# Patient Record
Sex: MALE | Race: BLACK OR AFRICAN AMERICAN | Employment: FULL TIME | URBAN - METROPOLITAN AREA
[De-identification: names, ages, dates, MRNs, and addresses within clinical notes are randomized per-mention and may not be internally consistent; named-entity substitution may affect disease eponyms.]

---

## 2017-01-23 DIAGNOSIS — I10 ESSENTIAL HYPERTENSION WITH GOAL BLOOD PRESSURE LESS THAN 130/80: ICD-10-CM

## 2017-01-24 RX ORDER — HYDROCHLOROTHIAZIDE 12.5 MG/1
TABLET ORAL
Qty: 30 TAB | Refills: 0 | Status: SHIPPED | OUTPATIENT
Start: 2017-01-24 | End: 2017-02-27 | Stop reason: SDUPTHER

## 2017-01-24 RX ORDER — AMLODIPINE BESYLATE 10 MG/1
TABLET ORAL
Qty: 30 TAB | Refills: 0 | Status: SHIPPED | OUTPATIENT
Start: 2017-01-24 | End: 2017-02-27 | Stop reason: SDUPTHER

## 2017-01-24 RX ORDER — LINAGLIPTIN AND METFORMIN HYDROCHLORIDE 2.5; 1 MG/1; MG/1
TABLET, FILM COATED ORAL
Qty: 60 TAB | Refills: 0 | Status: SHIPPED | OUTPATIENT
Start: 2017-01-24 | End: 2017-02-27 | Stop reason: SDUPTHER

## 2017-01-24 RX ORDER — ROSUVASTATIN CALCIUM 10 MG/1
TABLET, FILM COATED ORAL
Qty: 30 TAB | Refills: 0 | Status: SHIPPED | OUTPATIENT
Start: 2017-01-24 | End: 2017-02-27 | Stop reason: SDUPTHER

## 2017-03-06 ENCOUNTER — OFFICE VISIT (OUTPATIENT)
Dept: INTERNAL MEDICINE CLINIC | Age: 52
End: 2017-03-06

## 2017-03-06 VITALS
HEIGHT: 69 IN | OXYGEN SATURATION: 97 % | BODY MASS INDEX: 33.92 KG/M2 | SYSTOLIC BLOOD PRESSURE: 134 MMHG | TEMPERATURE: 98.5 F | RESPIRATION RATE: 16 BRPM | HEART RATE: 92 BPM | DIASTOLIC BLOOD PRESSURE: 97 MMHG | WEIGHT: 229 LBS

## 2017-03-06 DIAGNOSIS — J11.1 INFLUENZA-LIKE ILLNESS: Primary | ICD-10-CM

## 2017-03-06 DIAGNOSIS — E78.2 MIXED HYPERLIPIDEMIA: ICD-10-CM

## 2017-03-06 DIAGNOSIS — R05.9 COUGH: ICD-10-CM

## 2017-03-06 DIAGNOSIS — E11.9 TYPE 2 DIABETES MELLITUS WITHOUT COMPLICATION, WITHOUT LONG-TERM CURRENT USE OF INSULIN (HCC): ICD-10-CM

## 2017-03-06 DIAGNOSIS — I10 ESSENTIAL HYPERTENSION: ICD-10-CM

## 2017-03-06 LAB
FLUAV+FLUBV AG NOSE QL IA.RAPID: NEGATIVE POS/NEG
FLUAV+FLUBV AG NOSE QL IA.RAPID: NEGATIVE POS/NEG
VALID INTERNAL CONTROL?: YES

## 2017-03-06 NOTE — PROGRESS NOTES
Room 14    Chief Complaint   Patient presents with    Cough    Generalized Body Aches     started with fever on Wed. of last week. Has been taking cloraciden for cold symptoms. Patient did not take bp medications on Thurs. and Friday, has since  prescription on Sat. and resumed regime. 1. Have you been to the ER, urgent care clinic since your last visit? Hospitalized since your last visit? No    2. Have you seen or consulted any other health care providers outside of the [address] [place] [address] since your last visit? Include any pap smears or colon screening. No     Health Maintenance Due   Topic Date Due    DTaP/Tdap/Td series (1 - Tdap) 06/30/1987    EYE EXAM RETINAL OR DILATED Q1  06/25/2015    FOBT Q 1 YEAR AGE 50-75  06/30/2016    INFLUENZA AGE 9 TO ADULT  08/01/2016    HEMOGLOBIN A1C Q6M  02/05/2017   Patient did not receive a flu shot for 2016. Patient has upcoming appt. with Ms. Kimberly Reyna on March 17th. Patient does not have a living will, he is looking into completing one. Information provided with AVS today.

## 2017-03-06 NOTE — LETTER
NOTIFICATION RETURN TO WORK / SCHOOL 
 
3/6/2017 10:10 AM 
 
Mr. Esteban De La Rosa [address] [facility] [id number] To Whom It May Concern: 
 
Esteban De La Rosa is currently under the care of [facility]. He will return to work/school on: 3/9/17 If there are questions or concerns please have the patient contact our office. Sincerely, Janet Wang MD

## 2017-03-06 NOTE — MR AVS SNAPSHOT
Visit Information Date & Time Provider Department Dept. Phone Encounter #  
 3/6/2017  9:45 AM Raul Hanna MD [facility] [phone] [id number] Follow-up Instructions Return in about 11 days (around 3/17/2017), or if symptoms worsen or fail to improve, for diabetes, blood pressure as scheduled. Your Appointments 3/17/2017  8:00 AM  
ROUTINE CARE with Milka Jaimes NP [facility] ([address] [facility]) Appt Note: f/u  
 [address] [facility] [address] [facility] [address]  
[facility] [address] [facility] [address] Upcoming Health Maintenance Date Due DTaP/Tdap/Td series (1 - Tdap) 6/30/1987 EYE EXAM RETINAL OR DILATED Q1 6/25/2015 FOBT Q 1 YEAR AGE 50-75 6/30/2016 INFLUENZA AGE 9 TO ADULT 8/1/2016 HEMOGLOBIN A1C Q6M 2/5/2017 FOOT EXAM Q1 8/5/2017 MICROALBUMIN Q1 8/5/2017 LIPID PANEL Q1 8/5/2017 Allergies as of 3/6/2017  Review Complete On: 3/6/2017 By: Raul Hanna MD  
 No Known Allergies Current Immunizations  Never Reviewed Name Date Pneumococcal Polysaccharide (PPSV-23) 8/5/2016 Not reviewed this visit You Were Diagnosed With   
  
 Codes Comments Influenza-like illness    -  Primary ICD-10-CM: R69 
ICD-9-CM: 799.89 Cough     ICD-10-CM: R05 ICD-9-CM: 577. 2 Type 2 diabetes mellitus without complication, without long-term current use of insulin (HCC)     ICD-10-CM: E11.9 ICD-9-CM: 250.00 Essential hypertension     ICD-10-CM: I10 
ICD-9-CM: 401.9 Mixed hyperlipidemia     ICD-10-CM: E78.2 ICD-9-CM: 272.2 Vitals BP Pulse Temp Resp Height(growth percentile) Weight(growth percentile) (!) 134/97 (BP 1 Location: Left arm, BP Patient Position: Sitting) 92 98.5 °F (36.9 °C) (Oral) 16 5' 9\" (1.753 m) 229 lb (103.9 kg) SpO2 BMI Smoking Status 97% 33.82 kg/m2 Never Smoker Vitals History BMI and BSA Data Body Mass Index Body Surface Area  
 33.82 kg/m 2 2.25 m 2 Preferred Pharmacy Pharmacy Name Phone [id number] [facility] [phone] Your Updated Medication List  
  
   
This list is accurate as of: 3/6/17 10:13 AM.  Always use your most recent med list.  
  
  
  
  
 albuterol 90 mcg/actuation inhaler Commonly known as:  PROVENTIL HFA, VENTOLIN HFA, PROAIR HFA Take 2 Puffs by inhalation every six (6) hours as needed for Wheezing. amLODIPine 10 mg tablet Commonly known as:  Patterson Cater TAKE 1 TAB BY MOUTH DAILY. APPLE CIDER VINEGAR PO Take  by mouth. CINNAMON 500 mg Cap Generic drug:  cinnamon bark Take 1,000 mg by mouth two (2) times a day. coenzyme Q-10 200 mg capsule Commonly known as:  CO Q-10 Take 1 Cap by mouth daily. CRESTOR 10 mg tablet Generic drug:  rosuvastatin TAKE 1 TABLET BY MOUTH EVERY DAY  
  
 glucose blood VI test strips strip Commonly known as:  Ascensia CONTOUR  
PATIENT IS TO CHECK BLOOD SUGAR TWICE DAILY. guaiFENesin-dextromethorphan -30 mg per tablet Commonly known as:  Roberto & Roberto DM Take 1 Tab by mouth every twelve (12) hours as needed for Cough or Congestion. hydroCHLOROthiazide 12.5 mg tablet Commonly known as:  HYDRODIURIL  
TAKE 1 TABLET BY MOUTH EVERY DAY  
  
 linagliptin-metFORMIN 2.5-1,000 mg per tablet Commonly known as:  Keke Lips Take 1 Tab by mouth two (2) times daily (with meals). APPOINTMENT REQUIRED Prescriptions Sent to Pharmacy Refills  
 guaiFENesin-dextromethorphan SR (MUCINEX DM) 600-30 mg per tablet 1 Sig: Take 1 Tab by mouth every twelve (12) hours as needed for Cough or Congestion.   
 Class: Normal  
 Pharmacy: [facility]/pharmacy #[id number]- [id number], [address] [place] [address] [facility] [facility] #: [phone] Route: Oral  
  
We Performed the Following AMB POC MARYLOU INFLUENZA A/B TEST [10882 CPT(R)] Follow-up Instructions Return in about 11 days (around 3/17/2017), or if symptoms worsen or fail to improve, for diabetes, blood pressure as scheduled. Patient Instructions Cely Richards 3384 What is a living will? A living will is a legal form you use to write down the kind of care you want at the end of your life. It is used by the health professionals who will treat you if you aren't able to decide for yourself. If you put your wishes in writing, your loved ones and others will know what kind of care you want. They won't need to guess. This can ease your mind and be helpful to others. A living will is not the same as an estate or property will. An estate will explains what you want to happen with your money and property after you die. Is a living will a legal document? A living will is a legal document. Each state has its own laws about living rondon. If you move to another state, make sure that your living will is legal in the state where you now live. Or you might use a universal form that has been approved by many states. This kind of form can sometimes be completed and stored online. Your electronic copy will then be available wherever you have a connection to the Internet. In most cases, doctors will respect your wishes even if you have a form from a different state. · You don't need an  to complete a living will. But legal advice can be helpful if your state's laws are unclear, your health history is complicated, or your family can't agree on what should be in your living will. · You can change your living will at any time. Some people find that their wishes about end-of-life care change as their health changes.  
· In addition to making a living will, think about completing a medical power of  form. This form lets you name the person you want to make end-of-life treatment decisions for you (your \"health care agent\") if you're not able to. Many hospitals and nursing homes will give you the forms you need to complete a living will and a medical power of . · Your living will is used only if you can't make or communicate decisions for yourself anymore. If you become able to make decisions again, you can accept or refuse any treatment, no matter what you wrote in your living will. · Your state may offer an online registry. This is a place where you can store your living will online so the doctors and nurses who need to treat you can find it right away. What should you think about when creating a living will? Talk about your end-of-life wishes with your family members and your doctor. Let them know what you want. That way the people making decisions for you won't be surprised by your choices. Think about these questions as you make your living will: · Do you know enough about life support methods that might be used? If not, talk to your doctor so you know what might be done if you can't breathe on your own, your heart stops, or you're unable to swallow. · What things would you still want to be able to do after you receive life-support methods? Would you want to be able to walk? To speak? To eat on your own? To live without the help of machines? · If you have a choice, where do you want to be cared for? In your home? At a hospital or nursing home? · Do you want certain Episcopal practices performed if you become very ill? · If you have a choice at the end of your life, where would you prefer to die? At home? In a hospital or nursing home? Somewhere else? · Would you prefer to be buried or cremated? · Do you want your organs to be donated after you die? What should you do with your living will?  
· Make sure that your family members and your health care agent have copies of your living will. · Give your doctor a copy of your living will to keep in your medical record. If you have more than one doctor, make sure that each one has a copy. · You may want to put a copy of your living will where it can be easily found. Where can you learn more? Go to http://rosi-shell.info/. Enter H710 in the search box to learn more about \"Learning About Living Nata. \" Current as of: February 24, 2016 Content Version: 11.1 © 6610-4541 [employer]. Care instructions adapted under license by [employer] (which disclaims liability or warranty for this information). If you have questions about a medical condition or this instruction, always ask your healthcare professional. [address] any warranty or liability for your use of this information. Introducing [facility] & HEALTH SERVICES! [facility] introduces [employer] patient portal. Now you can access parts of your medical record, email your doctor's office, and request medication refills online. 1. In your internet browser, go to [email or web address] 2. Click on the First Time User? Click Here link in the Sign In box. You will see the New Member Sign Up page. 3. Enter your [employer] Access Code exactly as it appears below. You will not need to use this code after youve completed the sign-up process. If you do not sign up before the expiration date, you must request a new code. · [employer] Access Code: [email or web address] Expires: 3/20/2017  8:47 PM 
 
4. Enter the last four digits of your Social Security Number (xxxx) and Date of Birth (mm/dd/yyyy) as indicated and click Submit. You will be taken to the next sign-up page. 5. Create a Smart Panelt ID. This will be your [employer] login ID and cannot be changed, so think of one that is secure and easy to remember. 6. Create a Smart Panelt password. You can change your password at any time. 7. Enter your Password Reset Question and Answer. This can be used at a later time if you forget your password. 8. Enter your e-mail address. You will receive e-mail notification when new information is available in [address] 9. Click Sign Up. You can now view and download portions of your medical record. 10. Click the Download Summary menu link to download a portable copy of your medical information. If you have questions, please visit the Frequently Asked Questions section of the Droid[employer] website. Remember, [employer] is NOT to be used for urgent needs. For medical emergencies, dial 911. Now available from your iPhone and Android! Please provide this summary of care documentation to your next provider. Your primary care clinician is listed as Herminia Gallardo. If you have any questions after today's visit, please call [phone].

## 2017-03-06 NOTE — PATIENT INSTRUCTIONS
Learning About Living Nata  What is a living will? A living will is a legal form you use to write down the kind of care you want at the end of your life. It is used by the health professionals who will treat you if you aren't able to decide for yourself. If you put your wishes in writing, your loved ones and others will know what kind of care you want. They won't need to guess. This can ease your mind and be helpful to others. A living will is not the same as an estate or property will. An estate will explains what you want to happen with your money and property after you die. Is a living will a legal document? A living will is a legal document. Each state has its own laws about living rondon. If you move to another state, make sure that your living will is legal in the state where you now live. Or you might use a universal form that has been approved by many states. This kind of form can sometimes be completed and stored online. Your electronic copy will then be available wherever you have a connection to the Internet. In most cases, doctors will respect your wishes even if you have a form from a different state. · You don't need an  to complete a living will. But legal advice can be helpful if your state's laws are unclear, your health history is complicated, or your family can't agree on what should be in your living will. · You can change your living will at any time. Some people find that their wishes about end-of-life care change as their health changes. · In addition to making a living will, think about completing a medical power of  form. This form lets you name the person you want to make end-of-life treatment decisions for you (your \"health care agent\") if you're not able to. Many hospitals and nursing homes will give you the forms you need to complete a living will and a medical power of .   · Your living will is used only if you can't make or communicate decisions for yourself anymore. If you become able to make decisions again, you can accept or refuse any treatment, no matter what you wrote in your living will. · Your state may offer an online registry. This is a place where you can store your living will online so the doctors and nurses who need to treat you can find it right away. What should you think about when creating a living will? Talk about your end-of-life wishes with your family members and your doctor. Let them know what you want. That way the people making decisions for you won't be surprised by your choices. Think about these questions as you make your living will:  · Do you know enough about life support methods that might be used? If not, talk to your doctor so you know what might be done if you can't breathe on your own, your heart stops, or you're unable to swallow. · What things would you still want to be able to do after you receive life-support methods? Would you want to be able to walk? To speak? To eat on your own? To live without the help of machines? · If you have a choice, where do you want to be cared for? In your home? At a hospital or nursing home? · Do you want certain Adventist practices performed if you become very ill? · If you have a choice at the end of your life, where would you prefer to die? At home? In a hospital or nursing home? Somewhere else? · Would you prefer to be buried or cremated? · Do you want your organs to be donated after you die? What should you do with your living will? · Make sure that your family members and your health care agent have copies of your living will. · Give your doctor a copy of your living will to keep in your medical record. If you have more than one doctor, make sure that each one has a copy. · You may want to put a copy of your living will where it can be easily found. Where can you learn more? Go to http://rosi-shell.info/.   Enter M926 in the search box to learn more about \"Learning About Living Perroy. \"  Current as of: February 24, 2016  Content Version: 11.1  © 2790-2082 [employer]. Care instructions adapted under license by [employer] (which disclaims liability or warranty for this information). If you have questions about a medical condition or this instruction, always ask your healthcare professional. [address] any warranty or liability for your use of this information.

## 2017-03-06 NOTE — PROGRESS NOTES
HISTORY OF PRESENT ILLNESS  Kandy Richter is a 49 y.o. male. HPI  Presents for acute care    Several days of fever, chills, cough, HAs, body aches  Started 5-6 days ago    Now increased thickened secretions and sputum production    Sister staying with him and she has had similar sx    OTC meds have helped some     Fair PO intake, drinking fluids    Past medical, Social, and Family history reviewed  Medications reviewed and updated. ROS  Complete ROS reviewed and negative or stable except as noted in HPI. Physical Exam   Constitutional: He is oriented to person, place, and time. He appears well-nourished. No distress. HENT:   Head: Normocephalic and atraumatic. Eyes: EOM are normal. Pupils are equal, round, and reactive to light. No scleral icterus. Neck: Normal range of motion. Neck supple. Cardiovascular: Normal rate, regular rhythm and normal heart sounds. Exam reveals no gallop and no friction rub. No murmur heard. Pulmonary/Chest: Effort normal and breath sounds normal. No respiratory distress. He has no wheezes. He has no rales. Abdominal: Soft. He exhibits no distension. There is no tenderness. Musculoskeletal: Normal range of motion. He exhibits no edema. Neurological: He is alert and oriented to person, place, and time. He exhibits normal muscle tone. Skin: Skin is warm. No rash noted. Psychiatric: He has a normal mood and affect. Nursing note and vitals reviewed. Prior labs reviewed. ASSESSMENT and PLAN    ICD-10-CM ICD-9-CM    1. Influenza-like illness R69 799.89 guaiFENesin-dextromethorphan SR (MUCINEX DM) 600-30 mg per tablet   2. Cough R05 786.2 AMB POC MARYLOU INFLUENZA A/B TEST      guaiFENesin-dextromethorphan SR (MUCINEX DM) 600-30 mg per tablet      CANCELED: AMB POC RAPID INFLUENZA TEST   3. Type 2 diabetes mellitus without complication, without long-term current use of insulin (HCC) E11.9 250.00    4. Essential hypertension I10 401.9    5.  Mixed hyperlipidemia E78.2 272.2      Follow-up Disposition:  Return in about 11 days (around 3/17/2017), or if symptoms worsen or fail to improve, for diabetes, blood pressure as scheduled.    results and schedule of future studies reviewed with patient  reviewed diet and weight   reviewed medications and side effects in detail   Has routine f/u on 3/17/17  Encouraged mucinex  Stay hydrated

## 2017-03-17 ENCOUNTER — OFFICE VISIT (OUTPATIENT)
Dept: INTERNAL MEDICINE CLINIC | Age: 52
End: 2017-03-17

## 2017-03-17 VITALS
HEART RATE: 90 BPM | DIASTOLIC BLOOD PRESSURE: 85 MMHG | BODY MASS INDEX: 34.04 KG/M2 | HEIGHT: 69 IN | WEIGHT: 229.8 LBS | TEMPERATURE: 98.1 F | RESPIRATION RATE: 18 BRPM | SYSTOLIC BLOOD PRESSURE: 132 MMHG | OXYGEN SATURATION: 95 %

## 2017-03-17 DIAGNOSIS — E78.2 MIXED HYPERLIPIDEMIA: ICD-10-CM

## 2017-03-17 DIAGNOSIS — N52.9 ERECTILE DYSFUNCTION, UNSPECIFIED ERECTILE DYSFUNCTION TYPE: ICD-10-CM

## 2017-03-17 DIAGNOSIS — E11.69 TYPE 2 DIABETES MELLITUS WITH OTHER SPECIFIED COMPLICATION (HCC): Primary | ICD-10-CM

## 2017-03-17 DIAGNOSIS — I10 ESSENTIAL HYPERTENSION: ICD-10-CM

## 2017-03-17 DIAGNOSIS — Z12.5 PROSTATE CANCER SCREENING: ICD-10-CM

## 2017-03-17 LAB
BILIRUB UR QL STRIP: NEGATIVE
GLUCOSE UR-MCNC: NEGATIVE MG/DL
HBA1C MFR BLD HPLC: 8.6 % (ref 4.8–5.6)
KETONES P FAST UR STRIP-MCNC: NEGATIVE MG/DL
PH UR STRIP: 5 [PH] (ref 4.6–8)
PROT UR QL STRIP: NEGATIVE MG/DL
SP GR UR STRIP: 1.02 (ref 1–1.03)
UA UROBILINOGEN AMB POC: NORMAL (ref 0.2–1)
URINALYSIS CLARITY POC: CLEAR
URINALYSIS COLOR POC: YELLOW
URINE BLOOD POC: NEGATIVE
URINE LEUKOCYTES POC: NEGATIVE
URINE NITRITES POC: NEGATIVE

## 2017-03-17 RX ORDER — SILDENAFIL 100 MG/1
100 TABLET, FILM COATED ORAL AS NEEDED
Qty: 6 TAB | Refills: 0 | Status: SHIPPED | OUTPATIENT
Start: 2017-03-17 | End: 2018-03-07

## 2017-03-17 RX ORDER — PIOGLITAZONEHYDROCHLORIDE 15 MG/1
15 TABLET ORAL DAILY
Qty: 30 TAB | Refills: 3 | Status: SHIPPED | OUTPATIENT
Start: 2017-03-17 | End: 2017-08-08 | Stop reason: SDUPTHER

## 2017-03-17 NOTE — PROGRESS NOTES
HISTORY OF PRESENT ILLNESS  Francesca Sainz is a 49 y.o. male. HPI  Was seen by Dr. Jarrett Schafer for flu like symptoms March 1; negative rapid flu This is second consecutive year with similar symptoms. Plans to get influenza vaccine next year    Fasting blood surgars 130's-140's. Not eating breakfast,     Exercising at gym 5 days weekly, walking 1 mile a day, no loger lifting weight    Chronic poor sleep. ~ 4 hours daily    Did not take CoQ10 recommed but no longer has leg cramps      Takes 1 tablesppon vinegar in AM    Eye exam up to date. [employer] [facility]    Past Medical History:   Diagnosis Date    Arthritis     Borderline diabetic     Diabetes ([facility] [address]     Family history of premature CAD     Hypertension        Current Outpatient Prescriptions on File Prior to Visit   Medication Sig Dispense Refill    APPLE CIDER VINEGAR PO Take  by mouth.  guaiFENesin-dextromethorphan SR (MUCINEX DM) 600-30 mg per tablet Take 1 Tab by mouth every twelve (12) hours as needed for Cough or Congestion. 30 Tab 1    amLODIPine (NORVASC) 10 mg tablet TAKE 1 TAB BY MOUTH DAILY. 30 Tab 0    hydroCHLOROthiazide (HYDRODIURIL) 12.5 mg tablet TAKE 1 TABLET BY MOUTH EVERY DAY 30 Tab 0    CRESTOR 10 mg tablet TAKE 1 TABLET BY MOUTH EVERY DAY 30 Tab 0    linagliptin-metFORMIN (JENTADUETO) 2.5-1,000 mg per tablet Take 1 Tab by mouth two (2) times daily (with meals). APPOINTMENT REQUIRED 60 Tab 0    coenzyme Q-10 (CO Q-10) 200 mg capsule Take 1 Cap by mouth daily. 30 Cap 11    glucose blood VI test strips (ASCENSIA CONTOUR) strip PATIENT IS TO CHECK BLOOD SUGAR TWICE DAILY. 100 Strip 11    cinnamon bark (CINNAMON) 500 mg cap Take 1,000 mg by mouth two (2) times a day.  albuterol (PROVENTIL HFA, VENTOLIN HFA, PROAIR HFA) 90 mcg/actuation inhaler Take 2 Puffs by inhalation every six (6) hours as needed for Wheezing. 1 Inhaler 0     No current facility-administered medications on file prior to visit.       Review of Systems Constitutional: Negative for malaise/fatigue and weight loss. HENT: Negative. Eyes: Negative for blurred vision. Respiratory: Negative. Cardiovascular: Negative. Gastrointestinal: Negative. Genitourinary: Negative. Musculoskeletal: Negative. Neurological: Negative. Psychiatric/Behavioral: The patient has insomnia. Physical Exam   Constitutional: He is oriented to person, place, and time. He appears well-developed and well-nourished. No distress. HENT:   Right Ear: External ear normal.   Left Ear: External ear normal.   Nose: Nose normal.   Mouth/Throat: Oropharynx is clear and moist. No oropharyngeal exudate. Eyes: Conjunctivae are normal. Right eye exhibits no discharge. Left eye exhibits no discharge. Neck: No thyromegaly present. Cardiovascular: Normal rate, regular rhythm and normal heart sounds. Pulmonary/Chest: Effort normal and breath sounds normal.   Musculoskeletal: He exhibits no edema, tenderness or deformity. Lymphadenopathy:     He has no cervical adenopathy. Neurological: He is alert and oriented to person, place, and time. No cranial nerve deficit. Skin: Skin is warm and dry. He is not diaphoretic. Psychiatric: He has a normal mood and affect. His behavior is normal. Judgment and thought content normal.   Diabetic foot exam:     Left:    Pulse DP: 2+ (normal)   Pulse PT: trace   Deformities: None  Right:    Pulse DP: 2+ (normal)   Pulse PT: trace   Deformities: None    ASSESSMENT and PLAN    ICD-10-CM ICD-9-CM    1. Type 2 diabetes mellitus with other specified complication ([facility]) W03.74 250.80 AMB POC HEMOGLOBIN A1C      AMB POC URINALYSIS DIP STICK AUTO W/ MICRO      METABOLIC PANEL, COMPREHENSIVE      pioglitazone (ACTOS) 15 mg tablet      CBC WITH AUTOMATED DIFF   2. Essential hypertension W35 601.7 METABOLIC PANEL, COMPREHENSIVE   3. Mixed hyperlipidemia E78.2 272.2 LIPID PANEL      METABOLIC PANEL, COMPREHENSIVE   4.  Erectile dysfunction, unspecified erectile dysfunction type N52.9 607.84 sildenafil citrate (VIAGRA) 100 mg tablet   5. Prostate cancer screening Z12.5 V76.44 PSA W/ REFLX FREE PSA     Follow-up Disposition:  Return in about 3 months (around 6/17/2017) for diabetes, htn, hyperlipidemia.  lab results and schedule of future lab studies reviewed with patient  reviewed diet, exercise and weight control  reviewed medications and side effects in detail  specific diabetic recommendations: low cholesterol diet, weight control and daily exercise discussed, home glucose monitoring emphasized, all medications, side effects and compliance discussed carefully, glycohemoglobin and other lab monitoring discussed and long term diabetic complications discussed  use of aspirin to prevent MI and TIA's discussed    Hemoglobin A1 8.6% was 8.1%. Add Actos 15 mg daily. Continue current medications.  Reinforce lifestyle management

## 2017-03-17 NOTE — PROGRESS NOTES
RM#7  Chief Complaint   Patient presents with    Follow-up     Diabetes, flue last week     Results for orders placed or performed in visit on 03/17/17   AMB POC HEMOGLOBIN A1C   Result Value Ref Range    Hemoglobin A1c (POC) 8.6 (A) 4.8 - 5.6 %   AMB POC URINALYSIS DIP STICK AUTO W/ MICRO   Result Value Ref Range    Color (UA POC) Yellow     Clarity (UA POC) Clear     Glucose (UA POC) Negative Negative    Bilirubin (UA POC) Negative Negative    Ketones (UA POC) Negative Negative    Specific gravity (UA POC) 1.020 1.001 - 1.035    Blood (UA POC) Negative Negative    pH (UA POC) 5.0 4.6 - 8.0    Protein (UA POC) Negative Negative mg/dL    Urobilinogen (UA POC) 0.2 mg/dL 0.2 - 1    Nitrites (UA POC) Negative Negative    Leukocyte esterase (UA POC) Negative Negative       1. Have you been to the ER, urgent care clinic since your last visit? Hospitalized since your last visit? No    2. Have you seen or consulted any other health care providers outside of the [address] [place] [address] since your last visit? Include any pap smears or colon screening.  No

## 2017-03-17 NOTE — MR AVS SNAPSHOT
Visit Information Date & Time Provider Department Dept. Phone Encounter #  
 3/17/2017  8:00 AM Shelley Barrera [phone] Pediatrics and Internal Medicine [phone] [id number] Follow-up Instructions Return in about 3 months (around 6/17/2017) for diabetes, htn, hyperlipidemia. Upcoming Health Maintenance Date Due DTaP/Tdap/Td series (1 - Tdap) 6/30/1987 EYE EXAM RETINAL OR DILATED Q1 6/25/2015 FOBT Q 1 YEAR AGE 50-75 6/30/2016 INFLUENZA AGE 9 TO ADULT 8/1/2016 MICROALBUMIN Q1 8/5/2017 LIPID PANEL Q1 8/5/2017 HEMOGLOBIN A1C Q6M 9/17/2017 FOOT EXAM Q1 3/17/2018 Allergies as of 3/17/2017  Review Complete On: 3/17/2017 By: Betito Mota No Known Allergies Current Immunizations  Never Reviewed Name Date Pneumococcal Polysaccharide (PPSV-23) 8/5/2016 Not reviewed this visit You Were Diagnosed With   
  
 Codes Comments Type 2 diabetes mellitus with other specified complication (HCC)    -  Primary ICD-10-CM: E11.69 ICD-9-CM: 250.80 Erectile dysfunction, unspecified erectile dysfunction type     ICD-10-CM: N52.9 ICD-9-CM: 607.84 Essential hypertension     ICD-10-CM: I10 
ICD-9-CM: 401.9 Prostate cancer screening     ICD-10-CM: Z12.5 ICD-9-CM: V76.44 Mixed hyperlipidemia     ICD-10-CM: E78.2 ICD-9-CM: 272.2 Vitals BP Pulse Temp Resp Height(growth percentile) Weight(growth percentile) 132/85 (BP 1 Location: Left arm, BP Patient Position: Sitting) 90 98.1 °F (36.7 °C) (Oral) 18 5' 9.02\" (1.753 m) 229 lb 12.8 oz (104.2 kg) SpO2 BMI Smoking Status 95% 33.92 kg/m2 Never Smoker BMI and BSA Data Body Mass Index Body Surface Area  
 33.92 kg/m 2 2.25 m 2 Preferred Pharmacy Pharmacy Name Phone [facility]/PHARMACY #[id number] [facility] [phone] Your Updated Medication List  
  
   
 This list is accurate as of: 3/17/17  8:45 AM.  Always use your most recent med list.  
  
  
  
  
 albuterol 90 mcg/actuation inhaler Commonly known as:  PROVENTIL HFA, VENTOLIN HFA, PROAIR HFA Take 2 Puffs by inhalation every six (6) hours as needed for Wheezing. amLODIPine 10 mg tablet Commonly known as:  Suzon Salle TAKE 1 TAB BY MOUTH DAILY. APPLE CIDER VINEGAR PO Take  by mouth. CINNAMON 500 mg Cap Generic drug:  cinnamon bark Take 1,000 mg by mouth two (2) times a day. coenzyme Q-10 200 mg capsule Commonly known as:  CO Q-10 Take 1 Cap by mouth daily. CRESTOR 10 mg tablet Generic drug:  rosuvastatin TAKE 1 TABLET BY MOUTH EVERY DAY  
  
 glucose blood VI test strips strip Commonly known as:  Ascensia CONTOUR  
PATIENT IS TO CHECK BLOOD SUGAR TWICE DAILY. guaiFENesin-dextromethorphan -30 mg per tablet Commonly known as:  Roberto & Roberto DM Take 1 Tab by mouth every twelve (12) hours as needed for Cough or Congestion. hydroCHLOROthiazide 12.5 mg tablet Commonly known as:  HYDRODIURIL  
TAKE 1 TABLET BY MOUTH EVERY DAY  
  
 linagliptin-metFORMIN 2.5-1,000 mg per tablet Commonly known as:  Zannie Libby Take 1 Tab by mouth two (2) times daily (with meals). APPOINTMENT REQUIRED  
  
 pioglitazone 15 mg tablet Commonly known as:  ACTOS Take 1 Tab by mouth daily. sildenafil citrate 100 mg tablet Commonly known as:  VIAGRA Take 1 Tab by mouth as needed. Prescriptions Sent to Pharmacy Refills  
 pioglitazone (ACTOS) 15 mg tablet 3 Sig: Take 1 Tab by mouth daily. Class: Normal  
 Pharmacy: [place] [address] [address] [place] [address] Ph #: [phone] Route: Oral  
 sildenafil citrate (VIAGRA) 100 mg tablet 0 Sig: Take 1 Tab by mouth as needed.   
 Class: Normal  
 Pharmacy: [facility]/pharmacy #[id number], [address] [place] [address] [facility] #: [phone] Route: Oral  
  
We Performed the Following AMB POC HEMOGLOBIN A1C [27047 CPT(R)] AMB POC URINALYSIS DIP STICK AUTO W/ MICRO [07244 CPT(R)] CBC WITH AUTOMATED DIFF [12579 CPT(R)] LIPID PANEL [20242 CPT(R)] METABOLIC PANEL, COMPREHENSIVE [30319 CPT(R)] PSA W/ REFLX FREE PSA [74149 CPT(R)] Follow-up Instructions Return in about 3 months (around 6/17/2017) for diabetes, htn, hyperlipidemia. Introducing [facility] & HEALTH SERVICES! Rola Brown introduces [employer]unity patient portal. Now you can access parts of your medical record, email your doctor's office, and request medication refills online. 1. In your internet browser, go to [email or web address] 2. Click on the First Time User? Click Here link in the Sign In box. You will see the New Member Sign Up page. 3. Enter your Attunity Access Code exactly as it appears below. You will not need to use this code after youve completed the sign-up process. If you do not sign up before the expiration date, you must request a new code. · Attunity Access Code: [email or web address] Expires: 3/20/2017  9:47 PM 
 
4. Enter the last four digits of your Social Security Number (xxxx) and Date of Birth (mm/dd/yyyy) as indicated and click Submit. You will be taken to the next sign-up page. 5. Create a Attunity ID. This will be your Attunity login ID and cannot be changed, so think of one that is secure and easy to remember. 6. Create a Attunity password. You can change your password at any time. 7. Enter your Password Reset Question and Answer. This can be used at a later time if you forget your password. 8. Enter your e-mail address. You will receive e-mail notification when new information is available in [address] 9. Click Sign Up. You can now view and download portions of your medical record.  
10. Click the Download Summary menu link to download a portable copy of your medical information. If you have questions, please visit the Frequently Asked Questions section of the LPATH website. Remember, LPATH is NOT to be used for urgent needs. For medical emergencies, dial 911. Now available from your iPhone and Android! Please provide this summary of care documentation to your next provider. Your primary care clinician is listed as Dax Klein. If you have any questions after today's visit, please call [phone].

## 2017-04-02 DIAGNOSIS — I10 ESSENTIAL HYPERTENSION WITH GOAL BLOOD PRESSURE LESS THAN 130/80: ICD-10-CM

## 2017-04-03 RX ORDER — HYDROCHLOROTHIAZIDE 12.5 MG/1
TABLET ORAL
Qty: 30 TAB | Refills: 0 | Status: SHIPPED | OUTPATIENT
Start: 2017-04-03 | End: 2017-04-30 | Stop reason: SDUPTHER

## 2017-04-03 RX ORDER — LINAGLIPTIN AND METFORMIN HYDROCHLORIDE 2.5; 1 MG/1; MG/1
TABLET, FILM COATED ORAL
Qty: 60 TAB | Refills: 0 | Status: SHIPPED | OUTPATIENT
Start: 2017-04-03 | End: 2017-04-30 | Stop reason: SDUPTHER

## 2017-04-03 RX ORDER — ROSUVASTATIN CALCIUM 10 MG/1
TABLET, FILM COATED ORAL
Qty: 30 TAB | Refills: 0 | Status: SHIPPED | OUTPATIENT
Start: 2017-04-03 | End: 2017-04-30 | Stop reason: SDUPTHER

## 2017-04-03 RX ORDER — AMLODIPINE BESYLATE 10 MG/1
TABLET ORAL
Qty: 30 TAB | Refills: 0 | Status: SHIPPED | OUTPATIENT
Start: 2017-04-03 | End: 2017-04-30 | Stop reason: SDUPTHER

## 2017-04-06 ENCOUNTER — TELEPHONE (OUTPATIENT)
Dept: INTERNAL MEDICINE CLINIC | Age: 52
End: 2017-04-06

## 2017-04-18 LAB
ALBUMIN SERPL-MCNC: 4.3 G/DL (ref 3.5–5.5)
ALBUMIN/GLOB SERPL: 1.5 {RATIO} (ref 1.2–2.2)
ALP SERPL-CCNC: 56 IU/L (ref 39–117)
ALT SERPL-CCNC: 17 IU/L (ref 0–44)
AST SERPL-CCNC: 18 IU/L (ref 0–40)
BASOPHILS # BLD AUTO: 0 X10E3/UL (ref 0–0.2)
BASOPHILS NFR BLD AUTO: 0 %
BILIRUB SERPL-MCNC: 0.5 MG/DL (ref 0–1.2)
BUN SERPL-MCNC: 21 MG/DL (ref 6–24)
BUN/CREAT SERPL: 14 (ref 9–20)
CALCIUM SERPL-MCNC: 9.5 MG/DL (ref 8.7–10.2)
CHLORIDE SERPL-SCNC: 100 MMOL/L (ref 96–106)
CHOLEST SERPL-MCNC: 149 MG/DL (ref 100–199)
CO2 SERPL-SCNC: 25 MMOL/L (ref 18–29)
CREAT SERPL-MCNC: 1.53 MG/DL (ref 0.76–1.27)
EOSINOPHIL # BLD AUTO: 0.3 X10E3/UL (ref 0–0.4)
EOSINOPHIL NFR BLD AUTO: 3 %
ERYTHROCYTE [DISTWIDTH] IN BLOOD BY AUTOMATED COUNT: 15.6 % (ref 12.3–15.4)
GLOBULIN SER CALC-MCNC: 2.9 G/DL (ref 1.5–4.5)
GLUCOSE SERPL-MCNC: 162 MG/DL (ref 65–99)
HCT VFR BLD AUTO: 38.4 % (ref 37.5–51)
HDLC SERPL-MCNC: 36 MG/DL
HGB BLD-MCNC: 12.7 G/DL (ref 12.6–17.7)
IMM GRANULOCYTES # BLD: 0 X10E3/UL (ref 0–0.1)
IMM GRANULOCYTES NFR BLD: 0 %
LDLC SERPL CALC-MCNC: 82 MG/DL (ref 0–99)
LYMPHOCYTES # BLD AUTO: 2.8 X10E3/UL (ref 0.7–3.1)
LYMPHOCYTES NFR BLD AUTO: 32 %
MCH RBC QN AUTO: 27.5 PG (ref 26.6–33)
MCHC RBC AUTO-ENTMCNC: 33.1 G/DL (ref 31.5–35.7)
MCV RBC AUTO: 83 FL (ref 79–97)
MONOCYTES # BLD AUTO: 0.4 X10E3/UL (ref 0.1–0.9)
MONOCYTES NFR BLD AUTO: 5 %
NEUTROPHILS # BLD AUTO: 5.1 X10E3/UL (ref 1.4–7)
NEUTROPHILS NFR BLD AUTO: 60 %
PLATELET # BLD AUTO: 281 X10E3/UL (ref 150–379)
POTASSIUM SERPL-SCNC: 4.5 MMOL/L (ref 3.5–5.2)
PROT SERPL-MCNC: 7.2 G/DL (ref 6–8.5)
PSA SERPL-MCNC: 1.5 NG/ML (ref 0–4)
RBC # BLD AUTO: 4.62 X10E6/UL (ref 4.14–5.8)
REFLEX CRITERIA: NORMAL
SODIUM SERPL-SCNC: 143 MMOL/L (ref 134–144)
TRIGL SERPL-MCNC: 153 MG/DL (ref 0–149)
VLDLC SERPL CALC-MCNC: 31 MG/DL (ref 5–40)
WBC # BLD AUTO: 8.6 X10E3/UL (ref 3.4–10.8)

## 2017-04-30 DIAGNOSIS — I10 ESSENTIAL HYPERTENSION WITH GOAL BLOOD PRESSURE LESS THAN 130/80: ICD-10-CM

## 2017-05-01 RX ORDER — AMLODIPINE BESYLATE 10 MG/1
TABLET ORAL
Qty: 30 TAB | Refills: 0 | Status: SHIPPED | OUTPATIENT
Start: 2017-05-01 | End: 2017-06-05 | Stop reason: SDUPTHER

## 2017-05-01 RX ORDER — HYDROCHLOROTHIAZIDE 12.5 MG/1
TABLET ORAL
Qty: 30 TAB | Refills: 0 | Status: SHIPPED | OUTPATIENT
Start: 2017-05-01 | End: 2017-06-05 | Stop reason: SDUPTHER

## 2017-05-01 RX ORDER — LINAGLIPTIN AND METFORMIN HYDROCHLORIDE 2.5; 1 MG/1; MG/1
TABLET, FILM COATED ORAL
Qty: 60 TAB | Refills: 0 | Status: SHIPPED | OUTPATIENT
Start: 2017-05-01 | End: 2017-06-05 | Stop reason: SDUPTHER

## 2017-05-01 RX ORDER — ROSUVASTATIN CALCIUM 10 MG/1
TABLET, FILM COATED ORAL
Qty: 30 TAB | Refills: 0 | Status: SHIPPED | OUTPATIENT
Start: 2017-05-01 | End: 2017-06-05 | Stop reason: SDUPTHER

## 2017-06-05 DIAGNOSIS — I10 ESSENTIAL HYPERTENSION WITH GOAL BLOOD PRESSURE LESS THAN 130/80: ICD-10-CM

## 2017-06-06 RX ORDER — AMLODIPINE BESYLATE 10 MG/1
TABLET ORAL
Qty: 30 TAB | Refills: 0 | Status: SHIPPED | OUTPATIENT
Start: 2017-06-06 | End: 2017-07-04 | Stop reason: SDUPTHER

## 2017-06-06 RX ORDER — ROSUVASTATIN CALCIUM 10 MG/1
TABLET, FILM COATED ORAL
Qty: 30 TAB | Refills: 0 | Status: SHIPPED | OUTPATIENT
Start: 2017-06-06 | End: 2017-07-04 | Stop reason: SDUPTHER

## 2017-06-06 RX ORDER — HYDROCHLOROTHIAZIDE 12.5 MG/1
TABLET ORAL
Qty: 30 TAB | Refills: 0 | Status: SHIPPED | OUTPATIENT
Start: 2017-06-06 | End: 2017-07-04 | Stop reason: SDUPTHER

## 2017-06-06 RX ORDER — LINAGLIPTIN AND METFORMIN HYDROCHLORIDE 2.5; 1 MG/1; MG/1
TABLET, FILM COATED ORAL
Qty: 60 TAB | Refills: 0 | Status: SHIPPED | OUTPATIENT
Start: 2017-06-06 | End: 2017-07-04 | Stop reason: SDUPTHER

## 2017-06-23 ENCOUNTER — OFFICE VISIT (OUTPATIENT)
Dept: INTERNAL MEDICINE CLINIC | Age: 52
End: 2017-06-23

## 2017-06-23 VITALS
HEIGHT: 69 IN | DIASTOLIC BLOOD PRESSURE: 84 MMHG | HEART RATE: 85 BPM | WEIGHT: 232 LBS | BODY MASS INDEX: 34.36 KG/M2 | TEMPERATURE: 98.5 F | SYSTOLIC BLOOD PRESSURE: 134 MMHG | RESPIRATION RATE: 18 BRPM | OXYGEN SATURATION: 97 %

## 2017-06-23 DIAGNOSIS — I10 ESSENTIAL HYPERTENSION: ICD-10-CM

## 2017-06-23 DIAGNOSIS — M25.561 CHRONIC PAIN OF RIGHT KNEE: ICD-10-CM

## 2017-06-23 DIAGNOSIS — E66.9 OBESITY (BMI 30.0-34.9): ICD-10-CM

## 2017-06-23 DIAGNOSIS — G89.29 CHRONIC PAIN OF RIGHT KNEE: ICD-10-CM

## 2017-06-23 DIAGNOSIS — R05.9 COUGH: ICD-10-CM

## 2017-06-23 DIAGNOSIS — E78.2 MIXED HYPERLIPIDEMIA: ICD-10-CM

## 2017-06-23 LAB — HBA1C MFR BLD HPLC: 7.6 % (ref 4.8–5.6)

## 2017-06-23 RX ORDER — MONTELUKAST SODIUM 10 MG/1
10 TABLET ORAL DAILY
Qty: 30 TAB | Refills: 3 | Status: SHIPPED | OUTPATIENT
Start: 2017-06-23 | End: 2017-10-22 | Stop reason: SDUPTHER

## 2017-06-23 NOTE — PROGRESS NOTES
RM#8  Chief Complaint   Patient presents with    Follow-up     Diabetes f/u     Results for orders placed or performed in visit on 06/23/17   AMB POC HEMOGLOBIN A1C   Result Value Ref Range    Hemoglobin A1c (POC) 7.6 (A) 4.8 - 5.6 %       1. Have you been to the ER, urgent care clinic since your last visit? Hospitalized since your last visit? No    2. Have you seen or consulted any other health care providers outside of the [address] [place] [address] since your last visit? Include any pap smears or colon screening.  No  Health Maintenance Due   Topic Date Due    DTaP/Tdap/Td series (1 - Tdap) 06/30/1987    EYE EXAM RETINAL OR DILATED Q1  06/25/2015    FOBT Q 1 YEAR AGE 50-75  06/30/2016

## 2017-06-23 NOTE — PROGRESS NOTES
HISTORY OF PRESENT ILLNESS  Carlos Esposito is a 49 y.o. male presents for routine visit  HPI     Fasting blood sugar usually above goal but rarely > 200. Eye exam up to date  Doing a lot better with diet, now walks 1 mile daily    Recently started traveling for work. Poor food choices then    Recurrent cough and post nasal drainage mostly at nights. Steroid inhaler and antihistamine ineffective    Past Medical History:   Diagnosis Date    Arthritis     Borderline diabetic     Diabetes ([facility]     Family history of premature CAD     Hypertension        Current Outpatient Prescriptions on File Prior to Visit   Medication Sig Dispense Refill    CRESTOR 10 mg tablet TAKE 1 TABLET BY MOUTH EVERY DAY 30 Tab 0    JENTADUETO 2.5-1,000 mg per tablet TAKE 1 TAB BY MOUTH TWO (2) TIMES DAILY (WITH MEALS). APPOINTMENT REQUIRED 60 Tab 0    hydroCHLOROthiazide (HYDRODIURIL) 12.5 mg tablet TAKE 1 TABLET BY MOUTH EVERY DAY 30 Tab 0    amLODIPine (NORVASC) 10 mg tablet TAKE 1 TAB BY MOUTH DAILY. 30 Tab 0    pioglitazone (ACTOS) 15 mg tablet Take 1 Tab by mouth daily. 30 Tab 3    APPLE CIDER VINEGAR PO Take  by mouth.  albuterol (PROVENTIL HFA, VENTOLIN HFA, PROAIR HFA) 90 mcg/actuation inhaler Take 2 Puffs by inhalation every six (6) hours as needed for Wheezing. 1 Inhaler 0    glucose blood VI test strips (ASCENSIA CONTOUR) strip PATIENT IS TO CHECK BLOOD SUGAR TWICE DAILY. 100 Strip 11    cinnamon bark (CINNAMON) 500 mg cap Take 1,000 mg by mouth two (2) times a day.  sildenafil citrate (VIAGRA) 100 mg tablet Take 1 Tab by mouth as needed. 6 Tab 0    guaiFENesin-dextromethorphan SR (MUCINEX DM) 600-30 mg per tablet Take 1 Tab by mouth every twelve (12) hours as needed for Cough or Congestion. 30 Tab 1    coenzyme Q-10 (CO Q-10) 200 mg capsule Take 1 Cap by mouth daily. 30 Cap 11     No current facility-administered medications on file prior to visit.           Review of Systems   Constitutional: Negative for malaise/fatigue and weight loss. Eyes: Negative for blurred vision. Respiratory: Negative. Gastrointestinal: Negative. Genitourinary: Negative. Musculoskeletal: Negative. Psychiatric/Behavioral: Negative. Physical Exam   Constitutional: He is oriented to person, place, and time. He appears well-developed and well-nourished. No distress. Cardiovascular: Normal rate, regular rhythm and normal heart sounds. Pulmonary/Chest: Effort normal and breath sounds normal.   Musculoskeletal: He exhibits no edema, tenderness or deformity. Neurological: He is alert and oriented to person, place, and time. Skin: Skin is warm and dry. He is not diaphoretic. Psychiatric: He has a normal mood and affect. His behavior is normal. Judgment and thought content normal.   Diabetic foot exam:     Left:    Pulse DP: 2+ (normal)   Pulse PT: absent   Deformities: None  Right:    Pulse DP: 2+ (normal)   Pulse PT: absent   Deformities: None    ASSESSMENT and PLAN    ICD-10-CM ICD-9-CM    1. Uncontrolled type 2 diabetes mellitus without complication, without long-term current use of insulin ([facility]) E11.65 250.02 AMB POC HEMOGLOBIN A1C   2. Essential hypertension I10 401.9    3. Chronic pain of right knee M25.561 719.46     G89.29 338.29    4. Cough R05 786.2 montelukast (SINGULAIR) 10 mg tablet   5. Obesity (BMI 30.0-34. 9) E66.9 278.00    6. Mixed hyperlipidemia E78.2 272.2      Follow-up Disposition:  Return in about 2 months (around 8/23/2017) for physical, fasting labs.   lab results and schedule of future lab studies reviewed with patient  reviewed diet, exercise and weight control  cardiovascular risk and specific lipid/LDL goals reviewed  reviewed medications and side effects in detail  specific diabetic recommendations: low cholesterol diet, weight control and daily exercise discussed, home glucose monitoring emphasized, all medications, side effects and compliance discussed carefully, foot care discussed and Podiatry visits discussed, annual eye examinations at Ophthalmology discussed, glycohemoglobin and other lab monitoring discussed and long term diabetic complications discussed  use of aspirin to prevent MI and TIA's discussed    Diabetes, better controlled. Hemoglobin A1c 7.6% was 8.6%. Continue current medications.  Reinforced lifestyle management

## 2017-06-23 NOTE — MR AVS SNAPSHOT
Visit Information Date & Time Provider Department Dept. Phone Encounter #  
 6/23/2017  8:15 AM Liban Farmer and Internal Medicine [phone] [id number] Follow-up Instructions Return in about 2 months (around 8/23/2017) for physical, fasting labs. Upcoming Health Maintenance Date Due DTaP/Tdap/Td series (1 - Tdap) 6/30/1987 EYE EXAM RETINAL OR DILATED Q1 6/25/2015 FOBT Q 1 YEAR AGE 50-75 6/30/2016 INFLUENZA AGE 9 TO ADULT 8/1/2017 MICROALBUMIN Q1 8/5/2017 HEMOGLOBIN A1C Q6M 12/23/2017 FOOT EXAM Q1 3/17/2018 LIPID PANEL Q1 4/17/2018 Allergies as of 6/23/2017  Review Complete On: 6/23/2017 By: Bassem Munoz NP No Known Allergies Current Immunizations  Never Reviewed Name Date Pneumococcal Polysaccharide (PPSV-23) 8/5/2016 Not reviewed this visit You Were Diagnosed With   
  
 Codes Comments Uncontrolled type 2 diabetes mellitus without complication, without long-term current use of insulin ([facility] [address]    -  Primary ICD-10-CM: E11.65 ICD-9-CM: 250.02 Essential hypertension     ICD-10-CM: I10 
ICD-9-CM: 401.9 Chronic pain of right knee     ICD-10-CM: M25.561, G89.29 ICD-9-CM: 719.46, 338.29 Cough     ICD-10-CM: R05 ICD-9-CM: 786.2 Obesity (BMI 30.0-34.9)     ICD-10-CM: V21.0 ICD-9-CM: 278.00 Mixed hyperlipidemia     ICD-10-CM: E78.2 ICD-9-CM: 272.2 Vitals BP Pulse Temp Resp Height(growth percentile) Weight(growth percentile) 134/84 85 98.5 °F (36.9 °C) (Oral) 18 5' 9.02\" (1.753 m) 232 lb (105.2 kg) SpO2 BMI Smoking Status 97% 34.24 kg/m2 Never Smoker Vitals History BMI and BSA Data Body Mass Index Body Surface Area  
 34.24 kg/m 2 2.26 m 2 Preferred Pharmacy Pharmacy Name Phone [id number] [phone] Your Updated Medication List  
  
   
 This list is accurate as of: 6/23/17  8:52 AM.  Always use your most recent med list.  
  
  
  
  
 albuterol 90 mcg/actuation inhaler Commonly known as:  PROVENTIL HFA, VENTOLIN HFA, PROAIR HFA Take 2 Puffs by inhalation every six (6) hours as needed for Wheezing. amLODIPine 10 mg tablet Commonly known as:  Josefa Rafter TAKE 1 TAB BY MOUTH DAILY. APPLE CIDER VINEGAR PO Take  by mouth. CINNAMON 500 mg Cap Generic drug:  cinnamon bark Take 1,000 mg by mouth two (2) times a day. coenzyme Q-10 200 mg capsule Commonly known as:  CO Q-10 Take 1 Cap by mouth daily. CRESTOR 10 mg tablet Generic drug:  rosuvastatin TAKE 1 TABLET BY MOUTH EVERY DAY  
  
 glucose blood VI test strips strip Commonly known as:  Ascensia CONTOUR  
PATIENT IS TO CHECK BLOOD SUGAR TWICE DAILY. guaiFENesin-dextromethorphan -30 mg per tablet Commonly known as:  Roberto & Roberto DM Take 1 Tab by mouth every twelve (12) hours as needed for Cough or Congestion. hydroCHLOROthiazide 12.5 mg tablet Commonly known as:  HYDRODIURIL  
TAKE 1 TABLET BY MOUTH EVERY DAY  
  
 JENTADUETO 2.5-1,000 mg per tablet Generic drug:  linagliptin-metFORMIN  
TAKE 1 TAB BY MOUTH TWO (2) TIMES DAILY (WITH MEALS). APPOINTMENT REQUIRED  
  
 montelukast 10 mg tablet Commonly known as:  SINGULAIR Take 1 Tab by mouth daily. pioglitazone 15 mg tablet Commonly known as:  ACTOS Take 1 Tab by mouth daily. sildenafil citrate 100 mg tablet Commonly known as:  VIAGRA Take 1 Tab by mouth as needed. Prescriptions Sent to Pharmacy Refills  
 montelukast (SINGULAIR) 10 mg tablet 3 Sig: Take 1 Tab by mouth daily. Class: Normal  
 Pharmacy: [place] [address], [address] [place] [address] #: [phone] Route: Oral  
  
We Performed the Following AMB POC HEMOGLOBIN A1C [44788 CPT(R)] Follow-up Instructions Return in about 2 months (around 8/23/2017) for physical, fasting labs. Introducing [facility] & HEALTH SERVICES! Tanis Epley introduces [employer] patient portal. Now you can access parts of your medical record, email your doctor's office, and request medication refills online. 1. In your internet browser, go to [email or web address] 2. Click on the First Time User? Click Here link in the Sign In box. You will see the New Member Sign Up page. 3. Enter your [employer] Access Code exactly as it appears below. You will not need to use this code after youve completed the sign-up process. If you do not sign up before the expiration date, you must request a new code. · [employer] Access Code: [email or web address] Expires: 9/21/2017  8:51 AM 
 
4. Enter the last four digits of your Social Security Number (xxxx) and Date of Birth (mm/dd/yyyy) as indicated and click Submit. You will be taken to the next sign-up page. 5. Create a [employer] ID. This will be your [employer] login ID and cannot be changed, so think of one that is secure and easy to remember. 6. Create a [employer] password. You can change your password at any time. 7. Enter your Password Reset Question and Answer. This can be used at a later time if you forget your password. 8. Enter your e-mail address. You will receive e-mail notification when new information is available in [address] 9. Click Sign Up. You can now view and download portions of your medical record. 10. Click the Download Summary menu link to download a portable copy of your medical information. If you have questions, please visit the Frequently Asked Questions section of the [employer] website. Remember, [employer] is NOT to be used for urgent needs. For medical emergencies, dial 911. Now available from your iPhone and Android! Please provide this summary of care documentation to your next provider. Your primary care clinician is listed as Valery Grajeda. If you have any questions after today's visit, please call [phone].

## 2017-07-04 DIAGNOSIS — I10 ESSENTIAL HYPERTENSION WITH GOAL BLOOD PRESSURE LESS THAN 130/80: ICD-10-CM

## 2017-07-05 RX ORDER — ROSUVASTATIN CALCIUM 10 MG/1
TABLET, FILM COATED ORAL
Qty: 30 TAB | Refills: 0 | Status: SHIPPED | OUTPATIENT
Start: 2017-07-05 | End: 2017-08-08 | Stop reason: SDUPTHER

## 2017-07-05 RX ORDER — LINAGLIPTIN AND METFORMIN HYDROCHLORIDE 2.5; 1 MG/1; MG/1
TABLET, FILM COATED ORAL
Qty: 60 TAB | Refills: 0 | Status: SHIPPED | OUTPATIENT
Start: 2017-07-05 | End: 2017-08-08 | Stop reason: SDUPTHER

## 2017-07-05 RX ORDER — AMLODIPINE BESYLATE 10 MG/1
TABLET ORAL
Qty: 30 TAB | Refills: 0 | Status: SHIPPED | OUTPATIENT
Start: 2017-07-05 | End: 2017-08-08 | Stop reason: SDUPTHER

## 2017-07-05 RX ORDER — HYDROCHLOROTHIAZIDE 12.5 MG/1
TABLET ORAL
Qty: 30 TAB | Refills: 0 | Status: SHIPPED | OUTPATIENT
Start: 2017-07-05 | End: 2017-08-06 | Stop reason: SDUPTHER

## 2017-08-07 RX ORDER — HYDROCHLOROTHIAZIDE 12.5 MG/1
TABLET ORAL
Qty: 30 TAB | Refills: 0 | Status: SHIPPED | OUTPATIENT
Start: 2017-08-07 | End: 2017-09-05 | Stop reason: SDUPTHER

## 2017-08-08 DIAGNOSIS — E11.69 TYPE 2 DIABETES MELLITUS WITH OTHER SPECIFIED COMPLICATION (HCC): ICD-10-CM

## 2017-08-08 DIAGNOSIS — I10 ESSENTIAL HYPERTENSION WITH GOAL BLOOD PRESSURE LESS THAN 130/80: ICD-10-CM

## 2017-08-09 RX ORDER — PIOGLITAZONEHYDROCHLORIDE 15 MG/1
TABLET ORAL
Qty: 30 TAB | Refills: 3 | Status: SHIPPED | OUTPATIENT
Start: 2017-08-09 | End: 2017-12-15 | Stop reason: SDUPTHER

## 2017-08-09 RX ORDER — AMLODIPINE BESYLATE 10 MG/1
TABLET ORAL
Qty: 30 TAB | Refills: 0 | Status: SHIPPED | OUTPATIENT
Start: 2017-08-09 | End: 2017-09-05 | Stop reason: SDUPTHER

## 2017-08-09 RX ORDER — ROSUVASTATIN CALCIUM 10 MG/1
TABLET, FILM COATED ORAL
Qty: 30 TAB | Refills: 0 | Status: SHIPPED | OUTPATIENT
Start: 2017-08-09 | End: 2017-09-05 | Stop reason: SDUPTHER

## 2017-08-09 RX ORDER — LINAGLIPTIN AND METFORMIN HYDROCHLORIDE 2.5; 1 MG/1; MG/1
TABLET, FILM COATED ORAL
Qty: 60 TAB | Refills: 0 | Status: SHIPPED | OUTPATIENT
Start: 2017-08-09 | End: 2017-09-05 | Stop reason: SDUPTHER

## 2017-09-05 DIAGNOSIS — I10 ESSENTIAL HYPERTENSION WITH GOAL BLOOD PRESSURE LESS THAN 130/80: ICD-10-CM

## 2017-09-05 RX ORDER — LINAGLIPTIN AND METFORMIN HYDROCHLORIDE 2.5; 1 MG/1; MG/1
TABLET, FILM COATED ORAL
Qty: 60 TAB | Refills: 0 | Status: SHIPPED | OUTPATIENT
Start: 2017-09-05 | End: 2017-10-10 | Stop reason: SDUPTHER

## 2017-09-05 RX ORDER — ROSUVASTATIN CALCIUM 10 MG/1
TABLET, FILM COATED ORAL
Qty: 30 TAB | Refills: 0 | Status: SHIPPED | OUTPATIENT
Start: 2017-09-05 | End: 2017-10-10 | Stop reason: SDUPTHER

## 2017-09-05 RX ORDER — AMLODIPINE BESYLATE 10 MG/1
TABLET ORAL
Qty: 30 TAB | Refills: 0 | Status: SHIPPED | OUTPATIENT
Start: 2017-09-05 | End: 2017-10-10 | Stop reason: SDUPTHER

## 2017-09-05 RX ORDER — HYDROCHLOROTHIAZIDE 12.5 MG/1
TABLET ORAL
Qty: 30 TAB | Refills: 0 | Status: SHIPPED | OUTPATIENT
Start: 2017-09-05 | End: 2017-10-10 | Stop reason: SDUPTHER

## 2017-09-29 ENCOUNTER — HOSPITAL ENCOUNTER (EMERGENCY)
Age: 52
Discharge: HOME | End: 2017-09-29
Attending: EMERGENCY MEDICINE
Payer: COMMERCIAL

## 2017-09-29 ENCOUNTER — APPOINTMENT (OUTPATIENT)
Dept: GENERAL RADIOLOGY | Age: 52
End: 2017-09-29
Attending: EMERGENCY MEDICINE
Payer: COMMERCIAL

## 2017-09-29 VITALS
SYSTOLIC BLOOD PRESSURE: 156 MMHG | DIASTOLIC BLOOD PRESSURE: 94 MMHG | HEART RATE: 102 BPM | TEMPERATURE: 98.1 F | HEIGHT: 69 IN | BODY MASS INDEX: 34.51 KG/M2 | WEIGHT: 233.03 LBS | OXYGEN SATURATION: 96 % | RESPIRATION RATE: 18 BRPM

## 2017-09-29 DIAGNOSIS — E11.9 TYPE 2 DIABETES MELLITUS WITHOUT COMPLICATION, WITHOUT LONG-TERM CURRENT USE OF INSULIN (HCC): ICD-10-CM

## 2017-09-29 DIAGNOSIS — M79.675 PAIN OF TOE OF LEFT FOOT: Primary | ICD-10-CM

## 2017-09-29 PROCEDURE — 77030036687 HC SHOE PSTOP S2SG -A

## 2017-09-29 PROCEDURE — 74011250637 HC RX REV CODE- 250/637: Performed by: PHYSICIAN ASSISTANT

## 2017-09-29 PROCEDURE — 73660 X-RAY EXAM OF TOE(S): CPT

## 2017-09-29 PROCEDURE — 99283 EMERGENCY DEPT VISIT LOW MDM: CPT

## 2017-09-29 RX ORDER — HYDROCODONE BITARTRATE AND ACETAMINOPHEN 5; 325 MG/1; MG/1
1 TABLET ORAL
Qty: 10 TAB | Refills: 0 | Status: SHIPPED | OUTPATIENT
Start: 2017-09-29 | End: 2017-11-21

## 2017-09-29 RX ORDER — IBUPROFEN 400 MG/1
400 TABLET ORAL
Status: COMPLETED | OUTPATIENT
Start: 2017-09-29 | End: 2017-09-29

## 2017-09-29 RX ADMIN — IBUPROFEN 400 MG: 400 TABLET, FILM COATED ORAL at 19:57

## 2017-09-29 NOTE — ED PROVIDER NOTES
HPI Comments: Sky Duncan is a 47 y.o. Male, with a PMHx significant for HTN, DM, and arthritis, who presents ambulatory to the ED c/o a sudden onset L great toe pain after he hit it last night. Pt has been on his feet since the incident, noting he was at [employer]. He denies any analgesic use secondary to his DM. Pt denies any prior issues with his L foot. He denies a h/o gout. Pt specifically denies drainage from his nail, fever, cough, or runny nose. Social hx: - Tobacco use, + EtOH use, - Illicit drug use    PCP: Ivette Taylor NP    There are no other complaints, changes or physical findings at this time. The history is provided by the patient. No  was used. Past Medical History:   Diagnosis Date    Arthritis     Borderline diabetic     Diabetes ([address]     Family history of premature CAD     Hypertension        Past Surgical History:   Procedure Laterality Date    HX ORTHOPAEDIC      right knee         Family History:   Problem Relation Age of Onset    Cancer Mother      leukemia    Diabetes Father        Social History     Social History    Marital status: LEGALLY      Spouse name: N/A    Number of children: N/A    Years of education: N/A     Occupational History    Not on file. Social History Main Topics    Smoking status: Never Smoker    Smokeless tobacco: Never Used    Alcohol use 0.0 oz/week     0 Standard drinks or equivalent per week      Comment: social    Drug use: No    Sexual activity: Not Currently     Other Topics Concern    Not on file     Social History Narrative         ALLERGIES: Review of patient's allergies indicates no known allergies. Review of Systems   Constitutional: Negative for chills and fever. HENT: Negative for congestion, rhinorrhea and sore throat. Respiratory: Negative for cough and shortness of breath. Cardiovascular: Negative for chest pain and palpitations.    Gastrointestinal: Negative for abdominal pain, diarrhea, nausea and vomiting. Genitourinary: Negative for dysuria and hematuria. Musculoskeletal: Positive for myalgias (+L great toe). Negative for neck pain and neck stiffness.        -drainage from toe   Skin: Negative for rash and wound. Neurological: Negative for dizziness and headaches. Psychiatric/Behavioral: Negative for agitation and confusion. Patient Vitals for the past 12 hrs:   Temp Pulse Resp BP SpO2   09/29/17 1826 98.1 °F (36.7 °C) (!) 102 18 (!) 156/94 96 %            Physical Exam   Constitutional: He is oriented to person, place, and time. He appears well-developed and well-nourished. No distress. HENT:   Head: Normocephalic and atraumatic. Nose: Nose normal.   Mouth/Throat: Oropharynx is clear and moist. No oropharyngeal exudate. Eyes: Conjunctivae and EOM are normal. Right eye exhibits no discharge. Left eye exhibits no discharge. No scleral icterus. Neck: Normal range of motion. Neck supple. No JVD present. No tracheal deviation present. No thyromegaly present. Cardiovascular: Normal rate, regular rhythm and normal heart sounds. 2+ distal pulses   Pulmonary/Chest: Effort normal and breath sounds normal. No respiratory distress. He has no wheezes. Abdominal: Soft. There is no tenderness. Musculoskeletal: Normal range of motion. He exhibits no edema. TTP L great toe, decreased ROM L great toe secondary to tenderness, mild soft tissue TTP   Lymphadenopathy:     He has no cervical adenopathy. Neurological: He is alert and oriented to person, place, and time. He exhibits normal muscle tone. Coordination normal.   NVI   Skin: Skin is warm and dry. He is not diaphoretic. Psychiatric: He has a normal mood and affect. His behavior is normal. Judgment normal.   Nursing note and vitals reviewed.        MDM  Number of Diagnoses or Management Options  Diagnosis management comments:     DDx: Fracture, contusion, strain       Amount and/or Complexity of Data Reviewed  Tests in the radiology section of CPT®: ordered and reviewed  Review and summarize past medical records: yes  Independent visualization of images, tracings, or specimens: yes    Patient Progress  Patient progress: stable    ED Course       Procedures    IMAGING RESULTS:  XR GREAT TOE LT MIN 2 V   Final Result   EXAM: XR GREAT TOE LT MIN 2 V     INDICATION:   pain. Left great toe injury last night.     COMPARISON: None.     FINDINGS:  Three views of the left great toe demonstrate no fracture or other  acute abnormality.     IMPRESSION  IMPRESSION:  No acute abnormality. IMPRESSION:  1. Pain of toe of left foot    2. Type 2 diabetes mellitus without complication, without long-term current use of insulin ([facility] [address]        PLAN:  1. Discharge home    Follow-up Information     Follow up With Details Comments Contact Info    Brandon Olivarez MD  As needed [address] [facility]  [address] [facility],[address]  [facility].  [phone]      [facility] EMERGENCY DEPT  If symptoms worsen [address] [place] [address]  [phone]        Return to ED if worse     DISCHARGE NOTE  7:50 PM  The patient has been re-evaluated and is ready for discharge. Reviewed available results with patient. Counseled patient on diagnosis and care plan. Patient has expressed understanding, and all questions have been answered. Patient agrees with plan and agrees to follow up as recommended, or return to the ED if their symptoms worsen. Discharge instructions have been provided and explained to the patient, along with reasons to return to the ED. ATTESTATION:  This note is prepared by Celsa Velazquez, acting as Scribe for [facility]. ISMAEL Greco: The scribe's documentation has been prepared under my direction and personally reviewed by me in its entirety. I confirm that the note above accurately reflects all work, treatment, procedures, and medical decision making performed by me.

## 2017-09-29 NOTE — DISCHARGE INSTRUCTIONS
Bruises: Care Instructions  Your Care Instructions    Bruises occur when small blood vessels under the skin tear or rupture, most often from a twist, bump, or fall. Blood leaks into tissues under the skin and causes a black-and-blue spot that often turns colors, including purplish black, reddish blue, or yellowish green, as the bruise heals. Bruises hurt, but most are not serious and will go away on their own within 2 to 4 weeks. Sometimes, gravity causes them to spread down the body. A leg bruise usually will take longer to heal than a bruise on the face or arms. Follow-up care is a key part of your treatment and safety. Be sure to make and go to all appointments, and call your doctor if you are having problems. Its also a good idea to know your test results and keep a list of the medicines you take. How can you care for yourself at home? · Take pain medicines exactly as directed. ¨ If the doctor gave you a prescription medicine for pain, take it as prescribed. ¨ If you are not taking a prescription pain medicine, ask your doctor if you can take an over-the-counter medicine. · Put ice or a cold pack on the area for 10 to 20 minutes at a time. Put a thin cloth between the ice and your skin. · If you can, prop up the bruised area on pillows as much as possible for the next few days. Try to keep the bruise above the level of your heart. When should you call for help? Call your doctor now or seek immediate medical care if:  · You have signs of infection, such as:  ¨ Increased pain, swelling, warmth, or redness. ¨ Red streaks leading from the bruise. ¨ Pus draining from the bruise. ¨ A fever. · You have a bruise on your leg and signs of a blood clot, such as:  ¨ Increasing redness and swelling along with warmth, tenderness, and pain in the bruised area. ¨ Pain in your calf, back of the knee, thigh, or groin. ¨ Redness and swelling in your leg or groin. · Your pain gets worse.   Watch closely for changes in your health, and be sure to contact your doctor if:  · You do not get better as expected. Where can you learn more? Go to http://rosi-shell.info/. Enter ([phone] 337-458 in the search box to learn more about \"Bruises: Care Instructions. \"  Current as of: March 20, 2017  Content Version: 11.3  © 1062-0322 [employer]. Care instructions adapted under license by [employer] (which disclaims liability or warranty for this information). If you have questions about a medical condition or this instruction, always ask your healthcare professional. [place] [address] any warranty or liability for your use of this information.

## 2017-10-10 DIAGNOSIS — I10 ESSENTIAL HYPERTENSION WITH GOAL BLOOD PRESSURE LESS THAN 130/80: ICD-10-CM

## 2017-10-11 RX ORDER — AMLODIPINE BESYLATE 10 MG/1
TABLET ORAL
Qty: 30 TAB | Refills: 0 | Status: SHIPPED | OUTPATIENT
Start: 2017-10-11 | End: 2017-11-11 | Stop reason: SDUPTHER

## 2017-10-11 RX ORDER — LINAGLIPTIN AND METFORMIN HYDROCHLORIDE 2.5; 1 MG/1; MG/1
TABLET, FILM COATED ORAL
Qty: 60 TAB | Refills: 0 | Status: SHIPPED | OUTPATIENT
Start: 2017-10-11 | End: 2017-11-11 | Stop reason: SDUPTHER

## 2017-10-11 RX ORDER — HYDROCHLOROTHIAZIDE 12.5 MG/1
TABLET ORAL
Qty: 30 TAB | Refills: 0 | Status: SHIPPED | OUTPATIENT
Start: 2017-10-11 | End: 2017-11-11 | Stop reason: SDUPTHER

## 2017-10-11 RX ORDER — ROSUVASTATIN CALCIUM 10 MG/1
TABLET, FILM COATED ORAL
Qty: 30 TAB | Refills: 0 | Status: SHIPPED | OUTPATIENT
Start: 2017-10-11 | End: 2017-11-11 | Stop reason: SDUPTHER

## 2017-10-22 DIAGNOSIS — R05.9 COUGH: ICD-10-CM

## 2017-10-23 RX ORDER — MONTELUKAST SODIUM 10 MG/1
TABLET ORAL
Qty: 30 TAB | Refills: 3 | Status: SHIPPED | OUTPATIENT
Start: 2017-10-23 | End: 2017-11-21

## 2017-10-24 ENCOUNTER — TELEPHONE (OUTPATIENT)
Dept: INTERNAL MEDICINE CLINIC | Age: 52
End: 2017-10-24

## 2017-10-24 NOTE — TELEPHONE ENCOUNTER
Patient is going to see an endocrinologist of chris mora at [facility]; Dr. Riki Cancino on 11/21/2017 at 1:00. He will need a requisition to be evaluated and treated by provider. Phone: [phone].  Patient can be reached at [phone]

## 2017-11-11 DIAGNOSIS — I10 ESSENTIAL HYPERTENSION WITH GOAL BLOOD PRESSURE LESS THAN 130/80: ICD-10-CM

## 2017-11-13 RX ORDER — HYDROCHLOROTHIAZIDE 12.5 MG/1
TABLET ORAL
Qty: 30 TAB | Refills: 0 | Status: SHIPPED | OUTPATIENT
Start: 2017-11-13 | End: 2017-12-15 | Stop reason: SDUPTHER

## 2017-11-13 RX ORDER — LINAGLIPTIN AND METFORMIN HYDROCHLORIDE 2.5; 1 MG/1; MG/1
TABLET, FILM COATED ORAL
Qty: 60 TAB | Refills: 0 | Status: SHIPPED | OUTPATIENT
Start: 2017-11-13 | End: 2017-12-15 | Stop reason: SDUPTHER

## 2017-11-13 RX ORDER — ROSUVASTATIN CALCIUM 10 MG/1
TABLET, FILM COATED ORAL
Qty: 30 TAB | Refills: 0 | Status: SHIPPED | OUTPATIENT
Start: 2017-11-13 | End: 2017-12-15 | Stop reason: SDUPTHER

## 2017-11-13 RX ORDER — AMLODIPINE BESYLATE 10 MG/1
TABLET ORAL
Qty: 30 TAB | Refills: 0 | Status: SHIPPED | OUTPATIENT
Start: 2017-11-13 | End: 2017-12-15 | Stop reason: SDUPTHER

## 2017-11-21 ENCOUNTER — OFFICE VISIT (OUTPATIENT)
Dept: ENDOCRINOLOGY | Age: 52
End: 2017-11-21

## 2017-11-21 VITALS
HEIGHT: 69 IN | HEART RATE: 94 BPM | BODY MASS INDEX: 34.1 KG/M2 | DIASTOLIC BLOOD PRESSURE: 80 MMHG | SYSTOLIC BLOOD PRESSURE: 140 MMHG | WEIGHT: 230.2 LBS

## 2017-11-21 DIAGNOSIS — E78.2 MIXED HYPERLIPIDEMIA: ICD-10-CM

## 2017-11-21 DIAGNOSIS — E11.9 TYPE 2 DIABETES MELLITUS WITHOUT COMPLICATION, WITHOUT LONG-TERM CURRENT USE OF INSULIN (HCC): Primary | ICD-10-CM

## 2017-11-21 DIAGNOSIS — I10 ESSENTIAL HYPERTENSION: ICD-10-CM

## 2017-11-21 NOTE — PROGRESS NOTES
Chief Complaint   Patient presents with    Diabetes     pcp and pharmacy verified. Eye exam 4 mos ago. can't remember name of eye doctor   Records reviewed. History of Present Illness: Tonya Ann is a 47 y.o. male who I was asked to see in consult, by Dr. Xiomara Gatica, for evaluation of diabetes. Was diagnosed with diabetes . Current regimen is Linagliptin/Metformin 2.5/1000mg BID and Pioglitazone 15mg daily. He notes his PCP recently started the Pioglitazone a couple of months ago. Checks blood sugars \"when I don't feel well\". When he does check his BG it typically in the 130-150's range. Most recent Hgb A1c was 7.6% in 2017. Pt notes he is a full time college student. He notes he struggles with depression and anxiety and has been dealing with a lot of stress. He notes that he has 3 family members that lost limbs from his DM in the last 6 months and his father  from complications from DM and he wants to get his DM under control. A typical day is as follows:  Pt wakes around 4AM and he has be at work by [employer]. He has coffee (creamer) in the AM but is not eating breakfast.  - lunch: He has lunch 1PM, 3-4 days per week. He did not have lunch yesterday. - dinner: He will have dinner after he finishes his homework, which is typically after 8PM. Last night he had fish and potatoes and water. - snacks: She will have cheese and crackers or nuts during the day, at work. - He goes to bed around 10PM-MN, he will occasionally have an HS snack of PB crackers or a chicken wrap. He walks about a mile per day at work and he used to go to [employer], but has not been going for some time. He does hunt on the weekend and that includes a lot of walking. No history of vascular disease. No history of neuropathy, or nephropathy. Last eye exam was 2017, no retinopathy, will request these records.     He has hx of OA and has cortisone injections in both knees in May 2017, but has not had any other steroids since that time. Past Medical History:   Diagnosis Date    Arthritis     Borderline diabetic     Diabetes ([address]     Family history of premature CAD     Hypertension      Past Surgical History:   Procedure Laterality Date    HX ORTHOPAEDIC      right knee     Current Outpatient Prescriptions   Medication Sig    JENTADUETO 2.5-1,000 mg per tablet TAKE 1 TAB BY MOUTH TWO (2) TIMES DAILY (WITH MEALS). APPOINTMENT REQUIRED    hydroCHLOROthiazide (HYDRODIURIL) 12.5 mg tablet TAKE 1 TABLET BY MOUTH EVERY DAY    amLODIPine (NORVASC) 10 mg tablet TAKE 1 TAB BY MOUTH DAILY.  CRESTOR 10 mg tablet TAKE 1 TABLET BY MOUTH EVERY DAY    pioglitazone (ACTOS) 15 mg tablet TAKE 1 TAB BY MOUTH DAILY.  sildenafil citrate (VIAGRA) 100 mg tablet Take 1 Tab by mouth as needed.  APPLE CIDER VINEGAR PO Take  by mouth.  coenzyme Q-10 (CO Q-10) 200 mg capsule Take 1 Cap by mouth daily.  glucose blood VI test strips (ASCENSIA CONTOUR) strip PATIENT IS TO CHECK BLOOD SUGAR TWICE DAILY.  cinnamon bark (CINNAMON) 500 mg cap Take 1,000 mg by mouth two (2) times a day. No current facility-administered medications for this visit. No Known Allergies  Family History   Problem Relation Age of Onset   [facility] Cancer Mother      leukemia    Alzheimer Mother     Hypertension Mother     Diabetes Father     Heart Disease Father     Hypertension Father    [facility] Elevated Lipids Father     Glaucoma Father     Diabetes Sister     Heart Disease Sister     Cancer Sister      Breast    Hypertension Brother     Cancer Maternal Aunt      Breast    Glaucoma Paternal Aunt     Diabetes Brother      Social History     Social History    Marital status: LEGALLY      Spouse name: N/A    Number of children: N/A    Years of education: N/A     Occupational History    Not on file.      Social History Main Topics    Smoking status: Never Smoker    Smokeless tobacco: Never Used    Alcohol use 0.0 oz/week 0 Standard drinks or equivalent per week      Comment: social    Drug use: No    Sexual activity: Not Currently     Other Topics Concern    Not on file     Social History Narrative     Review of Systems:  - Constitutional Symptoms: no fevers, chills, weight loss  - Eyes: no blurry vision or double vision  - Cardiovascular: no chest pain or palpitations  - Respiratory: no cough or shortness of breath  - Gastrointestinal: no dysphagia or abdominal pain  - Musculoskeletal: no joint pains or weakness  - Integumentary: no rashes  - Neurological: no numbness, tingling, or headaches  - Psychiatric: + depression   - Endocrine: no heat or cold intolerance, no polyuria or polydipsia    Physical Examination:  Blood pressure 140/80, pulse 94, height 5' 9\" (1.753 m), weight 230 lb 3.2 oz (104.4 kg).   - General: pleasant, no distress, good eye contact  - HEENT: no exopthalmos, no periorbital edema, no scleral/conjunctival injection, EOMI, no lid lag or stare  - Neck: supple, no thyromegaly, masses, lymph nodes, or carotid bruits, no supraclavicular or dorsocervical fat pads  - Cardiovascular: regular, normal rate, normal S1 and S2, no murmurs/rubs/gallops, 2+ dorsalis pedis pulses bilaterally  - Respiratory: clear to auscultation bilaterally  - Gastrointestinal: soft, nontender, nondistended, no masses, no hepatosplenomegaly  - Musculoskeletal: no proximal muscle weakness in upper or lower extremities  - Integumentary: + acanthosis nigricans, no abdominal striae, no rashes, no edema, no foot ulcers  - Neurological: intact sensation to monofilament 4/4 locations, intact vibratory sensation at great toes bilaterally,   - Psychiatric: normal mood and affect    Data Reviewed:   Component      Latest Ref Rng & Units 6/23/2017 4/17/2017 4/17/2017           8:46 AM  7:52 AM  7:52 AM   Glucose      65 - 99 mg/dL  162 (H)    BUN      6 - 24 mg/dL  21    Creatinine      0.76 - 1.27 mg/dL  1.53 (H)    BUN/Creatinine ratio      9 - 20  14 Sodium      134 - 144 mmol/L  143    Potassium      3.5 - 5.2 mmol/L  4.5    Chloride      96 - 106 mmol/L  100    CO2      18 - 29 mmol/L  25    Calcium      8.7 - 10.2 mg/dL  9.5    Protein, total      6.0 - 8.5 g/dL  7.2    Albumin      3.5 - 5.5 g/dL  4.3    GLOBULIN, TOTAL      1.5 - 4.5 g/dL  2.9    A-G Ratio      1.2 - 2.2  1.5    Bilirubin, total      0.0 - 1.2 mg/dL  0.5    Alk. phosphatase      39 - 117 IU/L  56    AST      0 - 40 IU/L  18    ALT (SGPT)      0 - 44 IU/L  17    Cholesterol, total      100 - 199 mg/dL   149   Triglyceride      0 - 149 mg/dL   153 (H)   HDL Cholesterol      >39 mg/dL   36 (L)   VLDL, calculated      5 - 40 mg/dL   31   LDL, calculated      0 - 99 mg/dL   82   Hemoglobin A1c (POC)      4.8 - 5.6 % 7.6 (A)         Assessment/Plan:   1. Type 2 diabetes mellitus without complication, without long-term current use of insulin ([address]    2. Essential hypertension    3. Mixed hyperlipidemia    1)Pt given copy of \"Daily Diabetes Meal Planning Guide\" and educated about the \"[place] dinner plate\", reading food labels and which foods have the highest carbohydrates. Pt instructed to limit her carbohydrates to 45-60 grams with meals and 15 grams or less with snacks (but to limit snacks). We discussed DM treatment options, including SGLT-2, GLP-1, COCHRAN, TZD and Insulin. For now will not make any changes in his medications at this time, but will work on lifestyle changes and have pt check his BGs twice per day and have him mail his BG logs to me in 6 weeks so we can see how his BGs run during the day. Will check an A1C and urine MA today. 2) His manual BP was 140/80 on his current regimen, no changes needed at this time. 3) Pt's lipid panel was at goal in April 2017, pt is on Rosuvastatin 10mg daily, which he is tolerating well. This is a high intensity statin regimen. Pt voices understanding and agreement with the plan.     RTC 3 months    Patient Instructions        Learning About Diabetes Food Guidelines  Your Care Instructions    Meal planning is important to manage diabetes. It helps keep your blood sugar at a target level (which you set with your doctor). You don't have to eat special foods. You can eat what your family eats, including sweets once in a while. But you do have to pay attention to how often you eat and how much you eat of certain foods. You may want to work with a dietitian or a certified diabetes educator (CDE) to help you plan meals and snacks. A dietitian or CDE can also help you lose weight if that is one of your goals. What should you know about eating carbs? Managing the amount of carbohydrate (carbs) you eat is an important part of healthy meals when you have diabetes. Carbohydrate is found in many foods. · Learn which foods have carbs. And learn the amounts of carbs in different foods. ¨ Bread, cereal, pasta, and rice have about 15 grams of carbs in a serving. A serving is 1 slice of bread (1 ounce), ½ cup of cooked cereal, or 1/3 cup of cooked pasta or rice. ¨ Fruits have 15 grams of carbs in a serving. A serving is 1 small fresh fruit, such as an apple or orange; ½ of a banana; ½ cup of cooked or canned fruit; ½ cup of fruit juice; 1 cup of melon or raspberries; or 2 tablespoons of dried fruit. ¨ Milk and no-sugar-added yogurt have 15 grams of carbs in a serving. A serving is 1 cup of milk or 2/3 cup of no-sugar-added yogurt. ¨ Starchy vegetables have 15 grams of carbs in a serving. A serving is ½ cup of mashed potatoes or sweet potato; 1 cup winter squash; ½ of a small baked potato; ½ cup of cooked beans; or ½ cup cooked corn or green peas. · Learn how much carbs to eat each day and at each meal. A dietitian or CDE can teach you how to keep track of the amount of carbs you eat. This is called carbohydrate counting. · If you are not sure how to count carbohydrate grams, use the Plate Method to plan meals.  It is a good, quick way to make sure that you have a balanced meal. It also helps you spread carbs throughout the day. ¨ Divide your plate by types of foods. Put non-starchy vegetables on half the plate, meat or other protein food on one-quarter of the plate, and a grain or starchy vegetable in the final quarter of the plate. To this you can add a small piece of fruit and 1 cup of milk or yogurt, depending on how many carbs you are supposed to eat at a meal.  · Try to eat about the same amount of carbs at each meal. Do not \"save up\" your daily allowance of carbs to eat at one meal.  · Proteins have very little or no carbs per serving. Examples of proteins are beef, chicken, turkey, fish, eggs, tofu, cheese, cottage cheese, and peanut butter. A serving size of meat is 3 ounces, which is about the size of a deck of cards. Examples of meat substitute serving sizes (equal to 1 ounce of meat) are 1/4 cup of cottage cheese, 1 egg, 1 tablespoon of peanut butter, and ½ cup of tofu. How can you eat out and still eat healthy? · Learn to estimate the serving sizes of foods that have carbohydrate. If you measure food at home, it will be easier to estimate the amount in a serving of restaurant food. · If the meal you order has too much carbohydrate (such as potatoes, corn, or baked beans), ask to have a low-carbohydrate food instead. Ask for a salad or green vegetables. · If you use insulin, check your blood sugar before and after eating out to help you plan how much to eat in the future. · If you eat more carbohydrate at a meal than you had planned, take a walk or do other exercise. This will help lower your blood sugar. What else should you know? · Limit saturated fat, such as the fat from meat and dairy products. This is a healthy choice because people who have diabetes are at higher risk of heart disease. So choose lean cuts of meat and nonfat or low-fat dairy products. Use olive or canola oil instead of butter or shortening when cooking. · Don't skip meals.  Your blood sugar may drop too low if you skip meals and take insulin or certain medicines for diabetes. · Check with your doctor before you drink alcohol. Alcohol can cause your blood sugar to drop too low. Alcohol can also cause a bad reaction if you take certain diabetes medicines. Follow-up care is a key part of your treatment and safety. Be sure to make and go to all appointments, and call your doctor if you are having problems. It's also a good idea to know your test results and keep a list of the medicines you take. Where can you learn more? Go to http://rosi-shell.info/. Enter I143 in the search box to learn more about \"Learning About Diabetes Food Guidelines. \"  Current as of: March 13, 2017  Content Version: 11.4  © 6750-2966 [employer]. Care instructions adapted under license by [employer] (which disclaims liability or warranty for this information). If you have questions about a medical condition or this instruction, always ask your healthcare professional. [place] [address] any warranty or liability for your use of this information. Empagliflozin (By mouth)   Empagliflozin (hv-do-nez-FLOE-zin)  Treats type 2 diabetes. Also lowers risk of death in patients with type 2 diabetes and heart or blood vessel problems. Brand Name(s): Jardiance   There may be other brand names for this medicine. When This Medicine Should Not Be Used: This medicine is not right for everyone. Do not use it if you had an allergic reaction to empagliflozin. How to Use This Medicine:   Tablet  · Take your medicine as directed. Your dose may need to be changed several times to find what works best for you. This medicine is usually taken in the morning. · Read and follow the patient instructions that come with this medicine. Talk to your doctor or pharmacist if you have any questions. · Missed dose: Take a dose as soon as you remember.  If it is almost time for your next dose, wait until then and take a regular dose. Do not take extra medicine to make up for a missed dose. · Store the medicine in a closed container at room temperature, away from heat, moisture, and direct light. Drugs and Foods to Avoid:   Ask your doctor or pharmacist before using any other medicine, including over-the-counter medicines, vitamins, and herbal products. · Some medicines can affect how empagliflozin works. Tell your doctor if you are using any of the following:  ¨ Diuretic (water pill)  ¨ Insulin or another diabetes medicine  Warnings While Using This Medicine:   · Tell your doctor if you are pregnant or breastfeeding, or if you have kidney disease, liver problems, congestive heart failure, high cholesterol, or a history of pancreas problems or genital yeast or urinary tract infections. Tell your doctor if you are on a low-salt diet or if you drink alcohol. · This medicine may cause the following problems:  ¨ Low blood pressure  ¨ Ketoacidosis (high ketones and acid in the blood)  ¨ Low blood sugar  ¨ Kidney problems  ¨ Increased risk of genital yeast or urinary tract infections  · Tell any doctor or dentist who treats you that you are using this medicine. This medicine may affect certain medical test results. This medicine may affect the results of urine glucose tests. · Your doctor will do lab tests at regular visits to check on the effects of this medicine. Keep all appointments. · Keep all medicine out of the reach of children. Never share your medicine with anyone.   Possible Side Effects While Using This Medicine:   Call your doctor right away if you notice any of these side effects:  · Allergic reaction: Itching or hives, swelling in your face or hands, swelling or tingling in your mouth or throat, chest tightness, trouble breathing  · Change in how much or how often you urinate, bloody or cloudy urine, painful or difficult urination, lower back or side pain  · Increased hunger, confusion, shaking, trembling, sweating  · Lightheadedness, dizziness, fainting  · Trouble breathing, tiredness, stomach pain, nausea, vomiting  If you notice these less serious side effects, talk with your doctor:   · Redness, itching, pain, or swelling of the penis, bad-smelling discharge from the penis  · White or yellow vaginal discharge, vaginal itching or odor  If you notice other side effects that you think are caused by this medicine, tell your doctor. Call your doctor for medical advice about side effects. You may report side effects to FDA at 1-413-FDA-1933  © 2017 [address] Information is for End User's use only and may not be sold, redistributed or otherwise used for commercial purposes. The above information is an  only. It is not intended as medical advice for individual conditions or treatments. Talk to your doctor, nurse or pharmacist before following any medical regimen to see if it is safe and effective for you. Liraglutide (By injection)   Liraglutide (thx-s-TELY-tide)  Treats type 2 diabetes and helps with weight loss in certain patients. Also reduces the risk of heart attacks and strokes in patients with type 2 diabetes and heart or blood vessel disease. Brand Name(s): Saxenda, Victoza   There may be other brand names for this medicine. When This Medicine Should Not Be Used: This medicine is not right for everyone. Do not use it if you had an allergic reaction to liraglutide, or if you have multiple endocrine neoplasia syndrome type 2 (MEN 2) or if you or anyone in your family had medullary thyroid cancer. Tell your doctor if you are pregnant or have become pregnant while you are using this medicine. How to Use This Medicine:   Injectable  · Your doctor will prescribe your exact dose and tell you how often it should be given. This medicine is given as a shot under the skin of your stomach, thighs, or upper arms.   · If you use insulin in addition to this medicine, do not mix them into the same syringe. You may give the shots in the same area (including your stomach), but do not give the shots right next to each other. · You may be taught how to give your medicine at home. Make sure you understand all instructions before giving yourself an injection. Do not use more medicine or use it more often than your doctor tells you to. · Check the liquid in the pen. It should be clear and colorless. Do not use it if it is cloudy, discolored, or has particles in it. · You will be shown the body areas where this shot can be given. Use a different body area each time you give yourself a shot. Keep track of where you give each shot to make sure you rotate body areas. · Use a new needle and syringe each time you inject your medicine. · Never share medicine pens with others under any circumstances. Sharing needles or pens can result in transmission of infection. · Drink extra fluids so you will urinate more often and help prevent kidney problems. · This medicine should come with a Medication Guide. Ask your pharmacist for a copy if you do not have one. · Missed dose: If you miss a dose of this medicine, use it as soon as you remember. Then take your next dose at your usual time. Never take extra medicine to make up for a missed dose. If you miss a dose for 3 days or more, call your doctor to talk about how to restart your treatment. · Store your new, unused medicine pen in its original carton in the refrigerator. Protect it from light. Do not freeze this medicine or use it if it has been frozen. You may store the opened medicine pen in the refrigerator or at room temperature for 30 days. Throw away your used pen after 30 days, even if it still has medicine in it. Always remove the needle from the pen before you store it. · Throw away used needles in a hard, closed container that the needles cannot poke through. Keep this container away from children and pets.   Drugs and Foods to Avoid:      Ask your doctor or pharmacist before using any other medicine, including over-the-counter medicines, vitamins, and herbal products. Warnings While Using This Medicine:   · Tell your doctor if you are breastfeeding, or if you have kidney disease, liver disease, digestion problems (including gastroparesis), gallbladder disease, or a history of pancreas problems, depression, or angioedema (swelling of the arms, face, hands, mouth, or throat). · Do not use Saxenda® if you are also using Victoza®. They contain the same medicine. · This medicine may cause the following problems:   ¨ Increased risk for thyroid tumors  ¨ Pancreatitis  ¨ Low blood sugar  ¨ Kidney problems  ¨ Gallbladder problems, including gallstones  ¨ Thoughts of hurting yourself Kamlesh Hinds)  · Your doctor will do lab tests at regular visits to check on the effects of this medicine. Keep all appointments. · Keep all medicine out of the reach of children. Never share your medicine with anyone.   Possible Side Effects While Using This Medicine:   Call your doctor right away if you notice any of these side effects:  · Allergic reaction: Itching or hives, swelling in your face or hands, swelling or tingling in your mouth or throat, chest tightness, trouble breathing  · Change in how much or how often you urinate, painful or burning urination  · Feeling sad or depressed, thoughts of suicide, unusual changes in mood or behavior  · Shaking, trembling, sweating, fast or pounding heartbeat, fainting, hunger, confusion  · Sudden and severe stomach pain, nausea, vomiting, fever, lightheadedness  · Trouble breathing or swallowing, a lump in your neck, hoarseness when speaking  · Yellow skin or eyes  If you notice these less serious side effects, talk with your doctor:   · Decreased appetite  · Diarrhea, constipation, stomach upset  · Dizziness  · Headache  · Redness, itching, swelling, or any changes in your skin where the shot was given  If you notice other side effects that you think are caused by this medicine, tell your doctor. Call your doctor for medical advice about side effects. You may report side effects to FDA at 2-823-APA-9295  © 2017 [address] [employer] Information is for End User's use only and may not be sold, redistributed or otherwise used for commercial purposes. The above information is an  only. It is not intended as medical advice for individual conditions or treatments. Talk to your doctor, nurse or pharmacist before following any medical regimen to see if it is safe and effective for you. Follow-up Disposition:  Return in about 3 months (around 2/21/2018).     Copy sent to:  Dr. Lavera Sacks

## 2017-11-21 NOTE — MR AVS SNAPSHOT
Visit Information Date & Time Provider Department Dept. Phone Encounter #  
 11/21/2017  1:30 PM Gita Waite, [address] [facility] [phone] [id number] Follow-up Instructions Return in about 3 months (around 2/21/2018). Upcoming Health Maintenance Date Due DTaP/Tdap/Td series (1 - Tdap) 6/30/1987 EYE EXAM RETINAL OR DILATED Q1 6/25/2015 FOBT Q 1 YEAR AGE 50-75 6/30/2016 Influenza Age 5 to Adult 8/1/2017 MICROALBUMIN Q1 8/5/2017 HEMOGLOBIN A1C Q6M 12/23/2017 FOOT EXAM Q1 3/17/2018 LIPID PANEL Q1 4/17/2018 Allergies as of 11/21/2017  Review Complete On: 11/21/2017 By: Gita Waite MD  
 No Known Allergies Current Immunizations  Never Reviewed Name Date Pneumococcal Polysaccharide (PPSV-23) 8/5/2016 Not reviewed this visit You Were Diagnosed With   
  
 Codes Comments Type 2 diabetes mellitus without complication, without long-term current use of insulin (HCC)    -  Primary ICD-10-CM: E11.9 ICD-9-CM: 250.00 Essential hypertension     ICD-10-CM: I10 
ICD-9-CM: 401.9 Mixed hyperlipidemia     ICD-10-CM: E78.2 ICD-9-CM: 272.2 Vitals BP Pulse Height(growth percentile) Weight(growth percentile) BMI Smoking Status (!) 140/95 (BP 1 Location: Right arm, BP Patient Position: Sitting) 94 5' 9\" (1.753 m) 230 lb 3.2 oz (104.4 kg) 33.99 kg/m2 Never Smoker Vitals History BMI and BSA Data Body Mass Index Body Surface Area  
 33.99 kg/m 2 2.25 m 2 Preferred Pharmacy Pharmacy Name Phone [phone] [phone] Your Updated Medication List  
  
   
This list is accurate as of: 11/21/17  2:27 PM.  Always use your most recent med list. amLODIPine 10 mg tablet Commonly known as:  Yuli Blade TAKE 1 TAB BY MOUTH DAILY. APPLE CIDER VINEGAR PO Take  by mouth. CINNAMON 500 mg Cap Generic drug:  cinnamon bark Take 1,000 mg by mouth two (2) times a day. coenzyme Q-10 200 mg capsule Commonly known as:  CO Q-10 Take 1 Cap by mouth daily. CRESTOR 10 mg tablet Generic drug:  rosuvastatin TAKE 1 TABLET BY MOUTH EVERY DAY  
  
 glucose blood VI test strips strip Commonly known as:  Ascensia CONTOUR  
PATIENT IS TO CHECK BLOOD SUGAR TWICE DAILY. hydroCHLOROthiazide 12.5 mg tablet Commonly known as:  HYDRODIURIL  
TAKE 1 TABLET BY MOUTH EVERY DAY  
  
 JENTADUETO 2.5-1,000 mg per tablet Generic drug:  linagliptin-metFORMIN  
TAKE 1 TAB BY MOUTH TWO (2) TIMES DAILY (WITH MEALS). APPOINTMENT REQUIRED  
  
 pioglitazone 15 mg tablet Commonly known as:  ACTOS  
TAKE 1 TAB BY MOUTH DAILY. sildenafil citrate 100 mg tablet Commonly known as:  VIAGRA Take 1 Tab by mouth as needed. Follow-up Instructions Return in about 3 months (around 2/21/2018). Patient Instructions Learning About Diabetes Food Guidelines Your Care Instructions Meal planning is important to manage diabetes. It helps keep your blood sugar at a target level (which you set with your doctor). You don't have to eat special foods. You can eat what your family eats, including sweets once in a while. But you do have to pay attention to how often you eat and how much you eat of certain foods. You may want to work with a dietitian or a certified diabetes educator (CDE) to help you plan meals and snacks. A dietitian or CDE can also help you lose weight if that is one of your goals. What should you know about eating carbs? Managing the amount of carbohydrate (carbs) you eat is an important part of healthy meals when you have diabetes. Carbohydrate is found in many foods. · Learn which foods have carbs. And learn the amounts of carbs in different foods.  
¨ Bread, cereal, pasta, and rice have about 15 grams of carbs in a serving. A serving is 1 slice of bread (1 ounce), ½ cup of cooked cereal, or 1/3 cup of cooked pasta or rice. ¨ Fruits have 15 grams of carbs in a serving. A serving is 1 small fresh fruit, such as an apple or orange; ½ of a banana; ½ cup of cooked or canned fruit; ½ cup of fruit juice; 1 cup of melon or raspberries; or 2 tablespoons of dried fruit. ¨ Milk and no-sugar-added yogurt have 15 grams of carbs in a serving. A serving is 1 cup of milk or 2/3 cup of no-sugar-added yogurt. ¨ Starchy vegetables have 15 grams of carbs in a serving. A serving is ½ cup of mashed potatoes or sweet potato; 1 cup winter squash; ½ of a small baked potato; ½ cup of cooked beans; or ½ cup cooked corn or green peas. · Learn how much carbs to eat each day and at each meal. A dietitian or CDE can teach you how to keep track of the amount of carbs you eat. This is called carbohydrate counting. · If you are not sure how to count carbohydrate grams, use the Plate Method to plan meals. It is a good, quick way to make sure that you have a balanced meal. It also helps you spread carbs throughout the day. ¨ Divide your plate by types of foods. Put non-starchy vegetables on half the plate, meat or other protein food on one-quarter of the plate, and a grain or starchy vegetable in the final quarter of the plate. To this you can add a small piece of fruit and 1 cup of milk or yogurt, depending on how many carbs you are supposed to eat at a meal. 
· Try to eat about the same amount of carbs at each meal. Do not \"save up\" your daily allowance of carbs to eat at one meal. 
· Proteins have very little or no carbs per serving. Examples of proteins are beef, chicken, turkey, fish, eggs, tofu, cheese, cottage cheese, and peanut butter. A serving size of meat is 3 ounces, which is about the size of a deck of cards.  Examples of meat substitute serving sizes (equal to 1 ounce of meat) are 1/4 cup of cottage cheese, 1 egg, 1 tablespoon of peanut butter, and ½ cup of tofu. How can you eat out and still eat healthy? · Learn to estimate the serving sizes of foods that have carbohydrate. If you measure food at home, it will be easier to estimate the amount in a serving of restaurant food. · If the meal you order has too much carbohydrate (such as potatoes, corn, or baked beans), ask to have a low-carbohydrate food instead. Ask for a salad or green vegetables. · If you use insulin, check your blood sugar before and after eating out to help you plan how much to eat in the future. · If you eat more carbohydrate at a meal than you had planned, take a walk or do other exercise. This will help lower your blood sugar. What else should you know? · Limit saturated fat, such as the fat from meat and dairy products. This is a healthy choice because people who have diabetes are at higher risk of heart disease. So choose lean cuts of meat and nonfat or low-fat dairy products. Use olive or canola oil instead of butter or shortening when cooking. · Don't skip meals. Your blood sugar may drop too low if you skip meals and take insulin or certain medicines for diabetes. · Check with your doctor before you drink alcohol. Alcohol can cause your blood sugar to drop too low. Alcohol can also cause a bad reaction if you take certain diabetes medicines. Follow-up care is a key part of your treatment and safety. Be sure to make and go to all appointments, and call your doctor if you are having problems. It's also a good idea to know your test results and keep a list of the medicines you take. Where can you learn more? Go to http://rosi-shell.info/. Enter M658 in the search box to learn more about \"Learning About Diabetes Food Guidelines. \" Current as of: March 13, 2017 Content Version: 11.4 © 8168-3233 [employer].  Care instructions adapted under license by [employer] (which disclaims liability or warranty for this information). If you have questions about a medical condition or this instruction, always ask your healthcare professional. [address] any warranty or liability for your use of this information. Empagliflozin (By mouth) Empagliflozin (xt-yx-hsb-FLOE-zin) Treats type 2 diabetes. Also lowers risk of death in patients with type 2 diabetes and heart or blood vessel problems. Brand Name(s): Jardiance There may be other brand names for this medicine. When This Medicine Should Not Be Used: This medicine is not right for everyone. Do not use it if you had an allergic reaction to empagliflozin. How to Use This Medicine:  
Tablet · Take your medicine as directed. Your dose may need to be changed several times to find what works best for you. This medicine is usually taken in the morning. · Read and follow the patient instructions that come with this medicine. Talk to your doctor or pharmacist if you have any questions. · Missed dose: Take a dose as soon as you remember. If it is almost time for your next dose, wait until then and take a regular dose. Do not take extra medicine to make up for a missed dose. · Store the medicine in a closed container at room temperature, away from heat, moisture, and direct light. Drugs and Foods to Avoid: Ask your doctor or pharmacist before using any other medicine, including over-the-counter medicines, vitamins, and herbal products. · Some medicines can affect how empagliflozin works. Tell your doctor if you are using any of the following: ¨ Diuretic (water pill) ¨ Insulin or another diabetes medicine Warnings While Using This Medicine: · Tell your doctor if you are pregnant or breastfeeding, or if you have kidney disease, liver problems, congestive heart failure, high cholesterol, or a history of pancreas problems or genital yeast or urinary tract infections. Tell your doctor if you are on a low-salt diet or if you drink alcohol. · This medicine may cause the following problems: 
¨ Low blood pressure ¨ Ketoacidosis (high ketones and acid in the blood) ¨ Low blood sugar ¨ Kidney problems ¨ Increased risk of genital yeast or urinary tract infections · Tell any doctor or dentist who treats you that you are using this medicine. This medicine may affect certain medical test results. This medicine may affect the results of urine glucose tests. · Your doctor will do lab tests at regular visits to check on the effects of this medicine. Keep all appointments. · Keep all medicine out of the reach of children. Never share your medicine with anyone. Possible Side Effects While Using This Medicine:  
Call your doctor right away if you notice any of these side effects: · Allergic reaction: Itching or hives, swelling in your face or hands, swelling or tingling in your mouth or throat, chest tightness, trouble breathing · Change in how much or how often you urinate, bloody or cloudy urine, painful or difficult urination, lower back or side pain · Increased hunger, confusion, shaking, trembling, sweating · Lightheadedness, dizziness, fainting · Trouble breathing, tiredness, stomach pain, nausea, vomiting If you notice these less serious side effects, talk with your doctor: · Redness, itching, pain, or swelling of the penis, bad-smelling discharge from the penis · White or yellow vaginal discharge, vaginal itching or odor If you notice other side effects that you think are caused by this medicine, tell your doctor. Call your doctor for medical advice about side effects. You may report side effects to FDA at 5-513-MTE-5935 © 2017 [facility]– [place][note 2] Information is for End User's use only and may not be sold, redistributed or otherwise used for commercial purposes. The above information is an  only. It is not intended as medical advice for individual conditions or treatments.  Talk to your doctor, nurse or pharmacist before following any medical regimen to see if it is safe and effective for you. Liraglutide (By injection) Liraglutide (xmu-o-JWVC-tide) Treats type 2 diabetes and helps with weight loss in certain patients. Also reduces the risk of heart attacks and strokes in patients with type 2 diabetes and heart or blood vessel disease. Brand Name(s): Mallory Ahumada There may be other brand names for this medicine. When This Medicine Should Not Be Used: This medicine is not right for everyone. Do not use it if you had an allergic reaction to liraglutide, or if you have multiple endocrine neoplasia syndrome type 2 (MEN 2) or if you or anyone in your family had medullary thyroid cancer. Tell your doctor if you are pregnant or have become pregnant while you are using this medicine. How to Use This Medicine:  
Injectable · Your doctor will prescribe your exact dose and tell you how often it should be given. This medicine is given as a shot under the skin of your stomach, thighs, or upper arms. · If you use insulin in addition to this medicine, do not mix them into the same syringe. You may give the shots in the same area (including your stomach), but do not give the shots right next to each other. · You may be taught how to give your medicine at home. Make sure you understand all instructions before giving yourself an injection. Do not use more medicine or use it more often than your doctor tells you to. · Check the liquid in the pen. It should be clear and colorless. Do not use it if it is cloudy, discolored, or has particles in it. · You will be shown the body areas where this shot can be given. Use a different body area each time you give yourself a shot. Keep track of where you give each shot to make sure you rotate body areas. · Use a new needle and syringe each time you inject your medicine. · Never share medicine pens with others under any circumstances.  Sharing needles or pens can result in transmission of infection. · Drink extra fluids so you will urinate more often and help prevent kidney problems. · This medicine should come with a Medication Guide. Ask your pharmacist for a copy if you do not have one. · Missed dose: If you miss a dose of this medicine, use it as soon as you remember. Then take your next dose at your usual time. Never take extra medicine to make up for a missed dose. If you miss a dose for 3 days or more, call your doctor to talk about how to restart your treatment. · Store your new, unused medicine pen in its original carton in the refrigerator. Protect it from light. Do not freeze this medicine or use it if it has been frozen. You may store the opened medicine pen in the refrigerator or at room temperature for 30 days. Throw away your used pen after 30 days, even if it still has medicine in it. Always remove the needle from the pen before you store it. · Throw away used needles in a hard, closed container that the needles cannot poke through. Keep this container away from children and pets. Drugs and Foods to Avoid: Ask your doctor or pharmacist before using any other medicine, including over-the-counter medicines, vitamins, and herbal products. Warnings While Using This Medicine: · Tell your doctor if you are breastfeeding, or if you have kidney disease, liver disease, digestion problems (including gastroparesis), gallbladder disease, or a history of pancreas problems, depression, or angioedema (swelling of the arms, face, hands, mouth, or throat). · Do not use Saxenda® if you are also using Victoza®. They contain the same medicine. · This medicine may cause the following problems:  
¨ Increased risk for thyroid tumors ¨ Pancreatitis ¨ Low blood sugar ¨ Kidney problems ¨ Gallbladder problems, including gallstones ¨ Thoughts of hurting yourself Patricia Edwards) · Your doctor will do lab tests at regular visits to check on the effects of this medicine. Keep all appointments. · Keep all medicine out of the reach of children. Never share your medicine with anyone. Possible Side Effects While Using This Medicine:  
Call your doctor right away if you notice any of these side effects: · Allergic reaction: Itching or hives, swelling in your face or hands, swelling or tingling in your mouth or throat, chest tightness, trouble breathing · Change in how much or how often you urinate, painful or burning urination · Feeling sad or depressed, thoughts of suicide, unusual changes in mood or behavior · Shaking, trembling, sweating, fast or pounding heartbeat, fainting, hunger, confusion · Sudden and severe stomach pain, nausea, vomiting, fever, lightheadedness · Trouble breathing or swallowing, a lump in your neck, hoarseness when speaking · Yellow skin or eyes If you notice these less serious side effects, talk with your doctor: · Decreased appetite · Diarrhea, constipation, stomach upset · Dizziness · Headache · Redness, itching, swelling, or any changes in your skin where the shot was given If you notice other side effects that you think are caused by this medicine, tell your doctor. Call your doctor for medical advice about side effects. You may report side effects to FDA at 0-615-FDA-5635 © 2017 [address] Information is for End User's use only and may not be sold, redistributed or otherwise used for commercial purposes. The above information is an  only. It is not intended as medical advice for individual conditions or treatments. Talk to your doctor, nurse or pharmacist before following any medical regimen to see if it is safe and effective for you. Introducing [facility]!    
 [facility] introduces [employer] patient portal. Now you can access parts of your medical record, email your doctor's office, and request medication refills online. 1. In your internet browser, go to [email or web address] 2. Click on the First Time User? Click Here link in the Sign In box. You will see the New Member Sign Up page. 3. Enter your [employer] Access Code exactly as it appears below. You will not need to use this code after youve completed the sign-up process. If you do not sign up before the expiration date, you must request a new code. · [employer] Access Code: [email or web address] Expires: 12/28/2017  6:49 PM 
 
4. Enter the last four digits of your Social Security Number (xxxx) and Date of Birth (mm/dd/yyyy) as indicated and click Submit. You will be taken to the next sign-up page. 5. Create a [employer] ID. This will be your [employer] login ID and cannot be changed, so think of one that is secure and easy to remember. 6. Create a [employer] password. You can change your password at any time. 7. Enter your Password Reset Question and Answer. This can be used at a later time if you forget your password. 8. Enter your e-mail address. You will receive e-mail notification when new information is available in [address] 9. Click Sign Up. You can now view and download portions of your medical record. 10. Click the Download Summary menu link to download a portable copy of your medical information. If you have questions, please visit the Frequently Asked Questions section of the [employer] website. Remember, [employer] is NOT to be used for urgent needs. For medical emergencies, dial 911. Now available from your iPhone and Android! Please provide this summary of care documentation to your next provider. Your primary care clinician is listed as Eli Sethi. If you have any questions after today's visit, please call [phone].

## 2017-11-21 NOTE — PATIENT INSTRUCTIONS
Learning About Diabetes Food Guidelines  Your Care Instructions    Meal planning is important to manage diabetes. It helps keep your blood sugar at a target level (which you set with your doctor). You don't have to eat special foods. You can eat what your family eats, including sweets once in a while. But you do have to pay attention to how often you eat and how much you eat of certain foods. You may want to work with a dietitian or a certified diabetes educator (CDE) to help you plan meals and snacks. A dietitian or CDE can also help you lose weight if that is one of your goals. What should you know about eating carbs? Managing the amount of carbohydrate (carbs) you eat is an important part of healthy meals when you have diabetes. Carbohydrate is found in many foods. · Learn which foods have carbs. And learn the amounts of carbs in different foods. ¨ Bread, cereal, pasta, and rice have about 15 grams of carbs in a serving. A serving is 1 slice of bread (1 ounce), ½ cup of cooked cereal, or 1/3 cup of cooked pasta or rice. ¨ Fruits have 15 grams of carbs in a serving. A serving is 1 small fresh fruit, such as an apple or orange; ½ of a banana; ½ cup of cooked or canned fruit; ½ cup of fruit juice; 1 cup of melon or raspberries; or 2 tablespoons of dried fruit. ¨ Milk and no-sugar-added yogurt have 15 grams of carbs in a serving. A serving is 1 cup of milk or 2/3 cup of no-sugar-added yogurt. ¨ Starchy vegetables have 15 grams of carbs in a serving. A serving is ½ cup of mashed potatoes or sweet potato; 1 cup winter squash; ½ of a small baked potato; ½ cup of cooked beans; or ½ cup cooked corn or green peas. · Learn how much carbs to eat each day and at each meal. A dietitian or CDE can teach you how to keep track of the amount of carbs you eat. This is called carbohydrate counting. · If you are not sure how to count carbohydrate grams, use the Plate Method to plan meals.  It is a good, quick way to make sure that you have a balanced meal. It also helps you spread carbs throughout the day. ¨ Divide your plate by types of foods. Put non-starchy vegetables on half the plate, meat or other protein food on one-quarter of the plate, and a grain or starchy vegetable in the final quarter of the plate. To this you can add a small piece of fruit and 1 cup of milk or yogurt, depending on how many carbs you are supposed to eat at a meal.  · Try to eat about the same amount of carbs at each meal. Do not \"save up\" your daily allowance of carbs to eat at one meal.  · Proteins have very little or no carbs per serving. Examples of proteins are beef, chicken, turkey, fish, eggs, tofu, cheese, cottage cheese, and peanut butter. A serving size of meat is 3 ounces, which is about the size of a deck of cards. Examples of meat substitute serving sizes (equal to 1 ounce of meat) are 1/4 cup of cottage cheese, 1 egg, 1 tablespoon of peanut butter, and ½ cup of tofu. How can you eat out and still eat healthy? · Learn to estimate the serving sizes of foods that have carbohydrate. If you measure food at home, it will be easier to estimate the amount in a serving of restaurant food. · If the meal you order has too much carbohydrate (such as potatoes, corn, or baked beans), ask to have a low-carbohydrate food instead. Ask for a salad or green vegetables. · If you use insulin, check your blood sugar before and after eating out to help you plan how much to eat in the future. · If you eat more carbohydrate at a meal than you had planned, take a walk or do other exercise. This will help lower your blood sugar. What else should you know? · Limit saturated fat, such as the fat from meat and dairy products. This is a healthy choice because people who have diabetes are at higher risk of heart disease. So choose lean cuts of meat and nonfat or low-fat dairy products.  Use olive or canola oil instead of butter or shortening when cooking. · Don't skip meals. Your blood sugar may drop too low if you skip meals and take insulin or certain medicines for diabetes. · Check with your doctor before you drink alcohol. Alcohol can cause your blood sugar to drop too low. Alcohol can also cause a bad reaction if you take certain diabetes medicines. Follow-up care is a key part of your treatment and safety. Be sure to make and go to all appointments, and call your doctor if you are having problems. It's also a good idea to know your test results and keep a list of the medicines you take. Where can you learn more? Go to http://rosi-hsell.info/. Enter Z012 in the search box to learn more about \"Learning About Diabetes Food Guidelines. \"  Current as of: March 13, 2017  Content Version: 11.4  © 3471-9054 [employer]. Care instructions adapted under license by [employer] (which disclaims liability or warranty for this information). If you have questions about a medical condition or this instruction, always ask your healthcare professional. [place] [address] any warranty or liability for your use of this information. Empagliflozin (By mouth)   Empagliflozin (jc-dp-blz-FLOE-zin)  Treats type 2 diabetes. Also lowers risk of death in patients with type 2 diabetes and heart or blood vessel problems. Brand Name(s): Jardiance   There may be other brand names for this medicine. When This Medicine Should Not Be Used: This medicine is not right for everyone. Do not use it if you had an allergic reaction to empagliflozin. How to Use This Medicine:   Tablet  · Take your medicine as directed. Your dose may need to be changed several times to find what works best for you. This medicine is usually taken in the morning. · Read and follow the patient instructions that come with this medicine. Talk to your doctor or pharmacist if you have any questions. · Missed dose:  Take a dose as soon as you remember. If it is almost time for your next dose, wait until then and take a regular dose. Do not take extra medicine to make up for a missed dose. · Store the medicine in a closed container at room temperature, away from heat, moisture, and direct light. Drugs and Foods to Avoid:   Ask your doctor or pharmacist before using any other medicine, including over-the-counter medicines, vitamins, and herbal products. · Some medicines can affect how empagliflozin works. Tell your doctor if you are using any of the following:  ¨ Diuretic (water pill)  ¨ Insulin or another diabetes medicine  Warnings While Using This Medicine:   · Tell your doctor if you are pregnant or breastfeeding, or if you have kidney disease, liver problems, congestive heart failure, high cholesterol, or a history of pancreas problems or genital yeast or urinary tract infections. Tell your doctor if you are on a low-salt diet or if you drink alcohol. · This medicine may cause the following problems:  ¨ Low blood pressure  ¨ Ketoacidosis (high ketones and acid in the blood)  ¨ Low blood sugar  ¨ Kidney problems  ¨ Increased risk of genital yeast or urinary tract infections  · Tell any doctor or dentist who treats you that you are using this medicine. This medicine may affect certain medical test results. This medicine may affect the results of urine glucose tests. · Your doctor will do lab tests at regular visits to check on the effects of this medicine. Keep all appointments. · Keep all medicine out of the reach of children. Never share your medicine with anyone.   Possible Side Effects While Using This Medicine:   Call your doctor right away if you notice any of these side effects:  · Allergic reaction: Itching or hives, swelling in your face or hands, swelling or tingling in your mouth or throat, chest tightness, trouble breathing  · Change in how much or how often you urinate, bloody or cloudy urine, painful or difficult urination, lower back or side pain  · Increased hunger, confusion, shaking, trembling, sweating  · Lightheadedness, dizziness, fainting  · Trouble breathing, tiredness, stomach pain, nausea, vomiting  If you notice these less serious side effects, talk with your doctor:   · Redness, itching, pain, or swelling of the penis, bad-smelling discharge from the penis  · White or yellow vaginal discharge, vaginal itching or odor  If you notice other side effects that you think are caused by this medicine, tell your doctor. Call your doctor for medical advice about side effects. You may report side effects to FDA at 9-249-FDA-2030  © 2017 [address] Information is for End User's use only and may not be sold, redistributed or otherwise used for commercial purposes. The above information is an  only. It is not intended as medical advice for individual conditions or treatments. Talk to your doctor, nurse or pharmacist before following any medical regimen to see if it is safe and effective for you. Liraglutide (By injection)   Liraglutide (els-s-IZBA-tide)  Treats type 2 diabetes and helps with weight loss in certain patients. Also reduces the risk of heart attacks and strokes in patients with type 2 diabetes and heart or blood vessel disease. Brand Name(s): Saxenda, Victoza   There may be other brand names for this medicine. When This Medicine Should Not Be Used: This medicine is not right for everyone. Do not use it if you had an allergic reaction to liraglutide, or if you have multiple endocrine neoplasia syndrome type 2 (MEN 2) or if you or anyone in your family had medullary thyroid cancer. Tell your doctor if you are pregnant or have become pregnant while you are using this medicine. How to Use This Medicine:   Injectable  · Your doctor will prescribe your exact dose and tell you how often it should be given. This medicine is given as a shot under the skin of your stomach, thighs, or upper arms.   · If you use insulin in addition to this medicine, do not mix them into the same syringe. You may give the shots in the same area (including your stomach), but do not give the shots right next to each other. · You may be taught how to give your medicine at home. Make sure you understand all instructions before giving yourself an injection. Do not use more medicine or use it more often than your doctor tells you to. · Check the liquid in the pen. It should be clear and colorless. Do not use it if it is cloudy, discolored, or has particles in it. · You will be shown the body areas where this shot can be given. Use a different body area each time you give yourself a shot. Keep track of where you give each shot to make sure you rotate body areas. · Use a new needle and syringe each time you inject your medicine. · Never share medicine pens with others under any circumstances. Sharing needles or pens can result in transmission of infection. · Drink extra fluids so you will urinate more often and help prevent kidney problems. · This medicine should come with a Medication Guide. Ask your pharmacist for a copy if you do not have one. · Missed dose: If you miss a dose of this medicine, use it as soon as you remember. Then take your next dose at your usual time. Never take extra medicine to make up for a missed dose. If you miss a dose for 3 days or more, call your doctor to talk about how to restart your treatment. · Store your new, unused medicine pen in its original carton in the refrigerator. Protect it from light. Do not freeze this medicine or use it if it has been frozen. You may store the opened medicine pen in the refrigerator or at room temperature for 30 days. Throw away your used pen after 30 days, even if it still has medicine in it. Always remove the needle from the pen before you store it. · Throw away used needles in a hard, closed container that the needles cannot poke through.  Keep this container away from children and pets. Drugs and Foods to Avoid:      Ask your doctor or pharmacist before using any other medicine, including over-the-counter medicines, vitamins, and herbal products. Warnings While Using This Medicine:   · Tell your doctor if you are breastfeeding, or if you have kidney disease, liver disease, digestion problems (including gastroparesis), gallbladder disease, or a history of pancreas problems, depression, or angioedema (swelling of the arms, face, hands, mouth, or throat). · Do not use Saxenda® if you are also using Victoza®. They contain the same medicine. · This medicine may cause the following problems:   ¨ Increased risk for thyroid tumors  ¨ Pancreatitis  ¨ Low blood sugar  ¨ Kidney problems  ¨ Gallbladder problems, including gallstones  ¨ Thoughts of hurting yourself [employer])  · Your doctor will do lab tests at regular visits to check on the effects of this medicine. Keep all appointments. · Keep all medicine out of the reach of children. Never share your medicine with anyone.   Possible Side Effects While Using This Medicine:   Call your doctor right away if you notice any of these side effects:  · Allergic reaction: Itching or hives, swelling in your face or hands, swelling or tingling in your mouth or throat, chest tightness, trouble breathing  · Change in how much or how often you urinate, painful or burning urination  · Feeling sad or depressed, thoughts of suicide, unusual changes in mood or behavior  · Shaking, trembling, sweating, fast or pounding heartbeat, fainting, hunger, confusion  · Sudden and severe stomach pain, nausea, vomiting, fever, lightheadedness  · Trouble breathing or swallowing, a lump in your neck, hoarseness when speaking  · Yellow skin or eyes  If you notice these less serious side effects, talk with your doctor:   · Decreased appetite  · Diarrhea, constipation, stomach upset  · Dizziness  · Headache  · Redness, itching, swelling, or any changes in your skin where the shot was given  If you notice other side effects that you think are caused by this medicine, tell your doctor. Call your doctor for medical advice about side effects. You may report side effects to FDA at 6-079-VLP-0371  © 2017 [facility] Information is for End User's use only and may not be sold, redistributed or otherwise used for commercial purposes. The above information is an  only. It is not intended as medical advice for individual conditions or treatments. Talk to your doctor, nurse or pharmacist before following any medical regimen to see if it is safe and effective for you.

## 2017-11-21 NOTE — LETTER
11/21/2017 1:39 PM 
 
Patient:  Tanya Sanon YOB: 1965 Date of Visit: 11/21/2017 Dear Cait Cummings, DANNY 
[address] [facility] [address] \[facility] [place] [facility]\"" Internal Medicine [facility] [address] VIA In Basket 
 : Thank you for referring Mr. Jessika Roberts to me for evaluation/treatment. Below are the relevant portions of my assessment and plan of care. If you have questions, please do not hesitate to call me. I look forward to following Jeremiah Salguero along with you. Sincerely, Tomi Sykes MD

## 2017-11-22 LAB
ALBUMIN/CREAT UR: 44.5 MG/G CREAT (ref 0–30)
CREAT UR-MCNC: 113.6 MG/DL
EST. AVERAGE GLUCOSE BLD GHB EST-MCNC: 177 MG/DL
HBA1C MFR BLD: 7.8 % (ref 4.8–5.6)
MICROALBUMIN UR-MCNC: 50.6 UG/ML

## 2017-11-24 NOTE — PROGRESS NOTES
His A1C was 7.8%, which reflects an average blood sugar of just under 180 for the last 3 months. Make the lifestyle changes we discussed, check your sugars and send me some blood sugar readings in 6 weeks.

## 2017-11-26 RX ORDER — BLOOD SUGAR DIAGNOSTIC
STRIP MISCELLANEOUS
Qty: 100 STRIP | Refills: 1 | Status: SHIPPED | OUTPATIENT
Start: 2017-11-26 | End: 2018-11-30 | Stop reason: SDUPTHER

## 2017-12-15 DIAGNOSIS — E11.69 TYPE 2 DIABETES MELLITUS WITH OTHER SPECIFIED COMPLICATION (HCC): ICD-10-CM

## 2017-12-15 DIAGNOSIS — I10 ESSENTIAL HYPERTENSION WITH GOAL BLOOD PRESSURE LESS THAN 130/80: ICD-10-CM

## 2017-12-16 RX ORDER — AMLODIPINE BESYLATE 10 MG/1
TABLET ORAL
Qty: 30 TAB | Refills: 0 | Status: SHIPPED | OUTPATIENT
Start: 2017-12-16 | End: 2018-01-16 | Stop reason: SDUPTHER

## 2017-12-16 RX ORDER — PIOGLITAZONEHYDROCHLORIDE 15 MG/1
TABLET ORAL
Qty: 30 TAB | Refills: 3 | Status: SHIPPED | OUTPATIENT
Start: 2017-12-16 | End: 2018-04-30 | Stop reason: SDUPTHER

## 2017-12-16 RX ORDER — HYDROCHLOROTHIAZIDE 12.5 MG/1
TABLET ORAL
Qty: 30 TAB | Refills: 0 | Status: SHIPPED | OUTPATIENT
Start: 2017-12-16 | End: 2018-01-16 | Stop reason: SDUPTHER

## 2017-12-16 RX ORDER — LINAGLIPTIN AND METFORMIN HYDROCHLORIDE 2.5; 1 MG/1; MG/1
TABLET, FILM COATED ORAL
Qty: 60 TAB | Refills: 0 | Status: SHIPPED | OUTPATIENT
Start: 2017-12-16 | End: 2018-01-16 | Stop reason: SDUPTHER

## 2017-12-16 RX ORDER — ROSUVASTATIN CALCIUM 10 MG/1
TABLET, FILM COATED ORAL
Qty: 30 TAB | Refills: 0 | Status: SHIPPED | OUTPATIENT
Start: 2017-12-16 | End: 2018-01-16 | Stop reason: SDUPTHER

## 2018-01-16 DIAGNOSIS — I10 ESSENTIAL HYPERTENSION WITH GOAL BLOOD PRESSURE LESS THAN 130/80: ICD-10-CM

## 2018-01-17 RX ORDER — AMLODIPINE BESYLATE 10 MG/1
TABLET ORAL
Qty: 30 TAB | Refills: 0 | Status: SHIPPED | OUTPATIENT
Start: 2018-01-17 | End: 2018-02-23 | Stop reason: SDUPTHER

## 2018-01-17 RX ORDER — ROSUVASTATIN CALCIUM 10 MG/1
TABLET, FILM COATED ORAL
Qty: 30 TAB | Refills: 0 | Status: SHIPPED | OUTPATIENT
Start: 2018-01-17 | End: 2018-02-23 | Stop reason: SDUPTHER

## 2018-01-17 RX ORDER — HYDROCHLOROTHIAZIDE 12.5 MG/1
TABLET ORAL
Qty: 30 TAB | Refills: 0 | Status: SHIPPED | OUTPATIENT
Start: 2018-01-17 | End: 2018-02-23 | Stop reason: SDUPTHER

## 2018-01-17 RX ORDER — LINAGLIPTIN AND METFORMIN HYDROCHLORIDE 2.5; 1 MG/1; MG/1
TABLET, FILM COATED ORAL
Qty: 60 TAB | Refills: 0 | Status: SHIPPED | OUTPATIENT
Start: 2018-01-17 | End: 2018-02-23 | Stop reason: SDUPTHER

## 2018-02-20 DIAGNOSIS — R06.2 WHEEZING: ICD-10-CM

## 2018-02-20 RX ORDER — ALBUTEROL SULFATE 90 UG/1
AEROSOL, METERED RESPIRATORY (INHALATION)
Qty: 8.5 INHALER | Refills: 0 | Status: SHIPPED | OUTPATIENT
Start: 2018-02-20 | End: 2018-02-27

## 2018-02-23 DIAGNOSIS — I10 ESSENTIAL HYPERTENSION WITH GOAL BLOOD PRESSURE LESS THAN 130/80: ICD-10-CM

## 2018-02-26 RX ORDER — LINAGLIPTIN AND METFORMIN HYDROCHLORIDE 2.5; 1 MG/1; MG/1
TABLET, FILM COATED ORAL
Qty: 60 TAB | Refills: 0 | Status: SHIPPED | OUTPATIENT
Start: 2018-02-26 | End: 2018-03-25 | Stop reason: SDUPTHER

## 2018-02-26 RX ORDER — AMLODIPINE BESYLATE 10 MG/1
TABLET ORAL
Qty: 30 TAB | Refills: 0 | Status: SHIPPED | OUTPATIENT
Start: 2018-02-26 | End: 2018-03-25 | Stop reason: SDUPTHER

## 2018-02-26 RX ORDER — ROSUVASTATIN CALCIUM 10 MG/1
TABLET, FILM COATED ORAL
Qty: 30 TAB | Refills: 0 | Status: SHIPPED | OUTPATIENT
Start: 2018-02-26 | End: 2018-03-25 | Stop reason: SDUPTHER

## 2018-02-26 RX ORDER — HYDROCHLOROTHIAZIDE 12.5 MG/1
TABLET ORAL
Qty: 30 TAB | Refills: 0 | Status: SHIPPED | OUTPATIENT
Start: 2018-02-26 | End: 2018-03-28 | Stop reason: SDUPTHER

## 2018-02-26 NOTE — TELEPHONE ENCOUNTER
Pt calling back  b/c he is completely out of all of his needed medications. Has upcoming appt on 3/7/2018. Pt was wanting to know if possible to have any meds filled prior being that he needs them. Was notified appt is needed for particular meds but would see if there was any way possible for us to further assist him.

## 2018-02-27 ENCOUNTER — OFFICE VISIT (OUTPATIENT)
Dept: ENDOCRINOLOGY | Age: 53
End: 2018-02-27

## 2018-02-27 ENCOUNTER — HOSPITAL ENCOUNTER (OUTPATIENT)
Dept: GENERAL RADIOLOGY | Age: 53
Discharge: HOME | End: 2018-02-27
Payer: COMMERCIAL

## 2018-02-27 VITALS
WEIGHT: 246.2 LBS | HEART RATE: 84 BPM | BODY MASS INDEX: 36.46 KG/M2 | DIASTOLIC BLOOD PRESSURE: 87 MMHG | SYSTOLIC BLOOD PRESSURE: 133 MMHG | HEIGHT: 69 IN

## 2018-02-27 DIAGNOSIS — R06.2 WHEEZE: ICD-10-CM

## 2018-02-27 DIAGNOSIS — E78.2 MIXED HYPERLIPIDEMIA: ICD-10-CM

## 2018-02-27 DIAGNOSIS — E11.9 TYPE 2 DIABETES MELLITUS WITHOUT COMPLICATION, WITHOUT LONG-TERM CURRENT USE OF INSULIN (HCC): Primary | ICD-10-CM

## 2018-02-27 DIAGNOSIS — R68.82 DECREASED LIBIDO: ICD-10-CM

## 2018-02-27 DIAGNOSIS — I10 ESSENTIAL HYPERTENSION: ICD-10-CM

## 2018-02-27 PROCEDURE — 71046 X-RAY EXAM CHEST 2 VIEWS: CPT

## 2018-02-27 RX ORDER — MONTELUKAST SODIUM 10 MG/1
TABLET ORAL
Refills: 3 | COMMUNITY
Start: 2018-01-25 | End: 2018-02-27

## 2018-02-27 NOTE — PROGRESS NOTES
Chief Complaint   Patient presents with    Diabetes     pcp and pharmacy verified. recent eye exam. patient will ask them to fax   Records since last visit reviewed. History of Present Illness: Hazel Jensen is a 47 y.o. male here for follow up of diabetes. He was diagnosed with diabetes . At our initial visit in 2017 his regimen consisted of Linagliptin/Metformin 2.5/1000mg BID and Pioglitazone 15mg daily. He noted that his PCP had recently started the Pioglitazone a couple of months ago. His A1C was 7.8%. We discussed at length about DM and low carb dieting, making lifestyle changes, and following BGs twice daily. Pt feels that his BGs are doing well, but he notes that he feels he could be suffering from depression. He notes he can not sleep well, he is getting 4-5 hours per day, he notes he is having a lot of stress. He is now a Senior in college, he notes his stress at work is also increased. He is working 12 hours at work then an additional 4-6 hours for college. His PAunt was burried last week and he notes that she was on HD and  from complications of DM. He notes he has multiple family members who have lost limbs secondary to DM. He notes he has also had issues of decreased interest in sex and his libido has been low. Pt has been making changes in his diet with lower carb, higher protein and lower calorie foods. He has a fit bit and has been working on increasing his steps every day. He walks about a mile per day at work and he used to go to [employer], but has not been going for some time. He does hunt on the weekend and that includes a lot of walking. No history of vascular disease. No history of neuropathy, or nephropathy. Last eye exam was 2017, no retinopathy. He has hx of OA and has cortisone injections in both knees in May 2017, but has not had any other steroids since that time.     Current Outpatient Prescriptions   Medication Sig    hydroCHLOROthiazide (HYDRODIURIL) 12.5 mg tablet TAKE 1 TABLET BY MOUTH EVERY DAY    CRESTOR 10 mg tablet TAKE 1 TABLET BY MOUTH EVERY DAY    amLODIPine (NORVASC) 10 mg tablet TAKE 1 TAB BY MOUTH DAILY.  JENTADUETO 2.5-1,000 mg per tablet TAKE 1 TAB BY MOUTH TWO (2) TIMES DAILY (WITH MEALS). APPOINTMENT REQUIRED    pioglitazone (ACTOS) 15 mg tablet TAKE 1 TAB BY MOUTH DAILY.  CONTOUR NEXT STRIPS strip PATIENT IS TO CHECK BLOOD SUGAR TWICE DAILY    sildenafil citrate (VIAGRA) 100 mg tablet Take 1 Tab by mouth as needed.  APPLE CIDER VINEGAR PO Take  by mouth.  coenzyme Q-10 (CO Q-10) 200 mg capsule Take 1 Cap by mouth daily.  cinnamon bark (CINNAMON) 500 mg cap Take 1,000 mg by mouth two (2) times a day. No current facility-administered medications for this visit. No Known Allergies  Review of Systems:  - Eyes: no blurry vision or double vision  - Cardiovascular: no chest pain  - Respiratory: + cough  - Musculoskeletal: + chronic knee pain  - Neurological: no numbness/tingling in extremities    Physical Examination:  Blood pressure 133/87, pulse 84, height 5' 9\" (1.753 m), weight 246 lb 3.2 oz (111.7 kg). - General: pleasant, no distress, good eye contact   - Neck: no carotid bruits  - Cardiovascular: regular, normal rate, nl s1 and s2, no m/r/g, 2+ DP pulses   - Respiratory: + wheezing throughout, no rhonchi or rales   - Integumentary: no edema, no foot ulcers, sensation to monofilament and vibration intact bilaterally  - Psychiatric: normal mood and affect    Data Reviewed:   - none new for review    Assessment/Plan:   1) DM > Pt reports his BGs have been improving. Will check his A1C today. For now will not make any changes in his regimen at this time. Pt encouraged to limit the sugary beverages and keep working on the lifestyle changes we discussed. Pt to check her BGs twice per day. 2) HTN > BP at goal on his current regimen. No changes needed at this time.     3) HLD > Pt's lipid panel was at goal in April 2017, pt is on Rosuvastatin 10mg daily, which he is tolerating well. This is a high intensity statin regimen. Will order Lipid panel. 4) Wheeze > Will order a CXR. 5) Decreased Libido > Will check an AM fasting testosterone FSH and LH. Pt voices understanding and agreement with the plan. RTC 3 months    Follow-up Disposition:  Return in about 3 months (around 5/27/2018).     Copy sent to:  Dr. Jett Willett

## 2018-02-28 NOTE — PROGRESS NOTES
CXR clear.  This was obtained because he had reported issues of cough and he was having wheezing on exam.

## 2018-03-07 ENCOUNTER — OFFICE VISIT (OUTPATIENT)
Dept: INTERNAL MEDICINE CLINIC | Age: 53
End: 2018-03-07

## 2018-03-07 VITALS
DIASTOLIC BLOOD PRESSURE: 95 MMHG | TEMPERATURE: 98.2 F | WEIGHT: 246.4 LBS | RESPIRATION RATE: 18 BRPM | SYSTOLIC BLOOD PRESSURE: 138 MMHG | BODY MASS INDEX: 36.5 KG/M2 | OXYGEN SATURATION: 96 % | HEART RATE: 94 BPM | HEIGHT: 69 IN

## 2018-03-07 DIAGNOSIS — E66.9 OBESITY (BMI 30-39.9): ICD-10-CM

## 2018-03-07 DIAGNOSIS — I10 ESSENTIAL HYPERTENSION: Primary | ICD-10-CM

## 2018-03-07 DIAGNOSIS — N52.9 ERECTILE DYSFUNCTION, UNSPECIFIED ERECTILE DYSFUNCTION TYPE: ICD-10-CM

## 2018-03-07 DIAGNOSIS — J45.20 MILD INTERMITTENT ASTHMA WITHOUT COMPLICATION: ICD-10-CM

## 2018-03-07 PROBLEM — E11.21 TYPE 2 DIABETES WITH NEPHROPATHY (HCC): Status: ACTIVE | Noted: 2018-03-07

## 2018-03-07 LAB
ALBUMIN SERPL-MCNC: 4.7 G/DL (ref 3.5–5.5)
ALBUMIN/GLOB SERPL: 1.6 {RATIO} (ref 1.2–2.2)
ALP SERPL-CCNC: 62 IU/L (ref 39–117)
ALT SERPL-CCNC: 23 IU/L (ref 0–44)
AST SERPL-CCNC: 20 IU/L (ref 0–40)
BILIRUB SERPL-MCNC: 0.3 MG/DL (ref 0–1.2)
BUN SERPL-MCNC: 16 MG/DL (ref 6–24)
BUN/CREAT SERPL: 11 (ref 9–20)
CALCIUM SERPL-MCNC: 9.9 MG/DL (ref 8.7–10.2)
CHLORIDE SERPL-SCNC: 99 MMOL/L (ref 96–106)
CHOLEST SERPL-MCNC: 214 MG/DL (ref 100–199)
CO2 SERPL-SCNC: 20 MMOL/L (ref 18–29)
CREAT SERPL-MCNC: 1.4 MG/DL (ref 0.76–1.27)
EST. AVERAGE GLUCOSE BLD GHB EST-MCNC: 192 MG/DL
FSH SERPL-ACNC: 2.8 MIU/ML (ref 1.5–12.4)
GFR SERPLBLD CREATININE-BSD FMLA CKD-EPI: 58 ML/MIN/1.73
GFR SERPLBLD CREATININE-BSD FMLA CKD-EPI: 67 ML/MIN/1.73
GLOBULIN SER CALC-MCNC: 2.9 G/DL (ref 1.5–4.5)
GLUCOSE SERPL-MCNC: 159 MG/DL (ref 65–99)
HBA1C MFR BLD: 8.3 % (ref 4.8–5.6)
HDLC SERPL-MCNC: 35 MG/DL
INTERPRETATION, 910389: NORMAL
INTERPRETATION: NORMAL
LDLC SERPL CALC-MCNC: 115 MG/DL (ref 0–99)
LH SERPL-ACNC: 3.5 MIU/ML (ref 1.7–8.6)
Lab: NORMAL
PDF IMAGE, 910387: NORMAL
POTASSIUM SERPL-SCNC: 4.1 MMOL/L (ref 3.5–5.2)
PROT SERPL-MCNC: 7.6 G/DL (ref 6–8.5)
SHBG SERPL-SCNC: 28.4 NMOL/L
SODIUM SERPL-SCNC: 142 MMOL/L (ref 134–144)
TESTOST SERPL-MCNC: 214 NG/DL
TESTOSTERONE.FREE+WB MFR SERPL: 51.8 %
TESTOSTERONE.FREE+WB SERPL-MCNC: 111 NG/DL
TRIGL SERPL-MCNC: 322 MG/DL (ref 0–149)
VLDLC SERPL CALC-MCNC: 64 MG/DL (ref 5–40)

## 2018-03-07 RX ORDER — ALBUTEROL SULFATE 90 UG/1
2 AEROSOL, METERED RESPIRATORY (INHALATION)
Qty: 1 INHALER | Refills: 0 | Status: SHIPPED | OUTPATIENT
Start: 2018-03-07 | End: 2019-04-26 | Stop reason: SDUPTHER

## 2018-03-07 RX ORDER — VARDENAFIL HYDROCHLORIDE 20 MG/1
20 TABLET ORAL AS NEEDED
Qty: 10 TAB | Refills: 0 | Status: SHIPPED | OUTPATIENT
Start: 2018-03-07 | End: 2018-08-27

## 2018-03-07 NOTE — PROGRESS NOTES
HISTORY OF PRESENT ILLNESS  Cyndy Malone is a 47 y.o. male presents for evaluation of the following  HPI  Dry hacking cough,wheezing. Symptoms worse at nights    Stopped Singular for a while    Not using inhaler     School stressful    Diabetes managed by Dr. Clive Lennox. Compliant with medical management. Admits exercise and diet need improvement    Blood pressure readings usually better than today's    Requests trial of Levitra,    Past Medical History:   Diagnosis Date    Arthritis     Borderline diabetic     Diabetes ([facility] [address]     Family history of premature CAD     Hypertension        Current Outpatient Prescriptions on File Prior to Visit   Medication Sig Dispense Refill    hydroCHLOROthiazide (HYDRODIURIL) 12.5 mg tablet TAKE 1 TABLET BY MOUTH EVERY DAY 30 Tab 0    CRESTOR 10 mg tablet TAKE 1 TABLET BY MOUTH EVERY DAY 30 Tab 0    amLODIPine (NORVASC) 10 mg tablet TAKE 1 TAB BY MOUTH DAILY. 30 Tab 0    JENTADUETO 2.5-1,000 mg per tablet TAKE 1 TAB BY MOUTH TWO (2) TIMES DAILY (WITH MEALS). APPOINTMENT REQUIRED 60 Tab 0    pioglitazone (ACTOS) 15 mg tablet TAKE 1 TAB BY MOUTH DAILY. 30 Tab 3    CONTOUR NEXT STRIPS strip PATIENT IS TO CHECK BLOOD SUGAR TWICE DAILY 100 Strip 1    APPLE CIDER VINEGAR PO Take  by mouth.  coenzyme Q-10 (CO Q-10) 200 mg capsule Take 1 Cap by mouth daily. 30 Cap 11    cinnamon bark (CINNAMON) 500 mg cap Take 1,000 mg by mouth two (2) times a day. No current facility-administered medications on file prior to visit. Review of Systems   Constitutional: Negative. HENT: Negative. Eyes: Negative for blurred vision. Respiratory: Positive for cough, sputum production and wheezing. Cardiovascular: Negative for chest pain and palpitations. Gastrointestinal: Negative. Genitourinary: Negative. Musculoskeletal: Negative. Skin: Negative. Neurological: Negative. Psychiatric/Behavioral: Negative for depression.        Physical Exam Constitutional: He appears well-developed and well-nourished. No distress. HENT:   Right Ear: External ear normal.   Left Ear: External ear normal.   Nose: Nose normal.   Mouth/Throat: Oropharynx is clear and moist. No oropharyngeal exudate. Cardiovascular: Normal rate and regular rhythm. Pulmonary/Chest: Effort normal and breath sounds normal. No accessory muscle usage. No tachypnea. No respiratory distress. He has no decreased breath sounds. He has no wheezes. He has no rhonchi. He has no rales. He exhibits no tenderness. Lymphadenopathy:     He has no cervical adenopathy. Skin: He is not diaphoretic. ASSESSMENT and PLAN    ICD-10-CM ICD-9-CM    1. Essential hypertension I10 401.9    2. Mild intermittent asthma without complication L29.60 150.31 albuterol (PROVENTIL HFA, VENTOLIN HFA, PROAIR HFA) 90 mcg/actuation inhaler   3. Erectile dysfunction, unspecified erectile dysfunction type N52.9 607.84 vardenafil (LEVITRA) 20 mg tablet   4. Obesity (BMI 30-39. 9) E66.9 278.00      Follow-up Disposition:  Return in about 3 months (around 6/7/2018) for physical exam, htn, hyperlipidemia. current treatment plan is effective, no change in therapy  lab results and schedule of future lab studies reviewed with patient  reviewed diet, exercise and weight control  cardiovascular risk and specific lipid/LDL goals reviewed  reviewed medications and side effects in detail  use of aspirin to prevent MI and TIA's discussed    1. Sub optimal control. Reinforced lifestyle management. Defer medication adjustment. Encouraged to continue monitoring     2. Encouraged to restart Singular, inhaler prn    Patient voices understanding and acceptance of this advice and will call back if any further questions or concerns. An After Visit Summary was printed and given to the patient.

## 2018-03-07 NOTE — PROGRESS NOTES
RM#9  Chief Complaint   Patient presents with    Medication Refill     1. Have you been to the ER, urgent care clinic since your last visit? Hospitalized since your last visit? No    2. Have you seen or consulted any other health care providers outside of the [address] [place] [address] since your last visit? Include any pap smears or colon screening.  No  Health Maintenance Due   Topic Date Due    DTaP/Tdap/Td series (1 - Tdap) 06/30/1987    EYE EXAM RETINAL OR DILATED Q1  06/25/2015    FOBT Q 1 YEAR AGE 50-75  06/30/2016    Influenza Age 9 to Adult  08/01/2017

## 2018-03-07 NOTE — MR AVS SNAPSHOT
[address] [facility] [address] 
[phone] Patient: Ela Carranza MRN:  :[id number] Visit Information Date & Time Provider Department Dept. Phone Encounter #  
 3/7/2018  4:00 PM Reji [facility] [phone] [id number] Follow-up Instructions Return in about 3 months (around 2018) for physical exam, htn, hyperlipidemia. Your Appointments 2018  4:10 PM  
Follow Up with MD [facility] [address] [place] [address]) Appt Note: 3 month f/u  Diabetes [address] [id number] [address] [place] [address] Upcoming Health Maintenance Date Due DTaP/Tdap/Td series (1 - Tdap) 1987 EYE EXAM RETINAL OR DILATED Q1 2015 FOBT Q 1 YEAR AGE 50-75 2016 Influenza Age 5 to Adult 2017 LIPID PANEL Q1 2018 HEMOGLOBIN A1C Q6M 2018 MICROALBUMIN Q1 2018 FOOT EXAM Q1 2019 Allergies as of 3/7/2018  Review Complete On: 3/7/2018 By: Ida Cornejo NP No Known Allergies Current Immunizations  Never Reviewed Name Date Pneumococcal Polysaccharide (PPSV-23) 2016 Not reviewed this visit You Were Diagnosed With   
  
 Codes Comments Erectile dysfunction, unspecified erectile dysfunction type    -  Primary ICD-10-CM: N52.9 ICD-9-CM: 607.84 Mild intermittent asthma without complication     XNZ-58-XH: J45.20 ICD-9-CM: 493.90 Essential hypertension     ICD-10-CM: I10 
ICD-9-CM: 401.9 Obesity (BMI 30-39. 9)     ICD-10-CM: E66.9 ICD-9-CM: 278.00 Vitals BP Pulse Temp Resp Height(growth percentile) Weight(growth percentile)  (!) 138/95 (BP 1 Location: Right arm, BP Patient Position: Sitting) 94 98.2 °F (36.8 °C) (Oral) 18 5' 9.02\" (1.753 m) 246 lb 6.4 oz (111.8 kg) SpO2 BMI Smoking Status 96% 36.37 kg/m2 Never Smoker BMI and BSA Data Body Mass Index Body Surface Area  
 36.37 kg/m 2 2.33 m 2 Preferred Pharmacy Pharmacy Name Phone [phone] [facility] [phone] Your Updated Medication List  
  
   
This list is accurate as of 3/7/18  4:40 PM.  Always use your most recent med list.  
  
  
  
  
 albuterol 90 mcg/actuation inhaler Commonly known as:  PROVENTIL HFA, VENTOLIN HFA, PROAIR HFA Take 2 Puffs by inhalation every four (4) hours as needed for Wheezing. amLODIPine 10 mg tablet Commonly known as:  Morris Pique TAKE 1 TAB BY MOUTH DAILY. APPLE CIDER VINEGAR PO Take  by mouth. CINNAMON 500 mg Cap Generic drug:  cinnamon bark Take 1,000 mg by mouth two (2) times a day. coenzyme Q-10 200 mg capsule Commonly known as:  CO Q-10 Take 1 Cap by mouth daily. CONTOUR NEXT STRIPS strip Generic drug:  glucose blood VI test strips PATIENT IS TO CHECK BLOOD SUGAR TWICE DAILY CRESTOR 10 mg tablet Generic drug:  rosuvastatin TAKE 1 TABLET BY MOUTH EVERY DAY  
  
 hydroCHLOROthiazide 12.5 mg tablet Commonly known as:  HYDRODIURIL  
TAKE 1 TABLET BY MOUTH EVERY DAY  
  
 JENTADUETO 2.5-1,000 mg per tablet Generic drug:  linagliptin-metFORMIN  
TAKE 1 TAB BY MOUTH TWO (2) TIMES DAILY (WITH MEALS). APPOINTMENT REQUIRED  
  
 pioglitazone 15 mg tablet Commonly known as:  ACTOS  
TAKE 1 TAB BY MOUTH DAILY. vardenafil 20 mg tablet Commonly known as:  LEVITRA Take 20 mg by mouth as needed. Prescriptions Printed Refills  
 vardenafil (LEVITRA) 20 mg tablet 0 Sig: Take 20 mg by mouth as needed. Class: Print Route: Oral  
  
Prescriptions Sent to Pharmacy Refills albuterol (PROVENTIL HFA, VENTOLIN HFA, PROAIR HFA) 90 mcg/actuation inhaler 0 Sig: Take 2 Puffs by inhalation every four (4) hours as needed for Wheezing. Class: Normal  
 [facility] [address] [place] [address] #: [phone] Route: Inhalation Follow-up Instructions Return in about 3 months (around 6/7/2018) for physical exam, htn, hyperlipidemia. Patient Instructions Learning About High Blood Pressure What is high blood pressure? Blood pressure is a measure of how hard the blood pushes against the walls of your arteries. It's normal for blood pressure to go up and down throughout the day, but if it stays up, you have high blood pressure. Another name for high blood pressure is hypertension. Two numbers tell you your blood pressure. The first number is the systolic pressure. It shows how hard the blood pushes when your heart is pumping. The second number is the diastolic pressure. It shows how hard the blood pushes between heartbeats, when your heart is relaxed and filling with blood. A blood pressure of less than 120/80 (say \"120 over 80\") is ideal for an adult. High blood pressure is 140/90 or higher. You have high blood pressure if your top number is 140 or higher or your bottom number is 90 or higher, or both. Many people fall into the category in between, called prehypertension. People with prehypertension need to make lifestyle changes to bring their blood pressure down and help prevent or delay high blood pressure. What happens when you have high blood pressure? · Blood flows through your arteries with too much force. Over time, this damages the walls of your arteries. But you can't feel it. High blood pressure usually doesn't cause symptoms. · Fat and calcium start to build up in your arteries. This buildup is called plaque. Plaque makes your arteries narrower and stiffer.  Blood can't flow through them as easily. · This lack of good blood flow starts to damage some of the organs in your body. This can lead to problems such as coronary artery disease and heart attack, heart failure, stroke, kidney failure, and eye damage. How can you prevent high blood pressure? · Stay at a healthy weight. · Try to limit how much sodium you eat to less than 2,300 milligrams (mg) a day. If you limit your sodium to 1,500 mg a day, you can lower your blood pressure even more. ¨ Buy foods that are labeled \"unsalted,\" \"sodium-free,\" or \"low-sodium. \" Foods labeled \"reduced-sodium\" and \"light sodium\" may still have too much sodium. ¨ Flavor your food with garlic, lemon juice, onion, vinegar, herbs, and spices instead of salt. Do not use soy sauce, steak sauce, onion salt, garlic salt, mustard, or ketchup on your food. ¨ Use less salt (or none) when recipes call for it. You can often use half the salt a recipe calls for without losing flavor. · Be physically active. Get at least 30 minutes of exercise on most days of the week. Walking is a good choice. You also may want to do other activities, such as running, swimming, cycling, or playing tennis or team sports. · Limit alcohol to 2 drinks a day for men and 1 drink a day for women. · Eat plenty of fruits, vegetables, and low-fat dairy products. Eat less saturated and total fats. How is high blood pressure treated? · Your doctor will suggest making lifestyle changes. For example, your doctor may ask you to eat healthy foods, quit smoking, lose extra weight, and be more active. · If lifestyle changes don't help enough or your blood pressure is very high, you will have to take medicine every day. Follow-up care is a key part of your treatment and safety. Be sure to make and go to all appointments, and call your doctor if you are having problems. It's also a good idea to know your test results and keep a list of the medicines you take. Where can you learn more? Go to http://rosi-shell.info/. Enter P501 in the search box to learn more about \"Learning About High Blood Pressure. \" Current as of: September 21, 2016 Content Version: 11.4 © 0716-4907 Carvoyant. Care instructions adapted under license by Globa[employer]rek (which disclaims liability or warranty for this information). If you have questions about a medical condition or this instruction, always ask your healthcare professional. [place] [address] any warranty or liability for your use of this information. Starting a Weight Loss Plan: Care Instructions Your Care Instructions If you are thinking about losing weight, it can be hard to know where to start. Your doctor can help you set up a weight loss plan that best meets your needs. You may want to take a class on nutrition or exercise, or join a weight loss support group. If you have questions about how to make changes to your eating or exercise habits, ask your doctor about seeing a registered dietitian or an exercise specialist. 
It can be a big challenge to lose weight. But you do not have to make huge changes at once. Make small changes, and stick with them. When those changes become habit, add a few more changes. If you do not think you are ready to make changes right now, try to pick a date in the future. Make an appointment to see your doctor to discuss whether the time is right for you to start a plan. Follow-up care is a key part of your treatment and safety. Be sure to make and go to all appointments, and call your doctor if you are having problems. It's also a good idea to know your test results and keep a list of the medicines you take. How can you care for yourself at home? · Set realistic goals. Many people expect to lose much more weight than is likely. A weight loss of 5% to 10% of your body weight may be enough to improve your health. · Get family and friends involved to provide support. Talk to them about why you are trying to lose weight, and ask them to help. They can help by participating in exercise and having meals with you, even if they may be eating something different. · Find what works best for you. If you do not have time or do not like to cook, a program that offers meal replacement bars or shakes may be better for you. Or if you like to prepare meals, finding a plan that includes daily menus and recipes may be best. 
· Ask your doctor about other health professionals who can help you achieve your weight loss goals. ¨ A dietitian can help you make healthy changes in your diet. ¨ An exercise specialist or  can help you develop a safe and effective exercise program. 
¨ A counselor or psychiatrist can help you cope with issues such as depression, anxiety, or family problems that can make it hard to focus on weight loss. · Consider joining a support group for people who are trying to lose weight. Your doctor can suggest groups in your area. Where can you learn more? Go to http://rosi-shell.info/. Enter K240 in the search box to learn more about \"Starting a Weight Loss Plan: Care Instructions. \" Current as of: October 13, 2016 Content Version: 11.4 © 3051-1290 Healthwise, Incorporated. Care instructions adapted under license by ChoicePass (which disclaims liability or warranty for this information). If you have questions about a medical condition or this instruction, always ask your healthcare professional. [place] [address] any warranty or liability for your use of this information. Introducing [facility]! [facility] introduces Ooolala patient portal. Now you can access parts of your medical record, email your doctor's office, and request medication refills online. 1. In your internet browser, go to [email or web address] 2. Click on the First Time User? Click Here link in the Sign In box. You will see the New Member Sign Up page. 3. Enter your MyHeritage Access Code exactly as it appears below. You will not need to use this code after youve completed the sign-up process. If you do not sign up before the expiration date, you must request a new code. · MyHeritage Access Code: [email or web address] Expires: 5/28/2018  4:53 PM 
 
4. Enter the last four digits of your Social Security Number (xxxx) and Date of Birth (mm/dd/yyyy) as indicated and click Submit. You will be taken to the next sign-up page. 5. Create a MyHeritage ID. This will be your MyHeritage login ID and cannot be changed, so think of one that is secure and easy to remember. 6. Create a MyHeritage password. You can change your password at any time. 7. Enter your Password Reset Question and Answer. This can be used at a later time if you forget your password. 8. Enter your e-mail address. You will receive e-mail notification when new information is available in [address] 9. Click Sign Up. You can now view and download portions of your medical record. 10. Click the Download Summary menu link to download a portable copy of your medical information. If you have questions, please visit the Frequently Asked Questions section of the MyHeritage website. Remember, MyHeritage is NOT to be used for urgent needs. For medical emergencies, dial 911. Now available from your iPhone and Android! Please provide this summary of care documentation to your next provider. Your primary care clinician is listed as Omkar Telles. If you have any questions after today's visit, please call [phone].

## 2018-03-07 NOTE — PATIENT INSTRUCTIONS
Learning About High Blood Pressure  What is high blood pressure? Blood pressure is a measure of how hard the blood pushes against the walls of your arteries. It's normal for blood pressure to go up and down throughout the day, but if it stays up, you have high blood pressure. Another name for high blood pressure is hypertension. Two numbers tell you your blood pressure. The first number is the systolic pressure. It shows how hard the blood pushes when your heart is pumping. The second number is the diastolic pressure. It shows how hard the blood pushes between heartbeats, when your heart is relaxed and filling with blood. A blood pressure of less than 120/80 (say \"120 over 80\") is ideal for an adult. High blood pressure is 140/90 or higher. You have high blood pressure if your top number is 140 or higher or your bottom number is 90 or higher, or both. Many people fall into the category in between, called prehypertension. People with prehypertension need to make lifestyle changes to bring their blood pressure down and help prevent or delay high blood pressure. What happens when you have high blood pressure? · Blood flows through your arteries with too much force. Over time, this damages the walls of your arteries. But you can't feel it. High blood pressure usually doesn't cause symptoms. · Fat and calcium start to build up in your arteries. This buildup is called plaque. Plaque makes your arteries narrower and stiffer. Blood can't flow through them as easily. · This lack of good blood flow starts to damage some of the organs in your body. This can lead to problems such as coronary artery disease and heart attack, heart failure, stroke, kidney failure, and eye damage. How can you prevent high blood pressure? · Stay at a healthy weight. · Try to limit how much sodium you eat to less than 2,300 milligrams (mg) a day.  If you limit your sodium to 1,500 mg a day, you can lower your blood pressure even more.  ¨ Buy foods that are labeled \"unsalted,\" \"sodium-free,\" or \"low-sodium. \" Foods labeled \"reduced-sodium\" and \"light sodium\" may still have too much sodium. ¨ Flavor your food with garlic, lemon juice, onion, vinegar, herbs, and spices instead of salt. Do not use soy sauce, steak sauce, onion salt, garlic salt, mustard, or ketchup on your food. ¨ Use less salt (or none) when recipes call for it. You can often use half the salt a recipe calls for without losing flavor. · Be physically active. Get at least 30 minutes of exercise on most days of the week. Walking is a good choice. You also may want to do other activities, such as running, swimming, cycling, or playing tennis or team sports. · Limit alcohol to 2 drinks a day for men and 1 drink a day for women. · Eat plenty of fruits, vegetables, and low-fat dairy products. Eat less saturated and total fats. How is high blood pressure treated? · Your doctor will suggest making lifestyle changes. For example, your doctor may ask you to eat healthy foods, quit smoking, lose extra weight, and be more active. · If lifestyle changes don't help enough or your blood pressure is very high, you will have to take medicine every day. Follow-up care is a key part of your treatment and safety. Be sure to make and go to all appointments, and call your doctor if you are having problems. It's also a good idea to know your test results and keep a list of the medicines you take. Where can you learn more? Go to http://rosi-shell.info/. Enter P501 in the search box to learn more about \"Learning About High Blood Pressure. \"  Current as of: September 21, 2016  Content Version: 11.4  © 7001-9171 brand eins Verlag. Care instructions adapted under license by DoNation (which disclaims liability or warranty for this information).  If you have questions about a medical condition or this instruction, always ask your healthcare professional. Binary Thumb, [facility] disclaims any warranty or liability for your use of this information. Starting a Weight Loss Plan: Care Instructions  Your Care Instructions    If you are thinking about losing weight, it can be hard to know where to start. Your doctor can help you set up a weight loss plan that best meets your needs. You may want to take a class on nutrition or exercise, or join a weight loss support group. If you have questions about how to make changes to your eating or exercise habits, ask your doctor about seeing a registered dietitian or an exercise specialist.  It can be a big challenge to lose weight. But you do not have to make huge changes at once. Make small changes, and stick with them. When those changes become habit, add a few more changes. If you do not think you are ready to make changes right now, try to pick a date in the future. Make an appointment to see your doctor to discuss whether the time is right for you to start a plan. Follow-up care is a key part of your treatment and safety. Be sure to make and go to all appointments, and call your doctor if you are having problems. It's also a good idea to know your test results and keep a list of the medicines you take. How can you care for yourself at home? · Set realistic goals. Many people expect to lose much more weight than is likely. A weight loss of 5% to 10% of your body weight may be enough to improve your health. · Get family and friends involved to provide support. Talk to them about why you are trying to lose weight, and ask them to help. They can help by participating in exercise and having meals with you, even if they may be eating something different. · Find what works best for you. If you do not have time or do not like to cook, a program that offers meal replacement bars or shakes may be better for you.  Or if you like to prepare meals, finding a plan that includes daily menus and recipes may be best.  · Ask your doctor about other health professionals who can help you achieve your weight loss goals. ¨ A dietitian can help you make healthy changes in your diet. ¨ An exercise specialist or  can help you develop a safe and effective exercise program.  ¨ A counselor or psychiatrist can help you cope with issues such as depression, anxiety, or family problems that can make it hard to focus on weight loss. · Consider joining a support group for people who are trying to lose weight. Your doctor can suggest groups in your area. Where can you learn more? Go to http://rosiBooster Packshell.info/. Enter A391 in the search box to learn more about \"Starting a Weight Loss Plan: Care Instructions. \"  Current as of: October 13, 2016  Content Version: 11.4  © 8996-5091 Healthwise, Today Tix. Care instructions adapted under license by 99inn.cc (which disclaims liability or warranty for this information). If you have questions about a medical condition or this instruction, always ask your healthcare professional. [place] [address] any warranty or liability for your use of this information.

## 2018-03-08 ENCOUNTER — TELEPHONE (OUTPATIENT)
Dept: ENDOCRINOLOGY | Age: 53
End: 2018-03-08

## 2018-03-08 NOTE — PROGRESS NOTES
Spoke with pt regarding his labs  1) His TT was low but his BAT was in the low normal range. We discussed working on improving his blood sugars and A1C first, and if his testosterone does not improve, then we can discuss testosterone replacement options. 2) Pt notes he had been out of his Rousvastatin \"for a little while\" when his lipid panel was drawn, but he is now back on the Rosuvastatin 10mg daily. 3) Pt to work on the lifestyle and dietary changes we discussed and to send me some blood sugar readings in 3-4 weeks. Pt voices understanding and agreement with the plan.

## 2018-03-21 DIAGNOSIS — R05.9 COUGH: ICD-10-CM

## 2018-03-21 RX ORDER — MONTELUKAST SODIUM 10 MG/1
TABLET ORAL
Qty: 30 TAB | Refills: 3 | Status: SHIPPED | OUTPATIENT
Start: 2018-03-21 | End: 2018-07-21 | Stop reason: SDUPTHER

## 2018-03-25 DIAGNOSIS — I10 ESSENTIAL HYPERTENSION WITH GOAL BLOOD PRESSURE LESS THAN 130/80: ICD-10-CM

## 2018-03-26 RX ORDER — AMLODIPINE BESYLATE 10 MG/1
TABLET ORAL
Qty: 30 TAB | Refills: 0 | Status: SHIPPED | OUTPATIENT
Start: 2018-03-26 | End: 2018-04-23 | Stop reason: SDUPTHER

## 2018-03-26 RX ORDER — LINAGLIPTIN AND METFORMIN HYDROCHLORIDE 2.5; 1 MG/1; MG/1
TABLET, FILM COATED ORAL
Qty: 60 TAB | Refills: 0 | Status: SHIPPED | OUTPATIENT
Start: 2018-03-26 | End: 2018-04-23 | Stop reason: SDUPTHER

## 2018-03-26 RX ORDER — ROSUVASTATIN CALCIUM 10 MG/1
TABLET, FILM COATED ORAL
Qty: 30 TAB | Refills: 0 | Status: SHIPPED | OUTPATIENT
Start: 2018-03-26 | End: 2018-04-23 | Stop reason: SDUPTHER

## 2018-03-30 RX ORDER — HYDROCHLOROTHIAZIDE 12.5 MG/1
TABLET ORAL
Qty: 30 TAB | Refills: 0 | Status: SHIPPED | OUTPATIENT
Start: 2018-03-30 | End: 2018-04-30 | Stop reason: SDUPTHER

## 2018-04-23 DIAGNOSIS — I10 ESSENTIAL HYPERTENSION WITH GOAL BLOOD PRESSURE LESS THAN 130/80: ICD-10-CM

## 2018-04-23 RX ORDER — LINAGLIPTIN AND METFORMIN HYDROCHLORIDE 2.5; 1 MG/1; MG/1
TABLET, FILM COATED ORAL
Qty: 60 TAB | Refills: 0 | Status: SHIPPED | OUTPATIENT
Start: 2018-04-23 | End: 2018-05-25 | Stop reason: SDUPTHER

## 2018-04-23 RX ORDER — ROSUVASTATIN CALCIUM 10 MG/1
TABLET, FILM COATED ORAL
Qty: 30 TAB | Refills: 0 | Status: SHIPPED | OUTPATIENT
Start: 2018-04-23 | End: 2018-05-25 | Stop reason: SDUPTHER

## 2018-04-23 RX ORDER — AMLODIPINE BESYLATE 10 MG/1
TABLET ORAL
Qty: 30 TAB | Refills: 0 | Status: SHIPPED | OUTPATIENT
Start: 2018-04-23 | End: 2018-05-25 | Stop reason: SDUPTHER

## 2018-04-30 DIAGNOSIS — E11.69 TYPE 2 DIABETES MELLITUS WITH OTHER SPECIFIED COMPLICATION (HCC): ICD-10-CM

## 2018-05-04 RX ORDER — PIOGLITAZONEHYDROCHLORIDE 15 MG/1
TABLET ORAL
Qty: 30 TAB | Refills: 3 | Status: SHIPPED | OUTPATIENT
Start: 2018-05-04 | End: 2018-08-27 | Stop reason: SDUPTHER

## 2018-05-04 RX ORDER — HYDROCHLOROTHIAZIDE 12.5 MG/1
TABLET ORAL
Qty: 30 TAB | Refills: 0 | Status: SHIPPED | OUTPATIENT
Start: 2018-05-04 | End: 2018-05-30 | Stop reason: SDUPTHER

## 2018-05-25 DIAGNOSIS — I10 ESSENTIAL HYPERTENSION WITH GOAL BLOOD PRESSURE LESS THAN 130/80: ICD-10-CM

## 2018-05-25 RX ORDER — AMLODIPINE BESYLATE 10 MG/1
TABLET ORAL
Qty: 30 TAB | Refills: 0 | Status: SHIPPED | OUTPATIENT
Start: 2018-05-25 | End: 2018-06-26 | Stop reason: SDUPTHER

## 2018-05-25 RX ORDER — ROSUVASTATIN CALCIUM 10 MG/1
TABLET, FILM COATED ORAL
Qty: 30 TAB | Refills: 0 | Status: SHIPPED | OUTPATIENT
Start: 2018-05-25 | End: 2018-06-28 | Stop reason: SDUPTHER

## 2018-05-25 RX ORDER — LINAGLIPTIN AND METFORMIN HYDROCHLORIDE 2.5; 1 MG/1; MG/1
TABLET, FILM COATED ORAL
Qty: 60 TAB | Refills: 0 | Status: SHIPPED | OUTPATIENT
Start: 2018-05-25 | End: 2018-06-26 | Stop reason: SDUPTHER

## 2018-05-30 RX ORDER — HYDROCHLOROTHIAZIDE 12.5 MG/1
TABLET ORAL
Qty: 30 TAB | Refills: 0 | Status: SHIPPED | OUTPATIENT
Start: 2018-05-30 | End: 2018-06-26 | Stop reason: SDUPTHER

## 2018-06-01 ENCOUNTER — TELEPHONE (OUTPATIENT)
Dept: INTERNAL MEDICINE CLINIC | Age: 53
End: 2018-06-01

## 2018-06-01 NOTE — TELEPHONE ENCOUNTER
Writer looked at patient's preferred drug list for [employer] and [employer]. Rosuvastatin is covered with $0 co pay.

## 2018-06-01 NOTE — TELEPHONE ENCOUNTER
Rachel/telephone  Received: Today       Carrie Mendosakoby Cp Front Office Pool                     Pt stated his insurance will not cover Crestor and he is requesting a different medication. Pts number is [phone] and [facility] [address] [facility]

## 2018-06-01 NOTE — TELEPHONE ENCOUNTER
Spoke to patient. Writer informed patient that Rosuvastatin is covered at a $0 copay. Writer informed pharmacy to fill the prescription. Patient given an opportunity to ask questions, repeated information, and verbalized understanding.

## 2018-06-06 ENCOUNTER — TELEPHONE (OUTPATIENT)
Dept: ENDOCRINOLOGY | Age: 53
End: 2018-06-06

## 2018-06-06 NOTE — TELEPHONE ENCOUNTER
----- Message from Yony Kumar sent at 6/6/2018  4:33 PM EDT -----  Regarding: Dr. Erik Manuel   Pt called concerning his prescription and would like a call back.  Pt best contact number

## 2018-06-07 ENCOUNTER — OFFICE VISIT (OUTPATIENT)
Dept: INTERNAL MEDICINE CLINIC | Age: 53
End: 2018-06-07

## 2018-06-07 VITALS
DIASTOLIC BLOOD PRESSURE: 90 MMHG | WEIGHT: 239 LBS | TEMPERATURE: 97.9 F | OXYGEN SATURATION: 94 % | HEIGHT: 69 IN | BODY MASS INDEX: 35.4 KG/M2 | SYSTOLIC BLOOD PRESSURE: 134 MMHG | HEART RATE: 90 BPM | RESPIRATION RATE: 18 BRPM

## 2018-06-07 DIAGNOSIS — Z23 NEED FOR SHINGLES VACCINE: ICD-10-CM

## 2018-06-07 DIAGNOSIS — Z71.89 ADVANCE DIRECTIVE DISCUSSED WITH PATIENT: ICD-10-CM

## 2018-06-07 DIAGNOSIS — E78.2 MIXED HYPERLIPIDEMIA: ICD-10-CM

## 2018-06-07 DIAGNOSIS — Z00.00 PHYSICAL EXAM, ANNUAL: ICD-10-CM

## 2018-06-07 DIAGNOSIS — E11.9 TYPE 2 DIABETES MELLITUS WITHOUT COMPLICATION, WITHOUT LONG-TERM CURRENT USE OF INSULIN (HCC): Primary | ICD-10-CM

## 2018-06-07 DIAGNOSIS — Z82.49 FHX: PREMATURE CORONARY HEART DISEASE: ICD-10-CM

## 2018-06-07 DIAGNOSIS — E66.9 OBESITY (BMI 30-39.9): ICD-10-CM

## 2018-06-07 PROBLEM — E66.01 SEVERE OBESITY (BMI 35.0-39.9): Status: ACTIVE | Noted: 2018-06-07

## 2018-06-07 LAB
BILIRUB UR QL STRIP: NEGATIVE
GLUCOSE UR-MCNC: NEGATIVE MG/DL
HBA1C MFR BLD HPLC: 8.5 %
KETONES P FAST UR STRIP-MCNC: NEGATIVE MG/DL
PH UR STRIP: 5.5 [PH] (ref 4.6–8)
PROT UR QL STRIP: NEGATIVE
SP GR UR STRIP: 1.01 (ref 1–1.03)
UA UROBILINOGEN AMB POC: NORMAL (ref 0.2–1)
URINALYSIS CLARITY POC: CLEAR
URINALYSIS COLOR POC: YELLOW
URINE BLOOD POC: NEGATIVE
URINE LEUKOCYTES POC: NEGATIVE
URINE NITRITES POC: NEGATIVE

## 2018-06-07 NOTE — TELEPHONE ENCOUNTER
Spoke with patient. He had to reschedule his appointment until 8/29/18. He has to go OOT for his job. He had his annual physical this am. The doctor mentioned that [address] may be helpful with his diabetes and perhaps some weight loss. Patient is wondering if Victoza would be a good medication to try.

## 2018-06-07 NOTE — PATIENT INSTRUCTIONS
Learning About Meal Planning for Diabetes  Why plan your meals? Meal planning can be a key part of managing diabetes. Planning meals and snacks with the right balance of carbohydrate, protein, and fat can help you keep your blood sugar at the target level you set with your doctor. You don't have to eat special foods. You can eat what your family eats, including sweets once in a while. But you do have to pay attention to how often you eat and how much you eat of certain foods. You may want to work with a dietitian or a certified diabetes educator. He or she can give you tips and meal ideas and can answer your questions about meal planning. This health professional can also help you reach a healthy weight if that is one of your goals. What plan is right for you? Your dietitian or diabetes educator may suggest that you start with the plate format or carbohydrate counting. The plate format  The plate format is a simple way to help you manage how you eat. You plan meals by learning how much space each food should take on a plate. Using the plate format helps you spread carbohydrate throughout the day. It can make it easier to keep your blood sugar level within your target range. It also helps you see if you're eating healthy portion sizes. To use the plate format, you put non-starchy vegetables on half your plate. Add meat or meat substitutes on one-quarter of the plate. Put a grain or starchy vegetable (such as brown rice or a potato) on the final quarter of the plate. You can add a small piece of fruit and some low-fat or fat-free milk or yogurt, depending on your carbohydrate goal for each meal.  Here are some tips for using the plate format:  · Make sure that you are not using an oversized plate. A 9-inch plate is best. Many restaurants use larger plates. · Get used to using the plate format at home. Then you can use it when you eat out. · Write down your questions about using the plate format.  Talk to your doctor, a dietitian, or a diabetes educator about your concerns. Carbohydrate counting  With carbohydrate counting, you plan meals based on the amount of carbohydrate in each food. Carbohydrate raises blood sugar higher and more quickly than any other nutrient. It is found in desserts, breads and cereals, and fruit. It's also found in starchy vegetables such as potatoes and corn, grains such as rice and pasta, and milk and yogurt. Spreading carbohydrate throughout the day helps keep your blood sugar levels within your target range. Your daily amount depends on several things, including your weight, how active you are, which diabetes medicines you take, and what your goals are for your blood sugar levels. A registered dietitian or diabetes educator can help you plan how much carbohydrate to include in each meal and snack. A guideline for your daily amount of carbohydrate is:  · 45 to 60 grams at each meal. That's about the same as 3 to 4 carbohydrate servings. · 15 to 20 grams at each snack. That's about the same as 1 carbohydrate serving. The Nutrition Facts label on packaged foods tells you how much carbohydrate is in a serving of the food. First, look at the serving size on the food label. Is that the amount you eat in a serving? All of the nutrition information on a food label is based on that serving size. So if you eat more or less than that, you'll need to adjust the other numbers. Total carbohydrate is the next thing you need to look for on the label. If you count carbohydrate servings, one serving of carbohydrate is 15 grams. For foods that don't come with labels, such as fresh fruits and vegetables, you'll need a guide that lists carbohydrate in these foods. Ask your doctor, dietitian, or diabetes educator about books or other nutrition guides you can use.   If you take insulin, you need to know how many grams of carbohydrate are in a meal. This lets you know how much rapid-acting insulin to take before you eat. If you use an insulin pump, you get a constant rate of insulin during the day. So the pump must be programmed at meals to give you extra insulin to cover the rise in blood sugar after meals. When you know how much carbohydrate you will eat, you can take the right amount of insulin. Or, if you always use the same amount of insulin, you need to make sure that you eat the same amount of carbohydrate at meals. If you need more help to understand carbohydrate counting and food labels, ask your doctor, dietitian, or diabetes educator. How do you get started with meal planning? Here are some tips to get started:  · Plan your meals a week at a time. Don't forget to include snacks too. · Use cookbooks or online recipes to plan several main meals. Plan some quick meals for busy nights. You also can double some recipes that freeze well. Then you can save half for other busy nights when you don't have time to cook. · Make sure you have the ingredients you need for your recipes. If you're running low on basic items, put these items on your shopping list too. · List foods that you use to make breakfasts, lunches, and snacks. List plenty of fruits and vegetables. · Post this list on the refrigerator. Add to it as you think of more things you need. · Take the list to the store to do your weekly shopping. Follow-up care is a key part of your treatment and safety. Be sure to make and go to all appointments, and call your doctor if you are having problems. It's also a good idea to know your test results and keep a list of the medicines you take. Where can you learn more? Go to http://rosi-shell.info/. Allen Hernandes in the search box to learn more about \"Learning About Meal Planning for Diabetes. \"  Current as of: March 13, 2017  Content Version: 11.4  © 8601-8302 [employer].  Care instructions adapted under license by [employer] (which disclaims liability or warranty for this information). If you have questions about a medical condition or this instruction, always ask your healthcare professional. [place] [address] any warranty or liability for your use of this information. Well Visit, Men 48 to 72: Care Instructions  Your Care Instructions    Physical exams can help you stay healthy. Your doctor has checked your overall health and may have suggested ways to take good care of yourself. He or she also may have recommended tests. At home, you can help prevent illness with healthy eating, regular exercise, and other steps. Follow-up care is a key part of your treatment and safety. Be sure to make and go to all appointments, and call your doctor if you are having problems. It's also a good idea to know your test results and keep a list of the medicines you take. How can you care for yourself at home? · Reach and stay at a healthy weight. This will lower your risk for many problems, such as obesity, diabetes, heart disease, and high blood pressure. · Get at least 30 minutes of exercise on most days of the week. Walking is a good choice. You also may want to do other activities, such as running, swimming, cycling, or playing tennis or team sports. · Do not smoke. Smoking can make health problems worse. If you need help quitting, talk to your doctor about stop-smoking programs and medicines. These can increase your chances of quitting for good. · Protect your skin from too much sun. When you're outdoors from 10 a.m. to 4 p.m., stay in the shade or cover up with clothing and a hat with a wide brim. Wear sunglasses that block UV rays. Even when it's cloudy, put broad-spectrum sunscreen (SPF 30 or higher) on any exposed skin. · See a dentist one or two times a year for checkups and to have your teeth cleaned. · Wear a seat belt in the car. · Limit alcohol to 2 drinks a day. Too much alcohol can cause health problems.   Follow your doctor's advice about when to have certain tests. These tests can spot problems early. · Cholesterol. Your doctor will tell you how often to have this done based on your overall health and other things that can increase your risk for heart attack and stroke. · Blood pressure. Have your blood pressure checked during a routine doctor visit. Your doctor will tell you how often to check your blood pressure based on your age, your blood pressure results, and other factors. · Prostate exam. Talk to your doctor about whether you should have a blood test (called a PSA test) for prostate cancer. Experts disagree on whether men should have this test. Some experts recommend that you discuss the benefits and risks of the test with your doctor. · Diabetes. Ask your doctor whether you should have tests for diabetes. · Vision. Some experts recommend that you have yearly exams for glaucoma and other age-related eye problems starting at age 48. · Hearing. Tell your doctor if you notice any change in your hearing. You can have tests to find out how well you hear. · Colon cancer. You should begin tests for colon cancer at age 48. You may have one of several tests. Your doctor will tell you how often to have tests based on your age and risk. Risks include whether you already had a precancerous polyp removed from your colon or whether your parent, brother, sister, or child has had colon cancer. · Heart attack and stroke risk. At least every 4 to 6 years, you should have your risk for heart attack and stroke assessed. Your doctor uses factors such as your age, blood pressure, cholesterol, and whether you smoke or have diabetes to show what your risk for a heart attack or stroke is over the next 10 years. · Abdominal aortic aneurysm. Ask your doctor whether you should have a test to check for an aneurysm. You may need a test if you ever smoked or if your parent, brother, sister, or child has had an aneurysm.   When should you call for help?  Watch closely for changes in your health, and be sure to contact your doctor if you have any problems or symptoms that concern you. Where can you learn more? Go to http://rosi-shell.info/. Enter D707 in the search box to learn more about \"Well Visit, Men 48 to 72: Care Instructions. \"  Current as of: May 12, 2017  Content Version: 11.4  © 5487-8003 [employer]. Care instructions adapted under license by [employer] (which disclaims liability or warranty for this information). If you have questions about a medical condition or this instruction, always ask your healthcare professional. [place] [address] any warranty or liability for your use of this information. Body Mass Index: Care Instructions  Your Care Instructions    Body mass index (BMI) can help you see if your weight is raising your risk for health problems. It uses a formula to compare how much you weigh with how tall you are. · A BMI lower than 18.5 is considered underweight. · A BMI between 18.5 and 24.9 is considered healthy. · A BMI between 25 and 29.9 is considered overweight. A BMI of 30 or higher is considered obese. If your BMI is in the normal range, it means that you have a lower risk for weight-related health problems. If your BMI is in the overweight or obese range, you may be at increased risk for weight-related health problems, such as high blood pressure, heart disease, stroke, arthritis or joint pain, and diabetes. If your BMI is in the underweight range, you may be at increased risk for health problems such as fatigue, lower protection (immunity) against illness, muscle loss, bone loss, hair loss, and hormone problems. BMI is just one measure of your risk for weight-related health problems. You may be at higher risk for health problems if you are not active, you eat an unhealthy diet, or you drink too much alcohol or use tobacco products.   Follow-up care is a key part of your treatment and safety. Be sure to make and go to all appointments, and call your doctor if you are having problems. It's also a good idea to know your test results and keep a list of the medicines you take. How can you care for yourself at home? · Practice healthy eating habits. This includes eating plenty of fruits, vegetables, whole grains, lean protein, and low-fat dairy. · If your doctor recommends it, get more exercise. Walking is a good choice. Bit by bit, increase the amount you walk every day. Try for at least 30 minutes on most days of the week. · Do not smoke. Smoking can increase your risk for health problems. If you need help quitting, talk to your doctor about stop-smoking programs and medicines. These can increase your chances of quitting for good. · Limit alcohol to 2 drinks a day for men and 1 drink a day for women. Too much alcohol can cause health problems. If you have a BMI higher than 25  · Your doctor may do other tests to check your risk for weight-related health problems. This may include measuring the distance around your waist. A waist measurement of more than 40 inches in men or 35 inches in women can increase the risk of weight-related health problems. · Talk with your doctor about steps you can take to stay healthy or improve your health. You may need to make lifestyle changes to lose weight and stay healthy, such as changing your diet and getting regular exercise. If you have a BMI lower than 18.5  · Your doctor may do other tests to check your risk for health problems. · Talk with your doctor about steps you can take to stay healthy or improve your health. You may need to make lifestyle changes to gain or maintain weight and stay healthy, such as getting more healthy foods in your diet and doing exercises to build muscle. Where can you learn more? Go to http://rosi-shell.info/.   Enter S176 in the search box to learn more about \"Body Mass Index: Care Instructions. \"  Current as of: October 13, 2016  Content Version: 11.4  © 6641-3259 [employer]. Care instructions adapted under license by [employer] (which disclaims liability or warranty for this information). If you have questions about a medical condition or this instruction, always ask your healthcare professional. [place] [address] any warranty or liability for your use of this information. Advance Directives: Care Instructions  Your Care Instructions  An advance directive is a legal way to state your wishes at the end of your life. It tells your family and your doctor what to do if you can no longer say what you want. There are two main types of advance directives. You can change them any time that your wishes change. · A living will tells your family and your doctor your wishes about life support and other treatment. · A durable power of  for health care lets you name a person to make treatment decisions for you when you can't speak for yourself. This person is called a health care agent. If you do not have an advance directive, decisions about your medical care may be made by a doctor or a  who doesn't know you. It may help to think of an advance directive as a gift to the people who care for you. If you have one, they won't have to make tough decisions by themselves. Follow-up care is a key part of your treatment and safety. Be sure to make and go to all appointments, and call your doctor if you are having problems. It's also a good idea to know your test results and keep a list of the medicines you take. How can you care for yourself at home? · Discuss your wishes with your loved ones and your doctor. This way, there are no surprises. · Many states have a unique form. Or you might use a universal form that has been approved by many states. This kind of form can sometimes be completed and stored online.  Your electronic copy will then be available wherever you have a connection to the Internet. In most cases, doctors will respect your wishes even if you have a form from a different state. · You don't need a  to do an advance directive. But you may want to get legal advice. · Think about these questions when you prepare an advance directive:  ¨ Who do you want to make decisions about your medical care if you are not able to? Many people choose a family member or close friend. ¨ Do you know enough about life support methods that might be used? If not, talk to your doctor so you understand. ¨ What are you most afraid of that might happen? You might be afraid of having pain, losing your independence, or being kept alive by machines. ¨ Where would you prefer to die? Choices include your home, a hospital, or a nursing home. ¨ Would you like to have information about hospice care to support you and your family? ¨ Do you want to donate organs when you die? ¨ Do you want certain [employer] practices performed before you die? If so, put your wishes in the advance directive. · Read your advance directive every year, and make changes as needed. When should you call for help? Be sure to contact your doctor if you have any questions. Where can you learn more? Go to http://rosi-shell.info/. Enter R264 in the search box to learn more about \"Advance Directives: Care Instructions. \"  Current as of: September 24, 2016  Content Version: 11.4  © 3510-8057 [employer]. Care instructions adapted under license by [employer] (which disclaims liability or warranty for this information). If you have questions about a medical condition or this instruction, always ask your healthcare professional. [place] [address] any warranty or liability for your use of this information.            Body Mass Index: Care Instructions  Your Care Instructions    Body mass index (BMI) can help you see if your weight is raising your risk for health problems. It uses a formula to compare how much you weigh with how tall you are. · A BMI lower than 18.5 is considered underweight. · A BMI between 18.5 and 24.9 is considered healthy. · A BMI between 25 and 29.9 is considered overweight. A BMI of 30 or higher is considered obese. If your BMI is in the normal range, it means that you have a lower risk for weight-related health problems. If your BMI is in the overweight or obese range, you may be at increased risk for weight-related health problems, such as high blood pressure, heart disease, stroke, arthritis or joint pain, and diabetes. If your BMI is in the underweight range, you may be at increased risk for health problems such as fatigue, lower protection (immunity) against illness, muscle loss, bone loss, hair loss, and hormone problems. BMI is just one measure of your risk for weight-related health problems. You may be at higher risk for health problems if you are not active, you eat an unhealthy diet, or you drink too much alcohol or use tobacco products. Follow-up care is a key part of your treatment and safety. Be sure to make and go to all appointments, and call your doctor if you are having problems. It's also a good idea to know your test results and keep a list of the medicines you take. How can you care for yourself at home? · Practice healthy eating habits. This includes eating plenty of fruits, vegetables, whole grains, lean protein, and low-fat dairy. · If your doctor recommends it, get more exercise. Walking is a good choice. Bit by bit, increase the amount you walk every day. Try for at least 30 minutes on most days of the week. · Do not smoke. Smoking can increase your risk for health problems. If you need help quitting, talk to your doctor about stop-smoking programs and medicines. These can increase your chances of quitting for good.   · Limit alcohol to 2 drinks a day for men and 1 drink a day for women. Too much alcohol can cause health problems. If you have a BMI higher than 25  · Your doctor may do other tests to check your risk for weight-related health problems. This may include measuring the distance around your waist. A waist measurement of more than 40 inches in men or 35 inches in women can increase the risk of weight-related health problems. · Talk with your doctor about steps you can take to stay healthy or improve your health. You may need to make lifestyle changes to lose weight and stay healthy, such as changing your diet and getting regular exercise. If you have a BMI lower than 18.5  · Your doctor may do other tests to check your risk for health problems. · Talk with your doctor about steps you can take to stay healthy or improve your health. You may need to make lifestyle changes to gain or maintain weight and stay healthy, such as getting more healthy foods in your diet and doing exercises to build muscle. Where can you learn more? Go to http://rosi-shell.info/. Enter S176 in the search box to learn more about \"Body Mass Index: Care Instructions. \"  Current as of: October 13, 2016  Content Version: 11.4  © 2547-3986 [employer]. Care instructions adapted under license by [employer] (which disclaims liability or warranty for this information). If you have questions about a medical condition or this instruction, always ask your healthcare professional. [address] any warranty or liability for your use of this information.

## 2018-06-07 NOTE — MR AVS SNAPSHOT
[address] [facility] [address] [place] [address] 
[phone] Patient: Carl Bond MRN:  [id number] Visit Information Date & Time Provider Department Dept. Phone Encounter #  
 2018  8:30 AM Terresa Ahumada, [phone] Pediatrics and Internal [facility] [phone] [id number] Follow-up Instructions Return in about 3 months (around 2018) for htn, weight management. Your Appointments 2018 12:10 PM  
Follow Up with Eulalia Felix MD  
[facility] [address] [facility]) Appt Note: 3 month f/u  Diabetes; R/S  [id number] Dr. Mayra Jay [facility] [employer] [phone] [id number] [address] [place] [address] Upcoming Health Maintenance Date Due DTaP/Tdap/Td series (1 - Tdap) 1987 EYE EXAM RETINAL OR DILATED Q1 2015 FOBT Q 1 YEAR AGE 50-75 2016 Influenza Age 5 to Adult 2018 MICROALBUMIN Q1 2018 HEMOGLOBIN A1C Q6M 2018 FOOT EXAM Q1 2019 LIPID PANEL Q1 3/2/2019 Allergies as of 2018  Review Complete On: 2018 By: Aroldo Witt LPN No Known Allergies Current Immunizations  Never Reviewed Name Date Pneumococcal Polysaccharide (PPSV-23) 2016 Zoster Recombinant  Incomplete Not reviewed this visit You Were Diagnosed With   
  
 Codes Comments Type 2 diabetes mellitus without complication, without long-term current use of insulin (HCC)    -  Primary ICD-10-CM: E11.9 ICD-9-CM: 250.00 Need for shingles vaccine     ICD-10-CM: A35 ICD-9-CM: V04.89 Advance directive discussed with patient     ICD-10-CM: Z71.89 ICD-9-CM: V65.49 Mixed hyperlipidemia     ICD-10-CM: E78.2 ICD-9-CM: 272.2 Physical exam, annual     ICD-10-CM: Z00.00 ICD-9-CM: V70.0 Obesity (BMI 30-39. 9)     ICD-10-CM: E66.9 ICD-9-CM: 278.00 FHx: premature coronary heart disease     ICD-10-CM: Z82.49 
ICD-9-CM: V17.3 Vitals BP Pulse Temp Resp Height(growth percentile) Weight(growth percentile) 134/90 (BP 1 Location: Right arm, BP Patient Position: Sitting) 90 97.9 °F (36.6 °C) (Oral) 18 5' 9.02\" (1.753 m) 239 lb (108.4 kg) SpO2 BMI Smoking Status 94% 35.27 kg/m2 Never Smoker Vitals History BMI and BSA Data Body Mass Index Body Surface Area  
 35.27 kg/m 2 2.3 m 2 Preferred Pharmacy Pharmacy Name Phone [phone] [facility] [facility] [phone] Your Updated Medication List  
  
   
This list is accurate as of 6/7/18  9:10 AM.  Always use your most recent med list.  
  
  
  
  
 albuterol 90 mcg/actuation inhaler Commonly known as:  PROVENTIL HFA, VENTOLIN HFA, PROAIR HFA Take 2 Puffs by inhalation every four (4) hours as needed for Wheezing. amLODIPine 10 mg tablet Commonly known as:  Trevon Donna TAKE 1 TAB BY MOUTH DAILY. APPLE CIDER VINEGAR PO Take  by mouth. CINNAMON 500 mg Cap Generic drug:  cinnamon bark Take 1,000 mg by mouth two (2) times a day. coenzyme Q-10 200 mg capsule Commonly known as:  CO Q-10 Take 1 Cap by mouth daily. CONTOUR NEXT TEST STRIPS strip Generic drug:  glucose blood VI test strips PATIENT IS TO CHECK BLOOD SUGAR TWICE DAILY CRESTOR 10 mg tablet Generic drug:  rosuvastatin TAKE 1 TABLET BY MOUTH EVERY DAY  
  
 hydroCHLOROthiazide 12.5 mg tablet Commonly known as:  HYDRODIURIL  
TAKE 1 TABLET BY MOUTH EVERY DAY  
  
 JENTADUETO 2.5-1,000 mg per tablet Generic drug:  linagliptin-metFORMIN  
TAKE 1 TAB BY MOUTH TWO (2) TIMES DAILY (WITH MEALS). APPOINTMENT REQUIRED  
  
 montelukast 10 mg tablet Commonly known as:  SINGULAIR  
TAKE 1 TABLET BY MOUTH DAILY pioglitazone 15 mg tablet Commonly known as:  ACTOS  
TAKE 1 TAB BY MOUTH DAILY. vardenafil 20 mg tablet Commonly known as:  LEVITRA Take 20 mg by mouth as needed. We Performed the Following AMB POC EKG ROUTINE W/ 12 LEADS, INTER & REP [82763 CPT(R)] AMB POC HEMOGLOBIN A1C [77990 CPT(R)] AMB POC URINALYSIS DIP STICK AUTO W/O MICRO [90018 CPT(R)] CBC WITH AUTOMATED DIFF [40543 CPT(R)] LIPID PANEL [54293 CPT(R)] METABOLIC PANEL, COMPREHENSIVE [29447 CPT(R)] PSA W/ REFLX FREE PSA [15611 CPT(R)] REFERRAL TO CARDIOLOGY [RQQ49 Custom] ZOSTER VACC RECOMBINANT ADJUVANTED A3011508 CPT(R)] Follow-up Instructions Return in about 3 months (around 9/7/2018) for htn, weight management. Referral Information Referral ID Referred By Referred To  
  
 [id number] Miguel A pineda, [address], [place]   
   [address] [facility] [address] [facility], [address] Phone: [phone] Fax: [phone] Visits Status Start Date End Date 1 New Request 6/7/18 6/7/19 If your referral has a status of pending review or denied, additional information will be sent to support the outcome of this decision. Patient Instructions Learning About Meal Planning for Diabetes Why plan your meals? Meal planning can be a key part of managing diabetes. Planning meals and snacks with the right balance of carbohydrate, protein, and fat can help you keep your blood sugar at the target level you set with your doctor. You don't have to eat special foods. You can eat what your family eats, including sweets once in a while. But you do have to pay attention to how often you eat and how much you eat of certain foods. You may want to work with a dietitian or a certified diabetes educator. He or she can give you tips and meal ideas and can answer your questions about meal planning.  This health professional can also help you reach a healthy weight if that is one of your goals. What plan is right for you? Your dietitian or diabetes educator may suggest that you start with the plate format or carbohydrate counting. The plate format The plate format is a simple way to help you manage how you eat. You plan meals by learning how much space each food should take on a plate. Using the plate format helps you spread carbohydrate throughout the day. It can make it easier to keep your blood sugar level within your target range. It also helps you see if you're eating healthy portion sizes. To use the plate format, you put non-starchy vegetables on half your plate. Add meat or meat substitutes on one-quarter of the plate. Put a grain or starchy vegetable (such as brown rice or a potato) on the final quarter of the plate. You can add a small piece of fruit and some low-fat or fat-free milk or yogurt, depending on your carbohydrate goal for each meal. 
Here are some tips for using the plate format: · Make sure that you are not using an oversized plate. A 9-inch plate is best. Many restaurants use larger plates. · Get used to using the plate format at home. Then you can use it when you eat out. · Write down your questions about using the plate format. Talk to your doctor, a dietitian, or a diabetes educator about your concerns. Carbohydrate counting With carbohydrate counting, you plan meals based on the amount of carbohydrate in each food. Carbohydrate raises blood sugar higher and more quickly than any other nutrient. It is found in desserts, breads and cereals, and fruit. It's also found in starchy vegetables such as potatoes and corn, grains such as rice and pasta, and milk and yogurt. Spreading carbohydrate throughout the day helps keep your blood sugar levels within your target range.  
Your daily amount depends on several things, including your weight, how active you are, which diabetes medicines you take, and what your goals are for your blood sugar levels. A registered dietitian or diabetes educator can help you plan how much carbohydrate to include in each meal and snack. A guideline for your daily amount of carbohydrate is: · 45 to 60 grams at each meal. That's about the same as 3 to 4 carbohydrate servings. · 15 to 20 grams at each snack. That's about the same as 1 carbohydrate serving. The Nutrition Facts label on packaged foods tells you how much carbohydrate is in a serving of the food. First, look at the serving size on the food label. Is that the amount you eat in a serving? All of the nutrition information on a food label is based on that serving size. So if you eat more or less than that, you'll need to adjust the other numbers. Total carbohydrate is the next thing you need to look for on the label. If you count carbohydrate servings, one serving of carbohydrate is 15 grams. For foods that don't come with labels, such as fresh fruits and vegetables, you'll need a guide that lists carbohydrate in these foods. Ask your doctor, dietitian, or diabetes educator about books or other nutrition guides you can use. If you take insulin, you need to know how many grams of carbohydrate are in a meal. This lets you know how much rapid-acting insulin to take before you eat. If you use an insulin pump, you get a constant rate of insulin during the day. So the pump must be programmed at meals to give you extra insulin to cover the rise in blood sugar after meals. When you know how much carbohydrate you will eat, you can take the right amount of insulin. Or, if you always use the same amount of insulin, you need to make sure that you eat the same amount of carbohydrate at meals. If you need more help to understand carbohydrate counting and food labels, ask your doctor, dietitian, or diabetes educator. How do you get started with meal planning? Here are some tips to get started: · Plan your meals a week at a time. Don't forget to include snacks too. · Use cookbooks or online recipes to plan several main meals. Plan some quick meals for busy nights. You also can double some recipes that freeze well. Then you can save half for other busy nights when you don't have time to cook. · Make sure you have the ingredients you need for your recipes. If you're running low on basic items, put these items on your shopping list too. · List foods that you use to make breakfasts, lunches, and snacks. List plenty of fruits and vegetables. · Post this list on the refrigerator. Add to it as you think of more things you need. · Take the list to the store to do your weekly shopping. Follow-up care is a key part of your treatment and safety. Be sure to make and go to all appointments, and call your doctor if you are having problems. It's also a good idea to know your test results and keep a list of the medicines you take. Where can you learn more? Go to http://rosi-shell.info/. [address] in the search box to learn more about \"Learning About Meal Planning for Diabetes. \" Current as of: March 13, 2017 Content Version: 11.4 © 5046-2785 Healthwise, [employer]. Care instructions adapted under license by [employer] (which disclaims liability or warranty for this information). If you have questions about a medical condition or this instruction, always ask your healthcare professional. [place] [address] any warranty or liability for your use of this information. Well Visit, Men 48 to 72: Care Instructions Your Care Instructions Physical exams can help you stay healthy. Your doctor has checked your overall health and may have suggested ways to take good care of yourself. He or she also may have recommended tests. At home, you can help prevent illness with healthy eating, regular exercise, and other steps. Follow-up care is a key part of your treatment and safety. Be sure to make and go to all appointments, and call your doctor if you are having problems. It's also a good idea to know your test results and keep a list of the medicines you take. How can you care for yourself at home? · Reach and stay at a healthy weight. This will lower your risk for many problems, such as obesity, diabetes, heart disease, and high blood pressure. · Get at least 30 minutes of exercise on most days of the week. Walking is a good choice. You also may want to do other activities, such as running, swimming, cycling, or playing tennis or team sports. · Do not smoke. Smoking can make health problems worse. If you need help quitting, talk to your doctor about stop-smoking programs and medicines. These can increase your chances of quitting for good. · Protect your skin from too much sun. When you're outdoors from 10 a.m. to 4 p.m., stay in the shade or cover up with clothing and a hat with a wide brim. Wear sunglasses that block UV rays. Even when it's cloudy, put broad-spectrum sunscreen (SPF 30 or higher) on any exposed skin. · See a dentist one or two times a year for checkups and to have your teeth cleaned. · Wear a seat belt in the car. · Limit alcohol to 2 drinks a day. Too much alcohol can cause health problems. Follow your doctor's advice about when to have certain tests. These tests can spot problems early. · Cholesterol. Your doctor will tell you how often to have this done based on your overall health and other things that can increase your risk for heart attack and stroke. · Blood pressure. Have your blood pressure checked during a routine doctor visit. Your doctor will tell you how often to check your blood pressure based on your age, your blood pressure results, and other factors.  
· Prostate exam. Talk to your doctor about whether you should have a blood test (called a PSA test) for prostate cancer. Experts disagree on whether men should have this test. Some experts recommend that you discuss the benefits and risks of the test with your doctor. · Diabetes. Ask your doctor whether you should have tests for diabetes. · Vision. Some experts recommend that you have yearly exams for glaucoma and other age-related eye problems starting at age 48. · Hearing. Tell your doctor if you notice any change in your hearing. You can have tests to find out how well you hear. · Colon cancer. You should begin tests for colon cancer at age 48. You may have one of several tests. Your doctor will tell you how often to have tests based on your age and risk. Risks include whether you already had a precancerous polyp removed from your colon or whether your parent, brother, sister, or child has had colon cancer. · Heart attack and stroke risk. At least every 4 to 6 years, you should have your risk for heart attack and stroke assessed. Your doctor uses factors such as your age, blood pressure, cholesterol, and whether you smoke or have diabetes to show what your risk for a heart attack or stroke is over the next 10 years. · Abdominal aortic aneurysm. Ask your doctor whether you should have a test to check for an aneurysm. You may need a test if you ever smoked or if your parent, brother, sister, or child has had an aneurysm. When should you call for help? Watch closely for changes in your health, and be sure to contact your doctor if you have any problems or symptoms that concern you. Where can you learn more? Go to http://rosi-shell.info/. Enter F913 in the search box to learn more about \"Well Visit, Men 48 to 72: Care Instructions. \" Current as of: May 12, 2017 Content Version: 11.4 © 8564-9270 Healthwise, Incorporated.  Care instructions adapted under license by [employer] (which disclaims liability or warranty for this information). If you have questions about a medical condition or this instruction, always ask your healthcare professional. [address] any warranty or liability for your use of this information. Body Mass Index: Care Instructions Your Care Instructions Body mass index (BMI) can help you see if your weight is raising your risk for health problems. It uses a formula to compare how much you weigh with how tall you are. · A BMI lower than 18.5 is considered underweight. · A BMI between 18.5 and 24.9 is considered healthy. · A BMI between 25 and 29.9 is considered overweight. A BMI of 30 or higher is considered obese. If your BMI is in the normal range, it means that you have a lower risk for weight-related health problems. If your BMI is in the overweight or obese range, you may be at increased risk for weight-related health problems, such as high blood pressure, heart disease, stroke, arthritis or joint pain, and diabetes. If your BMI is in the underweight range, you may be at increased risk for health problems such as fatigue, lower protection (immunity) against illness, muscle loss, bone loss, hair loss, and hormone problems. BMI is just one measure of your risk for weight-related health problems. You may be at higher risk for health problems if you are not active, you eat an unhealthy diet, or you drink too much alcohol or use tobacco products. Follow-up care is a key part of your treatment and safety. Be sure to make and go to all appointments, and call your doctor if you are having problems. It's also a good idea to know your test results and keep a list of the medicines you take. How can you care for yourself at home? · Practice healthy eating habits. This includes eating plenty of fruits, vegetables, whole grains, lean protein, and low-fat dairy. · If your doctor recommends it, get more exercise.  Walking is a good choice. Bit by bit, increase the amount you walk every day. Try for at least 30 minutes on most days of the week. · Do not smoke. Smoking can increase your risk for health problems. If you need help quitting, talk to your doctor about stop-smoking programs and medicines. These can increase your chances of quitting for good. · Limit alcohol to 2 drinks a day for men and 1 drink a day for women. Too much alcohol can cause health problems. If you have a BMI higher than 25 · Your doctor may do other tests to check your risk for weight-related health problems. This may include measuring the distance around your waist. A waist measurement of more than 40 inches in men or 35 inches in women can increase the risk of weight-related health problems. · Talk with your doctor about steps you can take to stay healthy or improve your health. You may need to make lifestyle changes to lose weight and stay healthy, such as changing your diet and getting regular exercise. If you have a BMI lower than 18.5 · Your doctor may do other tests to check your risk for health problems. · Talk with your doctor about steps you can take to stay healthy or improve your health. You may need to make lifestyle changes to gain or maintain weight and stay healthy, such as getting more healthy foods in your diet and doing exercises to build muscle. Where can you learn more? Go to http://rosi-shell.info/. Enter S176 in the search box to learn more about \"Body Mass Index: Care Instructions. \" Current as of: October 13, 2016 Content Version: 11.4 © 9268-3932 Healthwise, [employer]. Care instructions adapted under license by [employer] (which disclaims liability or warranty for this information). If you have questions about a medical condition or this instruction, always ask your healthcare professional. [place] [address] any warranty or liability for your use of this information. Advance Directives: Care Instructions Your Care Instructions An advance directive is a legal way to state your wishes at the end of your life. It tells your family and your doctor what to do if you can no longer say what you want. There are two main types of advance directives. You can change them any time that your wishes change. · A living will tells your family and your doctor your wishes about life support and other treatment. · A durable power of  for health care lets you name a person to make treatment decisions for you when you can't speak for yourself. This person is called a health care agent. If you do not have an advance directive, decisions about your medical care may be made by a doctor or a  who doesn't know you. It may help to think of an advance directive as a gift to the people who care for you. If you have one, they won't have to make tough decisions by themselves. Follow-up care is a key part of your treatment and safety. Be sure to make and go to all appointments, and call your doctor if you are having problems. It's also a good idea to know your test results and keep a list of the medicines you take. How can you care for yourself at home? · Discuss your wishes with your loved ones and your doctor. This way, there are no surprises. · Many states have a unique form. Or you might use a universal form that has been approved by many states. This kind of form can sometimes be completed and stored online. Your electronic copy will then be available wherever you have a connection to the Internet. In most cases, doctors will respect your wishes even if you have a form from a different state. · You don't need a  to do an advance directive. But you may want to get legal advice. · Think about these questions when you prepare an advance directive: ¨ Who do you want to make decisions about your medical care if you are not able to? Many people choose a family member or close friend. ¨ Do you know enough about life support methods that might be used? If not, talk to your doctor so you understand. ¨ What are you most afraid of that might happen? You might be afraid of having pain, losing your independence, or being kept alive by machines. ¨ Where would you prefer to die? Choices include your home, a hospital, or a nursing home. ¨ Would you like to have information about hospice care to support you and your family? ¨ Do you want to donate organs when you die? ¨ Do you want certain Holiness practices performed before you die? If so, put your wishes in the advance directive. · Read your advance directive every year, and make changes as needed. When should you call for help? Be sure to contact your doctor if you have any questions. Where can you learn more? Go to http://rosi-shell.info/. Enter R264 in the search box to learn more about \"Advance Directives: Care Instructions. \" Current as of: September 24, 2016 Content Version: 11.4 © 9435-3008 [employer]. Care instructions adapted under license by [employer] (which disclaims liability or warranty for this information). If you have questions about a medical condition or this instruction, always ask your healthcare professional. [address] any warranty or liability for your use of this information. Introducing [facility]! Umu Burkett introduces Dropcam patient portal. Now you can access parts of your medical record, email your doctor's office, and request medication refills online. 1. In your internet browser, go to https://[employer]. [employer] 2. Click on the First Time User? Click Here link in the Sign In box. You will see the New Member Sign Up page. 3. Enter your Dropcam Access Code exactly as it appears below.  You will not need to use this code after youve completed the sign-up process. If you do not sign up before the expiration date, you must request a new code. · Metrekare Access Code: [email or web address] Expires: 9/5/2018  9:10 AM 
 
4. Enter the last four digits of your Social Security Number (xxxx) and Date of Birth (mm/dd/yyyy) as indicated and click Submit. You will be taken to the next sign-up page. 5. Create a Metrekare ID. This will be your Metrekare login ID and cannot be changed, so think of one that is secure and easy to remember. 6. Create a Metrekare password. You can change your password at any time. 7. Enter your Password Reset Question and Answer. This can be used at a later time if you forget your password. 8. Enter your e-mail address. You will receive e-mail notification when new information is available in [address]. 9. Click Sign Up. You can now view and download portions of your medical record. 10. Click the Download Summary menu link to download a portable copy of your medical information. If you have questions, please visit the Frequently Asked Questions section of the Metrek[employer] website. Remember, Metrekare is NOT to be used for urgent needs. For medical emergencies, dial 911. Now available from your iPhone and Android! Please provide this summary of care documentation to your next provider. Your primary care clinician is listed as Randy Singh. If you have any questions after today's visit, please call [phone].

## 2018-06-07 NOTE — PROGRESS NOTES
Exam Room 7  Radha Zuniga is a 47 y.o. male  Chief Complaint   Patient presents with    Complete Physical     1. Have you been to the ER, urgent care clinic since your last visit? Hospitalized since your last visit? No    2. Have you seen or consulted any other health care providers outside of the [address] [place] [address] since your last visit? Include any pap smears or colon screening.  No     Health Maintenance Due   Topic Date Due    DTaP/Tdap/Td series (1 - Tdap) 06/30/1987    EYE EXAM RETINAL OR DILATED Q1  06/25/2015    FOBT Q 1 YEAR AGE 50-75  06/30/2016     Patient had eye exam June 1, 2018 at [address] [place] [address]

## 2018-06-07 NOTE — TELEPHONE ENCOUNTER
Patient has been advised regarding Victoza. He wonders if he should stop his Jentadueto 2.5/1000 mg BID and Actos 15 mg in AMs. Also wonders what type of side effects he should expect and how long they will last.   OK to pickup a sample?

## 2018-06-07 NOTE — PROGRESS NOTES
HISTORY OF PRESENT ILLNESS  Shyann Mckeon is a 47 y.o. male with history of diabetes, hypertension, obesity, hyperlipidemia presents for physical exam  HPI  He reports plan to discuss starting Victoza with Dr. Harish Gaming    Exercising, compliant with medications and eating better, but blood sugars still above goal    Crestor now nonformulary, needs alternate [facility] [place]    Eye exam up to date, June 1, 2018; no retinopathy    colonoscopy 2015, Dr. Josue Streeter, needs 5 year follow up    Busy with school, work and parenting    Gets ~ 4 hours sleep, gets tired by 2PM    Review of Systems   Constitutional: Negative for malaise/fatigue. HENT: Negative. Eyes: Negative for blurred vision and double vision. Glasses   Respiratory: Negative. Gastrointestinal: Negative. Genitourinary: Negative. Musculoskeletal: Negative. Skin: Negative. Neurological: Negative for dizziness and headaches. Psychiatric/Behavioral: The patient is nervous/anxious and has insomnia. Physical Exam   Constitutional: He is oriented to person, place, and time. He appears well-developed and well-nourished. No distress. HENT:   Right Ear: External ear normal.   Left Ear: External ear normal.   Nose: Nose normal.   Mouth/Throat: Oropharynx is clear and moist. No oropharyngeal exudate. Neck: No JVD present. Carotid bruit is not present. No thyromegaly present. Cardiovascular: Normal rate, regular rhythm and normal heart sounds. Pulmonary/Chest: Effort normal and breath sounds normal. No respiratory distress. Abdominal: Soft. Bowel sounds are normal. He exhibits no distension and no mass. There is no tenderness. There is no rebound and no guarding. Musculoskeletal: He exhibits no edema, tenderness or deformity. Lymphadenopathy:     He has no cervical adenopathy. Neurological: He is alert and oriented to person, place, and time. No cranial nerve deficit. Skin: Skin is warm and dry. No rash noted.  He is not diaphoretic. No erythema. Psychiatric: He has a normal mood and affect. His behavior is normal. Judgment and thought content normal.       ASSESSMENT and PLAN    ICD-10-CM ICD-9-CM    1. Type 2 diabetes mellitus without complication, without long-term current use of insulin (HCC) E11.9 250.00 AMB POC HEMOGLOBIN A1C      AMB POC URINALYSIS DIP STICK AUTO W/O MICRO      METABOLIC PANEL, COMPREHENSIVE      LIPID PANEL      REFERRAL TO CARDIOLOGY   2. Need for shingles vaccine Z23 V04.89 ZOSTER VACC RECOMBINANT ADJUVANTED   3. Advance directive discussed with patient Z71.89 V65.49    4. Mixed hyperlipidemia C03.0 329.9 METABOLIC PANEL, COMPREHENSIVE      REFERRAL TO CARDIOLOGY   5. Physical exam, annual Z00.00 V70.0 AMB POC EKG ROUTINE W/ 12 LEADS, INTER & REP      CBC WITH AUTOMATED DIFF      PSA W/ REFLX FREE PSA   6. Obesity (BMI 30-39. 9) E66.9 278.00 REFERRAL TO CARDIOLOGY   7. FHx: premature coronary heart disease Z82.49 V17.3 REFERRAL TO CARDIOLOGY     Follow-up Disposition:  Return in about 3 months (around 9/7/2018) for htn, weight management, shingles vac.  lab results and schedule of future lab studies reviewed with patient  reviewed diet, exercise and weight control  cardiovascular risk and specific lipid/LDL goals reviewed  reviewed medications and side effects in detail  specific diabetic recommendations: low cholesterol diet, weight control and daily exercise discussed, home glucose monitoring emphasized, all medications, side effects and compliance discussed carefully, annual eye examinations at Ophthalmology discussed, glycohemoglobin and other lab monitoring discussed and long term diabetic complications discussed  use of aspirin to prevent MI and TIA's discussed    Discussed risk factors for CVD, indications for cardiology evaluation     Discussed the patient's BMI with him.   The BMI follow up plan is as follows:     dietary management education, guidance, and counseling  encourage exercise  monitor weight  prescribed dietary intake    An After Visit Summary was printed and given to the patient.

## 2018-06-07 NOTE — TELEPHONE ENCOUNTER
He does not need to stop the [facility] or the Actos. The most common side effect is nausea, which should pass once his body gets used to the medication.     Yes, that will be just fine

## 2018-06-07 NOTE — TELEPHONE ENCOUNTER
Victoza is a good medication for Diabetes and has been shown to help with both blood sugars as well as weight loss. I can send in a prescription if he would be interested. It is an injectable medication he takes every morning. He would start at a dose of 0.6mg daily for a week, then increase to 1.2mg daily.

## 2018-06-08 LAB
ALBUMIN SERPL-MCNC: 4.4 G/DL (ref 3.5–5.5)
ALBUMIN/GLOB SERPL: 1.5 {RATIO} (ref 1.2–2.2)
ALP SERPL-CCNC: 63 IU/L (ref 39–117)
ALT SERPL-CCNC: 24 IU/L (ref 0–44)
AST SERPL-CCNC: 20 IU/L (ref 0–40)
BASOPHILS # BLD AUTO: 0 X10E3/UL (ref 0–0.2)
BASOPHILS NFR BLD AUTO: 0 %
BILIRUB SERPL-MCNC: 0.4 MG/DL (ref 0–1.2)
BUN SERPL-MCNC: 17 MG/DL (ref 6–24)
BUN/CREAT SERPL: 10 (ref 9–20)
CALCIUM SERPL-MCNC: 9.9 MG/DL (ref 8.7–10.2)
CHLORIDE SERPL-SCNC: 102 MMOL/L (ref 96–106)
CHOLEST SERPL-MCNC: 170 MG/DL (ref 100–199)
CO2 SERPL-SCNC: 26 MMOL/L (ref 18–29)
CREAT SERPL-MCNC: 1.65 MG/DL (ref 0.76–1.27)
EOSINOPHIL # BLD AUTO: 0.3 X10E3/UL (ref 0–0.4)
EOSINOPHIL NFR BLD AUTO: 3 %
ERYTHROCYTE [DISTWIDTH] IN BLOOD BY AUTOMATED COUNT: 15 % (ref 12.3–15.4)
GLOBULIN SER CALC-MCNC: 3 G/DL (ref 1.5–4.5)
GLUCOSE SERPL-MCNC: 163 MG/DL (ref 65–99)
HCT VFR BLD AUTO: 39.7 % (ref 37.5–51)
HDLC SERPL-MCNC: 34 MG/DL
HGB BLD-MCNC: 12.9 G/DL (ref 13–17.7)
IMM GRANULOCYTES # BLD: 0 X10E3/UL (ref 0–0.1)
IMM GRANULOCYTES NFR BLD: 0 %
LDLC SERPL CALC-MCNC: 90 MG/DL (ref 0–99)
LYMPHOCYTES # BLD AUTO: 3.2 X10E3/UL (ref 0.7–3.1)
LYMPHOCYTES NFR BLD AUTO: 34 %
MCH RBC QN AUTO: 27 PG (ref 26.6–33)
MCHC RBC AUTO-ENTMCNC: 32.5 G/DL (ref 31.5–35.7)
MCV RBC AUTO: 83 FL (ref 79–97)
MONOCYTES # BLD AUTO: 0.4 X10E3/UL (ref 0.1–0.9)
MONOCYTES NFR BLD AUTO: 4 %
NEUTROPHILS # BLD AUTO: 5.6 X10E3/UL (ref 1.4–7)
NEUTROPHILS NFR BLD AUTO: 59 %
PLATELET # BLD AUTO: 281 X10E3/UL (ref 150–379)
POTASSIUM SERPL-SCNC: 4.5 MMOL/L (ref 3.5–5.2)
PROT SERPL-MCNC: 7.4 G/DL (ref 6–8.5)
PSA SERPL-MCNC: 1.3 NG/ML (ref 0–4)
RBC # BLD AUTO: 4.78 X10E6/UL (ref 4.14–5.8)
REFLEX CRITERIA: NORMAL
SODIUM SERPL-SCNC: 143 MMOL/L (ref 134–144)
TRIGL SERPL-MCNC: 229 MG/DL (ref 0–149)
VLDLC SERPL CALC-MCNC: 46 MG/DL (ref 5–40)
WBC # BLD AUTO: 9.5 X10E3/UL (ref 3.4–10.8)

## 2018-06-19 NOTE — TELEPHONE ENCOUNTER
----- Message from Tammi Zabala sent at 6/19/2018  7:40 AM EDT -----  Regarding: Dr. Carolyn Castro  Pt requested a Rx for Victoza 1.2mg, and uses the [facility] on file. Pt stated the medication is working well. Pt best contact [phone].

## 2018-06-21 ENCOUNTER — DOCUMENTATION ONLY (OUTPATIENT)
Dept: ENDOCRINOLOGY | Age: 53
End: 2018-06-21

## 2018-06-21 NOTE — PROGRESS NOTES
[id number];RLVHGQ:[id number]; Review Type:Prior Auth; Coverage Start Date:06/21/2018; Coverage End Date:06/21/2019

## 2018-06-26 DIAGNOSIS — I10 ESSENTIAL HYPERTENSION WITH GOAL BLOOD PRESSURE LESS THAN 130/80: ICD-10-CM

## 2018-06-26 RX ORDER — BLOOD SUGAR DIAGNOSTIC
STRIP MISCELLANEOUS
Qty: 100 PEN NEEDLE | Refills: 3 | Status: SHIPPED | OUTPATIENT
Start: 2018-06-26 | End: 2019-04-14 | Stop reason: SDUPTHER

## 2018-06-26 NOTE — TELEPHONE ENCOUNTER
Requested Prescriptions     Pending Prescriptions Disp Refills    Insulin Needles, Disposable, (NOVOFINE 32) 32 gauge x 1/4\" ndle       Sig: by Does Not Apply route.

## 2018-06-28 RX ORDER — ROSUVASTATIN CALCIUM 10 MG/1
TABLET, COATED ORAL
Qty: 30 TAB | Refills: 0 | Status: SHIPPED | OUTPATIENT
Start: 2018-06-28 | End: 2018-07-24 | Stop reason: SDUPTHER

## 2018-07-06 ENCOUNTER — OFFICE VISIT (OUTPATIENT)
Dept: CARDIOLOGY CLINIC | Age: 53
End: 2018-07-06

## 2018-07-06 VITALS
HEART RATE: 90 BPM | DIASTOLIC BLOOD PRESSURE: 84 MMHG | RESPIRATION RATE: 18 BRPM | WEIGHT: 228 LBS | BODY MASS INDEX: 33.77 KG/M2 | SYSTOLIC BLOOD PRESSURE: 140 MMHG | HEIGHT: 69 IN | OXYGEN SATURATION: 97 %

## 2018-07-06 DIAGNOSIS — E78.2 MIXED HYPERLIPIDEMIA: ICD-10-CM

## 2018-07-06 DIAGNOSIS — I10 BENIGN ESSENTIAL HTN: Primary | ICD-10-CM

## 2018-07-06 DIAGNOSIS — E11.21 TYPE 2 DIABETES WITH NEPHROPATHY (HCC): ICD-10-CM

## 2018-07-06 DIAGNOSIS — Z82.49 FAMILY HISTORY OF EARLY CAD: ICD-10-CM

## 2018-07-06 NOTE — MR AVS SNAPSHOT
[address] [facility] [address] [facility] 
[phone] Patient: Sky Duncan MRN:  [id number] Visit Information Date & Time Provider Department Dept. Phone Encounter #  
 2018  9:40 AM Narendra Fermin MD [facility] Sulaiman Monk [phone] [id number] Your Appointments 2018 12:10 PM  
Follow Up with Maritza Berrios MD  
[address] [place] [address] Diabetes and Endocrinology [address] [place] [address]) Appt Note: 3 month f/u  Diabetes; R/S SW [id number] Dr. Benitez Cardington [employer] [address] [phone] [address] [place] [address]  
  
    
 2018  8:00 AM  
ROUTINE CARE with Ivette Taylor NP [facility] ([address] [facility]) Appt Note: 3 mo f/u weight management [id number] [facility] [address] [facility] [address]  
[facility] [address] [facility] [address] Upcoming Health Maintenance Date Due DTaP/Tdap/Td series (1 - Tdap) 1987 EYE EXAM RETINAL OR DILATED Q1 2015 FOBT Q 1 YEAR AGE 50-75 2016 Influenza Age 5 to Adult 2018 MICROALBUMIN Q1 2018 HEMOGLOBIN A1C Q6M 2018 FOOT EXAM Q1 2019 LIPID PANEL Q1 2019 Allergies as of 2018  Review Complete On: 2018 By: Priti Colindres RN No Known Allergies Current Immunizations  Never Reviewed Name Date Pneumococcal Polysaccharide (PPSV-23) 2016 Zoster Recombinant 2018 Not reviewed this visit Vitals BP Pulse Resp Height(growth percentile) Weight(growth percentile) SpO2  
 140/84 (BP 1 Location: Left arm, BP Patient Position: Sitting) 90 18 5' 9\" (1.753 m) 228 lb (103.4 kg) 97% BMI Smoking Status 33.67 kg/m2 Never Smoker Vitals History BMI and BSA Data Body Mass Index Body Surface Area  
 33.67 kg/m 2 2.24 m 2 Preferred Pharmacy Pharmacy Name Phone [id number] [facility] [phone] Your Updated Medication List  
  
   
This list is accurate as of 7/6/18 10:37 AM.  Always use your most recent med list.  
  
  
  
  
 albuterol 90 mcg/actuation inhaler Commonly known as:  PROVENTIL HFA, VENTOLIN HFA, PROAIR HFA Take 2 Puffs by inhalation every four (4) hours as needed for Wheezing. amLODIPine 10 mg tablet Commonly known as:  Wandra Sierra TAKE 1 TAB BY MOUTH DAILY. APPLE CIDER VINEGAR PO Take  by mouth. CINNAMON 500 mg Cap Generic drug:  cinnamon bark Take 1,000 mg by mouth two (2) times a day. coenzyme Q-10 200 mg capsule Commonly known as:  CO Q-10 Take 1 Cap by mouth daily. CONTOUR NEXT TEST STRIPS strip Generic drug:  glucose blood VI test strips PATIENT IS TO CHECK BLOOD SUGAR TWICE DAILY  
  
 hydroCHLOROthiazide 12.5 mg tablet Commonly known as:  HYDRODIURIL  
TAKE 1 TABLET BY MOUTH EVERY DAY Insulin Needles (Disposable) 32 gauge x 1/4\" Ndle Commonly known as:  NOVOFINE 32 One shot daily JENTADUETO 2.5-1,000 mg per tablet Generic drug:  linagliptin-metFORMIN  
TAKE 1 TAB BY MOUTH TWO (2) TIMES DAILY (WITH MEALS). APPOINTMENT REQUIRED Liraglutide 0.6 mg/0.1 mL (18 mg/3 mL) Pnij Commonly known as:  Cliffton Keel Inject 1.2 mg once daily  
  
 montelukast 10 mg tablet Commonly known as:  SINGULAIR  
TAKE 1 TABLET BY MOUTH DAILY pioglitazone 15 mg tablet Commonly known as:  ACTOS  
TAKE 1 TAB BY MOUTH DAILY. rosuvastatin 10 mg tablet Commonly known as:  CRESTOR  
TAKE 1 TABLET BY MOUTH EVERY DAY  
  
 vardenafil 20 mg tablet Commonly known as:  LEVITRA Take 20 mg by mouth as needed. Introducing hospitals & HEALTH SERVICES! Romayne Duster introduces CatchFree patient portal. Now you can access parts of your medical record, email your doctor's office, and request medication refills online. 1. In your internet browser, go to [email or web address] 2. Click on the First Time User? Click Here link in the Sign In box. You will see the New Member Sign Up page. 3. Enter your CatchFree Access Code exactly as it appears below. You will not need to use this code after youve completed the sign-up process. If you do not sign up before the expiration date, you must request a new code. · CatchFree Access Code: [email or web address] Expires: 9/5/2018  9:10 AM 
 
4. Enter the last four digits of your Social Security Number (xxxx) and Date of Birth (mm/dd/yyyy) as indicated and click Submit. You will be taken to the next sign-up page. 5. Create a CatchFree ID. This will be your CatchFree login ID and cannot be changed, so think of one that is secure and easy to remember. 6. Create a CatchFree password. You can change your password at any time. 7. Enter your Password Reset Question and Answer. This can be used at a later time if you forget your password. 8. Enter your e-mail address. You will receive e-mail notification when new information is available in [address] 9. Click Sign Up. You can now view and download portions of your medical record. 10. Click the Download Summary menu link to download a portable copy of your medical information. If you have questions, please visit the Frequently Asked Questions section of the CatchFree website. Remember, CatchFree is NOT to be used for urgent needs. For medical emergencies, dial 911. Now available from your iPhone and Android! Please provide this summary of care documentation to your next provider. Your primary care clinician is listed as Paige Bond. If you have any questions after today's visit, please call [phone].

## 2018-07-10 RX ORDER — LINAGLIPTIN AND METFORMIN HYDROCHLORIDE 2.5; 1 MG/1; MG/1
TABLET, FILM COATED ORAL
Qty: 60 TAB | Refills: 2 | Status: SHIPPED | OUTPATIENT
Start: 2018-07-10 | End: 2018-10-03 | Stop reason: SDUPTHER

## 2018-07-10 RX ORDER — AMLODIPINE BESYLATE 10 MG/1
TABLET ORAL
Qty: 30 TAB | Refills: 2 | Status: SHIPPED | OUTPATIENT
Start: 2018-07-10 | End: 2018-10-03 | Stop reason: SDUPTHER

## 2018-07-10 RX ORDER — HYDROCHLOROTHIAZIDE 12.5 MG/1
TABLET ORAL
Qty: 30 TAB | Refills: 2 | Status: SHIPPED | OUTPATIENT
Start: 2018-07-10 | End: 2018-10-04 | Stop reason: SDUPTHER

## 2018-07-10 NOTE — TELEPHONE ENCOUNTER
Verbal orders read back and verified by Howard Mcdermott for accuracy. Transmission failed. Prescription called in.

## 2018-07-21 DIAGNOSIS — R05.9 COUGH: ICD-10-CM

## 2018-07-23 RX ORDER — MONTELUKAST SODIUM 10 MG/1
TABLET ORAL
Qty: 30 TAB | Refills: 3 | Status: SHIPPED | OUTPATIENT
Start: 2018-07-23 | End: 2018-11-29 | Stop reason: SDUPTHER

## 2018-07-24 RX ORDER — ROSUVASTATIN CALCIUM 10 MG/1
TABLET, COATED ORAL
Qty: 30 TAB | Refills: 0 | Status: SHIPPED | OUTPATIENT
Start: 2018-07-24 | End: 2018-08-28 | Stop reason: SDUPTHER

## 2018-08-03 NOTE — TELEPHONE ENCOUNTER
[facility] requested 90 day rx. Per 6/6/18 telephone encounter, Danna Pittman said:     Kirk Serrato is a good medication for Diabetes and has been shown to help with both blood sugars as well as weight loss.     I can send in a prescription if he would be interested.     It is an injectable medication he takes every morning. He would start at a dose of 0.6mg daily for a week, then increase to 1.2mg daily.

## 2018-08-27 ENCOUNTER — OFFICE VISIT (OUTPATIENT)
Dept: ENDOCRINOLOGY | Age: 53
End: 2018-08-27

## 2018-08-27 VITALS
SYSTOLIC BLOOD PRESSURE: 128 MMHG | HEART RATE: 91 BPM | BODY MASS INDEX: 32.32 KG/M2 | DIASTOLIC BLOOD PRESSURE: 89 MMHG | HEIGHT: 69 IN | WEIGHT: 218.2 LBS

## 2018-08-27 DIAGNOSIS — I10 ESSENTIAL HYPERTENSION: ICD-10-CM

## 2018-08-27 DIAGNOSIS — N52.8 OTHER MALE ERECTILE DYSFUNCTION: ICD-10-CM

## 2018-08-27 DIAGNOSIS — E11.9 TYPE 2 DIABETES MELLITUS WITHOUT COMPLICATION, WITHOUT LONG-TERM CURRENT USE OF INSULIN (HCC): Primary | ICD-10-CM

## 2018-08-27 DIAGNOSIS — E78.2 MIXED HYPERLIPIDEMIA: ICD-10-CM

## 2018-08-27 DIAGNOSIS — E11.69 TYPE 2 DIABETES MELLITUS WITH OTHER SPECIFIED COMPLICATION (HCC): ICD-10-CM

## 2018-08-27 RX ORDER — PIOGLITAZONEHYDROCHLORIDE 15 MG/1
TABLET ORAL
Qty: 30 TAB | Refills: 3 | Status: SHIPPED | OUTPATIENT
Start: 2018-08-27 | End: 2018-12-14 | Stop reason: SDUPTHER

## 2018-08-27 RX ORDER — HYDROCHLOROTHIAZIDE 12.5 MG/1
12.5 CAPSULE ORAL DAILY
COMMUNITY
Start: 2018-08-06 | End: 2018-09-07 | Stop reason: SDUPTHER

## 2018-08-27 RX ORDER — TADALAFIL 20 MG/1
20 TABLET ORAL AS NEEDED
Qty: 10 TAB | Refills: 3 | Status: SHIPPED | OUTPATIENT
Start: 2018-08-27 | End: 2018-11-30

## 2018-08-27 NOTE — PROGRESS NOTES
Chief Complaint   Patient presents with    Diabetes     pcp and pharmacy verified   Records since last visit reviewed. History of Present Illness: Rishi Pfeiffer is a 47 y.o. male here for follow up of diabetes. He was diagnosed with diabetes 2015. At our last visit in February 2018 his A1C was 8.3% on Linagliptin/Metformin 2.5/1000mg BID and Pioglitazone 15mg daily. We discussed at length about DM and low carb dieting, making lifestyle changes, and following BGs twice daily. Since February 2018 he has lost 30 pounds and is today down to 218 pounds. He notes he is feeling better and \"I have not had any issues or problems\". Pt notes that since he has started the Victoza, he can not eat as much, he notes \"I am battling with thinking I can eat what I want, but just not as much. \"  He notes he has been very busy and has not been able to be physically active. He just bought a house 3 weeks ago and will be closing next month. He notes that as he is feeling better, he is able to play with his grandchild better. Pt checks his BGs periodically. He notes when he does test, his BGs are never over 150. He has been having issues of HAs, but his optometrist told him it was because he was on the computer for 8 hours per day. He is taking the Victoza 1.2mg every morning, Linagliptin/Metformin 2.5/1000mg BID and Pioglitazone 15mg daily. Pt notes he is not very hungry so he will have a cup of coffee in the morning. He will nibble on grapes or oatmeal in the afternoon. He will have dinner around 7PM, last night he had 1/2 of a cheese burger and water. Pt has been making changes in his diet with lower carb, higher protein and lower calorie foods. He has a fit bit and has been working on increasing his steps every day. He has been getting about 3500 sets per day. No history of vascular disease. No history of neuropathy, or nephropathy. Last eye exam was June 2018, no retinopathy.  Will request these records. He has not had any cortisone injections. He has been having some pain in his left hand, from the frequent typing. With his weight loss he notes his knee pain is less. Current Outpatient Prescriptions   Medication Sig    hydroCHLOROthiazide (MICROZIDE) 12.5 mg capsule 12.5 mg daily.  tadalafil (CIALIS) 20 mg tablet Take 1 Tab by mouth as needed. Take one tablet a half an hour before activity. Run as cash pay.  Liraglutide (VICTOZA) 0.6 mg/0.1 mL (18 mg/3 mL) pnij Inject 1.2 mg once daily    rosuvastatin (CRESTOR) 10 mg tablet TAKE 1 TABLET BY MOUTH EVERY DAY    montelukast (SINGULAIR) 10 mg tablet TAKE 1 TABLET BY MOUTH DAILY    amLODIPine (NORVASC) 10 mg tablet TAKE 1 TAB BY MOUTH DAILY.  hydroCHLOROthiazide (HYDRODIURIL) 12.5 mg tablet TAKE 1 TABLET BY MOUTH EVERY DAY    JENTADUETO 2.5-1,000 mg per tablet TAKE 1 TAB BY MOUTH TWO (2) TIMES DAILY (WITH MEALS). APPOINTMENT REQUIRED    Insulin Needles, Disposable, (NOVOFINE 32) 32 gauge x 1/4\" ndle One shot daily    pioglitazone (ACTOS) 15 mg tablet TAKE 1 TAB BY MOUTH DAILY.  albuterol (PROVENTIL HFA, VENTOLIN HFA, PROAIR HFA) 90 mcg/actuation inhaler Take 2 Puffs by inhalation every four (4) hours as needed for Wheezing.  CONTOUR NEXT STRIPS strip PATIENT IS TO CHECK BLOOD SUGAR TWICE DAILY    coenzyme Q-10 (CO Q-10) 200 mg capsule Take 1 Cap by mouth daily.  cinnamon bark (CINNAMON) 500 mg cap Take 500 mg by mouth two (2) times a day. No current facility-administered medications for this visit. No Known Allergies  Review of Systems:  - Eyes: no blurry vision or double vision  - Cardiovascular: no chest pain  - Respiratory: no SOB or cough  - Musculoskeletal: chronic knee pain is improved  - Neurological: no numbness/tingling in extremities    Physical Examination:  Blood pressure 128/89, pulse 91, height 5' 9\" (1.753 m), weight 218 lb 3.2 oz (99 kg).    General: pleasant, no distress, good eye contact   Neck: no carotid bruits  Cardiovascular: regular, normal rate, nl s1 and s2, no m/r/g, 2+ DP pulses   Respiratory: clear bilaterally  Integumentary: no edema, no foot ulcers  Psychiatric: normal mood and affect    Diabetic foot exam:     Left Foot:   Visual Exam: normal    Pulse DP: 2+ (normal)   Filament test: normal sensation    Vibratory sensation: normal      Right Foot:   Visual Exam: normal    Pulse DP: 2+ (normal)   Filament test: normal sensation    Vibratory sensation: normal        Data Reviewed:   Component      Latest Ref Rng & Units 6/7/2018 6/7/2018 6/7/2018 6/7/2018           9:09 AM  9:09 AM  9:09 AM  9:09 AM   WBC      3.4 - 10.8 x10E3/uL  9.5     RBC      4.14 - 5.80 x10E6/uL  4.78     HGB      13.0 - 17.7 g/dL  12.9 (L)     HCT      37.5 - 51.0 %  39.7     MCV      79 - 97 fL  83     MCH      26.6 - 33.0 pg  27.0     MCHC      31.5 - 35.7 g/dL  32.5     RDW      12.3 - 15.4 %  15.0     PLATELET      219 - 307 x10E3/uL  281     NEUTROPHILS      Not Estab. %  59     Lymphocytes      Not Estab. %  34     MONOCYTES      Not Estab. %  4     EOSINOPHILS      Not Estab. %  3     BASOPHILS      Not Estab. %  0     ABS. NEUTROPHILS      1.4 - 7.0 x10E3/uL  5.6     Abs Lymphocytes      0.7 - 3.1 x10E3/uL  3.2 (H)     ABS. MONOCYTES      0.1 - 0.9 x10E3/uL  0.4     ABS. EOSINOPHILS      0.0 - 0.4 x10E3/uL  0.3     ABS. BASOPHILS      0.0 - 0.2 x10E3/uL  0.0     IMMATURE GRANULOCYTES      Not Estab. %  0     ABS. IMM.  GRANS.      0.0 - 0.1 x10E3/uL  0.0     Glucose      65 - 99 mg/dL    163 (H)   BUN      6 - 24 mg/dL    17   Creatinine      0.76 - 1.27 mg/dL    1.65 (H)   BUN/Creatinine ratio      9 - 20    10   Sodium      134 - 144 mmol/L    143   Potassium      3.5 - 5.2 mmol/L    4.5   Chloride      96 - 106 mmol/L    102   CO2      18 - 29 mmol/L    26   Calcium      8.7 - 10.2 mg/dL    9.9   Protein, total      6.0 - 8.5 g/dL    7.4   Albumin      3.5 - 5.5 g/dL    4.4   GLOBULIN, TOTAL      1.5 - 4.5 g/dL 3.0   A-G Ratio      1.2 - 2.2    1.5   Bilirubin, total      0.0 - 1.2 mg/dL    0.4   Alk. phosphatase      39 - 117 IU/L    63   AST      0 - 40 IU/L    20   ALT (SGPT)      0 - 44 IU/L    24   Cholesterol, total      100 - 199 mg/dL   170    Triglyceride      0 - 149 mg/dL   229 (H)    HDL Cholesterol      >39 mg/dL   34 (L)    VLDL, calculated      5 - 40 mg/dL   46 (H)    LDL, calculated      0 - 99 mg/dL   90    Prostate Specific Ag      0.0 - 4.0 ng/mL 1.3        Component      Latest Ref Rng & Units 6/7/2018           8:28 AM   Hemoglobin A1c (POC)      % 8.5       Assessment/Plan:   1) DM > Pt has had excellent weight loss and when he does check his BGs he has not see BGs above 150. Will check his A1C today. Pt encouraged to keep up the good work and continue the Victoza 1.2mg daily, Linagliptin/Metformin 2.5/1000mg BID and Pioglitazone 15mg daily. Pt to check her BGs twice per day. 2) HTN > BP at goal on his current regimen. No changes needed at this time. 3) HLD > Pt's lipid panel was at goal in June 2018, pt is on Rosuvastatin 10mg daily, which he is tolerating well. This is a high intensity statin regimen. 4) ED > Will give pt a prescription for Cialas 20mg PRN. Pt voices understanding and agreement with the plan. RTC 3 months    Follow-up Disposition:  Return in about 3 months (around 11/27/2018).     Copy sent to:  Dr. Yosi Motley

## 2018-08-28 LAB
EST. AVERAGE GLUCOSE BLD GHB EST-MCNC: 160 MG/DL
HBA1C MFR BLD: 7.2 % (ref 4.8–5.6)

## 2018-08-28 RX ORDER — ROSUVASTATIN CALCIUM 10 MG/1
TABLET, COATED ORAL
Qty: 30 TAB | Refills: 0 | Status: SHIPPED | OUTPATIENT
Start: 2018-08-28 | End: 2018-09-27 | Stop reason: SDUPTHER

## 2018-09-07 ENCOUNTER — OFFICE VISIT (OUTPATIENT)
Dept: INTERNAL MEDICINE CLINIC | Age: 53
End: 2018-09-07

## 2018-09-07 VITALS
HEIGHT: 69 IN | BODY MASS INDEX: 32.32 KG/M2 | HEART RATE: 90 BPM | WEIGHT: 218.2 LBS | RESPIRATION RATE: 18 BRPM | OXYGEN SATURATION: 96 % | TEMPERATURE: 98.1 F | DIASTOLIC BLOOD PRESSURE: 90 MMHG | SYSTOLIC BLOOD PRESSURE: 143 MMHG

## 2018-09-07 DIAGNOSIS — E78.2 MIXED HYPERLIPIDEMIA: ICD-10-CM

## 2018-09-07 DIAGNOSIS — I10 ESSENTIAL HYPERTENSION WITH GOAL BLOOD PRESSURE LESS THAN 130/80: Primary | ICD-10-CM

## 2018-09-07 DIAGNOSIS — E66.9 OBESITY (BMI 30.0-34.9): ICD-10-CM

## 2018-09-07 DIAGNOSIS — Z23 ENCOUNTER FOR IMMUNIZATION: ICD-10-CM

## 2018-09-07 NOTE — PROGRESS NOTES
HISTORY OF PRESENT ILLNESS  Morgan Jansen is a 47 y.o. male presents for routine visit  HPI  Diabetes managed by Dr Ruiz Melendrez; provider pleased with weight weight loss. victoza causes nausea if taken without meal    Compliant with medical management    Here for shingrix #2 today    Physically active    Trying to follow a healthier diet    Just bought house    Past Medical History:   Diagnosis Date    Arthritis     Borderline diabetic     Diabetes ([address]     Family history of premature CAD     Hypertension        Current Outpatient Prescriptions on File Prior to Visit   Medication Sig Dispense Refill    rosuvastatin (CRESTOR) 10 mg tablet TAKE 1 TABLET BY MOUTH EVERY DAY 30 Tab 0    pioglitazone (ACTOS) 15 mg tablet TAKE 1 TAB BY MOUTH DAILY. 30 Tab 3    Liraglutide (VICTOZA) 0.6 mg/0.1 mL (18 mg/3 mL) pnij Inject 1.2 mg once daily 6 Pen 0    montelukast (SINGULAIR) 10 mg tablet TAKE 1 TABLET BY MOUTH DAILY 30 Tab 3    amLODIPine (NORVASC) 10 mg tablet TAKE 1 TAB BY MOUTH DAILY. 30 Tab 2    hydroCHLOROthiazide (HYDRODIURIL) 12.5 mg tablet TAKE 1 TABLET BY MOUTH EVERY DAY 30 Tab 2    JENTADUETO 2.5-1,000 mg per tablet TAKE 1 TAB BY MOUTH TWO (2) TIMES DAILY (WITH MEALS). APPOINTMENT REQUIRED 60 Tab 2    Insulin Needles, Disposable, (NOVOFINE 32) 32 gauge x 1/4\" ndle One shot daily 100 Pen Needle 3    albuterol (PROVENTIL HFA, VENTOLIN HFA, PROAIR HFA) 90 mcg/actuation inhaler Take 2 Puffs by inhalation every four (4) hours as needed for Wheezing. 1 Inhaler 0    CONTOUR NEXT STRIPS strip PATIENT IS TO CHECK BLOOD SUGAR TWICE DAILY 100 Strip 1    coenzyme Q-10 (CO Q-10) 200 mg capsule Take 1 Cap by mouth daily. 30 Cap 11    cinnamon bark (CINNAMON) 500 mg cap Take 500 mg by mouth two (2) times a day.  tadalafil (CIALIS) 20 mg tablet Take 1 Tab by mouth as needed. Take one tablet a half an hour before activity. Run as cash pay.  10 Tab 3     No current facility-administered medications on file prior to visit. Review of Systems   Constitutional: Positive for weight loss (intentional). Eyes: Negative. Cardiovascular: Negative. Gastrointestinal: Negative. Genitourinary: Negative. Neurological: Negative. Visit Vitals    /90 (BP 1 Location: Left arm, BP Patient Position: Sitting)    Pulse 90    Temp 98.1 °F (36.7 °C) (Oral)    Resp 18    Ht 5' 9\" (1.753 m)    Wt 218 lb 3.2 oz (99 kg)    SpO2 96%    BMI 32.22 kg/m2     Physical Exam   Constitutional: He appears well-developed and well-nourished. No distress. Cardiovascular: Normal rate, regular rhythm and normal heart sounds. Pulmonary/Chest: Effort normal and breath sounds normal.   Musculoskeletal: He exhibits no edema. Skin: He is not diaphoretic. ASSESSMENT and PLAN    ICD-10-CM ICD-9-CM    1. Essential hypertension with goal blood pressure less than 130/80 I10 401.9    2. Mixed hyperlipidemia E78.2 272.2    3. Encounter for immunization Z23 V03.89 CANCELED: ZOSTER VACC RECOMBINANT ADJUVANTED      CANCELED: MI IMMUNIZ ADMIN,1 SINGLE/COMB VAC/TOXOID   4. Obesity (BMI 30.0-34. 9) E66.9 278.00      Follow-up Disposition:  Return in about 3 months (around 12/7/2018) for fasting labs, htn, hyperlipidemia, weight management. lab results and schedule of future lab studies reviewed with patient  reviewed diet, exercise and weight control  cardiovascular risk and specific lipid/LDL goals reviewed  reviewed medications and side effects in detail  use of aspirin to prevent MI and TIA's discussed    Hypertension, sub optimal control. Reinforced lifestyle management    shingrix out of stock will do on follow up visit    Patient voices understanding and acceptance of this advice and will call back if any further questions or concerns. An After Visit Summary was printed and given to the patient.

## 2018-09-07 NOTE — PROGRESS NOTES
Exam Room 7    John Caputo is a 47 y.o. male    Chief Complaint   Patient presents with    Weight Management     Fasting    1. Have you been to the ER, urgent care clinic since your last visit? Hospitalized since your last visit? No    2. Have you seen or consulted any other health care providers outside of the [address] [place] [address] since your last visit? Include any pap smears or colon screening.  No    Last A1c done August 27th

## 2018-09-07 NOTE — PATIENT INSTRUCTIONS
High Blood Pressure: Care Instructions  Your Care Instructions    If your blood pressure is usually above 130/80, you have high blood pressure, or hypertension. That means the top number is 130 or higher or the bottom number is 80 or higher, or both. Despite what a lot of people think, high blood pressure usually doesn't cause headaches or make you feel dizzy or lightheaded. It usually has no symptoms. But it does increase your risk for heart attack, stroke, and kidney or eye damage. The higher your blood pressure, the more your risk increases. Your doctor will give you a goal for your blood pressure. Your goal will be based on your health and your age. Lifestyle changes, such as eating healthy and being active, are always important to help lower blood pressure. You might also take medicine to reach your blood pressure goal.  Follow-up care is a key part of your treatment and safety. Be sure to make and go to all appointments, and call your doctor if you are having problems. It's also a good idea to know your test results and keep a list of the medicines you take. How can you care for yourself at home? Medical treatment  · If you stop taking your medicine, your blood pressure will go back up. You may take one or more types of medicine to lower your blood pressure. Be safe with medicines. Take your medicine exactly as prescribed. Call your doctor if you think you are having a problem with your medicine. · Talk to your doctor before you start taking aspirin every day. Aspirin can help certain people lower their risk of a heart attack or stroke. But taking aspirin isn't right for everyone, because it can cause serious bleeding. · See your doctor regularly. You may need to see the doctor more often at first or until your blood pressure comes down. · If you are taking blood pressure medicine, talk to your doctor before you take decongestants or anti-inflammatory medicine, such as ibuprofen.  Some of these medicines can raise blood pressure. · Learn how to check your blood pressure at home. Lifestyle changes  · Stay at a healthy weight. This is especially important if you put on weight around the waist. Losing even 10 pounds can help you lower your blood pressure. · If your doctor recommends it, get more exercise. Walking is a good choice. Bit by bit, increase the amount you walk every day. Try for at least 30 minutes on most days of the week. You also may want to swim, bike, or do other activities. · Avoid or limit alcohol. Talk to your doctor about whether you can drink any alcohol. · Try to limit how much sodium you eat to less than 2,300 milligrams (mg) a day. Your doctor may ask you to try to eat less than 1,500 mg a day. · Eat plenty of fruits (such as bananas and oranges), vegetables, legumes, whole grains, and low-fat dairy products. · Lower the amount of saturated fat in your diet. Saturated fat is found in animal products such as milk, cheese, and meat. Limiting these foods may help you lose weight and also lower your risk for heart disease. · Do not smoke. Smoking increases your risk for heart attack and stroke. If you need help quitting, talk to your doctor about stop-smoking programs and medicines. These can increase your chances of quitting for good. When should you call for help? Call 911 anytime you think you may need emergency care. This may mean having symptoms that suggest that your blood pressure is causing a serious heart or blood vessel problem. Your blood pressure may be over 180/110.   For example, call 911 if:    · You have symptoms of a heart attack. These may include:  ¨ Chest pain or pressure, or a strange feeling in the chest.  ¨ Sweating. ¨ Shortness of breath. ¨ Nausea or vomiting. ¨ Pain, pressure, or a strange feeling in the back, neck, jaw, or upper belly or in one or both shoulders or arms. ¨ Lightheadedness or sudden weakness.   ¨ A fast or irregular heartbeat.     · You have symptoms of a stroke. These may include:  ¨ Sudden numbness, tingling, weakness, or loss of movement in your face, arm, or leg, especially on only one side of your body. ¨ Sudden vision changes. ¨ Sudden trouble speaking. ¨ Sudden confusion or trouble understanding simple statements. ¨ Sudden problems with walking or balance. ¨ A sudden, severe headache that is different from past headaches.     · You have severe back or belly pain.    Do not wait until your blood pressure comes down on its own. Get help right away.   Call your doctor now or seek immediate care if:    · Your blood pressure is much higher than normal (such as 180/110 or higher), but you don't have symptoms.     · You think high blood pressure is causing symptoms, such as:  ¨ Severe headache. ¨ Blurry vision.    Watch closely for changes in your health, and be sure to contact your doctor if:    · Your blood pressure measures 140/90 or higher at least 2 times. That means the top number is 140 or higher or the bottom number is 90 or higher, or both.     · You think you may be having side effects from your blood pressure medicine.     · Your blood pressure is usually normal, but it goes above normal at least 2 times. Where can you learn more? Go to http://rosi-shell.info/. Enter A266 in the search box to learn more about \"High Blood Pressure: Care Instructions. \"  Current as of: December 6, 2017  Content Version: 11.7  © 2256-4977 [employer]. Care instructions adapted under license by [employer] (which disclaims liability or warranty for this information). If you have questions about a medical condition or this instruction, always ask your healthcare professional. [place] [address] any warranty or liability for your use of this information. Learning About High Blood Pressure  What is high blood pressure?     Blood pressure is a measure of how hard the blood pushes against the walls of your arteries. It's normal for blood pressure to go up and down throughout the day, but if it stays up, you have high blood pressure. Another name for high blood pressure is hypertension. Two numbers tell you your blood pressure. The first number is the systolic pressure. It shows how hard the blood pushes when your heart is pumping. The second number is the diastolic pressure. It shows how hard the blood pushes between heartbeats, when your heart is relaxed and filling with blood. A blood pressure of less than 120/80 (say \"120 over 80\") is ideal for an adult. High blood pressure is 130/80 or higher. You have high blood pressure if your top number is 130 or higher or your bottom number is 80 or higher, or both. What happens when you have high blood pressure? · Blood flows through your arteries with too much force. Over time, this damages the walls of your arteries. But you can't feel it. High blood pressure usually doesn't cause symptoms. · Fat and calcium start to build up in your arteries. This buildup is called plaque. Plaque makes your arteries narrower and stiffer. Blood can't flow through them as easily. · This lack of good blood flow starts to damage some of the organs in your body. This can lead to problems such as coronary artery disease and heart attack, heart failure, stroke, kidney failure, and eye damage. How can you prevent high blood pressure? · Stay at a healthy weight. · Try to limit how much sodium you eat to less than 2,300 milligrams (mg) a day. If you limit your sodium to 1,500 mg a day, you can lower your blood pressure even more. ¨ Buy foods that are labeled \"unsalted,\" \"sodium-free,\" or \"low-sodium. \" Foods labeled \"reduced-sodium\" and \"light sodium\" may still have too much sodium. ¨ Flavor your food with garlic, lemon juice, onion, vinegar, herbs, and spices instead of salt.  Do not use soy sauce, steak sauce, onion salt, garlic salt, mustard, or ketchup on your food.  ¨ Use less salt (or none) when recipes call for it. You can often use half the salt a recipe calls for without losing flavor. · Be physically active. Get at least 30 minutes of exercise on most days of the week. Walking is a good choice. You also may want to do other activities, such as running, swimming, cycling, or playing tennis or team sports. · Limit alcohol to 2 drinks a day for men and 1 drink a day for women. · Eat plenty of fruits, vegetables, and low-fat dairy products. Eat less saturated and total fats. How is high blood pressure treated? · Your doctor will suggest making lifestyle changes. For example, your doctor may ask you to eat healthy foods, quit smoking, lose extra weight, and be more active. · If lifestyle changes don't help enough or your blood pressure is very high, you will have to take medicine every day. Follow-up care is a key part of your treatment and safety. Be sure to make and go to all appointments, and call your doctor if you are having problems. It's also a good idea to know your test results and keep a list of the medicines you take. Where can you learn more? Go to http://rosi-shell.info/. Enter P501 in the search box to learn more about \"Learning About High Blood Pressure. \"  Current as of: May 10, 2017  Content Version: 11.7  © 4750-9464 [employer]. Care instructions adapted under license by [employer] (which disclaims liability or warranty for this information). If you have questions about a medical condition or this instruction, always ask your healthcare professional. [place] [address] any warranty or liability for your use of this information. Starting a Weight Loss Plan: Care Instructions  Your Care Instructions    If you are thinking about losing weight, it can be hard to know where to start. Your doctor can help you set up a weight loss plan that best meets your needs.  You may want to take a class on nutrition or exercise, or join a weight loss support group. If you have questions about how to make changes to your eating or exercise habits, ask your doctor about seeing a registered dietitian or an exercise specialist.  It can be a big challenge to lose weight. But you do not have to make huge changes at once. Make small changes, and stick with them. When those changes become habit, add a few more changes. If you do not think you are ready to make changes right now, try to pick a date in the future. Make an appointment to see your doctor to discuss whether the time is right for you to start a plan. Follow-up care is a key part of your treatment and safety. Be sure to make and go to all appointments, and call your doctor if you are having problems. It's also a good idea to know your test results and keep a list of the medicines you take. How can you care for yourself at home? · Set realistic goals. Many people expect to lose much more weight than is likely. A weight loss of 5% to 10% of your body weight may be enough to improve your health. · Get family and friends involved to provide support. Talk to them about why you are trying to lose weight, and ask them to help. They can help by participating in exercise and having meals with you, even if they may be eating something different. · Find what works best for you. If you do not have time or do not like to cook, a program that offers meal replacement bars or shakes may be better for you. Or if you like to prepare meals, finding a plan that includes daily menus and recipes may be best.  · Ask your doctor about other health professionals who can help you achieve your weight loss goals. ¨ A dietitian can help you make healthy changes in your diet.   ¨ An exercise specialist or  can help you develop a safe and effective exercise program.  ¨ A counselor or psychiatrist can help you cope with issues such as depression, anxiety, or family problems that can make it hard to focus on weight loss. · Consider joining a support group for people who are trying to lose weight. Your doctor can suggest groups in your area. Where can you learn more? Go to http://rosi-shell.info/. Enter I495 in the search box to learn more about \"Starting a Weight Loss Plan: Care Instructions. \"  Current as of: October 9, 2017  Content Version: 11.7  © 0580-4396 [employer]. Care instructions adapted under license by [employer] (which disclaims liability or warranty for this information). If you have questions about a medical condition or this instruction, always ask your healthcare professional. [address] any warranty or liability for your use of this information.

## 2018-09-07 NOTE — MR AVS SNAPSHOT
[address] [facility] [place] [address] 
[phone] Patient: Daksha Martinez MRN:  [id number] Visit Information Date & Time Provider Department Dept. Phone Encounter #  
 2018  8:00 AM Noe Anibal Blevinsdexter [facility] [phone] [facility] [phone] [id number] Follow-up Instructions Return in about 3 months (around 2018) for fasting labs, htn, hyperlipidemia, weight management. Your Appointments 2018 12:10 PM  
Follow Up with MD Abdulaziz [facility]) Appt Note: 3 month f/u  
 [address] [facility] [address] [id number] [facility] Upcoming Health Maintenance Date Due DTaP/Tdap/Td series (1 - Tdap) 1987 FOBT Q 1 YEAR AGE 50-75 2016 Influenza Age 5 to Adult 2018 MICROALBUMIN Q1 2018 HEMOGLOBIN A1C Q6M 2019 EYE EXAM RETINAL OR DILATED Q1 2019 LIPID PANEL Q1 2019 FOOT EXAM Q1 2019 Allergies as of 2018  Review Complete On: 2018 By: José Antonio Gonzalez LPN No Known Allergies Current Immunizations  Never Reviewed Name Date Pneumococcal Polysaccharide (PPSV-23) 2016 Zoster Recombinant  Incomplete, 2018 Not reviewed this visit You Were Diagnosed With   
  
 Codes Comments Essential hypertension with goal blood pressure less than 130/80    -  Primary ICD-10-CM: I10 
ICD-9-CM: 401.9 Mixed hyperlipidemia     ICD-10-CM: E78.2 ICD-9-CM: 272.2 Encounter for immunization     ICD-10-CM: Z26 ICD-9-CM: V03.89 Obesity (BMI 30.0-34.9)     ICD-10-CM: Q61.9 ICD-9-CM: 278.00 Vitals BP Pulse Temp Resp Height(growth percentile) Weight(growth percentile) 143/90 (BP 1 Location: Left arm, BP Patient Position: Sitting) 90 98.1 °F (36.7 °C) (Oral) 18 5' 9\" (1.753 m) 218 lb 3.2 oz (99 kg) SpO2 BMI Smoking Status 96% 32.22 kg/m2 Never Smoker Vitals History BMI and BSA Data Body Mass Index Body Surface Area  
 32.22 kg/m 2 2.2 m 2 Preferred Pharmacy Pharmacy Name Phone [phone] [facility] [phone] Your Updated Medication List  
  
   
This list is accurate as of 9/7/18  8:27 AM.  Always use your most recent med list.  
  
  
  
  
 albuterol 90 mcg/actuation inhaler Commonly known as:  PROVENTIL HFA, VENTOLIN HFA, PROAIR HFA Take 2 Puffs by inhalation every four (4) hours as needed for Wheezing. amLODIPine 10 mg tablet Commonly known as:  Michelle Citron TAKE 1 TAB BY MOUTH DAILY. CINNAMON 500 mg Cap Generic drug:  cinnamon bark Take 500 mg by mouth two (2) times a day. coenzyme Q-10 200 mg capsule Commonly known as:  CO Q-10 Take 1 Cap by mouth daily. CONTOUR NEXT TEST STRIPS strip Generic drug:  glucose blood VI test strips PATIENT IS TO CHECK BLOOD SUGAR TWICE DAILY  
  
 hydroCHLOROthiazide 12.5 mg tablet Commonly known as:  HYDRODIURIL  
TAKE 1 TABLET BY MOUTH EVERY DAY Insulin Needles (Disposable) 32 gauge x 1/4\" Ndle Commonly known as:  NOVOFINE 32 One shot daily JENTADUETO 2.5-1,000 mg per tablet Generic drug:  linagliptin-metFORMIN  
TAKE 1 TAB BY MOUTH TWO (2) TIMES DAILY (WITH MEALS). APPOINTMENT REQUIRED Liraglutide 0.6 mg/0.1 mL (18 mg/3 mL) Pnij Commonly known as:  Rodolfo Dopp Inject 1.2 mg once daily  
  
 montelukast 10 mg tablet Commonly known as:  SINGULAIR  
TAKE 1 TABLET BY MOUTH DAILY pioglitazone 15 mg tablet Commonly known as:  ACTOS  
TAKE 1 TAB BY MOUTH DAILY. rosuvastatin 10 mg tablet Commonly known as:  CRESTOR  
 TAKE 1 TABLET BY MOUTH EVERY DAY  
  
 tadalafil 20 mg tablet Commonly known as:  CIALIS Take 1 Tab by mouth as needed. Take one tablet a half an hour before activity. Run as cash pay. We Performed the Following CA IMMUNIZ ADMIN,1 SINGLE/COMB VAC/TOXOID [id number] CPT(R)] ZOSTER VACC RECOMBINANT ADJUVANTED [id number] CPT(R)] Follow-up Instructions Return in about 3 months (around 12/7/2018) for fasting labs, htn, hyperlipidemia, weight management. Patient Instructions High Blood Pressure: Care Instructions Your Care Instructions If your blood pressure is usually above 130/80, you have high blood pressure, or hypertension. That means the top number is 130 or higher or the bottom number is 80 or higher, or both. Despite what a lot of people think, high blood pressure usually doesn't cause headaches or make you feel dizzy or lightheaded. It usually has no symptoms. But it does increase your risk for heart attack, stroke, and kidney or eye damage. The higher your blood pressure, the more your risk increases. Your doctor will give you a goal for your blood pressure. Your goal will be based on your health and your age. Lifestyle changes, such as eating healthy and being active, are always important to help lower blood pressure. You might also take medicine to reach your blood pressure goal. 
Follow-up care is a key part of your treatment and safety. Be sure to make and go to all appointments, and call your doctor if you are having problems. It's also a good idea to know your test results and keep a list of the medicines you take. How can you care for yourself at home? Medical treatment · If you stop taking your medicine, your blood pressure will go back up. You may take one or more types of medicine to lower your blood pressure. Be safe with medicines. Take your medicine exactly as prescribed. Call your doctor if you think you are having a problem with your medicine. · Talk to your doctor before you start taking aspirin every day. Aspirin can help certain people lower their risk of a heart attack or stroke. But taking aspirin isn't right for everyone, because it can cause serious bleeding. · See your doctor regularly. You may need to see the doctor more often at first or until your blood pressure comes down. · If you are taking blood pressure medicine, talk to your doctor before you take decongestants or anti-inflammatory medicine, such as ibuprofen. Some of these medicines can raise blood pressure. · Learn how to check your blood pressure at home. Lifestyle changes · Stay at a healthy weight. This is especially important if you put on weight around the waist. Losing even 10 pounds can help you lower your blood pressure. · If your doctor recommends it, get more exercise. Walking is a good choice. Bit by bit, increase the amount you walk every day. Try for at least 30 minutes on most days of the week. You also may want to swim, bike, or do other activities. · Avoid or limit alcohol. Talk to your doctor about whether you can drink any alcohol. · Try to limit how much sodium you eat to less than 2,300 milligrams (mg) a day. Your doctor may ask you to try to eat less than 1,500 mg a day. · Eat plenty of fruits (such as bananas and oranges), vegetables, legumes, whole grains, and low-fat dairy products. · Lower the amount of saturated fat in your diet. Saturated fat is found in animal products such as milk, cheese, and meat. Limiting these foods may help you lose weight and also lower your risk for heart disease. · Do not smoke. Smoking increases your risk for heart attack and stroke. If you need help quitting, talk to your doctor about stop-smoking programs and medicines. These can increase your chances of quitting for good. When should you call for help? Call 911 anytime you think you may need emergency care.  This may mean having symptoms that suggest that your blood pressure is causing a serious heart or blood vessel problem. Your blood pressure may be over 180/110. 
 For example, call 911 if: 
  · You have symptoms of a heart attack. These may include: ¨ Chest pain or pressure, or a strange feeling in the chest. 
¨ Sweating. ¨ Shortness of breath. ¨ Nausea or vomiting. ¨ Pain, pressure, or a strange feeling in the back, neck, jaw, or upper belly or in one or both shoulders or arms. ¨ Lightheadedness or sudden weakness. ¨ A fast or irregular heartbeat.  
  · You have symptoms of a stroke. These may include: 
¨ Sudden numbness, tingling, weakness, or loss of movement in your face, arm, or leg, especially on only one side of your body. ¨ Sudden vision changes. ¨ Sudden trouble speaking. ¨ Sudden confusion or trouble understanding simple statements. ¨ Sudden problems with walking or balance. ¨ A sudden, severe headache that is different from past headaches.  
  · You have severe back or belly pain.  
 Do not wait until your blood pressure comes down on its own. Get help right away. 
 Call your doctor now or seek immediate care if: 
  · Your blood pressure is much higher than normal (such as 180/110 or higher), but you don't have symptoms.  
  · You think high blood pressure is causing symptoms, such as: ¨ Severe headache. ¨ Blurry vision.  
 Watch closely for changes in your health, and be sure to contact your doctor if: 
  · Your blood pressure measures 140/90 or higher at least 2 times. That means the top number is 140 or higher or the bottom number is 90 or higher, or both.  
  · You think you may be having side effects from your blood pressure medicine.  
  · Your blood pressure is usually normal, but it goes above normal at least 2 times. Where can you learn more? Go to http://rosi-shell.info/. Enter B209 in the search box to learn more about \"High Blood Pressure: Care Instructions. \" 
 Current as of: December 6, 2017 Content Version: 11.7 © 8417-6441 [employer]. Care instructions adapted under license by [employer] (which disclaims liability or warranty for this information). If you have questions about a medical condition or this instruction, always ask your healthcare professional. [address] any warranty or liability for your use of this information. Learning About High Blood Pressure What is high blood pressure? Blood pressure is a measure of how hard the blood pushes against the walls of your arteries. It's normal for blood pressure to go up and down throughout the day, but if it stays up, you have high blood pressure. Another name for high blood pressure is hypertension. Two numbers tell you your blood pressure. The first number is the systolic pressure. It shows how hard the blood pushes when your heart is pumping. The second number is the diastolic pressure. It shows how hard the blood pushes between heartbeats, when your heart is relaxed and filling with blood. A blood pressure of less than 120/80 (say \"120 over 80\") is ideal for an adult. High blood pressure is 130/80 or higher. You have high blood pressure if your top number is 130 or higher or your bottom number is 80 or higher, or both. What happens when you have high blood pressure? · Blood flows through your arteries with too much force. Over time, this damages the walls of your arteries. But you can't feel it. High blood pressure usually doesn't cause symptoms. · Fat and calcium start to build up in your arteries. This buildup is called plaque. Plaque makes your arteries narrower and stiffer. Blood can't flow through them as easily. · This lack of good blood flow starts to damage some of the organs in your body. This can lead to problems such as coronary artery disease and heart attack, heart failure, stroke, kidney failure, and eye damage. How can you prevent high blood pressure? · Stay at a healthy weight. · Try to limit how much sodium you eat to less than 2,300 milligrams (mg) a day. If you limit your sodium to 1,500 mg a day, you can lower your blood pressure even more. ¨ Buy foods that are labeled \"unsalted,\" \"sodium-free,\" or \"low-sodium. \" Foods labeled \"reduced-sodium\" and \"light sodium\" may still have too much sodium. ¨ Flavor your food with garlic, lemon juice, onion, vinegar, herbs, and spices instead of salt. Do not use soy sauce, steak sauce, onion salt, garlic salt, mustard, or ketchup on your food. ¨ Use less salt (or none) when recipes call for it. You can often use half the salt a recipe calls for without losing flavor. · Be physically active. Get at least 30 minutes of exercise on most days of the week. Walking is a good choice. You also may want to do other activities, such as running, swimming, cycling, or playing tennis or team sports. · Limit alcohol to 2 drinks a day for men and 1 drink a day for women. · Eat plenty of fruits, vegetables, and low-fat dairy products. Eat less saturated and total fats. How is high blood pressure treated? · Your doctor will suggest making lifestyle changes. For example, your doctor may ask you to eat healthy foods, quit smoking, lose extra weight, and be more active. · If lifestyle changes don't help enough or your blood pressure is very high, you will have to take medicine every day. Follow-up care is a key part of your treatment and safety. Be sure to make and go to all appointments, and call your doctor if you are having problems. It's also a good idea to know your test results and keep a list of the medicines you take. Where can you learn more? Go to http://rosi-shell.info/. Enter P501 in the search box to learn more about \"Learning About High Blood Pressure. \" Current as of: May 10, 2017 Content Version: 11.7 © 8657-3265 [employer]. Care instructions adapted under license by [employer] (which disclaims liability or warranty for this information). If you have questions about a medical condition or this instruction, always ask your healthcare professional. [address] any warranty or liability for your use of this information. Starting a Weight Loss Plan: Care Instructions Your Care Instructions If you are thinking about losing weight, it can be hard to know where to start. Your doctor can help you set up a weight loss plan that best meets your needs. You may want to take a class on nutrition or exercise, or join a weight loss support group. If you have questions about how to make changes to your eating or exercise habits, ask your doctor about seeing a registered dietitian or an exercise specialist. 
It can be a big challenge to lose weight. But you do not have to make huge changes at once. Make small changes, and stick with them. When those changes become habit, add a few more changes. If you do not think you are ready to make changes right now, try to pick a date in the future. Make an appointment to see your doctor to discuss whether the time is right for you to start a plan. Follow-up care is a key part of your treatment and safety. Be sure to make and go to all appointments, and call your doctor if you are having problems. It's also a good idea to know your test results and keep a list of the medicines you take. How can you care for yourself at home? · Set realistic goals. Many people expect to lose much more weight than is likely. A weight loss of 5% to 10% of your body weight may be enough to improve your health. · Get family and friends involved to provide support. Talk to them about why you are trying to lose weight, and ask them to help.  They can help by participating in exercise and having meals with you, even if they may be eating something different. · Find what works best for you. If you do not have time or do not like to cook, a program that offers meal replacement bars or shakes may be better for you. Or if you like to prepare meals, finding a plan that includes daily menus and recipes may be best. 
· Ask your doctor about other health professionals who can help you achieve your weight loss goals. ¨ A dietitian can help you make healthy changes in your diet. ¨ An exercise specialist or  can help you develop a safe and effective exercise program. 
¨ A counselor or psychiatrist can help you cope with issues such as depression, anxiety, or family problems that can make it hard to focus on weight loss. · Consider joining a support group for people who are trying to lose weight. Your doctor can suggest groups in your area. Where can you learn more? Go to http://rosi-shell.info/. Enter O977 in the search box to learn more about \"Starting a Weight Loss Plan: Care Instructions. \" Current as of: October 9, 2017 Content Version: 11.7 © 1025-6881 I Do Now I Don't. Care instructions adapted under license by [employer] (which disclaims liability or warranty for this information). If you have questions about a medical condition or this instruction, always ask your healthcare professional. [address] any warranty or liability for your use of this information. Introducing [facility]! Rola Brown introduces Cache IQ patient portal. Now you can access parts of your medical record, email your doctor's office, and request medication refills online. 1. In your internet browser, go to [email or web address] 2. Click on the First Time User? Click Here link in the Sign In box. You will see the New Member Sign Up page. 3. Enter your Cache IQ Access Code exactly as it appears below.  You will not need to use this code after youve completed the sign-up process. If you do not sign up before the expiration date, you must request a new code. · Lakala Access Code: [email or web address] Expires: 12/6/2018  8:27 AM 
 
4. Enter the last four digits of your Social Security Number (xxxx) and Date of Birth (mm/dd/yyyy) as indicated and click Submit. You will be taken to the next sign-up page. 5. Create a Lakala ID. This will be your Lakala login ID and cannot be changed, so think of one that is secure and easy to remember. 6. Create a Lakala password. You can change your password at any time. 7. Enter your Password Reset Question and Answer. This can be used at a later time if you forget your password. 8. Enter your e-mail address. You will receive e-mail notification when new information is available in [address]. 9. Click Sign Up. You can now view and download portions of your medical record. 10. Click the Download Summary menu link to download a portable copy of your medical information. If you have questions, please visit the Frequently Asked Questions section of the Lakala website. Remember, Lakala is NOT to be used for urgent needs. For medical emergencies, dial 911. Now available from your iPhone and Android! Please provide this summary of care documentation to your next provider. Your primary care clinician is listed as Herminia Gallardo. If you have any questions after today's visit, please call [phone].

## 2018-09-24 ENCOUNTER — TELEPHONE (OUTPATIENT)
Dept: ENDOCRINOLOGY | Age: 53
End: 2018-09-24

## 2018-09-24 NOTE — TELEPHONE ENCOUNTER
I reviewed his blood sugar logs and they are looking pretty good. Continue the Victoza 1.2mg every morning, Linagliptin/Metformin 2.5/1000mg in the morning and with dinner and Pioglitazone 15mg daily.

## 2018-09-27 RX ORDER — ROSUVASTATIN CALCIUM 10 MG/1
TABLET, COATED ORAL
Qty: 30 TAB | Refills: 0 | Status: SHIPPED | OUTPATIENT
Start: 2018-09-27 | End: 2018-11-01 | Stop reason: SDUPTHER

## 2018-10-03 DIAGNOSIS — I10 ESSENTIAL HYPERTENSION WITH GOAL BLOOD PRESSURE LESS THAN 130/80: ICD-10-CM

## 2018-10-04 RX ORDER — HYDROCHLOROTHIAZIDE 12.5 MG/1
CAPSULE ORAL
Qty: 30 CAP | Refills: 2 | Status: SHIPPED | OUTPATIENT
Start: 2018-10-04 | End: 2018-12-14 | Stop reason: SDUPTHER

## 2018-10-04 RX ORDER — AMLODIPINE BESYLATE 10 MG/1
TABLET ORAL
Qty: 30 TAB | Refills: 2 | Status: SHIPPED | OUTPATIENT
Start: 2018-10-04 | End: 2018-12-14 | Stop reason: SDUPTHER

## 2018-10-04 RX ORDER — LINAGLIPTIN AND METFORMIN HYDROCHLORIDE 2.5; 1 MG/1; MG/1
TABLET, FILM COATED ORAL
Qty: 60 TAB | Refills: 2 | Status: SHIPPED | OUTPATIENT
Start: 2018-10-04 | End: 2018-12-14 | Stop reason: SDUPTHER

## 2018-11-01 RX ORDER — ROSUVASTATIN CALCIUM 10 MG/1
TABLET, COATED ORAL
Qty: 30 TAB | Refills: 0 | Status: SHIPPED | OUTPATIENT
Start: 2018-11-01 | End: 2018-11-30 | Stop reason: SDUPTHER

## 2018-11-14 ENCOUNTER — TELEPHONE (OUTPATIENT)
Dept: ENDOCRINOLOGY | Age: 53
End: 2018-11-14

## 2018-11-14 NOTE — TELEPHONE ENCOUNTER
----- Message from [facility] sent at 11/14/2018 10:31 AM EST -----  Regarding: Dr Marva Dewitt refcarlos  The pt is requesting a callback about an unknown medication that he believes is wrong.       Best contact number is [phone]

## 2018-11-16 RX ORDER — SILDENAFIL 25 MG/1
TABLET, FILM COATED ORAL
Qty: 25 TAB | Refills: 4 | Status: SHIPPED | OUTPATIENT
Start: 2018-11-16 | End: 2018-11-30

## 2018-11-29 DIAGNOSIS — R05.9 COUGH: ICD-10-CM

## 2018-11-29 RX ORDER — MONTELUKAST SODIUM 10 MG/1
TABLET ORAL
Qty: 30 TAB | Refills: 3 | Status: SHIPPED | OUTPATIENT
Start: 2018-11-29 | End: 2019-03-25 | Stop reason: SDUPTHER

## 2018-11-30 ENCOUNTER — OFFICE VISIT (OUTPATIENT)
Dept: ENDOCRINOLOGY | Age: 53
End: 2018-11-30

## 2018-11-30 VITALS
WEIGHT: 204 LBS | DIASTOLIC BLOOD PRESSURE: 89 MMHG | HEIGHT: 69 IN | BODY MASS INDEX: 30.21 KG/M2 | SYSTOLIC BLOOD PRESSURE: 138 MMHG

## 2018-11-30 DIAGNOSIS — I10 ESSENTIAL HYPERTENSION: ICD-10-CM

## 2018-11-30 DIAGNOSIS — E11.9 TYPE 2 DIABETES MELLITUS WITHOUT COMPLICATION, WITHOUT LONG-TERM CURRENT USE OF INSULIN (HCC): Primary | ICD-10-CM

## 2018-11-30 DIAGNOSIS — E78.2 MIXED HYPERLIPIDEMIA: ICD-10-CM

## 2018-11-30 RX ORDER — ROSUVASTATIN CALCIUM 10 MG/1
TABLET, COATED ORAL
Qty: 30 TAB | Refills: 0 | Status: SHIPPED | OUTPATIENT
Start: 2018-11-30 | End: 2019-01-04 | Stop reason: SDUPTHER

## 2018-12-01 LAB
ALBUMIN SERPL-MCNC: 4.5 G/DL (ref 3.5–5.5)
ALBUMIN/CREAT UR: 22.2 MG/G CREAT (ref 0–30)
ALBUMIN/GLOB SERPL: 1.6 {RATIO} (ref 1.2–2.2)
ALP SERPL-CCNC: 62 IU/L (ref 39–117)
ALT SERPL-CCNC: 18 IU/L (ref 0–44)
AST SERPL-CCNC: 23 IU/L (ref 0–40)
BILIRUB SERPL-MCNC: 0.2 MG/DL (ref 0–1.2)
BUN SERPL-MCNC: 15 MG/DL (ref 6–24)
BUN/CREAT SERPL: 10 (ref 9–20)
CALCIUM SERPL-MCNC: 9.8 MG/DL (ref 8.7–10.2)
CHLORIDE SERPL-SCNC: 100 MMOL/L (ref 96–106)
CHOLEST SERPL-MCNC: 118 MG/DL (ref 100–199)
CO2 SERPL-SCNC: 29 MMOL/L (ref 20–29)
CREAT SERPL-MCNC: 1.54 MG/DL (ref 0.76–1.27)
CREAT UR-MCNC: 174.2 MG/DL
EST. AVERAGE GLUCOSE BLD GHB EST-MCNC: 151 MG/DL
GLOBULIN SER CALC-MCNC: 2.9 G/DL (ref 1.5–4.5)
GLUCOSE SERPL-MCNC: 99 MG/DL (ref 65–99)
HBA1C MFR BLD: 6.9 % (ref 4.8–5.6)
HDLC SERPL-MCNC: 37 MG/DL
INTERPRETATION, 910389: NORMAL
INTERPRETATION: NORMAL
LDLC SERPL CALC-MCNC: 55 MG/DL (ref 0–99)
Lab: NORMAL
MICROALBUMIN UR-MCNC: 38.6 UG/ML
PDF IMAGE, 910387: NORMAL
POTASSIUM SERPL-SCNC: 4.2 MMOL/L (ref 3.5–5.2)
PROT SERPL-MCNC: 7.4 G/DL (ref 6–8.5)
SODIUM SERPL-SCNC: 145 MMOL/L (ref 134–144)
TRIGL SERPL-MCNC: 128 MG/DL (ref 0–149)
VLDLC SERPL CALC-MCNC: 26 MG/DL (ref 5–40)

## 2018-12-11 ENCOUNTER — HOSPITAL ENCOUNTER (EMERGENCY)
Age: 53
Discharge: HOME | End: 2018-12-11
Attending: EMERGENCY MEDICINE
Payer: COMMERCIAL

## 2018-12-11 ENCOUNTER — APPOINTMENT (OUTPATIENT)
Dept: CT IMAGING | Age: 53
End: 2018-12-11
Payer: COMMERCIAL

## 2018-12-11 ENCOUNTER — APPOINTMENT (OUTPATIENT)
Dept: GENERAL RADIOLOGY | Age: 53
End: 2018-12-11
Payer: COMMERCIAL

## 2018-12-11 VITALS
BODY MASS INDEX: 30.5 KG/M2 | OXYGEN SATURATION: 100 % | HEART RATE: 87 BPM | WEIGHT: 205.91 LBS | DIASTOLIC BLOOD PRESSURE: 98 MMHG | RESPIRATION RATE: 16 BRPM | TEMPERATURE: 98.2 F | SYSTOLIC BLOOD PRESSURE: 154 MMHG | HEIGHT: 69 IN

## 2018-12-11 DIAGNOSIS — S49.92XA INJURY OF LEFT SHOULDER, INITIAL ENCOUNTER: ICD-10-CM

## 2018-12-11 DIAGNOSIS — M48.02 CERVICAL STENOSIS OF SPINAL CANAL: ICD-10-CM

## 2018-12-11 DIAGNOSIS — M61.312: ICD-10-CM
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
## 2018-12-11 DIAGNOSIS — S09.90XA INJURY OF HEAD, INITIAL ENCOUNTER: Primary | ICD-10-CM

## 2018-12-11 DIAGNOSIS — F07.81 POST CONCUSSION SYNDROME: ICD-10-CM

## 2018-12-11 PROCEDURE — 70450 CT HEAD/BRAIN W/O DYE: CPT

## 2018-12-11 PROCEDURE — 72125 CT NECK SPINE W/O DYE: CPT

## 2018-12-11 PROCEDURE — 99283 EMERGENCY DEPT VISIT LOW MDM: CPT

## 2018-12-11 PROCEDURE — 73030 X-RAY EXAM OF SHOULDER: CPT

## 2018-12-11 RX ORDER — IBUPROFEN 600 MG/1
600 TABLET ORAL
Qty: 20 TAB | Refills: 0 | Status: SHIPPED | OUTPATIENT
Start: 2018-12-11 | End: 2018-12-14

## 2018-12-11 RX ORDER — METHOCARBAMOL 750 MG/1
750 TABLET, FILM COATED ORAL 3 TIMES DAILY
Qty: 15 TAB | Refills: 0 | Status: SHIPPED | OUTPATIENT
Start: 2018-12-11 | End: 2018-12-16

## 2018-12-11 RX ORDER — BUTALBITAL, ACETAMINOPHEN AND CAFFEINE 300; 40; 50 MG/1; MG/1; MG/1
CAPSULE ORAL
Qty: 15 CAP | Refills: 0 | Status: SHIPPED | OUTPATIENT
Start: 2018-12-11 | End: 2018-12-14

## 2018-12-11 NOTE — DISCHARGE INSTRUCTIONS
Rest, ice/cool compresses several times daily x 2 days, then alternate ice/moist heat, gentle stretching, massage. Avoid prolonged sitting. Follow up with primary care for recheck. Contact information provided for Orthopedist if symptoms persist.  Return to the Emergency Dept for any continued/worsening pain. Head Injury: After Your Visit to the Emergency Room  Your Care Instructions  You were seen in the emergency room for a head injury. Have another adult watch you closely for the next 24 hours. Ask the person to watch for signs that your head injury is getting worse, and make sure that he or she knows what to do if these signs appear. Even though you have been released from the emergency room, you still need to watch for any problems. The doctor carefully checked you. But sometimes problems can develop later. If you have new symptoms, or if your symptoms do not get better, return to the emergency room or call your doctor right away. A concussion means you hit your head hard enough to injure your brain. If you had a concussion, you may have symptoms that last for a few days to months. You may have changes in how well you think, concentrate, or remember; changes in your sleep patterns; or other symptoms. Although this is common after a concussion, any symptoms that are new or that are getting worse could be signs of a more serious problem. A visit to the emergency room is only one step in your treatment. Even if you feel better, you still need to do what your doctor recommends, such as going to all suggested follow-up appointments and taking medicines exactly as directed. This will help you recover and help prevent future problems. How can you care for yourself at home? · Take it easy for the next few days, or longer if you are not feeling well. Do not try to do too much. · Get plenty of sleep at night. Try to rest during the day.   · Ask your doctor if you can take an over-the-counter pain medicine, such as acetaminophen (Tylenol), ibuprofen (Advil, Motrin), or naproxen (Aleve). Read and follow all instructions on the label. Do not take two or more pain medicines at the same time unless the doctor told you to. Many pain medicines have acetaminophen, which is Tylenol. Too much acetaminophen (Tylenol) can be harmful. · Put ice or a cold pack on the area for 10 to 20 minutes at a time. Put a thin cloth between the ice and your skin. · Do not drink any alcohol for 24 hours or until your doctor tells you it is okay. · Avoid activities that could lead to another head injury. Avoid contact sports until your doctor says that you can do them. An athlete should never go back into a game after a head injury. · Until your doctor says it is okay, do not drive or do other things that require you to be alert. When should you call for help? Call 911 if:  · You have twitching, jerking, or a seizure. · You passed out (lost consciousness). · You have other symptoms that you think are a medical emergency. Return to the emergency room now if:  · You continue to vomit for more than 2 hours, or you have new vomiting. · You have clear or bloody fluid coming from your nose or ears. · The person checking on you has a hard time waking you. · You are confused, cannot think clearly, or do not know where you are. · You have trouble walking or talking. · You have new weakness or numbness in any part of your body. · You have bruises around both eyes (\"raccoon eyes\"). Call your doctor today if:  · Your headaches get worse. Where can you learn more? Go to WalkHub.be  Enter C324 in the search box to learn more about \"Head Injury: After Your Visit to the Emergency Room. \"   © 0025-3599 [employer]. Care instructions adapted under license by [facility] (which disclaims liability or warranty for this information).  This care instruction is for use with your licensed healthcare professional. If you have questions about a medical condition or this instruction, always ask your healthcare professional. [place] [address] any warranty or liability for your use of this information. Content Version: 9.4.21342; Last Revised: July 27, 2010       Postconcussion Syndrome: Care Instructions  Your Care Instructions    Postconcussion syndrome occurs after a blow to the head or body. Common symptoms are changes in the ability to concentrate, think, remember, or solve problems. Symptoms, which may include headaches, personality changes, and dizziness, may be related to stress from the events surrounding the accident that caused the injury. Follow-up care is a key part of your treatment and safety. Be sure to make and go to all appointments, and call your doctor if you are having problems. It's also a good idea to know your test results and keep a list of the medicines you take. How can you care for yourself at home? Pain  · Rest is the best treatment for postconcussion syndrome. · Do not drive if you have taken a prescription pain medicine. · Rest in a quiet, dark room until your headache is gone. Close your eyes and try to relax or go to sleep. Do not watch TV or read. · Put a cold, moist cloth or cold pack on the painful area for 10 to 20 minutes at a time. Put a thin cloth between the cold pack and your skin. · Have someone gently massage your neck and shoulders. · Take your medicines exactly as prescribed. Call your doctor if you think you are having a problem with your medicine. You will get more details on the specific medicines your doctor prescribes. Stress  · Try to reduce stress. Some ways to do this include:  ? Taking slow, deep breaths. ? Soaking in a warm bath. ? Listening to soothing music. ? Taking a yoga class. ? Having a massage or back rub. ? Drinking a warm, nonalcoholic, noncaffeinated beverage. · Get enough sleep. · Eat a healthy, balanced diet.  A balanced diet includes whole grains, dairy, fruits and vegetables, and protein. Eat a variety of foods from each of those groups so you get all the nutrients you need. · Avoid alcohol and illegal drugs. · Try relaxation exercises, such as breathing and muscle relaxation exercises. · Talk to your doctor about counseling. It may help you deal with stress from your accident. When should you call for help? Watch closely for changes in your health, and be sure to contact your doctor if:    · You do not get better as expected.     · Your symptoms, such as headaches, trouble concentrating, or changes in mood, get worse. Where can you learn more? Go to http://rosi-shell.info/. Enter J126 in the search box to learn more about \"Postconcussion Syndrome: Care Instructions. \"  Current as of: September 10, 2017  Content Version: 11.8  © 4272-0229 [employer]. Care instructions adapted under license by [employer] (which disclaims liability or warranty for this information). If you have questions about a medical condition or this instruction, always ask your healthcare professional. [address] any warranty or liability for your use of this information. Shoulder Stretches: Exercises  Your Care Instructions  Here are some examples of exercises for your shoulder. Start each exercise slowly. Ease off the exercise if you start to have pain. Your doctor or physical therapist will tell you when you can start these exercises and which ones will work best for you. How to do the exercises  Shoulder stretch    1.  a doorway and place one arm against the door frame. Your elbow should be a little higher than your shoulder. 2. Relax your shoulders as you lean forward, allowing your chest and shoulder muscles to stretch. You can also turn your body slightly away from your arm to stretch the muscles even more. 3. Hold for 15 to 30 seconds.   4. Repeat 2 to 4 times with each arm. Shoulder and chest stretch    1. Shoulder and chest stretch  2. While sitting, relax your upper body so you slump slightly in your chair. 3. As you breathe in, straighten your back and open your arms out to the sides. 4. Gently pull your shoulder blades back and downward. 5. Hold for 15 to 30 seconds as your breathe normally. 6. Repeat 2 to 4 times. Overhead stretch    1. Reach up over your head with both arms. 2. Hold for 15 to 30 seconds. 3. Repeat 2 to 4 times. Follow-up care is a key part of your treatment and safety. Be sure to make and go to all appointments, and call your doctor if you are having problems. It's also a good idea to know your test results and keep a list of the medicines you take. Where can you learn more? Go to http://rosi-shell.info/. Enter S254 in the search box to learn more about \"Shoulder Stretches: Exercises. \"  Current as of: November 29, 2017  Content Version: 11.8  © 9926-6060 [employer]. Care instructions adapted under license by [employer] (which disclaims liability or warranty for this information). If you have questions about a medical condition or this instruction, always ask your healthcare professional. [address] any warranty or liability for your use of this information.

## 2018-12-11 NOTE — ROUTINE PROCESS
Hua JAMES reviewed discharge instructions with the patient. The patient verbalized understanding.   Alert and stable for discharge

## 2018-12-11 NOTE — LETTER
[address] 
[facility] EMERGENCY DEPT 
[address] [id number] [phone] Work/School Note Date: 12/11/2018 To Whom It May concern: 
 
Alok Kingston was seen and treated today in the emergency room. He may return to work in 1 to 2 days, as symptoms improve. Sincerely, Lidia Bocanegra

## 2018-12-11 NOTE — ED PROVIDER NOTES
EMERGENCY DEPARTMENT HISTORY AND PHYSICAL EXAM      Date: 12/11/2018  Patient Name: Alok Kingston    History of Presenting Illness     Chief Complaint   Patient presents with    Fall     Ambulatory c/o LOC from slip and fall on ice this am Pt also c/o L shoulder pain and jaw pain        History Provided By: Patient    HPI: Alok Kingston, 47 y.o. male presents ambulatory to the ED with c/o head injury and pain to the L shoulder after a GLF on the ice this am.  Pt states \"I don't even really remember all of what happened and I don't remember driving my car to work\". Pt states he began to get alarmed when he was at work and felt disoriented. He denied visual deficits other than some initial dizziness. No N/V. Pt c/o mild neck pain. No numbness/weakness. Pt denied h/o chronic headaches/neck pain. He states the shoulder pain is mild and believes he may have tried to catch himself with the fall. He landed supine and is unsure why he is having pain radiating into his jaw. He is able to open/close his mouth and has no pain with palpation/movement of the jaw/mouth. No dental injury. He denied chest pain or shortness of breath. Chief Complaint: head injury, neck pain, L shoulder pain s/p fall  Duration: 3 Hours  Timing:  Acute  Location: head, neck, L shoulder  Quality: Aching  Severity: Moderate  Modifying Factors: pain worsens with movement of shoulder  Associated Symptoms: mild nausea, dizziness and confusion associated with headache        There are no other complaints, changes, or physical findings at this time. PCP: Mk Young NP    Current Outpatient Medications   Medication Sig Dispense Refill    butalbital-acetaminophen-caff (FIORICET) -40 mg per capsule May take 1 to 2 caps every 6 hours as needed 15 Cap 0    methocarbamol (ROBAXIN) 750 mg tablet Take 1 Tab by mouth three (3) times daily for 5 days.  15 Tab 0    ibuprofen (MOTRIN) 600 mg tablet Take 1 Tab by mouth every six (6) hours as needed for Pain for up to 20 doses. 20 Tab 0    rosuvastatin (CRESTOR) 10 mg tablet TAKE 1 TABLET BY MOUTH EVERY DAY 30 Tab 0    glucose blood VI test strips (CONTOUR NEXT TEST STRIPS) strip PATIENT IS TO CHECK BLOOD SUGAR TWICE DAILY 200 Strip 3    Liraglutide (VICTOZA 3-DANIELA) 0.6 mg/0.1 mL (18 mg/3 mL) pnij INJECT 1.2 MG ONCE DAILY 18 mL 0    montelukast (SINGULAIR) 10 mg tablet TAKE 1 TABLET BY MOUTH DAILY 30 Tab 3    amLODIPine (NORVASC) 10 mg tablet TAKE 1 TABLET BY MOUTH EVERY DAY 30 Tab 2    hydroCHLOROthiazide (MICROZIDE) 12.5 mg capsule TAKE ONE CAPSULE BY MOUTH EVERY DAY 30 Cap 2    JENTADUETO 2.5-1,000 mg per tablet TAKE 1 TABLET BY MOUTH TWICE A DAY WITH MEALS 60 Tab 2    pioglitazone (ACTOS) 15 mg tablet TAKE 1 TAB BY MOUTH DAILY. 30 Tab 3    Insulin Needles, Disposable, (NOVOFINE 32) 32 gauge x 1/4\" ndle One shot daily 100 Pen Needle 3    albuterol (PROVENTIL HFA, VENTOLIN HFA, PROAIR HFA) 90 mcg/actuation inhaler Take 2 Puffs by inhalation every four (4) hours as needed for Wheezing. 1 Inhaler 0    coenzyme Q-10 (CO Q-10) 200 mg capsule Take 1 Cap by mouth daily. 30 Cap 11    cinnamon bark (CINNAMON) 500 mg cap Take 500 mg by mouth two (2) times a day.          Past History     Past Medical History:  Past Medical History:   Diagnosis Date    Arthritis     Borderline diabetic     Diabetes ([address]     Family history of premature CAD     Hypertension        Past Surgical History:  Past Surgical History:   Procedure Laterality Date    HX ORTHOPAEDIC      right knee       Family History:  Family History   Problem Relation Age of Onset   [facility] Cancer Mother         leukemia    Alzheimer Mother     Hypertension Mother     Diabetes Father     Heart Disease Father     Hypertension Father     Elevated Lipids Father     Glaucoma Father     Diabetes Sister     Heart Disease Sister     Cancer Sister         Breast    Hypertension Brother     Cancer Maternal Aunt         Breast    Glaucoma Paternal Aunt     Diabetes Brother        Social History:  Social History     Tobacco Use    Smoking status: Never Smoker    Smokeless tobacco: Never Used   Substance Use Topics    Alcohol use: Yes     Alcohol/week: 0.0 oz     Comment: social    Drug use: No       Allergies:  No Known Allergies      Review of Systems   Review of Systems   Constitutional: Negative for chills and fever. HENT: Negative for congestion, dental problem, nosebleeds, rhinorrhea and sore throat. Eyes: Negative for photophobia, pain and visual disturbance. Respiratory: Negative for cough and shortness of breath. Cardiovascular: Negative for chest pain and palpitations. Gastrointestinal: Positive for nausea. Negative for abdominal pain, diarrhea and vomiting. Genitourinary: Negative for dysuria and hematuria. Musculoskeletal: Negative for neck pain and neck stiffness. Skin: Negative for rash and wound. Allergic/Immunologic: Negative for food allergies and immunocompromised state. Neurological: Positive for dizziness and light-headedness. Negative for weakness, numbness and headaches. Psychiatric/Behavioral: Positive for confusion. Negative for agitation. Physical Exam   Physical Exam   Constitutional: He is oriented to person, place, and time. He appears well-developed and well-nourished. No distress. WDWN AA male, alert, articulate, in NAD   HENT:   Head: Normocephalic. Nose: Nose normal.   Mouth/Throat: Oropharynx is clear and moist. No oropharyngeal exudate. Mild palpable hematoma to posterior occipital scalp, no step off, no mastoid tenderness, dentition intact, no nasal bone tenderness, no epistaxis. Mandible nontender. Palate nontender. Eyes: Conjunctivae and EOM are normal. Pupils are equal, round, and reactive to light. Right eye exhibits no discharge. Left eye exhibits no discharge. No scleral icterus. Neck: Normal range of motion. Neck supple. No JVD present.  No tracheal deviation present. No thyromegaly present. No midline cervical spinal tenderness, moderate paracervical tenderness   Cardiovascular: Normal rate, regular rhythm and normal heart sounds. Pulmonary/Chest: Effort normal and breath sounds normal. No respiratory distress. He has no wheezes. He exhibits no tenderness. Abdominal: Soft. There is no tenderness. Musculoskeletal: He exhibits tenderness. He exhibits no edema. Decreased A/P ROM to L shoulder due to tenderness with palpation/movement, no spinal tenderness, no deformity, no crepitus, no erythema/rash/lesion/heat. 2+ distal pulses, NVI, sensation grossly intact to light touch. Pt observed to ambulate without deficit. Lymphadenopathy:     He has no cervical adenopathy. Neurological: He is alert and oriented to person, place, and time. No cranial nerve deficit. He exhibits normal muscle tone. Coordination normal.   FNF intact, no pronator drift   Skin: Skin is warm and dry. He is not diaphoretic. Psychiatric: He has a normal mood and affect. His behavior is normal. Judgment normal.   Nursing note and vitals reviewed. Diagnostic Study Results     Labs -   No results found for this or any previous visit (from the past 12 hour(s)). Radiologic Studies -   CT SPINE CERV WO CONT   Final Result   IMPRESSION:      1. No fracture. 2. C6-C7 and at least moderate central spinal canal stenosis and severe   bilateral foraminal stenosis. 3. C5-C6 moderate central spinal canal stenosis and moderate right foraminal   stenosis. CT HEAD WO CONT   Final Result   IMPRESSION:       No acute intracranial abnormality on this noncontrast head CT. No change. XR SHOULDER LT AP/LAT MIN 2 V   Final Result   IMPRESSION:       Normal left shoulder views. CT Results  (Last 48 hours)               12/11/18 1123  CT HEAD WO CONT Final result    Impression:  IMPRESSION:        No acute intracranial abnormality on this noncontrast head CT. No change. Narrative:  EXAM: CT HEAD WO CONT       INDICATION: Loss of consciousness after slip and fall injury this morning. COMPARISON: CT head on 2/23/2010. TECHNIQUE: Noncontrast head CT. Coronal and sagittal reformats. CT dose   reduction was achieved through the use of a standardized protocol tailored for   this examination and automatic exposure control for dose modulation. FINDINGS: The ventricles and sulci are age-appropriate without hydrocephalus. There is no mass effect or midline shift. There is no intracranial hemorrhage or   extra-axial fluid collection. Cortical calcification in the right frontal lobe   near the sylvian fissure is unchanged. There is no abnormal area of decreased   density to suggest infarct. The calvarium is intact. The visualized paranasal sinuses and mastoid air cells   are clear. 12/11/18 1123  CT SPINE CERV WO CONT Final result    Impression:  IMPRESSION:       1. No fracture. 2. C6-C7 and at least moderate central spinal canal stenosis and severe   bilateral foraminal stenosis. 3. C5-C6 moderate central spinal canal stenosis and moderate right foraminal   stenosis. Narrative:  EXAM:  CT CERVICAL SPINE WITHOUT CONTRAST       INDICATION: Neck pain after fall with loss of consciousness this morning. COMPARISON: None. CONTRAST:  None. TECHNIQUE: Multislice helical CT of the cervical spine was performed without   intravenous contrast administration. Sagittal and coronal reconstructions were   generated. CT dose reduction was achieved through use of a standardized   protocol tailored for this examination and automatic exposure control for dose   modulation. FINDINGS:       2 mm posterior subluxation of C6 on C7 is degenerated. Reversal of the normal   cervical lordosis is most likely due to chronic degenerative disc disease. There   is no fracture or compression deformity. The odontoid process is intact.  The craniocervical junction is within normal limits. Moderate degenerative disc   disease C5-C7. Facets are within normal limits. Paraspinal soft tissues are   within normal limits. C2-C3: There is no spinal canal or neural foraminal stenosis. C3-C4: Mild central spinal canal stenosis. Melvin Earing C4-C5: Mild central spinal canal stenosis. C5-C6: Moderate central spinal canal stenosis and right foraminal stenosis. C6-C7: At least moderate central spinal canal stenosis. Severe bilateral   foraminal stenosis. C7-T1: There is no spinal canal or neural foraminal stenosis. Medical Decision Making   I am the first provider for this patient. I reviewed the vital signs, available nursing notes, past medical history, past surgical history, family history and social history. Vital Signs-Reviewed the patient's vital signs. Patient Vitals for the past 12 hrs:   Temp Pulse Resp BP SpO2   12/11/18 0954 98.2 °F (36.8 °C) 87 16 (!) 154/98 100 %       Records Reviewed: Nursing Notes, Old Medical Records, Previous Radiology Studies and Previous Laboratory Studies    Provider Notes (Medical Decision Making):   Concussion, skull fx, contusion, strain, rotator cuff injury    ED Course:   Initial assessment performed. The patients presenting problems have been discussed, and they are in agreement with the care plan formulated and outlined with them. I have encouraged them to ask questions as they arise throughout their visit. DISCHARGE NOTE:  The care plan has been outline with the patient and/or family, who verbally conveyed understanding and agreement. Available results have been reviewed. Patient and/or family understand the follow up plan as outlined and discharge instructions. Should their condition deterioration at any time after discharge the patient agrees to return, follow up sooner than outlined or seek medical assistance at the closest Emergency Room as soon as possible. Questions have been answered. Discharge instructions and educational information regarding the patient's diagnosis as well a list of reasons why the patient would want to seek immediate medical attention, should their condition change, were reviewed directly with the patient/family       PLAN:  1. Current Discharge Medication List      START taking these medications    Details   butalbital-acetaminophen-caff (FIORICET) -40 mg per capsule May take 1 to 2 caps every 6 hours as needed  Qty: 15 Cap, Refills: 0      methocarbamol (ROBAXIN) 750 mg tablet Take 1 Tab by mouth three (3) times daily for 5 days. Qty: 15 Tab, Refills: 0      ibuprofen (MOTRIN) 600 mg tablet Take 1 Tab by mouth every six (6) hours as needed for Pain for up to 20 doses. Qty: 20 Tab, Refills: 0           2. Follow-up Information     Follow up With Specialties Details Why Contact Info    Kilo Jimenez NP Family Practice   [id number] [facility]  Patricia Fairchild [address] [place] [address]  [phone]      [facility] EMERGENCY DEPT Emergency Medicine  If symptoms worsen [address] [place] [address] Drive  [address] [facility]  [phone]        Return to ED if worse     Diagnosis     Clinical Impression:   1. Injury of head, initial encounter    2. Post concussion syndrome    3. Cervical stenosis of spinal canal    4. Calcifcn and ossifictn of muscles assoc w burns, l shoulder    5. Injury of left shoulder, initial encounter    6.  Fall, initial encounter

## 2018-12-14 ENCOUNTER — OFFICE VISIT (OUTPATIENT)
Dept: INTERNAL MEDICINE CLINIC | Age: 53
End: 2018-12-14

## 2018-12-14 VITALS
HEART RATE: 94 BPM | RESPIRATION RATE: 16 BRPM | SYSTOLIC BLOOD PRESSURE: 121 MMHG | DIASTOLIC BLOOD PRESSURE: 88 MMHG | TEMPERATURE: 98.5 F | WEIGHT: 201.6 LBS | HEIGHT: 69 IN | OXYGEN SATURATION: 99 % | BODY MASS INDEX: 29.86 KG/M2

## 2018-12-14 DIAGNOSIS — S06.0X1D CONCUSSION WITH LOSS OF CONSCIOUSNESS OF 30 MINUTES OR LESS, SUBSEQUENT ENCOUNTER: ICD-10-CM

## 2018-12-14 DIAGNOSIS — E11.9 TYPE 2 DIABETES MELLITUS WITHOUT COMPLICATION, WITHOUT LONG-TERM CURRENT USE OF INSULIN (HCC): ICD-10-CM

## 2018-12-14 DIAGNOSIS — I10 ESSENTIAL HYPERTENSION WITH GOAL BLOOD PRESSURE LESS THAN 130/80: ICD-10-CM

## 2018-12-14 DIAGNOSIS — E11.69 TYPE 2 DIABETES MELLITUS WITH OTHER SPECIFIED COMPLICATION (HCC): ICD-10-CM

## 2018-12-14 DIAGNOSIS — I10 ESSENTIAL HYPERTENSION WITH GOAL BLOOD PRESSURE LESS THAN 130/80: Primary | ICD-10-CM
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
## 2018-12-14 RX ORDER — SILDENAFIL 25 MG/1
TABLET, FILM COATED ORAL
Refills: 4 | COMMUNITY
Start: 2018-11-16 | End: 2019-04-08 | Stop reason: SDUPTHER

## 2018-12-14 NOTE — PROGRESS NOTES
HISTORY OF PRESENT ILLNESS  Thomas Rueda is a 47 y.o. male. HPI  [facility] Tuesday, d/t icy side walk, hit back of  Head,  LOC for ~ 10 minutes,  bruised left arm and shoulder. Was seen by ED [facility] ED provider, negative head and  cervical spine CT scan    Headaches resolved. Continues to have left shoulder pain     He has avoided pain medication d/t concern of adverse effect on renal function    Compliant with medical management     Endocrinologist Randi Case, diabetes control much better    He is following a healthier diet    Past Medical History:   Diagnosis Date    Arthritis     Borderline diabetic     Diabetes ([facility] [address]     Family history of premature CAD     Hypertension        Current Outpatient Medications on File Prior to Visit   Medication Sig Dispense Refill    [] methocarbamol (ROBAXIN) 750 mg tablet Take 1 Tab by mouth three (3) times daily for 5 days. 15 Tab 0    rosuvastatin (CRESTOR) 10 mg tablet TAKE 1 TABLET BY MOUTH EVERY DAY 30 Tab 0    glucose blood VI test strips (CONTOUR NEXT TEST STRIPS) strip PATIENT IS TO CHECK BLOOD SUGAR TWICE DAILY 200 Strip 3    Liraglutide (VICTOZA 3-DANIELA) 0.6 mg/0.1 mL (18 mg/3 mL) pnij INJECT 1.2 MG ONCE DAILY 18 mL 0    montelukast (SINGULAIR) 10 mg tablet TAKE 1 TABLET BY MOUTH DAILY 30 Tab 3    amLODIPine (NORVASC) 10 mg tablet TAKE 1 TABLET BY MOUTH EVERY DAY 30 Tab 2    hydroCHLOROthiazide (MICROZIDE) 12.5 mg capsule TAKE ONE CAPSULE BY MOUTH EVERY DAY 30 Cap 2    JENTADUETO 2.5-1,000 mg per tablet TAKE 1 TABLET BY MOUTH TWICE A DAY WITH MEALS 60 Tab 2    pioglitazone (ACTOS) 15 mg tablet TAKE 1 TAB BY MOUTH DAILY. 30 Tab 3    Insulin Needles, Disposable, (NOVOFINE 32) 32 gauge x 1/4\" ndle One shot daily 100 Pen Needle 3    albuterol (PROVENTIL HFA, VENTOLIN HFA, PROAIR HFA) 90 mcg/actuation inhaler Take 2 Puffs by inhalation every four (4) hours as needed for Wheezing.  1 Inhaler 0    coenzyme Q-10 (CO Q-10) 200 mg capsule Take 1 Cap by mouth daily. 30 Cap 11    cinnamon bark (CINNAMON) 500 mg cap Take 500 mg by mouth two (2) times a day.  sildenafil citrate (VIAGRA) 25 mg tablet TAKE 1-4 TABLETS 30 MINUTES PRIOR TO ROMANCE. DO NOT REPEAT IN 24 HOURS.  4     No current facility-administered medications on file prior to visit. Review of Systems   Constitutional: Positive for weight loss (intentional). HENT: Negative. Eyes: Negative. Respiratory: Negative. Cardiovascular: Negative. Gastrointestinal: Negative. Genitourinary: Negative. Musculoskeletal: Positive for myalgias. Negative for joint pain. Skin: Negative. Left upper arm bruise   Neurological: Negative for dizziness and headaches. Psychiatric/Behavioral: Negative. Visit Vitals  /88 (BP 1 Location: Right arm, BP Patient Position: Sitting)   Pulse 94   Temp 98.5 °F (36.9 °C) (Oral)   Resp 16   Ht 5' 9\" (1.753 m)   Wt 201 lb 9.6 oz (91.4 kg)   SpO2 99%   BMI 29.77 kg/m²     Physical Exam   Constitutional: He is oriented to person, place, and time. He appears well-developed and well-nourished. No distress. Musculoskeletal:        Arms:  Neurological: He is alert and oriented to person, place, and time. No cranial nerve deficit. Coordination normal.   Skin: Skin is warm and dry. Bruising (left medial upper arm) noted. No abrasion and no rash noted. He is not diaphoretic. No erythema. Psychiatric: He has a normal mood and affect. His behavior is normal. Judgment and thought content normal.       ASSESSMENT and PLAN    ICD-10-CM ICD-9-CM    1. Essential hypertension with goal blood pressure less than 130/80 I10 401.9    2. Type 2 diabetes mellitus without complication, without long-term current use of insulin (HCC) E11.9 250.00    3. Fall due to slipping on ice or snow, subsequent encounter W00. 9XXD V58.89      E885.9    4. Concussion with loss of consciousness of 30 minutes or less, subsequent encounter S06. 0X1D V58.89      850.11      Diagnoses and all orders for this visit:    1. Essential hypertension with goal blood pressure less than 130/80    2. Type 2 diabetes mellitus without complication, without long-term current use of insulin ([facility] [address]    3. Fall due to slipping on ice or snow, subsequent encounter    4. Concussion with loss of consciousness of 30 minutes or less, subsequent encounter      Strongly encouraged to continue current medications and lifestyle changes    Encouraged Acetaminophen prn acute shoulder pain 2/2 fall    Follow-up Disposition:  Return in about 6 months (around 6/14/2019) for diabetes, htn.  current treatment plan is effective, no change in therapy  lab results and schedule of future lab studies reviewed with patient  reviewed diet, exercise and weight control  cardiovascular risk and specific lipid/LDL goals reviewed  reviewed medications and side effects in detail  radiology results and schedule of future radiology studies reviewed with patient    Patient voices understanding and acceptance of this advice and will call back if any further questions or concerns. An After Visit Summary was printed and given to the patient.

## 2018-12-14 NOTE — LETTER
12/14/2018 8:43 AM 
 
[facility] [id number] Immunization History Administered Date(s) Administered  Pneumococcal Polysaccharide (PPSV-23) 08/05/2016  Zoster Recombinant 06/07/2018

## 2018-12-14 NOTE — PROGRESS NOTES
Exam room 9  Beau Mojica is a 47 y.o. male  Visit Vitals  /88 (BP 1 Location: Right arm, BP Patient Position: Sitting)   Pulse 94   Temp 98.5 °F (36.9 °C) (Oral)   Resp 16   Ht 5' 9\" (1.753 m)   Wt 201 lb 9.6 oz (91.4 kg)   SpO2 99%   BMI 29.77 kg/m²     Chief Complaint   Patient presents with    Hypertension    Labs    Weight Management     1. Have you been to the ER, urgent care clinic since your last visit? [facility]ussion due to fall on ice 12/11/18    2. Have you seen or consulted any other health care providers outside of the [facility] since your last visit? Include any pap smears or colon screening.  No  Health Maintenance Due   Topic Date Due    DTaP/Tdap/Td series (1 - Tdap) 06/30/1987    FOBT Q 1 YEAR AGE 50-75  06/30/2016    Influenza Age 5 to Adult  08/01/2018    Shingrix Vaccine Age 50> (2 of 2) 08/02/2018     Learning Assessment 3/11/2016   PRIMARY LEARNER Patient   HIGHEST LEVEL OF EDUCATION - PRIMARY LEARNER  SOME COLLEGE   BARRIERS PRIMARY LEARNER NONE   CO-LEARNER CAREGIVER No   PRIMARY LANGUAGE ENGLISH   LEARNER PREFERENCE PRIMARY READING   ANSWERED BY Patient   RELATIONSHIP SELF

## 2018-12-14 NOTE — PATIENT INSTRUCTIONS
Learning About Diabetes Food Guidelines  Your Care Instructions    Meal planning is important to manage diabetes. It helps keep your blood sugar at a target level (which you set with your doctor). You don't have to eat special foods. You can eat what your family eats, including sweets once in a while. But you do have to pay attention to how often you eat and how much you eat of certain foods. You may want to work with a dietitian or a certified diabetes educator (CDE) to help you plan meals and snacks. A dietitian or CDE can also help you lose weight if that is one of your goals. What should you know about eating carbs? Managing the amount of carbohydrate (carbs) you eat is an important part of healthy meals when you have diabetes. Carbohydrate is found in many foods. · Learn which foods have carbs. And learn the amounts of carbs in different foods. ? Bread, cereal, pasta, and rice have about 15 grams of carbs in a serving. A serving is 1 slice of bread (1 ounce), ½ cup of cooked cereal, or 1/3 cup of cooked pasta or rice. ? Fruits have 15 grams of carbs in a serving. A serving is 1 small fresh fruit, such as an apple or orange; ½ of a banana; ½ cup of cooked or canned fruit; ½ cup of fruit juice; 1 cup of melon or raspberries; or 2 tablespoons of dried fruit. ? Milk and no-sugar-added yogurt have 15 grams of carbs in a serving. A serving is 1 cup of milk or 2/3 cup of no-sugar-added yogurt. ? Starchy vegetables have 15 grams of carbs in a serving. A serving is ½ cup of mashed potatoes or sweet potato; 1 cup winter squash; ½ of a small baked potato; ½ cup of cooked beans; or ½ cup cooked corn or green peas. · Learn how much carbs to eat each day and at each meal. A dietitian or CDE can teach you how to keep track of the amount of carbs you eat. This is called carbohydrate counting. · If you are not sure how to count carbohydrate grams, use the Plate Method to plan meals.  It is a good, quick way to make sure that you have a balanced meal. It also helps you spread carbs throughout the day. ? Divide your plate by types of foods. Put non-starchy vegetables on half the plate, meat or other protein food on one-quarter of the plate, and a grain or starchy vegetable in the final quarter of the plate. To this you can add a small piece of fruit and 1 cup of milk or yogurt, depending on how many carbs you are supposed to eat at a meal.  · Try to eat about the same amount of carbs at each meal. Do not \"save up\" your daily allowance of carbs to eat at one meal.  · Proteins have very little or no carbs per serving. Examples of proteins are beef, chicken, turkey, fish, eggs, tofu, cheese, cottage cheese, and peanut butter. A serving size of meat is 3 ounces, which is about the size of a deck of cards. Examples of meat substitute serving sizes (equal to 1 ounce of meat) are 1/4 cup of cottage cheese, 1 egg, 1 tablespoon of peanut butter, and ½ cup of tofu. How can you eat out and still eat healthy? · Learn to estimate the serving sizes of foods that have carbohydrate. If you measure food at home, it will be easier to estimate the amount in a serving of restaurant food. · If the meal you order has too much carbohydrate (such as potatoes, corn, or baked beans), ask to have a low-carbohydrate food instead. Ask for a salad or green vegetables. · If you use insulin, check your blood sugar before and after eating out to help you plan how much to eat in the future. · If you eat more carbohydrate at a meal than you had planned, take a walk or do other exercise. This will help lower your blood sugar. What else should you know? · Limit saturated fat, such as the fat from meat and dairy products. This is a healthy choice because people who have diabetes are at higher risk of heart disease. So choose lean cuts of meat and nonfat or low-fat dairy products.  Use olive or canola oil instead of butter or shortening when cooking. · Don't skip meals. Your blood sugar may drop too low if you skip meals and take insulin or certain medicines for diabetes. · Check with your doctor before you drink alcohol. Alcohol can cause your blood sugar to drop too low. Alcohol can also cause a bad reaction if you take certain diabetes medicines. Follow-up care is a key part of your treatment and safety. Be sure to make and go to all appointments, and call your doctor if you are having problems. It's also a good idea to know your test results and keep a list of the medicines you take. Where can you learn more? Go to http://rosi-shell.info/. Enter Z150 in the search box to learn more about \"Learning About Diabetes Food Guidelines. \"  Current as of: December 7, 2017  Content Version: 11.8  © 9620-0713 [employer]. Care instructions adapted under license by [employer] (which disclaims liability or warranty for this information). If you have questions about a medical condition or this instruction, always ask your healthcare professional. [address] any warranty or liability for your use of this information. Diabetes Blood Sugar Emergencies: Your Action Plan  How can you prevent a blood sugar emergency? An important part of living with diabetes is keeping your blood sugar in your target range. You'll need to know what to do if it's too high or too low. Managing your blood sugar levels helps you avoid emergencies. This care sheet will teach you about the signs of high and low blood sugar. It will help you make an action plan with your doctor for when these signs occur. Low blood sugar is more likely to happen if you take certain medicines for diabetes. It can also happen if you skip a meal, drink alcohol, or exercise more than usual.  You may get high blood sugar if you eat differently than you normally do.  One example is eating more carbohydrate than usual. Having a cold, the flu, or other sudden illness can also cause high blood sugar levels. Levels can also rise if you miss a dose of medicine. Any change in how you take your medicine may affect your blood sugar level. So it's important to work with your doctor before you make any changes. Check your blood sugar  Work with your doctor to fill in the blank spaces below that apply to you. Track your levels, know your target range, and write down ways you can get your blood sugar back in your target range. A log book can help you track your levels. Take the book to all of your medical appointments. · Check your blood sugar _____ times a day, at these times:________________________________________________. (For example: Before meals, at bedtime, before exercise, during exercise, other.)  · Your blood sugar target range before a meal is ___________________. Your blood sugar target range after a meal is _______________________. · Do this--___________________________________________________--to get your blood sugar back within your safe range if your blood sugar results are _________________________________________. (For example: Less than 70 or above 250 mg/dL.)  Call your doctor when your blood sugar results are ___________________________________. (For example: Less than 70 or above 250 mg/dL.)  What are the symptoms of low and high blood sugar? Common symptoms of low blood sugar are sweating and feeling shaky, weak, hungry, or confused. Symptoms can start quickly. Common symptoms of high blood sugar are feeling very thirsty or very hungry. You may also pass urine more often than usual. You may have blurry vision and may lose weight without trying. But some people may have high or low blood sugar without having any symptoms. That's a good reason to check your blood sugar on a regular schedule. What should you do if you have symptoms? Work with your doctor to fill in the blank spaces below that apply to you.   Low blood sugar  If you have symptoms of low blood sugar, check your blood sugar. If it's below _____ ( for example, below 70), eat or drink a quick-sugar food that has about 15 grams of carbohydrate. Your goal is to get your level back to your safe range. Check your blood sugar again 15 minutes later. If it's still not in your target range, take another 15 grams of carbohydrate and check your blood sugar again in 15 minutes. Repeat this until you reach your target. Then go back to your regular testing schedule. When you have low blood sugar, it's best to stop or reduce any physical activity until your blood sugar is back in your target range and is stable. If you must stay active, eat or drink 30 grams of carbohydrate. Then check your blood sugar again in 15 minutes. If it's not in your target range, take another 30 grams of carbohydrates. Check your blood sugar again in 15 minutes. Keep doing this until you reach your target. You can then go back to your regular testing schedule. If your symptoms or blood sugar levels are getting worse or have not improved after 15 minutes, seek medical care right away. Here are some examples of quick-sugar foods with 15 grams of carbohydrate:  · 3 or 4 glucose tablets  · 1 tube of glucose gel  · Hard candy (such as 3 Jolly Ranchers or 5 to 7 Life Savers)  · ½ cup to ¾ cup (4 to 6 ounces) of fruit juice or regular (not diet) soda  High blood sugar  If you have symptoms of high blood sugar, check your blood sugar. Your goal is to get your level back to your target range. If it's above ______ ( for example, above 250), follow these steps:  · If you missed a dose of your diabetes medicine, take it now. Take only the amount of medicine that you have been prescribed. Do not take more or less medicine. · Give yourself insulin if your doctor has prescribed it for high blood sugar. · Test for ketones, if the doctor told you to do so.  If the results of the ketone test show a moderate-to-large amount of ketones, call the doctor for advice. · Wait 30 minutes after you take the extra insulin or the missed medicine. Check your blood sugar again. If your symptoms or blood sugar levels are getting worse or have not improved after taking these steps, seek medical care right away. Follow-up care is a key part of your treatment and safety. Be sure to make and go to all appointments, and call your doctor if you are having problems. It's also a good idea to know your test results and keep a list of the medicines you take. Where can you learn more? Go to http://rosi-shell.info/. Enter Z280 in the search box to learn more about \"Diabetes Blood Sugar Emergencies: Your Action Plan. \"  Current as of: December 7, 2017  Content Version: 11.8  © 3222-6777 [employer]. Care instructions adapted under license by [employer] (which disclaims liability or warranty for this information). If you have questions about a medical condition or this instruction, always ask your healthcare professional. [address] any warranty or liability for your use of this information.

## 2018-12-27 RX ORDER — PIOGLITAZONEHYDROCHLORIDE 15 MG/1
TABLET ORAL
Qty: 30 TAB | Refills: 3 | Status: SHIPPED | OUTPATIENT
Start: 2018-12-27 | End: 2019-09-06

## 2018-12-27 RX ORDER — LINAGLIPTIN AND METFORMIN HYDROCHLORIDE 2.5; 1 MG/1; MG/1
TABLET, FILM COATED ORAL
Qty: 60 TAB | Refills: 2 | Status: SHIPPED | OUTPATIENT
Start: 2018-12-27 | End: 2019-04-14 | Stop reason: SDUPTHER

## 2018-12-27 RX ORDER — HYDROCHLOROTHIAZIDE 12.5 MG/1
CAPSULE ORAL
Qty: 30 CAP | Refills: 2 | Status: SHIPPED | OUTPATIENT
Start: 2018-12-27 | End: 2019-03-27 | Stop reason: SDUPTHER

## 2018-12-27 RX ORDER — AMLODIPINE BESYLATE 10 MG/1
TABLET ORAL
Qty: 30 TAB | Refills: 2 | Status: SHIPPED | OUTPATIENT
Start: 2018-12-27 | End: 2019-03-27 | Stop reason: SDUPTHER

## 2019-01-11 ENCOUNTER — TELEPHONE (OUTPATIENT)
Dept: ENDOCRINOLOGY | Age: 54
End: 2019-01-11

## 2019-01-11 NOTE — TELEPHONE ENCOUNTER
Patient states has lost 64 las in the last 6 months. He now weighs 190 lbs. He states that he does not have any energy and gets slight headaches. He wonders if due to his diabetes. He wonders if the diabetes medication may be too strong. He has not been checking his blood sugars. Advised to start checking his blood sugars once daily at different times x 2 weeks and either mail the readings here or call with readings. Patient agrees.

## 2019-01-11 NOTE — TELEPHONE ENCOUNTER
Patient called to say he hasn't been feeling well for a long time and it has to do with his diabetes. He would like a call back at:  [phone].

## 2019-01-15 DIAGNOSIS — E11.9 TYPE 2 DIABETES MELLITUS WITHOUT COMPLICATION, WITHOUT LONG-TERM CURRENT USE OF INSULIN (HCC): Primary | ICD-10-CM

## 2019-01-15 NOTE — TELEPHONE ENCOUNTER
Patient stated that he called the pharmacy for his test strips and they told him that they do not have a prescription there and for him to call here. Patient would like for a new prescription to be sent to [facility] [phone].

## 2019-01-15 NOTE — TELEPHONE ENCOUNTER
Rx was sent 11/30/18, but [facility] states they have no record. Advised patient will send again today.

## 2019-01-30 ENCOUNTER — OFFICE VISIT (OUTPATIENT)
Dept: INTERNAL MEDICINE CLINIC | Age: 54
End: 2019-01-30

## 2019-01-30 VITALS
HEART RATE: 78 BPM | DIASTOLIC BLOOD PRESSURE: 84 MMHG | BODY MASS INDEX: 28.94 KG/M2 | TEMPERATURE: 98.5 F | RESPIRATION RATE: 16 BRPM | SYSTOLIC BLOOD PRESSURE: 124 MMHG | WEIGHT: 195.4 LBS | OXYGEN SATURATION: 98 % | HEIGHT: 69 IN

## 2019-01-30 DIAGNOSIS — M54.6 ACUTE RIGHT-SIDED THORACIC BACK PAIN: Primary | ICD-10-CM

## 2019-01-30 DIAGNOSIS — N28.9 RENAL INSUFFICIENCY: ICD-10-CM

## 2019-01-30 DIAGNOSIS — I10 ESSENTIAL HYPERTENSION: ICD-10-CM

## 2019-01-30 RX ORDER — CYCLOBENZAPRINE HCL 10 MG
10 TABLET ORAL
Qty: 30 TAB | Refills: 0 | Status: SHIPPED | OUTPATIENT
Start: 2019-01-30 | End: 2020-02-03

## 2019-01-30 RX ORDER — LIDOCAINE 50 MG/G
PATCH TOPICAL
Qty: 30 EACH | Refills: 0 | Status: SHIPPED | OUTPATIENT
Start: 2019-01-30 | End: 2019-04-23 | Stop reason: SDUPTHER

## 2019-01-30 NOTE — PROGRESS NOTES
Exam room [address] Pitts is a 47 y.o. male   Pt states he had a fall and was seen in the ED, and by PCP but thinks due to the fall his back is hurting. Chief Complaint   Patient presents with    Back Pain     Visit Vitals  /84 (BP 1 Location: Left arm, BP Patient Position: Sitting)   Pulse 78   Temp 98.5 °F (36.9 °C) (Oral)   Resp 16   Ht 5' 9\" (1.753 m)   Wt 195 lb 6.4 oz (88.6 kg)   SpO2 98%   BMI 28.86 kg/m²     1. Have you been to the ER, urgent care clinic since your last visit? Hospitalized since your last visit? No    2. Have you seen or consulted any other health care providers outside of the [address] [place] [address] since your last visit? Include any pap smears or colon screening.  No  Health Maintenance Due   Topic Date Due    DTaP/Tdap/Td series (1 - Tdap) 06/30/1987    FOBT Q 1 YEAR AGE 50-75  06/30/2016    Influenza Age 5 to Adult  08/01/2018    Shingrix Vaccine Age 50> (2 of 2) 08/02/2018     Learning Assessment 3/11/2016   PRIMARY LEARNER Patient   HIGHEST LEVEL OF EDUCATION - PRIMARY LEARNER  SOME COLLEGE   BARRIERS PRIMARY LEARNER NONE   CO-LEARNER CAREGIVER No   PRIMARY LANGUAGE ENGLISH   LEARNER PREFERENCE PRIMARY READING   ANSWERED BY Patient   RELATIONSHIP SELF

## 2019-01-30 NOTE — PROGRESS NOTES
HISTORY OF PRESENT ILLNESS  Isabel Roberts is a 47 y.o. male. HPI  Right thoracic back pain for 1-2 weeks after fall on icy surface 12/11/18    Pain worse during the day, stands at work    Longer drive to work, d/t relocation     Past Medical History:   Diagnosis Date    Arthritis     Borderline diabetic     Diabetes ([address]     Family history of premature CAD     Hypertension        Current Outpatient Medications on File Prior to Visit   Medication Sig Dispense Refill    glucose blood VI test strips (CONTOUR NEXT TEST STRIPS) strip PATIENT IS TO CHECK BLOOD SUGAR TWICE DAILY. DX: E11.9 200 Strip 3    rosuvastatin (CRESTOR) 10 mg tablet TAKE 1 TABLET BY MOUTH EVERY DAY 30 Tab 6    hydroCHLOROthiazide (MICROZIDE) 12.5 mg capsule TAKE ONE CAPSULE BY MOUTH EVERY DAY 30 Cap 2    pioglitazone (ACTOS) 15 mg tablet TAKE 1 TAB BY MOUTH DAILY. 30 Tab 3    amLODIPine (NORVASC) 10 mg tablet TAKE 1 TABLET BY MOUTH EVERY DAY 30 Tab 2    JENTADUETO 2.5-1,000 mg per tablet TAKE 1 TABLET BY MOUTH TWICE A DAY WITH MEALS 60 Tab 2    sildenafil citrate (VIAGRA) 25 mg tablet TAKE 1-4 TABLETS 30 MINUTES PRIOR TO ROMANCE. DO NOT REPEAT IN 24 HOURS.  4    Liraglutide (VICTOZA 3-DANIELA) 0.6 mg/0.1 mL (18 mg/3 mL) pnij INJECT 1.2 MG ONCE DAILY 18 mL 0    montelukast (SINGULAIR) 10 mg tablet TAKE 1 TABLET BY MOUTH DAILY 30 Tab 3    Insulin Needles, Disposable, (NOVOFINE 32) 32 gauge x 1/4\" ndle One shot daily 100 Pen Needle 3    albuterol (PROVENTIL HFA, VENTOLIN HFA, PROAIR HFA) 90 mcg/actuation inhaler Take 2 Puffs by inhalation every four (4) hours as needed for Wheezing. 1 Inhaler 0    coenzyme Q-10 (CO Q-10) 200 mg capsule Take 1 Cap by mouth daily. 30 Cap 11    cinnamon bark (CINNAMON) 500 mg cap Take 500 mg by mouth two (2) times a day. No current facility-administered medications on file prior to visit. Review of Systems   Respiratory: Negative.     Cardiovascular: Negative for palpitations, orthopnea and leg swelling. Gastrointestinal: Negative. Genitourinary: Negative. Musculoskeletal: Positive for back pain, falls and myalgias. Physical Exam   Musculoskeletal:        Arms:      ASSESSMENT and PLAN    ICD-10-CM ICD-9-CM    1. Acute right-sided thoracic back pain M54.6 724.1 XR SPINE THORAC 3 V      cyclobenzaprine (FLEXERIL) 10 mg tablet      lidocaine (LIDODERM) 5 %   2. Renal insufficiency N28.9 593.9    3. Essential hypertension I10 401.9      Follow-up Disposition:  Return if symptoms worsen or fail to improve. reviewed medications and side effects in detail    Patient advised to limit use of NSAID d/t renal insufficiency    Encouraged proper posture, back support     Discussed PT referral if symptoms persist      Patient voices understanding and acceptance of this advice and will call back if any further questions or concerns. An After Visit Summary was printed and given to the patient.

## 2019-01-30 NOTE — PATIENT INSTRUCTIONS
Learning About How to Have a Healthy Back  What causes back pain? Back pain is often caused by overuse, strain, or injury. For example, people often hurt their backs playing sports or working in the yard, being jolted in a car accident, or lifting something too heavy. Aging plays a part too. Your bones and muscles tend to lose strength as you age, which makes injury more likely. The spongy discs between the bones of the spine (vertebrae) may suffer from wear and tear and no longer provide enough cushion between the bones. A disc that bulges or breaks open (herniated disc) can press on nerves, causing back pain. In some people, back pain is the result of arthritis, broken vertebrae caused by bone loss (osteoporosis), illness, or a spine problem. Although most people have back pain at one time or another, there are steps you can take to make it less likely. How can you have a healthy back? Reduce stress on your back through good posture  Slumping or slouching alone may not cause low back pain. But after the back has been strained or injured, bad posture can make pain worse. · Sleep in a position that maintains your back's normal curves and on a mattress that feels comfortable. Sleep on your side with a pillow between your knees, or sleep on your back with a pillow under your knees. These positions can reduce strain on your back. · Stand and sit up straight. \"Good posture\" generally means your ears, shoulders, and hips are in a straight line. · If you must stand for a long time, put one foot on a stool, ledge, or box. Switch feet every now and then. · Sit in a chair that is low enough to let you place both feet flat on the floor with both knees nearly level with your hips. If your chair or desk is too high, use a footrest to raise your knees. Place a small pillow, a rolled-up towel, or a lumbar roll in the curve of your back if you need extra support.   · Try a kneeling chair, which helps tilt your hips forward. This takes pressure off your lower back. · Try sitting on an exercise ball. It can rock from side to side, which helps keep your back loose. · When driving, keep your knees nearly level with your hips. Sit straight, and drive with both hands on the steering wheel. Your arms should be in a slightly bent position. Reduce stress on your back through careful lifting  · Squat down, bending at the hips and knees only. If you need to, put one knee to the floor and extend your other knee in front of you, bent at a right angle (half kneeling). · Press your chest straight forward. This helps keep your upper back straight while keeping a slight arch in your low back. · Hold the load as close to your body as possible, at the level of your belly button (navel). · Use your feet to change direction, taking small steps. · Lead with your hips as you change direction. Keep your shoulders in line with your hips as you move. · Set down your load carefully, squatting with your knees and hips only. Exercise and stretch your back  · Do some exercise on most days of the week, if your doctor says it is okay. You can walk, run, swim, or cycle. · Stretch your back muscles. Here are a few exercises to try:  ? Lie on your back, and gently pull one bent knee to your chest. Put that foot back on the floor, and then pull the other knee to your chest.  ? Do pelvic tilts. Lie on your back with your knees bent. Tighten your stomach muscles. Pull your belly button (navel) in and up toward your ribs. You should feel like your back is pressing to the floor and your hips and pelvis are slightly lifting off the floor. Hold for 6 seconds while breathing smoothly. ? Sit with your back flat against a wall. · Keep your core muscles strong. The muscles of your back, belly (abdomen), and buttocks support your spine. ? Pull in your belly and imagine pulling your navel toward your spine. Hold this for 6 seconds, then relax.  Remember to keep breathing normally as you tense your muscles. ? Do curl-ups. Always do them with your knees bent. Keep your low back on the floor, and curl your shoulders toward your knees using a smooth, slow motion. Keep your arms folded across your chest. If this bothers your neck, try putting your hands behind your neck (not your head), with your elbows spread apart. ? Lie on your back with your knees bent and your feet flat on the floor. Tighten your belly muscles, and then push with your feet and raise your buttocks up a few inches. Hold this position 6 seconds as you continue to breathe normally, then lower yourself slowly to the floor. Repeat 8 to 12 times. ? If you like group exercise, try Pilates or yoga. These classes have poses that strengthen the core muscles. Lead a healthy lifestyle  · Stay at a healthy weight to avoid strain on your back. · Do not smoke. Smoking increases the risk of osteoporosis, which weakens the spine. If you need help quitting, talk to your doctor about stop-smoking programs and medicines. These can increase your chances of quitting for good. Where can you learn more? Go to http://rosi-shell.info/. Enter L315 in the search box to learn more about \"Learning About How to Have a Healthy Back. \"  Current as of: September 20, 2018  Content Version: 11.9  © 9406-0870 "[employer]. Care instructions adapted under license by [employer] (which disclaims liability or warranty for this information). If you have questions about a medical condition or this instruction, always ask your healthcare professional. [place] [address] any warranty or liability for your use of this information.

## 2019-02-06 ENCOUNTER — TELEPHONE (OUTPATIENT)
Dept: ENDOCRINOLOGY | Age: 54
End: 2019-02-06

## 2019-02-11 ENCOUNTER — HOSPITAL ENCOUNTER (OUTPATIENT)
Dept: GENERAL RADIOLOGY | Age: 54
Discharge: HOME | End: 2019-02-11
Payer: COMMERCIAL

## 2019-02-11 DIAGNOSIS — M54.6 ACUTE RIGHT-SIDED THORACIC BACK PAIN: ICD-10-CM

## 2019-02-11 PROCEDURE — 72072 X-RAY EXAM THORAC SPINE 3VWS: CPT

## 2019-02-14 NOTE — PROGRESS NOTES
[place]: Blane Carrel  ([phone]    HPI:   Mr. Hamlet Person is a 48 yo M with DM, HTN, family history of early CAD seen initially for shortness of breath. Since last visit, he has been doing okay. In 2014, he had a stress test that was normal. From a symptom standpoint, he denies any chest pain, exertional shortness of breath. He occasionally has some wheezing at night that he associates with asthma. He lives on the 10th floor and says that he has done better with the stairs. He denies any palpitations. He has been seeing Dr. Paco Duncan of endocrinology and making adjustments to medications. He is trying to eat better as well. He is compensated on exam with clear lungs and no lower extremity edema. His blood pressure was overall normal. Reviewed personally his EKG from 6/7/18 and it was normal sinus rhythm, nonspecific ST-T wave abnormality, heart rate of 75. Assessment/Plan:  1. Family history of early coronary artery disease. He is currently asymptomatic. Focus is on prevention. Stressed the importance of regular follow up with his primary care physician and addressing risk factors. He did note some frustration about having to be on so many medications, but again, stressed the importance of addressing this and being preventative. He can follow up here on an as needed basis. 2. Essential hypertension. Blood pressure controlled. No changes made. 3. Type 2 diabetes. Followed by endocrinology, Dr. Paco Duncan. 4. Mixed hyperlipidemia. He is tolerating statin. He  has a past medical history of Arthritis; Borderline diabetic; Diabetes ([address] Family history of premature CAD; and Hypertension. All other systems negative except as above. PE  Vitals:    07/06/18 0925   BP: 140/84   Pulse: 90   Resp: 18   SpO2: 97%   Weight: 228 lb (103.4 kg)   Height: 5' 9\" (1.753 m)    Body mass index is 33.67 kg/(m^2).    General appearance - alert, well appearing, and in no distress  Mental status - affect appropriate to mood  Eyes - sclera anicteric, moist mucous membranes  Neck - supple, no significant adenopathy, no JVD  Chest - clear to auscultation, no wheezes, rales or rhonchi  Heart - normal rate, regular rhythm, normal S1, S2, no murmurs, rubs, clicks or gallops  Abdomen - soft, nontender, nondistended, no masses or organomegaly  Neurological -no focal deficit  Extremities - peripheral pulses normal, no pedal edema  Skin - normal coloration  no rashes    Recent Labs:  Lab Results   Component Value Date/Time    Cholesterol, total 170 06/07/2018 09:09 AM    HDL Cholesterol 34 (L) 06/07/2018 09:09 AM    LDL, calculated 90 06/07/2018 09:09 AM    Triglyceride 229 (H) 06/07/2018 09:09 AM    CHOL/HDL Ratio 7.5 (H) 08/16/2010 12:07 PM     Lab Results   Component Value Date/Time    Creatinine (POC) 2.5 (H) 03/02/2016 12:03 AM    Creatinine 1.65 (H) 06/07/2018 09:09 AM     Lab Results   Component Value Date/Time    BUN 17 06/07/2018 09:09 AM    BUN (POC) 24 (H) 03/02/2016 12:03 AM     Lab Results   Component Value Date/Time    Potassium 4.5 06/07/2018 09:09 AM     Lab Results   Component Value Date/Time    Hemoglobin A1c 8.3 (H) 03/02/2018 08:27 AM     Lab Results   Component Value Date/Time    Hemoglobin (POC) 10.9 (L) 03/02/2016 12:03 AM    HGB 12.9 (L) 06/07/2018 09:09 AM     Lab Results   Component Value Date/Time    PLATELET 894 60/63/8310 09:09 AM       Reviewed:  Past Medical History:   Diagnosis Date    Arthritis     Borderline diabetic     Diabetes ([facility]     Family history of premature CAD     Hypertension      History   Smoking Status    Never Smoker   Smokeless Tobacco    Never Used     History   Alcohol Use    0.0 oz/week    0 Standard drinks or equivalent per week     Comment: social     No Known Allergies    Current Outpatient Prescriptions   Medication Sig    rosuvastatin (CRESTOR) 10 mg tablet TAKE 1 TABLET BY MOUTH EVERY DAY    Insulin Needles, Disposable, (NOVOFINE 32) 32 gauge x 1/4\" ndle One shot daily    Liraglutide (VICTOZA) 0.6 mg/0.1 mL (18 mg/3 mL) pnij Inject 1.2 mg once daily    hydroCHLOROthiazide (HYDRODIURIL) 12.5 mg tablet TAKE 1 TABLET BY MOUTH EVERY DAY    JENTADUETO 2.5-1,000 mg per tablet TAKE 1 TAB BY MOUTH TWO (2) TIMES DAILY (WITH MEALS). APPOINTMENT REQUIRED    amLODIPine (NORVASC) 10 mg tablet TAKE 1 TAB BY MOUTH DAILY.  pioglitazone (ACTOS) 15 mg tablet TAKE 1 TAB BY MOUTH DAILY.  montelukast (SINGULAIR) 10 mg tablet TAKE 1 TABLET BY MOUTH DAILY    albuterol (PROVENTIL HFA, VENTOLIN HFA, PROAIR HFA) 90 mcg/actuation inhaler Take 2 Puffs by inhalation every four (4) hours as needed for Wheezing.  CONTOUR NEXT STRIPS strip PATIENT IS TO CHECK BLOOD SUGAR TWICE DAILY    cinnamon bark (CINNAMON) 500 mg cap Take 1,000 mg by mouth two (2) times a day.  vardenafil (LEVITRA) 20 mg tablet Take 20 mg by mouth as needed.  APPLE CIDER VINEGAR PO Take  by mouth.  coenzyme Q-10 (CO Q-10) 200 mg capsule Take 1 Cap by mouth daily. No current facility-administered medications for this visit.         MD Juan Francisco [facility] [place]  [address] [facility] [address]  [address] [Post Nasal Drip] : post nasal drip [Ear Pain] : ear pain [Ear Itch] : ear itch [Vertigo] : vertigo [Ear Noises] : ear noises [Nasal Congestion] : nasal congestion [Problem Snoring] : problem snoring [Snoring With Pauses] : snoring with pauses [Hoarseness] : hoarseness [Throat Clearing] : throat clearing [Throat Pain] : throat pain [Throat Dryness] : throat dryness [Throat Itching] : throat itching [Eyes Itch] : itching of the eyes [Heartburn] : heartburn [Negative] : Heme/Lymph [Sneezing] : no sneezing [Seasonal Allergies] : no seasonal allergies [Hearing Loss] : no hearing loss [Recurrent Ear Infections] : no recurrent ear infections [Lightheadedness] : no lightheadedness [Nose Bleeds] : no nose bleeds [Recurrent Sinus Infections] : no recurrent sinus infections [Sinus Pain] : no sinus pain [Sinus Pressure] : no sinus pressure [Sense Of Smell Problem] : no sense of smell problem [Discolored Nasal Discharge] : no discolored nasal discharge [Swelling Neck] : no neck swelling [Swelling Face] : no face swelling [Fever] : no fever [Chills] : no chills [Feeling Poorly] : not feeling poorly [Feeling Tired] : not feeling tired [Recent Weight Gain (___ Lbs)] : no recent weight gain [Recent Weight Loss (___ Lbs)] : no recent weight loss [Eye Pain] : no eye pain [Red  Eyes] : eyes not red [Eyesight Problems] : no eyesight problems [Discharge From Eyes] : no purulent discharge from the eyes [Dry Eyes] : no dryness of the eyes [Heart Rate Is Slow] : the heart rate was not slow [Heart Rate Is Fast] : the heart rate was not fast [Chest Pain] : no chest pain [Palpitations] : no palpitations [Leg Claudication] : no intermittent leg claudication [Lower Ext Edema] : no extremity edema [Shortness Of Breath] : no shortness of breath [Cough] : no cough [SOB on Exertion] : no shortness of breath during exertion [Orthopnea] : no orthopnea [PND] : no PND [Abdominal Pain] : no abdominal pain [Vomiting] : no vomiting [Constipation] : no constipation [Diarrhea] : no diarrhea [Melena] : no melena [Dysuria] : no dysuria [Incontinence] : no incontinence [Hesitancy] : no urinary hesitancy [Nocturia] : no nocturia [Genital Lesion] : no genital lesions [Testicular Pain] : no testicular pain [Arthralgias] : no arthralgias [Joint Pain] : no joint pain [Joint Swelling] : no joint swelling [Joint Stiffness] : no joint stiffness [Limb Pain] : no limb pain [Limb Swelling] : no limb swelling [Skin Lesions] : no skin lesions [Skin Wound] : no skin wound [Itching] : no itching [Change In A Mole] : no change in a mole [Dry Skin] : no dry skin [An Unusual Growth] : no unusual growth on the skin [Confused] : no confusion [Convulsions] : no convulsions [Dizziness] : no dizziness [Fainting] : no fainting [Limb Weakness] : no limb weakness [Difficulty Walking] : no difficulty walking [Suicidal] : not suicidal [Sleep Disturbances] : no sleep disturbances [Anxiety] : no anxiety [Depression] : no depression [Change In Personality] : no personality change [Emotional Problems] : no emotional problems [Proptosis] : no proptosis [Hot Flashes] : no hot flashes [Muscle Weakness] : no muscle weakness [Erectile Dysfunction] : no erectile dysfunction [Deepening Of The Voice] : no deepening of the voice [Feelings Of Weakness] : no feelings of weakness [Easy Bleeding] : no tendency for easy bleeding [Easy Bruising] : no tendency for easy bruising [Swollen Glands] : no swollen glands [Swollen Glands In The Neck] : no swollen glands in the neck [FreeTextEntry3] : Watery eyes [FreeTextEntry7] : heart burn, difficulty swallowing

## 2019-03-06 ENCOUNTER — TELEPHONE (OUTPATIENT)
Dept: ENDOCRINOLOGY | Age: 54
End: 2019-03-06

## 2019-03-06 NOTE — TELEPHONE ENCOUNTER
Patient has been notified. The pharmacy refill it and he was confused. He states that this medication in on auto refill. Advised him to call his pharmacy to advise that he no longer takes.

## 2019-03-06 NOTE — TELEPHONE ENCOUNTER
Per telephone encounter 02/06/19: Reviewed BG logs (see scanned document). His blood sugars are looking very good and he has continued to have weight loss. Pt instructed to stop the Pioglitazone and send me some more blood sugar readings in a month.     Pt voices understanding and agreement with the plan.

## 2019-03-06 NOTE — TELEPHONE ENCOUNTER
----- Message from Kwame Jordan sent at 3/5/2019  5:56 PM EST -----  Regarding: Dr. Garrido Scales: [phone]  Pt would like a call back providing the information of which diabetic medication was stopped within the last three weeks.

## 2019-03-08 ENCOUNTER — TELEPHONE (OUTPATIENT)
Dept: ENDOCRINOLOGY | Age: 54
End: 2019-03-08

## 2019-03-08 NOTE — TELEPHONE ENCOUNTER
FYI: Recommended to call his pharmacy, as Tyesha Rivers has already gone for the day. Patient agreed.

## 2019-03-08 NOTE — TELEPHONE ENCOUNTER
----- Message from Smule Gains sent at 3/8/2019  1:19 PM EST -----  Regarding: /Telephone  Pt called requesting a call back in regards to pt PCP prescribed an anti depression medication and pt is inquiring is it safe to take with diabetes medication. Pt best contact number is [phone] .

## 2019-03-09 NOTE — TELEPHONE ENCOUNTER
Called pt and discussed his current DM medications. Pt will discuss with Dr. Kesha Rodriguez the medication she is considering and will call back with a name of a medication and we can review it against his current regimen.

## 2019-03-25 DIAGNOSIS — R05.9 COUGH: ICD-10-CM

## 2019-03-25 RX ORDER — MONTELUKAST SODIUM 10 MG/1
TABLET ORAL
Qty: 30 TAB | Refills: 3 | Status: SHIPPED | OUTPATIENT
Start: 2019-03-25 | End: 2019-07-17 | Stop reason: SDUPTHER

## 2019-03-25 NOTE — TELEPHONE ENCOUNTER
Medication refill request:    Last Office Visit: 1/30/19  Next Office Visit:    Future Appointments   Date Time Provider Porsha Carcamo   3/27/2019  9:15 AM Tashia Mcknight NP [facility]   5/31/2019  8:50 AM Arabella José MD [facility]   6/14/2019  8:00 AM Tashia Mcknight NP [address] [place] [address] verified. yes    Patient requesting 90 day supply.

## 2019-03-27 ENCOUNTER — OFFICE VISIT (OUTPATIENT)
Dept: INTERNAL MEDICINE CLINIC | Age: 54
End: 2019-03-27

## 2019-03-27 VITALS
HEIGHT: 69 IN | OXYGEN SATURATION: 99 % | DIASTOLIC BLOOD PRESSURE: 86 MMHG | TEMPERATURE: 98 F | WEIGHT: 184 LBS | HEART RATE: 93 BPM | RESPIRATION RATE: 19 BRPM | SYSTOLIC BLOOD PRESSURE: 133 MMHG | BODY MASS INDEX: 27.25 KG/M2

## 2019-03-27 DIAGNOSIS — F32.A DEPRESSION, UNSPECIFIED DEPRESSION TYPE: Primary | ICD-10-CM

## 2019-03-27 DIAGNOSIS — I10 ESSENTIAL HYPERTENSION WITH GOAL BLOOD PRESSURE LESS THAN 130/80: ICD-10-CM

## 2019-03-27 RX ORDER — CITALOPRAM 10 MG/1
10 TABLET ORAL DAILY
Qty: 30 TAB | Refills: 3 | Status: SHIPPED | OUTPATIENT
Start: 2019-03-27 | End: 2019-04-18 | Stop reason: SDUPTHER

## 2019-03-27 NOTE — PROGRESS NOTES
HISTORY OF PRESENT ILLNESS  Marilee Bryan is a 47 y.o. male presents for acute care  HPI  He is tired. Feels he has \"hit a wall\"     Struggling in school    Can't focus    Seeing therapist, diagnosed with depression, recommended  Antidepressant     Though antidepressant would interfere with medication    Believes depressed mood started with initiation of  Victoza. Endocrinologist wants to continue medication. Actos recently discontinued     He has lost a significant amount of weight.with regular exercise, walks 5 miles daily, dietary changes and Victoza  Frustrated about inquires from family and coworkers about possible illness or drug abuse as cause of weight loss        No new stressors. Ex wife with mental illness, 5 children, several are  angry about new relationship  A lot of good things in life. He has a supportive fiance, upcoming marriage in November    Thinking of changing job    relocated to [place], does not mind commute to work    Compliant with medical management    Past Medical History:   Diagnosis Date    Arthritis     Borderline diabetic     Diabetes ([address]     Family history of premature CAD     Hypertension        Past Medical History:   Diagnosis Date    Arthritis     Borderline diabetic     Diabetes ([facility] [address]     Family history of premature CAD     Hypertension          Current Outpatient Medications on File Prior to Visit   Medication Sig Dispense Refill    montelukast (SINGULAIR) 10 mg tablet TAKE 1 TABLET BY MOUTH DAILY 30 Tab 3    liraglutide (VICTOZA 2-DANIELA) 0.6 mg/0.1 mL (18 mg/3 mL) pnij INJECT 1.2 MG ONCE DAILY 18 mL 3    cyclobenzaprine (FLEXERIL) 10 mg tablet Take 1 Tab by mouth three (3) times daily as needed for Muscle Spasm(s). 30 Tab 0    lidocaine (LIDODERM) 5 % Apply patch to the affected area for 12 hours a day and remove for 12 hours a day.  30 Each 0    glucose blood VI test strips (CONTOUR NEXT TEST STRIPS) strip PATIENT IS TO CHECK BLOOD SUGAR TWICE DAILY. DX: E11.9 200 Strip 3    rosuvastatin (CRESTOR) 10 mg tablet TAKE 1 TABLET BY MOUTH EVERY DAY 30 Tab 6    hydroCHLOROthiazide (MICROZIDE) 12.5 mg capsule TAKE ONE CAPSULE BY MOUTH EVERY DAY 30 Cap 2    pioglitazone (ACTOS) 15 mg tablet TAKE 1 TAB BY MOUTH DAILY. 30 Tab 3    amLODIPine (NORVASC) 10 mg tablet TAKE 1 TABLET BY MOUTH EVERY DAY 30 Tab 2    JENTADUETO 2.5-1,000 mg per tablet TAKE 1 TABLET BY MOUTH TWICE A DAY WITH MEALS 60 Tab 2    sildenafil citrate (VIAGRA) 25 mg tablet TAKE 1-4 TABLETS 30 MINUTES PRIOR TO ROMANCE. DO NOT REPEAT IN 24 HOURS.  4    Insulin Needles, Disposable, (NOVOFINE 32) 32 gauge x 1/4\" ndle One shot daily 100 Pen Needle 3    albuterol (PROVENTIL HFA, VENTOLIN HFA, PROAIR HFA) 90 mcg/actuation inhaler Take 2 Puffs by inhalation every four (4) hours as needed for Wheezing. 1 Inhaler 0    coenzyme Q-10 (CO Q-10) 200 mg capsule Take 1 Cap by mouth daily. 30 Cap 11    cinnamon bark (CINNAMON) 500 mg cap Take 500 mg by mouth two (2) times a day. No current facility-administered medications on file prior to visit. Review of Systems   Constitutional: Positive for weight loss (intentional). Negative for malaise/fatigue. Eyes: Negative. Respiratory: Negative. Cardiovascular: Negative. Gastrointestinal: Negative. Genitourinary: Negative. Musculoskeletal: Negative. Neurological: Negative for dizziness, weakness and headaches. Psychiatric/Behavioral: Positive for depression. Negative for memory loss, substance abuse and suicidal ideas. The patient is not nervous/anxious and does not have insomnia. Visit Vitals  /86 (BP 1 Location: Left arm, BP Patient Position: Sitting)   Pulse 93   Temp 98 °F (36.7 °C) (Oral)   Resp 19   Ht 5' 9\" (1.753 m)   Wt 184 lb (83.5 kg)   SpO2 99%   BMI 27.17 kg/m²     Physical Exam   Constitutional: He is oriented to person, place, and time. He appears well-developed and well-nourished. No distress.    Eyes: Conjunctivae are normal.   Neck: Normal range of motion. Neck supple. Cardiovascular: Normal rate, regular rhythm and normal heart sounds. Pulmonary/Chest: Effort normal and breath sounds normal.   Neurological: He is alert and oriented to person, place, and time. No cranial nerve deficit. Skin: He is not diaphoretic. Psychiatric: He has a normal mood and affect. His behavior is normal. Judgment and thought content normal.       ASSESSMENT and PLAN    ICD-10-CM ICD-9-CM    1. Depression, unspecified depression type F32.9 311 citalopram (CELEXA) 10 mg tablet     Follow-up and Dispositions    · Return in about 1 month (around 4/24/2019) for physical exam .       current treatment plan is effective, no change in therapy  lab results and schedule of future lab studies reviewed with patient  reviewed medications and side effects in detail    Discussed treatment options, management plan, medication side effects, indications for call back, adjustment/discontinuation of medication of medication       Patient voices understanding and acceptance of this advice and will call back if any further questions or concerns. An After Visit Summary was printed and given to the patient.

## 2019-03-27 NOTE — PROGRESS NOTES
Exam room [address] Lesley Marte is a 47 y.o. male   Pt states he has appointment with endocrinologist May 31, 2019  Pt has received Flu vaccine at his workplace  Pt is due for second zoster vaccine, Pt states he was told that due to it being over a year from the initial first dose he would have to retake the first dose again (start over)  Pt is checking blood sugars 3x daily states blood sugars are all within range    Chief Complaint   Patient presents with    Medication Evaluation     There were no vitals taken for this visit. 1. Have you been to the ER, urgent care clinic since your last visit? Hospitalized since your last visit? No    2. Have you seen or consulted any other health care providers outside of the [address] [place] [address] since your last visit? Include any pap smears or colon screening.  No  Health Maintenance Due   Topic Date Due    DTaP/Tdap/Td series (1 - Tdap) 06/30/1987    FOBT Q 1 YEAR AGE 50-75  06/30/2016    Influenza Age 5 to Adult  08/01/2018    Shingrix Vaccine Age 50> (2 of 2) 08/02/2018     Learning Assessment 3/11/2016   PRIMARY LEARNER Patient   HIGHEST LEVEL OF EDUCATION - PRIMARY LEARNER  SOME COLLEGE   BARRIERS PRIMARY LEARNER NONE   CO-LEARNER CAREGIVER No   PRIMARY LANGUAGE ENGLISH   LEARNER PREFERENCE PRIMARY READING   ANSWERED BY Patient   RELATIONSHIP SELF

## 2019-03-27 NOTE — PATIENT INSTRUCTIONS

## 2019-04-01 RX ORDER — AMLODIPINE BESYLATE 10 MG/1
TABLET ORAL
Qty: 30 TAB | Refills: 2 | Status: SHIPPED | OUTPATIENT
Start: 2019-04-01 | End: 2019-04-24 | Stop reason: SDUPTHER

## 2019-04-01 RX ORDER — HYDROCHLOROTHIAZIDE 12.5 MG/1
CAPSULE ORAL
Qty: 30 CAP | Refills: 2 | Status: SHIPPED | OUTPATIENT
Start: 2019-04-01 | End: 2019-04-24 | Stop reason: SDUPTHER

## 2019-04-08 RX ORDER — SILDENAFIL 25 MG/1
TABLET, FILM COATED ORAL
Qty: 25 TAB | Refills: 0 | Status: SHIPPED | OUTPATIENT
Start: 2019-04-08 | End: 2019-05-20 | Stop reason: SDUPTHER

## 2019-04-10 ENCOUNTER — TELEPHONE (OUTPATIENT)
Dept: ENDOCRINOLOGY | Age: 54
End: 2019-04-10

## 2019-04-10 NOTE — TELEPHONE ENCOUNTER
Reviewed BG logs. I called pt and encouraged him to keep up the excellent work. He notes that he is still dieting and working out and has lost down to 175 pounds.

## 2019-04-15 RX ORDER — LINAGLIPTIN AND METFORMIN HYDROCHLORIDE 2.5; 1 MG/1; MG/1
TABLET, FILM COATED ORAL
Qty: 60 TAB | Refills: 2 | Status: SHIPPED | OUTPATIENT
Start: 2019-04-15 | End: 2019-07-10 | Stop reason: SDUPTHER

## 2019-04-15 RX ORDER — BLOOD SUGAR DIAGNOSTIC
STRIP MISCELLANEOUS
Qty: 100 PEN NEEDLE | Refills: 6 | Status: SHIPPED | OUTPATIENT
Start: 2019-04-15

## 2019-04-18 DIAGNOSIS — F32.A DEPRESSION, UNSPECIFIED DEPRESSION TYPE: ICD-10-CM

## 2019-04-18 NOTE — TELEPHONE ENCOUNTER
Medication refill request:    Last Office Visit: 3/27/19  Next Office Visit:    Future Appointments   Date Time Provider Porsha Carcamo   4/26/2019  2:00 PM Zeus Marquez NP [address]   5/31/2019  8:50 AM Regina Sosa MD E [address]   6/14/2019  8:00 AM Zeus Marquez NP [address] [place] [address] verified. yes    Patient requesting 90 day supply.

## 2019-04-23 DIAGNOSIS — M54.6 ACUTE RIGHT-SIDED THORACIC BACK PAIN: ICD-10-CM

## 2019-04-23 RX ORDER — CITALOPRAM 10 MG/1
10 TABLET ORAL DAILY
Qty: 30 TAB | Refills: 3 | Status: SHIPPED | OUTPATIENT
Start: 2019-04-23 | End: 2019-08-18 | Stop reason: SDUPTHER

## 2019-04-24 DIAGNOSIS — I10 ESSENTIAL HYPERTENSION WITH GOAL BLOOD PRESSURE LESS THAN 130/80: ICD-10-CM

## 2019-04-24 RX ORDER — HYDROCHLOROTHIAZIDE 12.5 MG/1
CAPSULE ORAL
Qty: 30 CAP | Refills: 0 | Status: SHIPPED | OUTPATIENT
Start: 2019-04-24 | End: 2019-05-22 | Stop reason: SDUPTHER

## 2019-04-24 RX ORDER — AMLODIPINE BESYLATE 10 MG/1
TABLET ORAL
Qty: 30 TAB | Refills: 0 | Status: SHIPPED | OUTPATIENT
Start: 2019-04-24 | End: 2019-05-22 | Stop reason: SDUPTHER

## 2019-04-24 NOTE — TELEPHONE ENCOUNTER
After verification Pt notified that prescription has been filled.  Pt acknowledged receiving notice from [facility], no further questions or concerns

## 2019-04-26 ENCOUNTER — OFFICE VISIT (OUTPATIENT)
Dept: INTERNAL MEDICINE CLINIC | Age: 54
End: 2019-04-26

## 2019-04-26 VITALS
SYSTOLIC BLOOD PRESSURE: 129 MMHG | BODY MASS INDEX: 26.78 KG/M2 | HEART RATE: 88 BPM | WEIGHT: 180.8 LBS | TEMPERATURE: 97.9 F | DIASTOLIC BLOOD PRESSURE: 88 MMHG | HEIGHT: 69 IN | RESPIRATION RATE: 16 BRPM | OXYGEN SATURATION: 98 %

## 2019-04-26 DIAGNOSIS — E78.2 MIXED HYPERLIPIDEMIA: ICD-10-CM

## 2019-04-26 DIAGNOSIS — M54.2 CERVICAL PAIN: ICD-10-CM

## 2019-04-26 DIAGNOSIS — Z71.89 ADVANCED DIRECTIVES, COUNSELING/DISCUSSION: ICD-10-CM

## 2019-04-26 DIAGNOSIS — Z00.00 PHYSICAL EXAM, ANNUAL: Primary | ICD-10-CM

## 2019-04-26 DIAGNOSIS — J45.20 MILD INTERMITTENT ASTHMA WITHOUT COMPLICATION: ICD-10-CM

## 2019-04-26 DIAGNOSIS — E11.69 CONTROLLED TYPE 2 DIABETES MELLITUS WITH OTHER SPECIFIED COMPLICATION, WITHOUT LONG-TERM CURRENT USE OF INSULIN (HCC): ICD-10-CM

## 2019-04-26 DIAGNOSIS — Z23 ENCOUNTER FOR IMMUNIZATION: ICD-10-CM

## 2019-04-26 DIAGNOSIS — M54.50 LUMBAR PAIN: ICD-10-CM

## 2019-04-26 DIAGNOSIS — N28.9 RENAL INSUFFICIENCY: ICD-10-CM

## 2019-04-26 RX ORDER — LIDOCAINE 50 MG/G
PATCH TOPICAL
Qty: 30 PATCH | Refills: 0 | Status: SHIPPED | OUTPATIENT
Start: 2019-04-26 | End: 2020-02-03

## 2019-04-26 RX ORDER — ALBUTEROL SULFATE 90 UG/1
2 AEROSOL, METERED RESPIRATORY (INHALATION)
Qty: 1 INHALER | Refills: 0 | Status: SHIPPED | OUTPATIENT
Start: 2019-04-26 | End: 2019-08-24 | Stop reason: SDUPTHER

## 2019-04-26 NOTE — PROGRESS NOTES
RM 9    Patient is accompanied by Shanda     Chief Complaint   Patient presents with    Annual Exam     annual complete physical      1. Have you been to the ER, urgent care clinic since your last visit? Hospitalized since your last visit? No    2. Have you seen or consulted any other health care providers outside of the [address] [place] [address] since your last visit? Include any pap smears or colon screening. No    Health Maintenance Due   Topic Date Due    DTaP/Tdap/Td series (1 - Tdap) 06/30/1987    FOBT Q 1 YEAR AGE 50-75  06/30/2016    Shingrix Vaccine Age 50> (2 of 2) 08/02/2018       3 most recent PHQ Screens 6/7/2018   Little interest or pleasure in doing things Not at all   Feeling down, depressed, irritable, or hopeless Not at all   Total Score PHQ 2 0     Abuse Screening Questionnaire 4/26/2019   Do you ever feel afraid of your partner? N   Are you in a relationship with someone who physically or mentally threatens you? N   Is it safe for you to go home?  Y       Learning Assessment 3/11/2016   PRIMARY LEARNER Patient   HIGHEST LEVEL OF EDUCATION - PRIMARY LEARNER  SOME COLLEGE   BARRIERS PRIMARY LEARNER NONE   CO-LEARNER CAREGIVER No   PRIMARY LANGUAGE ENGLISH   LEARNER PREFERENCE PRIMARY READING   ANSWERED BY Patient   RELATIONSHIP SELF

## 2019-04-26 NOTE — PATIENT INSTRUCTIONS
Well Visit, Men 48 to 72: Care Instructions  Your Care Instructions    Physical exams can help you stay healthy. Your doctor has checked your overall health and may have suggested ways to take good care of yourself. He or she also may have recommended tests. At home, you can help prevent illness with healthy eating, regular exercise, and other steps. Follow-up care is a key part of your treatment and safety. Be sure to make and go to all appointments, and call your doctor if you are having problems. It's also a good idea to know your test results and keep a list of the medicines you take. How can you care for yourself at home? · Reach and stay at a healthy weight. This will lower your risk for many problems, such as obesity, diabetes, heart disease, and high blood pressure. · Get at least 30 minutes of exercise on most days of the week. Walking is a good choice. You also may want to do other activities, such as running, swimming, cycling, or playing tennis or team sports. · Do not smoke. Smoking can make health problems worse. If you need help quitting, talk to your doctor about stop-smoking programs and medicines. These can increase your chances of quitting for good. · Protect your skin from too much sun. When you're outdoors from 10 a.m. to 4 p.m., stay in the shade or cover up with clothing and a hat with a wide brim. Wear sunglasses that block UV rays. Even when it's cloudy, put broad-spectrum sunscreen (SPF 30 or higher) on any exposed skin. · See a dentist one or two times a year for checkups and to have your teeth cleaned. · Wear a seat belt in the car. · Limit alcohol to 2 drinks a day. Too much alcohol can cause health problems. Follow your doctor's advice about when to have certain tests. These tests can spot problems early. · Cholesterol.  Your doctor will tell you how often to have this done based on your overall health and other things that can increase your risk for heart attack and stroke. · Blood pressure. Have your blood pressure checked during a routine doctor visit. Your doctor will tell you how often to check your blood pressure based on your age, your blood pressure results, and other factors. · Prostate exam. Talk to your doctor about whether you should have a blood test (called a PSA test) for prostate cancer. Experts disagree on whether men should have this test. Some experts recommend that you discuss the benefits and risks of the test with your doctor. · Diabetes. Ask your doctor whether you should have tests for diabetes. · Vision. Some experts recommend that you have yearly exams for glaucoma and other age-related eye problems starting at age 48. · Hearing. Tell your doctor if you notice any change in your hearing. You can have tests to find out how well you hear. · Colon cancer. You should begin tests for colon cancer at age 48. You may have one of several tests. Your doctor will tell you how often to have tests based on your age and risk. Risks include whether you already had a precancerous polyp removed from your colon or whether your parent, brother, sister, or child has had colon cancer. · Heart attack and stroke risk. At least every 4 to 6 years, you should have your risk for heart attack and stroke assessed. Your doctor uses factors such as your age, blood pressure, cholesterol, and whether you smoke or have diabetes to show what your risk for a heart attack or stroke is over the next 10 years. · Abdominal aortic aneurysm. Ask your doctor whether you should have a test to check for an aneurysm. You may need a test if you ever smoked or if your parent, brother, sister, or child has had an aneurysm. When should you call for help? Watch closely for changes in your health, and be sure to contact your doctor if you have any problems or symptoms that concern you. Where can you learn more? Go to http://rosi-shell.info/.   Enter R115 in the search box to learn more about \"Well Visit, Men 48 to 72: Care Instructions. \"  Current as of: March 28, 2018  Content Version: 11.9  © 5792-0566 [employer]. Care instructions adapted under license by [employer] (which disclaims liability or warranty for this information). If you have questions about a medical condition or this instruction, always ask your healthcare professional. [address] any warranty or liability for your use of this information. Learning About High Blood Pressure  What is high blood pressure? Blood pressure is a measure of how hard the blood pushes against the walls of your arteries. It's normal for blood pressure to go up and down throughout the day, but if it stays up, you have high blood pressure. Another name for high blood pressure is hypertension. Two numbers tell you your blood pressure. The first number is the systolic pressure. It shows how hard the blood pushes when your heart is pumping. The second number is the diastolic pressure. It shows how hard the blood pushes between heartbeats, when your heart is relaxed and filling with blood. Your doctor will give you a goal for your blood pressure. Your goal will be based on your health and your age. High blood pressure (hypertension) means that the top number stays high, or the bottom number stays high, or both. High blood pressure increases the risk of stroke, heart attack, and other problems. You and your doctor will talk about your risks of these problems based on your blood pressure. What happens when you have high blood pressure? · Blood flows through your arteries with too much force. Over time, this damages the walls of your arteries. But you can't feel it. High blood pressure usually doesn't cause symptoms. · Fat and calcium start to build up in your arteries. This buildup is called plaque. Plaque makes your arteries narrower and stiffer.  Blood can't flow through them as easily. · This lack of good blood flow starts to damage some of the organs in your body. This can lead to problems such as coronary artery disease and heart attack, heart failure, stroke, kidney failure, and eye damage. How can you prevent high blood pressure? · Stay at a healthy weight. · Try to limit how much sodium you eat to less than 2,300 milligrams (mg) a day. If you limit your sodium to 1,500 mg a day, you can lower your blood pressure even more. ? Buy foods that are labeled \"unsalted,\" \"sodium-free,\" or \"low-sodium. \" Foods labeled \"reduced-sodium\" and \"light sodium\" may still have too much sodium. ? Flavor your food with garlic, lemon juice, onion, vinegar, herbs, and spices instead of salt. Do not use soy sauce, steak sauce, onion salt, garlic salt, mustard, or ketchup on your food. ? Use less salt (or none) when recipes call for it. You can often use half the salt a recipe calls for without losing flavor. · Be physically active. Get at least 30 minutes of exercise on most days of the week. Walking is a good choice. You also may want to do other activities, such as running, swimming, cycling, or playing tennis or team sports. · Limit alcohol to 2 drinks a day for men and 1 drink a day for women. · Eat plenty of fruits, vegetables, and low-fat dairy products. Eat less saturated and total fats. How is high blood pressure treated? · Your doctor will suggest making lifestyle changes. For example, your doctor may ask you to eat healthy foods, quit smoking, lose extra weight, and be more active. · If lifestyle changes don't help enough, your doctor may recommend that you take medicine. · When blood pressure is very high, medicines are needed to lower it. Follow-up care is a key part of your treatment and safety. Be sure to make and go to all appointments, and call your doctor if you are having problems.  It's also a good idea to know your test results and keep a list of the medicines you take.  Where can you learn more? Go to http://rosi-shell.info/. Enter P501 in the search box to learn more about \"Learning About High Blood Pressure. \"  Current as of: July 22, 2018  Content Version: 11.9  © 5799-6079 [employer]. Care instructions adapted under license by [employer] (which disclaims liability or warranty for this information). If you have questions about a medical condition or this instruction, always ask your healthcare professional. [place] [address] any warranty or liability for your use of this information. Neck Pain: Care Instructions  Your Care Instructions    You can have neck pain anywhere from the bottom of your head to the top of your shoulders. It can spread to the upper back or arms. Injuries, painting a ceiling, sleeping with your neck twisted, staying in one position for too long, and many other activities can cause neck pain. Most neck pain gets better with home care. Your doctor may recommend medicine to relieve pain or relax your muscles. He or she may suggest exercise and physical therapy to increase flexibility and relieve stress. You may need to wear a special (cervical) collar to support your neck for a day or two. Follow-up care is a key part of your treatment and safety. Be sure to make and go to all appointments, and call your doctor if you are having problems. It's also a good idea to know your test results and keep a list of the medicines you take. How can you care for yourself at home? · Try using a heating pad on a low or medium setting for 15 to 20 minutes every 2 or 3 hours. Try a warm shower in place of one session with the heating pad. · You can also try an ice pack for 10 to 15 minutes every 2 to 3 hours. Put a thin cloth between the ice and your skin. · Take pain medicines exactly as directed. ? If the doctor gave you a prescription medicine for pain, take it as prescribed.   ? If you are not taking a prescription pain medicine, ask your doctor if you can take an over-the-counter medicine. · If your doctor recommends a cervical collar, wear it exactly as directed. When should you call for help? Call your doctor now or seek immediate medical care if:    · You have new or worsening numbness in your arms, buttocks or legs.     · You have new or worsening weakness in your arms or legs. (This could make it hard to stand up.)     · You lose control of your bladder or bowels.    Watch closely for changes in your health, and be sure to contact your doctor if:    · Your neck pain is getting worse.     · You are not getting better after 1 week.     · You do not get better as expected. Where can you learn more? Go to http://rosi-shell.info/. Enter [id number] in the search box to learn more about \"Neck Pain: Care Instructions. \"  Current as of: September 20, 2018  Content Version: 11.9  © 4135-5656 [employer]. Care instructions adapted under license by [employer] (which disclaims liability or warranty for this information). If you have questions about a medical condition or this instruction, always ask your healthcare professional. [address] any warranty or liability for your use of this information. Learning About Low Back Pain  What is low back pain? Low back pain is pain that can occur anywhere below the ribs and above the legs. It is very common. Almost everyone has it at one time or another. Low back pain can be:  Acute. This is new pain that can last a few days to a few weeks--at the most a few months. Chronic. This pain can last for more than a few months. Sometimes it can last for years. What are some myths about low back pain? Here are some common myths about low back pain--and the facts:  Myth: \"I need to rest my back when I have back pain. \"  Fact: Staying active won't hurt you.  It may help you get better faster. Myth: \"I need prescription pain medicine. \"  Fact: It's best to try to let time and being active heal your back. Opioid pain medicines--such as hydrocodone or oxycodone--usually don't work any better than over-the-counter medicines like ibuprofen or naproxen. And opioids can cause serious problems like addiction or overdose. Myth: \"I need a test like an X-ray or an MRI to diagnose my low back pain. \"  Fact: Getting a test right away won't help you get better faster. And it could lead you down a treatment path you may not need, since most people get better on their own. What causes low back pain? In most cases, there isn't a clear cause. This can be frustrating, because your back hurts and there's no obvious reason. Your back pain can be caused by:  Overuse or muscle strain. This can happen from playing sports, lifting heavy things, or not being physically fit. A herniated disc. This is a problem with the cushion between the bones in your back. Arthritis. With age, you may have changes in your bones that can narrow the space around your nerves. Other causes. In rare cases, the cause is a serious illness like an infection or cancer. But there are usually other symptoms too. What are the symptoms? Your symptoms depend on your body and the cause of your back pain. You may feel:  · Pain that's sharp or dull. It may be in one small area or over a broad area. But even bad pain doesn't mean that it's caused by something serious. · Leg pain, numbness, or tingling. When a nerve gets squeezed--such as from a disc problem or arthritis--you may have symptoms in your leg or foot. You can even have leg symptoms from a back problem without having any pain in your back. How is low back pain diagnosed? A physical exam is the main way to diagnose low back pain. Your doctor may examine your back, check your nerves by testing your reflexes, and make sure that your muscles are strong.  He or she also will ask questions about your back and overall health. Most people don't need any tests right away. Tests often don't show the reason for your pain. If your pain lasts more than 6 weeks or you have symptoms that your doctor is more concerned about, he or she may order tests. These may include an X-ray, a CT scan, or an MRI. In some cases, tests can help your doctor find a cause for your pain, especially for pain in one or both legs. How is low back pain treated? Most acute low back pain gets better on its own within a few weeks, no matter what the cause. Time and doing usual activities are all that most people need to feel better. Using heat or ice and taking over-the-counter pain medicine also can help while your body heals. If you aren't getting better on your own or your pain is very bad, your doctor may recommend:  · Physical therapy. · Spinal manipulation, such as by a chiropractor. · Acupuncture. · Massage. · Injections of steroid medicine in your back (especially for pain that involves your legs). If you have chronic low back pain, treatment will help you understand and manage your pain. Treatment may include:  · Staying active. This may include walking or doing back exercises. · Physical therapy. · Medicines. Some of these medicines are also used for other problems, like depression. · Pain management. Your doctor may have you see a pain specialist.  · Counseling. Having chronic pain can be hard. It may help to talk to someone who can help you cope with your pain. Surgery isn't needed for most people. But it may help some types of low back pain. Follow-up care is a key part of your treatment and safety. Be sure to make and go to all appointments, and call your doctor if you are having problems. It's also a good idea to know your test results and keep a list of the medicines you take. When should you call for help? Call 911 anytime you think you may need emergency care.  For example, call if:  · You can't move a leg at all. Call your doctor now or seek immediate medical care if:  · You have new or worse symptoms in your legs, belly, or buttocks. Symptoms may include:  ? Numbness or tingling. ? Weakness. ? Pain. · You lose bladder or bowel control. Watch closely for changes in your health, and be sure to contact your doctor if:  · Along with the back pain, you have a fever, lose weight, or don't feel well. · You do not get better as expected. Where can you learn more? Go to http://rosi-shell.info/. Enter A007 in the search box to learn more about \"Learning About Low Back Pain. \"  Current as of: September 20, 2018  Content Version: 11.9  © 6785-3977 [employer]. Care instructions adapted under license by [employer] (which disclaims liability or warranty for this information). If you have questions about a medical condition or this instruction, always ask your healthcare professional. [address] any warranty or liability for your use of this information.

## 2019-04-27 LAB
ALBUMIN SERPL-MCNC: 4.4 G/DL (ref 3.5–5.5)
ALBUMIN/GLOB SERPL: 1.5 {RATIO} (ref 1.2–2.2)
ALP SERPL-CCNC: 65 IU/L (ref 39–117)
ALT SERPL-CCNC: 8 IU/L (ref 0–44)
AST SERPL-CCNC: 19 IU/L (ref 0–40)
BILIRUB SERPL-MCNC: 0.4 MG/DL (ref 0–1.2)
BUN SERPL-MCNC: 16 MG/DL (ref 6–24)
BUN/CREAT SERPL: 11 (ref 9–20)
CALCIUM SERPL-MCNC: 9.8 MG/DL (ref 8.7–10.2)
CHLORIDE SERPL-SCNC: 100 MMOL/L (ref 96–106)
CO2 SERPL-SCNC: 27 MMOL/L (ref 20–29)
CREAT SERPL-MCNC: 1.42 MG/DL (ref 0.76–1.27)
EST. AVERAGE GLUCOSE BLD GHB EST-MCNC: 140 MG/DL
GLOBULIN SER CALC-MCNC: 3 G/DL (ref 1.5–4.5)
GLUCOSE SERPL-MCNC: 97 MG/DL (ref 65–99)
HBA1C MFR BLD: 6.5 % (ref 4.8–5.6)
POTASSIUM SERPL-SCNC: 3.9 MMOL/L (ref 3.5–5.2)
PROT SERPL-MCNC: 7.4 G/DL (ref 6–8.5)
SODIUM SERPL-SCNC: 144 MMOL/L (ref 134–144)
TSH SERPL DL<=0.005 MIU/L-ACNC: 1.84 UIU/ML (ref 0.45–4.5)

## 2019-04-28 NOTE — PROGRESS NOTES
HISTORY OF PRESENT ILLNESS  Emir Harris is a 47 y.o. male presents with spencer for physical exam  HPI  She continues to exercise regularly and follow a healthy diet. Diabetes managed by Dr. Fredo Holloway. Hemoglobin a1c and blood sugars at goal    Eye exam current     Colonoscopy current     SSRI started LOV, he has seen significant improvement in focus and mood. Has raised chemistry grade to B from [employer]    Stressors caused by  24year old step son with mental illness who has recently moved in with him    Lumbar and cervical pain. Believes d/t long commute to work or from fall on ice last winter. Pain not debilitating. OTC pain medication effective     Psychosocial Hx:  + depression and anxiety  No tobacco   Social alcohol      Past Medical History:   Diagnosis Date    Arthritis     Borderline diabetic     Diabetes ([facility] [address]     Family history of premature CAD     Hypertension        Current Outpatient Medications on File Prior to Visit   Medication Sig Dispense Refill    amLODIPine (NORVASC) 10 mg tablet TAKE 1 TABLET BY MOUTH EVERY DAY 30 Tab 0    hydroCHLOROthiazide (MICROZIDE) 12.5 mg capsule TAKE ONE CAPSULE BY MOUTH EVERY DAY 30 Cap 0    citalopram (CELEXA) 10 mg tablet Take 1 Tab by mouth daily. 30 Tab 3    JENTADUETO 2.5-1,000 mg per tablet TAKE 1 TABLET BY MOUTH TWICE A DAY WITH MEALS 60 Tab 2    Insulin Needles, Disposable, (NOVOFINE 32) 32 gauge x 1/4\" ndle USE FOR ONE SHOT DAILY 100 Pen Needle 6    montelukast (SINGULAIR) 10 mg tablet TAKE 1 TABLET BY MOUTH DAILY 30 Tab 3    liraglutide (VICTOZA 2-DANIELA) 0.6 mg/0.1 mL (18 mg/3 mL) pnij INJECT 1.2 MG ONCE DAILY 18 mL 3    glucose blood VI test strips (CONTOUR NEXT TEST STRIPS) strip PATIENT IS TO CHECK BLOOD SUGAR TWICE DAILY. DX: E11.9 200 Strip 3    rosuvastatin (CRESTOR) 10 mg tablet TAKE 1 TABLET BY MOUTH EVERY DAY 30 Tab 6    pioglitazone (ACTOS) 15 mg tablet TAKE 1 TAB BY MOUTH DAILY.  30 Tab 3    coenzyme Q-10 (CO Q-10) 200 mg capsule Take 1 Cap by mouth daily. 30 Cap 11    cinnamon bark (CINNAMON) 500 mg cap Take 500 mg by mouth two (2) times a day.  lidocaine (LIDODERM) 5 % APPLY PATCH TO THE AFFECTED AREA FOR 12 HOURS A DAY AND REMOVE FOR 12 HOURS A DAY. 30 Patch 0    sildenafil citrate (VIAGRA) 25 mg tablet TAKE 1-4 TABLETS BY MOUTH 30 MINUTES PRIOR TO ROMANCE. DO NOT REPEAT IN 24 HOURS 25 Tab 0    cyclobenzaprine (FLEXERIL) 10 mg tablet Take 1 Tab by mouth three (3) times daily as needed for Muscle Spasm(s). 30 Tab 0     No current facility-administered medications on file prior to visit. Review of Systems   Constitutional: Positive for weight loss (intentiional ). HENT: Negative for hearing loss. Eyes: Negative for blurred vision. Respiratory: Negative. Cardiovascular: Negative. Gastrointestinal: Negative. Genitourinary: Negative. Musculoskeletal: Positive for back pain, myalgias and neck pain. Skin: Negative. Neurological: Negative. Endo/Heme/Allergies: Negative. Psychiatric/Behavioral: Negative. Visit Vitals  /88 (BP 1 Location: Left arm, BP Patient Position: Sitting)   Pulse 88   Temp 97.9 °F (36.6 °C) (Oral)   Resp 16   Ht 5' 9\" (1.753 m)   Wt 180 lb 12.8 oz (82 kg)   SpO2 98%   BMI 26.70 kg/m²     Physical Exam   Constitutional: He is oriented to person, place, and time. He appears well-developed and well-nourished. No distress. HENT:   Head: Normocephalic and atraumatic. Right Ear: External ear normal.   Left Ear: External ear normal.   Nose: Nose normal.   Mouth/Throat: Oropharynx is clear and moist. No oropharyngeal exudate. Eyes: Pupils are equal, round, and reactive to light. Conjunctivae and EOM are normal. Right eye exhibits no discharge. Left eye exhibits no discharge. No scleral icterus. Neck: No JVD present. No spinous process tenderness and no muscular tenderness present. Carotid bruit is not present. No neck rigidity.  No edema, no erythema and normal range of motion present. No thyromegaly present. Cardiovascular: Normal rate and regular rhythm. Pulmonary/Chest: Effort normal and breath sounds normal.   Abdominal: Soft. Bowel sounds are normal. He exhibits no distension and no mass. There is no tenderness. There is no rebound and no guarding. Musculoskeletal: He exhibits no edema, tenderness or deformity. Lumbar back: He exhibits normal range of motion, no tenderness and no deformity. Back:    Lymphadenopathy:     He has no cervical adenopathy. Neurological: He is alert and oriented to person, place, and time. No cranial nerve deficit. Skin: Skin is warm and dry. He is not diaphoretic. Psychiatric: He has a normal mood and affect. His behavior is normal. Judgment and thought content normal.       ASSESSMENT and PLAN    ICD-10-CM ICD-9-CM    1. Physical exam, annual Z00.00 V70.0    2. Mild intermittent asthma without complication X98.01 903.38 albuterol (PROVENTIL HFA, VENTOLIN HFA, PROAIR HFA) 90 mcg/actuation inhaler   3. Essential hypertension with goal blood pressure less than 130/80 T54 861.3 METABOLIC PANEL, COMPREHENSIVE   4. Renal insufficiency B65.2 339.8 METABOLIC PANEL, COMPREHENSIVE      TSH RFX ON ABNORMAL TO FREE T4   5. Essential hypertension G95 869.0 METABOLIC PANEL, COMPREHENSIVE   6. Mixed hyperlipidemia Y67.5 466.5 METABOLIC PANEL, COMPREHENSIVE   7. Controlled type 2 diabetes mellitus with other specified complication, without long-term current use of insulin (HCC) E11.69 250.80 HEMOGLOBIN A1C WITH EAG      TSH RFX ON ABNORMAL TO FREE T4   8. Lumbar pain M54.5 724.2 REFERRAL TO ORTHOPEDIC SURGERY   9. Cervical pain M54.2 723.1 REFERRAL TO ORTHOPEDIC SURGERY   10. Encounter for immunization Z23 V03.89 TETANUS, DIPHTHERIA TOXOIDS AND ACELLULAR PERTUSSIS VACCINE (TDAP), IN INDIVIDS. >=7, IM      SD IMMUNIZ ADMIN,1 SINGLE/COMB VAC/TOXOID   11.  Advanced directives, counseling/discussion Z71.89 V65.49      Follow-up and Dispositions    · Return in about 3 months (around 7/26/2019) for htn.       lab results and schedule of future lab studies reviewed with patient  reviewed medications and side effects in detail    Discussed indications for pneumococcal vaccine. He elects to defer to next OV    Encouraged shingles vaccine, advised to first  verify coverage with insurer    Patient voices understanding and acceptance of this advice and will call back if any further questions or concerns. An After Visit Summary was printed and given to the patient.

## 2019-04-28 NOTE — ACP (ADVANCE CARE PLANNING)
Advance Care Planning (ACP) Provider Conversation Snapshot    Date of ACP Conversation: 04/28/19  Persons included in Conversation:  patient  Length of ACP Conversation in minutes:  <16 minutes (Non-Billable)    Authorized Decision Maker (if patient is incapable of making informed decisions):    This person is:   n/a            For Patients with Decision Making Capacity:   Values/Goals: Exploration of values, goals, and preferences if recovery is not expected, even with continued medical treatment in the event of:  Imminent death    Conversation Outcomes / Follow-Up Plan:   Recommended completion of Advance Directive form after review of ACP materials and conversation with prospective healthcare agent

## 2019-04-29 RX ORDER — BLOOD SUGAR DIAGNOSTIC
STRIP MISCELLANEOUS
Qty: 100 PEN NEEDLE | Refills: 1 | Status: SHIPPED | OUTPATIENT
Start: 2019-04-29 | End: 2020-09-04 | Stop reason: SDUPTHER

## 2019-05-21 RX ORDER — SILDENAFIL 25 MG/1
TABLET, FILM COATED ORAL
Qty: 25 TAB | Refills: 0 | Status: SHIPPED | OUTPATIENT
Start: 2019-05-21 | End: 2019-07-05 | Stop reason: SDUPTHER

## 2019-05-22 DIAGNOSIS — I10 ESSENTIAL HYPERTENSION WITH GOAL BLOOD PRESSURE LESS THAN 130/80: ICD-10-CM

## 2019-05-22 RX ORDER — AMLODIPINE BESYLATE 10 MG/1
TABLET ORAL
Qty: 30 TAB | Refills: 0 | Status: SHIPPED | OUTPATIENT
Start: 2019-05-22 | End: 2019-06-14 | Stop reason: SDUPTHER

## 2019-05-22 RX ORDER — HYDROCHLOROTHIAZIDE 12.5 MG/1
CAPSULE ORAL
Qty: 30 CAP | Refills: 0 | Status: SHIPPED | OUTPATIENT
Start: 2019-05-22 | End: 2019-06-14 | Stop reason: SDUPTHER

## 2019-05-28 RX ORDER — BLOOD SUGAR DIAGNOSTIC
STRIP MISCELLANEOUS
Qty: 100 PEN NEEDLE | Refills: 3 | Status: SHIPPED | OUTPATIENT
Start: 2019-05-28 | End: 2020-09-04 | Stop reason: SDUPTHER

## 2019-05-31 ENCOUNTER — OFFICE VISIT (OUTPATIENT)
Dept: ENDOCRINOLOGY | Age: 54
End: 2019-05-31

## 2019-05-31 VITALS
HEIGHT: 69 IN | BODY MASS INDEX: 24.76 KG/M2 | DIASTOLIC BLOOD PRESSURE: 88 MMHG | WEIGHT: 167.2 LBS | HEART RATE: 76 BPM | SYSTOLIC BLOOD PRESSURE: 134 MMHG

## 2019-05-31 DIAGNOSIS — E78.2 MIXED HYPERLIPIDEMIA: ICD-10-CM

## 2019-05-31 DIAGNOSIS — N52.8 OTHER MALE ERECTILE DYSFUNCTION: ICD-10-CM

## 2019-05-31 DIAGNOSIS — E11.9 TYPE 2 DIABETES MELLITUS WITHOUT COMPLICATION, WITHOUT LONG-TERM CURRENT USE OF INSULIN (HCC): Primary | ICD-10-CM

## 2019-05-31 DIAGNOSIS — I10 ESSENTIAL HYPERTENSION: ICD-10-CM

## 2019-05-31 NOTE — PROGRESS NOTES
Chief Complaint   Patient presents with    Diabetes     pcp and pharmacy verified   Records since last visit reviewed. History of Present Illness: Dany Penny is a 47 y.o. male here for follow up of diabetes. He was diagnosed with diabetes 2015. His A1C in April 2019 was down to 6.5%. Pt's weight today is 167 pounds, which is down 37 pounds from 204 in November 2018. Pt is still taking  Victoza 1.2mg every morning, Linagliptin/Metformin 2.5/1000mg BID and Pioglitazone 15mg daily. He brought his BG logs with him today. His FBGs have been running in the 110-130's range with pre-lunch in the 100-120's range and pre diner in the 's range. Pt is on Celexa and he notes this has helped with his feelings of depression. He will graduate in August.     Pt is eating 3 meals per day. He has breakfast around 730AM at work. Yesterday he had a sausage, hashbrown, english muffin and coffee (creamer). He has lunch around [place]. Yesterday he had 1/2 a cold cut sub and water. He has dinner around 6-7PM, last night he had a chicken club sandwich, some fries and water. He is going to the gym every day with his girlfriend and he has been going for a walk every morning. He is getting 61598 + steps per day. No history of vascular disease. No history of neuropathy, or nephropathy. Last eye exam was June 2018, no retinopathy. He has not had any cortisone injections. He notes his knee pain has resolved with the weigh loss. Current Outpatient Medications   Medication Sig    Insulin Needles, Disposable, (NOVOFINE 32) 32 gauge x 1/4\" ndle USE FOR ONE SHOT DAILY    amLODIPine (NORVASC) 10 mg tablet TAKE 1 TABLET BY MOUTH EVERY DAY    hydroCHLOROthiazide (MICROZIDE) 12.5 mg capsule TAKE ONE CAPSULE BY MOUTH EVERY DAY    sildenafil citrate (VIAGRA) 25 mg tablet TAKE 1-4 TABLETS BY MOUTH 30 MINUTES PRIOR TO ROMANCE. DO NOT REPEAT IN 24 HOURS    Insulin Needles, Disposable, (NOVOFINE 32) 32 gauge x 1/4\" ndle USE FOR ONE SHOT DAILY    lidocaine (LIDODERM) 5 % APPLY PATCH TO THE AFFECTED AREA FOR 12 HOURS A DAY AND REMOVE FOR 12 HOURS A DAY.  albuterol (PROVENTIL HFA, VENTOLIN HFA, PROAIR HFA) 90 mcg/actuation inhaler Take 2 Puffs by inhalation every four (4) hours as needed for Wheezing.  citalopram (CELEXA) 10 mg tablet Take 1 Tab by mouth daily.  JENTADUETO 2.5-1,000 mg per tablet TAKE 1 TABLET BY MOUTH TWICE A DAY WITH MEALS    Insulin Needles, Disposable, (NOVOFINE 32) 32 gauge x 1/4\" ndle USE FOR ONE SHOT DAILY    montelukast (SINGULAIR) 10 mg tablet TAKE 1 TABLET BY MOUTH DAILY    liraglutide (VICTOZA 2-DANIELA) 0.6 mg/0.1 mL (18 mg/3 mL) pnij INJECT 1.2 MG ONCE DAILY    cyclobenzaprine (FLEXERIL) 10 mg tablet Take 1 Tab by mouth three (3) times daily as needed for Muscle Spasm(s).  glucose blood VI test strips (CONTOUR NEXT TEST STRIPS) strip PATIENT IS TO CHECK BLOOD SUGAR TWICE DAILY. DX: E11.9    rosuvastatin (CRESTOR) 10 mg tablet TAKE 1 TABLET BY MOUTH EVERY DAY    pioglitazone (ACTOS) 15 mg tablet TAKE 1 TAB BY MOUTH DAILY.  coenzyme Q-10 (CO Q-10) 200 mg capsule Take 1 Cap by mouth daily.  cinnamon bark (CINNAMON) 500 mg cap Take 500 mg by mouth two (2) times a day. No current facility-administered medications for this visit. No Known Allergies  Review of Systems:  - Eyes: no blurry vision or double vision  - Cardiovascular: no chest pain  - Respiratory: no SOB or cough  - Musculoskeletal: chronic knee pain is improved  - Neurological: no numbness/tingling in extremities    Physical Examination:  Blood pressure 134/88, pulse 76, height 5' 9\" (1.753 m), weight 167 lb 3.2 oz (75.8 kg).    General: pleasant, no distress, good eye contact   Neck: no carotid bruits  Cardiovascular: regular, normal rate, nl s1 and s2, no m/r/g, 2+ DP pulses   Respiratory: clear bilaterally  Integumentary: no edema, no foot ulcers  Psychiatric: normal mood and affect    Diabetic foot exam:     Left Foot:   Visual Exam: normal    Pulse DP: 2+ (normal)   Filament test: normal sensation    Vibratory sensation: normal      Right Foot:   Visual Exam: normal    Pulse DP: 2+ (normal)   Filament test: normal sensation    Vibratory sensation: normal        Data Reviewed:   Component      Latest Ref Rng & Units 4/26/2019 4/26/2019 4/26/2019           2:39 PM  2:39 PM  2:39 PM   Glucose      65 - 99 mg/dL   97   BUN      6 - 24 mg/dL   16   Creatinine      0.76 - 1.27 mg/dL   1.42 (H)   BUN/Creatinine ratio      9 - 20   11   Sodium      134 - 144 mmol/L   144   Potassium      3.5 - 5.2 mmol/L   3.9   Chloride      96 - 106 mmol/L   100   CO2      20 - 29 mmol/L   27   Calcium      8.7 - 10.2 mg/dL   9.8   Protein, total      6.0 - 8.5 g/dL   7.4   Albumin      3.5 - 5.5 g/dL   4.4   GLOBULIN, TOTAL      1.5 - 4.5 g/dL   3.0   A-G Ratio      1.2 - 2.2   1.5   Bilirubin, total      0.0 - 1.2 mg/dL   0.4   Alk. phosphatase      39 - 117 IU/L   65   AST      0 - 40 IU/L   19   ALT (SGPT)      0 - 44 IU/L   8   Hemoglobin A1c, (calculated)      4.8 - 5.6 %  6.5 (H)    Estimated average glucose      mg/dL  140    TSH      0.450 - 4.500 uIU/mL 1.840         Assessment/Plan:   1) DM > Pt has had excellent weight loss and his BGs have improved as well. Pt encouraged to keep up the good work and continue the Victoza 1.2mg daily, Linagliptin/Metformin 2.5/1000mg BID, but will have pt stop the Pioglitazone 15mg daily. Will see how he does off the Pioglitazone and see him back in 3 months. Pt to check her BGs twice per day. 2) HTN > BP at goal on his current regimen. No changes needed at this time. 3) HLD > Pt's lipid panel was at goal in June 2018, pt is on Rosuvastatin 10mg daily, which he is tolerating well. This is a high intensity statin regimen. 4) ED > Will give pt a prescription for Cialas 20mg PRN. Pt voices understanding and agreement with the plan.     RTC 3 months    Follow-up and Dispositions    · Return in about 3 months (around 8/31/2019).          Copy sent to:  Dr. Reva Escobedo

## 2019-06-14 DIAGNOSIS — I10 ESSENTIAL HYPERTENSION WITH GOAL BLOOD PRESSURE LESS THAN 130/80: ICD-10-CM

## 2019-06-14 RX ORDER — HYDROCHLOROTHIAZIDE 12.5 MG/1
CAPSULE ORAL
Qty: 30 CAP | Refills: 0 | Status: SHIPPED | OUTPATIENT
Start: 2019-06-14 | End: 2019-07-09 | Stop reason: SDUPTHER

## 2019-06-14 RX ORDER — AMLODIPINE BESYLATE 10 MG/1
TABLET ORAL
Qty: 30 TAB | Refills: 0 | Status: SHIPPED | OUTPATIENT
Start: 2019-06-14 | End: 2019-07-09 | Stop reason: SDUPTHER

## 2019-06-21 NOTE — PROGRESS NOTES
Chief Complaint Patient presents with  Diabetes PCP and pharmacy confirmed Records since last visit reviewed. History of Present Illness: Gavino Correa is a 47 y.o. male here for follow up of diabetes. He was diagnosed with diabetes 2015. At our last visit in August 2018 His A1C had improved from 8.3% down to 7.2% on Victoza 1.2mg daily, Pioglitazode 15mg daily and Linagliptin/Metformin 2.5/1000mg BID Pt has lost 15 pounds and notes he is exercising more and is feeling much better. His new girlfriend is \"a fitness person\" and she has helped him change his diet and lifestyle. He checks his BGs about once per week. He is taking the Victoza 1.2mg every morning, Linagliptin/Metformin 2.5/1000mg BID and Pioglitazone 15mg daily. Pt is eating 2 meals per day. He will drink water and coffee in the morning, his girlfriend is trying to get him to eat 3 meals per day. He has replaced his chocolate with pineapple and strawberries. With the Victoza he notes his portions have come down quite a bit. He is going to the gym every day with his girlfriend and he has been going for a walk every morning. He is getting 8000 steps per day. No history of vascular disease. No history of neuropathy, or nephropathy. Last eye exam was June 2018, no retinopathy. He has not had any cortisone injections. He has been having some pain in his left hand, from the frequent typing. With his weight loss he notes his knee pain is less. He is having his knees \"checked\" in the near future. Current Outpatient Medications Medication Sig  
 rosuvastatin (CRESTOR) 10 mg tablet TAKE 1 TABLET BY MOUTH EVERY DAY  glucose blood VI test strips (CONTOUR NEXT TEST STRIPS) strip PATIENT IS TO CHECK BLOOD SUGAR TWICE DAILY  Liraglutide (VICTOZA 3-DANIELA) 0.6 mg/0.1 mL (18 mg/3 mL) pnij INJECT 1.2 MG ONCE DAILY  montelukast (SINGULAIR) 10 mg tablet TAKE 1 TABLET BY MOUTH DAILY  amLODIPine (NORVASC) 10 mg tablet TAKE 1 TABLET BY MOUTH EVERY DAY  hydroCHLOROthiazide (MICROZIDE) 12.5 mg capsule TAKE ONE CAPSULE BY MOUTH EVERY DAY  JENTADUETO 2.5-1,000 mg per tablet TAKE 1 TABLET BY MOUTH TWICE A DAY WITH MEALS  pioglitazone (ACTOS) 15 mg tablet TAKE 1 TAB BY MOUTH DAILY.  Insulin Needles, Disposable, (NOVOFINE 32) 32 gauge x 1/4\" ndle One shot daily  albuterol (PROVENTIL HFA, VENTOLIN HFA, PROAIR HFA) 90 mcg/actuation inhaler Take 2 Puffs by inhalation every four (4) hours as needed for Wheezing.  coenzyme Q-10 (CO Q-10) 200 mg capsule Take 1 Cap by mouth daily.  cinnamon bark (CINNAMON) 500 mg cap Take 500 mg by mouth two (2) times a day. No current facility-administered medications for this visit. No Known Allergies Review of Systems: - Eyes: no blurry vision or double vision - Cardiovascular: no chest pain - Respiratory: no SOB or cough - Musculoskeletal: chronic knee pain is improved - Neurological: no numbness/tingling in extremities Physical Examination: 
Blood pressure 138/89, height 5' 9\" (1.753 m), weight 204 lb (92.5 kg). General: pleasant, no distress, good eye contact Neck: no carotid bruits Cardiovascular: regular, normal rate, nl s1 and s2, no m/r/g, 2+ DP pulses Respiratory: clear bilaterally Integumentary: no edema, no foot ulcers Psychiatric: normal mood and affect Diabetic foot exam:  
 
Left Foot: 
 Visual Exam: normal  
 Pulse DP: 2+ (normal) Filament test: normal sensation Vibratory sensation: normal 
   
Right Foot: 
 Visual Exam: normal  
 Pulse DP: 2+ (normal) Filament test: normal sensation Vibratory sensation: normal 
 
 
 
Data Reviewed:  
- None Assessment/Plan:  
1) DM > Pt has had excellent weight loss and when he does check his BGs he has not see BGs above 150. Will check his A1C today.  Pt encouraged to keep up the good work and continue the [place] OTR daily, Linagliptin/Metformin 2.5/1000mg BID and Pioglitazone 15mg daily. Pt to check her BGs twice per day. 2) HTN > BP at goal on his current regimen. No changes needed at this time. 3) HLD > Pt's lipid panel was at goal in June 2018, pt is on Rosuvastatin 10mg daily, which he is tolerating well. This is a high intensity statin regimen. 4) ED > Will give pt a prescription for Cialas 20mg PRN. Pt voices understanding and agreement with the plan. RTC 6 months Follow-up Disposition: 
Return in about 6 months (around 5/30/2019). Copy sent to: 
Dr. She Miranda 19

## 2019-06-28 ENCOUNTER — OFFICE VISIT (OUTPATIENT)
Dept: INTERNAL MEDICINE CLINIC | Age: 54
End: 2019-06-28

## 2019-06-28 VITALS
HEART RATE: 86 BPM | BODY MASS INDEX: 26.36 KG/M2 | WEIGHT: 178 LBS | HEIGHT: 69 IN | RESPIRATION RATE: 16 BRPM | TEMPERATURE: 98.5 F | OXYGEN SATURATION: 98 % | SYSTOLIC BLOOD PRESSURE: 119 MMHG | DIASTOLIC BLOOD PRESSURE: 71 MMHG

## 2019-06-28 DIAGNOSIS — Z23 ENCOUNTER FOR IMMUNIZATION: ICD-10-CM

## 2019-06-28 RX ORDER — ASPIRIN 81 MG/1
TABLET ORAL DAILY
COMMUNITY
End: 2019-09-06

## 2019-06-28 NOTE — PROGRESS NOTES
Rm#[id number]    Chief Complaint   Patient presents with    Diabetes     6 month f/u    Hypertension     6 month f/u      1. Have you been to the ER, urgent care clinic since your last visit? Hospitalized since your last visit? No    2. Have you seen or consulted any other health care providers outside of the [address] [place] [address] since your last visit? Include any pap smears or colon screening.  No     Health Maintenance Due   Topic Date Due    FOBT Q 1 YEAR AGE 50-75  06/30/2016    Shingrix Vaccine Age 50> (2 of 2) 08/02/2018     3 most recent PHQ Screens 6/28/2019   Little interest or pleasure in doing things Not at all   Feeling down, depressed, irritable, or hopeless Not at all   Total Score PHQ 2 0

## 2019-07-05 RX ORDER — SILDENAFIL 25 MG/1
TABLET, FILM COATED ORAL
Qty: 25 TAB | Refills: 0 | Status: SHIPPED | OUTPATIENT
Start: 2019-07-05 | End: 2019-08-15 | Stop reason: SDUPTHER

## 2019-07-08 NOTE — PROGRESS NOTES
HISTORY OF PRESENT ILLNESS Katherine Stoner is a 49 y.o. male. HPI 
 
ROS Physical Exam 
 
ASSESSMENT and PLAN 
{ASSESSMENT/PLAN:59116}

## 2019-07-09 DIAGNOSIS — I10 ESSENTIAL HYPERTENSION WITH GOAL BLOOD PRESSURE LESS THAN 130/80: ICD-10-CM

## 2019-07-10 RX ORDER — HYDROCHLOROTHIAZIDE 12.5 MG/1
CAPSULE ORAL
Qty: 30 CAP | Refills: 0 | Status: SHIPPED | OUTPATIENT
Start: 2019-07-10 | End: 2019-08-05 | Stop reason: SDUPTHER

## 2019-07-10 RX ORDER — AMLODIPINE BESYLATE 10 MG/1
TABLET ORAL
Qty: 30 TAB | Refills: 0 | Status: SHIPPED | OUTPATIENT
Start: 2019-07-10 | End: 2019-08-05 | Stop reason: SDUPTHER

## 2019-07-10 RX ORDER — LINAGLIPTIN AND METFORMIN HYDROCHLORIDE 2.5; 1 MG/1; MG/1
TABLET, FILM COATED ORAL
Qty: 60 TAB | Refills: 2 | Status: SHIPPED | OUTPATIENT
Start: 2019-07-10 | End: 2019-10-24 | Stop reason: SDUPTHER

## 2019-07-17 DIAGNOSIS — R05.9 COUGH: ICD-10-CM

## 2019-07-17 RX ORDER — MONTELUKAST SODIUM 10 MG/1
TABLET ORAL
Qty: 90 TAB | Refills: 1 | Status: SHIPPED | OUTPATIENT
Start: 2019-07-17 | End: 2019-12-13 | Stop reason: SDUPTHER

## 2019-08-02 RX ORDER — ROSUVASTATIN CALCIUM 10 MG/1
TABLET, COATED ORAL
Qty: 90 TAB | Refills: 2 | Status: SHIPPED | OUTPATIENT
Start: 2019-08-02 | End: 2020-03-20

## 2019-08-15 RX ORDER — BLOOD SUGAR DIAGNOSTIC
STRIP MISCELLANEOUS
Qty: 100 PEN NEEDLE | Refills: 3 | Status: SHIPPED | OUTPATIENT
Start: 2019-08-15 | End: 2020-09-04 | Stop reason: SDUPTHER

## 2019-08-16 RX ORDER — SILDENAFIL 25 MG/1
TABLET, FILM COATED ORAL
Qty: 25 TAB | Refills: 0 | Status: SHIPPED | OUTPATIENT
Start: 2019-08-16 | End: 2019-10-29 | Stop reason: SDUPTHER

## 2019-08-18 DIAGNOSIS — F32.A DEPRESSION, UNSPECIFIED DEPRESSION TYPE: ICD-10-CM

## 2019-08-19 RX ORDER — CITALOPRAM 10 MG/1
TABLET ORAL
Qty: 90 TAB | Refills: 1 | Status: SHIPPED | OUTPATIENT
Start: 2019-08-19 | End: 2019-10-09 | Stop reason: SDUPTHER

## 2019-08-24 DIAGNOSIS — J45.20 MILD INTERMITTENT ASTHMA WITHOUT COMPLICATION: ICD-10-CM

## 2019-08-26 RX ORDER — ALBUTEROL SULFATE 90 UG/1
AEROSOL, METERED RESPIRATORY (INHALATION)
Qty: 8.5 INHALER | Refills: 0 | Status: SHIPPED | OUTPATIENT
Start: 2019-08-26 | End: 2020-03-25 | Stop reason: SDUPTHER

## 2019-08-30 DIAGNOSIS — I10 ESSENTIAL HYPERTENSION: ICD-10-CM

## 2019-08-30 DIAGNOSIS — E11.9 TYPE 2 DIABETES MELLITUS WITHOUT COMPLICATION, WITHOUT LONG-TERM CURRENT USE OF INSULIN (HCC): ICD-10-CM

## 2019-08-30 DIAGNOSIS — N52.8 OTHER MALE ERECTILE DYSFUNCTION: ICD-10-CM

## 2019-08-30 DIAGNOSIS — E78.2 MIXED HYPERLIPIDEMIA: ICD-10-CM

## 2019-08-31 LAB
ALBUMIN SERPL-MCNC: 4.3 G/DL (ref 3.5–5.5)
ALBUMIN/CREAT UR: 4.5 MG/G CREAT (ref 0–30)
ALBUMIN/GLOB SERPL: 1.3 {RATIO} (ref 1.2–2.2)
ALP SERPL-CCNC: 58 IU/L (ref 39–117)
ALT SERPL-CCNC: 9 IU/L (ref 0–44)
AST SERPL-CCNC: 12 IU/L (ref 0–40)
BILIRUB SERPL-MCNC: 0.5 MG/DL (ref 0–1.2)
BUN SERPL-MCNC: 15 MG/DL (ref 6–24)
BUN/CREAT SERPL: 10 (ref 9–20)
CALCIUM SERPL-MCNC: 9.6 MG/DL (ref 8.7–10.2)
CHLORIDE SERPL-SCNC: 101 MMOL/L (ref 96–106)
CHOLEST SERPL-MCNC: 150 MG/DL (ref 100–199)
CO2 SERPL-SCNC: 27 MMOL/L (ref 20–29)
CREAT SERPL-MCNC: 1.53 MG/DL (ref 0.76–1.27)
CREAT UR-MCNC: 73 MG/DL
EST. AVERAGE GLUCOSE BLD GHB EST-MCNC: 131 MG/DL
GLOBULIN SER CALC-MCNC: 3.2 G/DL (ref 1.5–4.5)
GLUCOSE SERPL-MCNC: 105 MG/DL (ref 65–99)
HBA1C MFR BLD: 6.2 % (ref 4.8–5.6)
HDLC SERPL-MCNC: 42 MG/DL
INTERPRETATION, 910389: NORMAL
INTERPRETATION: NORMAL
LDLC SERPL CALC-MCNC: 98 MG/DL (ref 0–99)
Lab: NORMAL
MICROALBUMIN UR-MCNC: 3.3 UG/ML
PDF IMAGE, 910387: NORMAL
POTASSIUM SERPL-SCNC: 4.4 MMOL/L (ref 3.5–5.2)
PROT SERPL-MCNC: 7.5 G/DL (ref 6–8.5)
SODIUM SERPL-SCNC: 144 MMOL/L (ref 134–144)
TRIGL SERPL-MCNC: 51 MG/DL (ref 0–149)
VLDLC SERPL CALC-MCNC: 10 MG/DL (ref 5–40)

## 2019-09-06 ENCOUNTER — OFFICE VISIT (OUTPATIENT)
Dept: ENDOCRINOLOGY | Age: 54
End: 2019-09-06

## 2019-09-06 VITALS
HEIGHT: 69 IN | BODY MASS INDEX: 25.8 KG/M2 | WEIGHT: 174.2 LBS | DIASTOLIC BLOOD PRESSURE: 81 MMHG | SYSTOLIC BLOOD PRESSURE: 120 MMHG | HEART RATE: 88 BPM

## 2019-09-06 DIAGNOSIS — I10 ESSENTIAL HYPERTENSION: ICD-10-CM

## 2019-09-06 DIAGNOSIS — E11.9 TYPE 2 DIABETES MELLITUS WITHOUT COMPLICATION, WITHOUT LONG-TERM CURRENT USE OF INSULIN (HCC): Primary | ICD-10-CM

## 2019-09-06 DIAGNOSIS — E78.2 MIXED HYPERLIPIDEMIA: ICD-10-CM

## 2019-09-06 NOTE — PROGRESS NOTES
Chief Complaint   Patient presents with    Diabetes     pcp and pharmacy verified. Eye exam due    Records since last visit reviewed. History of Present Illness: Reggie Hu is a 49 y.o. male here for follow up of diabetes. He was diagnosed with diabetes 2015. His A1C in April 2019 was down to 6.5% he had good weight loss and his BGs were doing well so we stopped his Pioglitazone. Last week his A1C was down further to 6.2%  Pt is still taking  Victoza 1.2mg every morning and Linagliptin/Metformin 2.5/1000mg BID. Pt notes he has started back at the gym (he had been slipping for a little while, but he has restarted). Pt notes he had some slips with his diet but is working on getting back on track. Pt notes he had some stress in his life (he and his GF have . Pt did gradate from college two weeks ago. Pt has increased his Celexa and he noted he was feeling more depressed. No history of vascular disease. No history of neuropathy, or nephropathy. Last eye exam was June 2018, no retinopathy. He has not had any cortisone injections. He notes his knee pain has resolved with the weigh loss. Current Outpatient Medications   Medication Sig    albuterol (PROVENTIL HFA, VENTOLIN HFA, PROAIR HFA) 90 mcg/actuation inhaler INHALE 2 PUFFS BY MOUTH EVERY 4 HOURS AS NEEDED FOR WHEEZE    citalopram (CELEXA) 10 mg tablet TAKE 1 TABLET BY MOUTH EVERY DAY    sildenafil citrate (VIAGRA) 25 mg tablet TAKE 1-4 TABLETS BY MOUTH 30 MINUTES PRIOR TO ROMANCE. DO NOT REPEAT IN 24 HOURS    Insulin Needles, Disposable, (NOVOFINE 32) 32 gauge x 1/4\" ndle USE FOR ONE SHOT DAILY    amLODIPine (NORVASC) 10 mg tablet TAKE 1 TABLET BY MOUTH EVERY DAY    hydroCHLOROthiazide (MICROZIDE) 12.5 mg capsule TAKE ONE CAPSULE BY MOUTH EVERY DAY    rosuvastatin (CRESTOR) 10 mg tablet TAKE 1 TABLET BY MOUTH EVERY DAY    montelukast (SINGULAIR) 10 mg tablet TAKE 1 TABLET BY MOUTH DAILY    JENTADUETO 2.5-1,000 mg per tablet TAKE 1 TABLET BY MOUTH TWICE A DAY WITH MEALS    Insulin Needles, Disposable, (NOVOFINE 32) 32 gauge x 1/4\" ndle USE FOR ONE SHOT DAILY    Insulin Needles, Disposable, (NOVOFINE 32) 32 gauge x 1/4\" ndle USE FOR ONE SHOT DAILY    lidocaine (LIDODERM) 5 % APPLY PATCH TO THE AFFECTED AREA FOR 12 HOURS A DAY AND REMOVE FOR 12 HOURS A DAY.  Insulin Needles, Disposable, (NOVOFINE 32) 32 gauge x 1/4\" ndle USE FOR ONE SHOT DAILY    liraglutide (VICTOZA 2-DANIELA) 0.6 mg/0.1 mL (18 mg/3 mL) pnij INJECT 1.2 MG ONCE DAILY    cyclobenzaprine (FLEXERIL) 10 mg tablet Take 1 Tab by mouth three (3) times daily as needed for Muscle Spasm(s).  glucose blood VI test strips (CONTOUR NEXT TEST STRIPS) strip PATIENT IS TO CHECK BLOOD SUGAR TWICE DAILY. DX: E11.9    pioglitazone (ACTOS) 15 mg tablet TAKE 1 TAB BY MOUTH DAILY.  coenzyme Q-10 (CO Q-10) 200 mg capsule Take 1 Cap by mouth daily.  cinnamon bark (CINNAMON) 500 mg cap Take 500 mg by mouth two (2) times a day. No current facility-administered medications for this visit. No Known Allergies  Review of Systems:  - Eyes: no blurry vision or double vision  - Cardiovascular: no chest pain  - Respiratory: no SOB or cough  - Musculoskeletal: chronic knee pain is improved  - Neurological: no numbness/tingling in extremities    Physical Examination:  Blood pressure 120/81, pulse 88, height 5' 9\" (1.753 m), weight 174 lb 3.2 oz (79 kg).    General: pleasant, no distress, good eye contact   Neck: no carotid bruits  Cardiovascular: regular, normal rate, nl s1 and s2, no m/r/g, 2+ DP pulses   Respiratory: clear bilaterally  Integumentary: no edema, no foot ulcers  Psychiatric: normal mood and affect    Diabetic foot exam:     Left Foot:   Visual Exam: normal    Pulse DP: 2+ (normal)   Filament test: normal sensation    Vibratory sensation: normal      Right Foot:   Visual Exam: normal    Pulse DP: 2+ (normal)   Filament test: normal sensation    Vibratory sensation: normal        Data Reviewed:   Component      Latest Ref Rng & Units 8/30/2019 8/30/2019 8/30/2019 8/30/2019           7:48 AM  7:48 AM  7:48 AM  7:48 AM   Glucose      65 - 99 mg/dL 105 (H)      BUN      6 - 24 mg/dL 15      Creatinine      0.76 - 1.27 mg/dL 1.53 (H)      GFR est non-AA      >59 mL/min/1.73 51 (L)      GFR est AA      >59 mL/min/1.73 59 (L)      BUN/Creatinine ratio      9 - 20 10      Sodium      134 - 144 mmol/L 144      Potassium      3.5 - 5.2 mmol/L 4.4      Chloride      96 - 106 mmol/L 101      CO2      20 - 29 mmol/L 27      Calcium      8.7 - 10.2 mg/dL 9.6      Protein, total      6.0 - 8.5 g/dL 7.5      Albumin      3.5 - 5.5 g/dL 4.3      GLOBULIN, TOTAL      1.5 - 4.5 g/dL 3.2      A-G Ratio      1.2 - 2.2 1.3      Bilirubin, total      0.0 - 1.2 mg/dL 0.5      Alk. phosphatase      39 - 117 IU/L 58      AST      0 - 40 IU/L 12      ALT (SGPT)      0 - 44 IU/L 9      Cholesterol, total      100 - 199 mg/dL  150     Triglyceride      0 - 149 mg/dL  51     HDL Cholesterol      >39 mg/dL  42     VLDL, calculated      5 - 40 mg/dL  10     LDL, calculated      0 - 99 mg/dL  98     Creatinine, urine      Not Estab. mg/dL   73.0    Microalbumin, urine      Not Estab. ug/mL   3.3    Microalbumin/Creat. Ratio      0.0 - 30.0 mg/g creat   4.5    Hemoglobin A1c, (calculated)      4.8 - 5.6 %    6.2 (H)   Estimated average glucose      mg/dL    131       Assessment/Plan:   1) DM > His A1C last week was 6.2% Pt encouraged to keep up the good work and continue the Victoza 1.2mg daily, Linagliptin/Metformin 2.5/1000mg BID. Pt to check her BGs twice per day. 2) HTN > BP at goal on his current regimen. No changes needed at this time. 3) HLD > Pt's lipid panel was at goal in September 2019, pt is on Rosuvastatin 10mg daily, which he is tolerating well. This is a high intensity statin regimen. Pt voices understanding and agreement with the plan.     RTC 6 months    Follow-up and Dispositions    · Return in about 6 months (around 3/6/2020).          Copy sent to:  Dr. Jose Antonio Sanders

## 2019-10-09 DIAGNOSIS — F32.A DEPRESSION, UNSPECIFIED DEPRESSION TYPE: ICD-10-CM

## 2019-10-09 RX ORDER — CITALOPRAM 10 MG/1
20 TABLET ORAL DAILY
Qty: 90 TAB | Refills: 1 | Status: SHIPPED | OUTPATIENT
Start: 2019-10-09 | End: 2020-01-06 | Stop reason: SDUPTHER

## 2019-10-13 DIAGNOSIS — I10 ESSENTIAL HYPERTENSION WITH GOAL BLOOD PRESSURE LESS THAN 130/80: ICD-10-CM

## 2019-10-14 RX ORDER — AMLODIPINE BESYLATE 10 MG/1
TABLET ORAL
Qty: 90 TAB | Refills: 1 | Status: SHIPPED | OUTPATIENT
Start: 2019-10-14 | End: 2020-06-15

## 2019-10-24 RX ORDER — LINAGLIPTIN AND METFORMIN HYDROCHLORIDE 2.5; 1 MG/1; MG/1
TABLET, FILM COATED ORAL
Qty: 60 TAB | Refills: 2 | Status: SHIPPED | OUTPATIENT
Start: 2019-10-24 | End: 2020-01-18

## 2019-10-25 RX ORDER — HYDROCHLOROTHIAZIDE 12.5 MG/1
CAPSULE ORAL
Qty: 90 CAP | Refills: 1 | Status: SHIPPED | OUTPATIENT
Start: 2019-10-25 | End: 2020-05-05

## 2019-11-26 ENCOUNTER — OFFICE VISIT (OUTPATIENT)
Dept: INTERNAL MEDICINE CLINIC | Age: 54
End: 2019-11-26

## 2019-11-26 VITALS
SYSTOLIC BLOOD PRESSURE: 124 MMHG | HEIGHT: 69 IN | HEART RATE: 81 BPM | BODY MASS INDEX: 27.73 KG/M2 | WEIGHT: 187.2 LBS | OXYGEN SATURATION: 98 % | TEMPERATURE: 99.1 F | RESPIRATION RATE: 16 BRPM | DIASTOLIC BLOOD PRESSURE: 83 MMHG

## 2019-11-26 DIAGNOSIS — N28.9 RENAL INSUFFICIENCY: ICD-10-CM

## 2019-11-26 DIAGNOSIS — F32.A DEPRESSION, UNSPECIFIED DEPRESSION TYPE: ICD-10-CM

## 2019-11-26 DIAGNOSIS — E11.69 CONTROLLED TYPE 2 DIABETES MELLITUS WITH OTHER SPECIFIED COMPLICATION, WITHOUT LONG-TERM CURRENT USE OF INSULIN (HCC): ICD-10-CM

## 2019-11-26 DIAGNOSIS — I10 ESSENTIAL HYPERTENSION WITH GOAL BLOOD PRESSURE LESS THAN 130/80: Primary | ICD-10-CM

## 2019-11-26 DIAGNOSIS — Z23 ENCOUNTER FOR IMMUNIZATION: ICD-10-CM

## 2019-11-26 NOTE — PATIENT INSTRUCTIONS
 
Blood pressure is a measure of how hard the blood pushes against the walls of your arteries. It's normal for blood pressure to go up and down throughout the day, but if it stays up, you have high blood pressure. Another name for high blood pressure is hypertension. Two numbers tell you your blood pressure. The first number is the systolic pressure. It shows how hard the blood pushes when your heart is pumping. The second number is the diastolic pressure. It shows how hard the blood pushes between heartbeats, when your heart is relaxed and filling with blood. Your doctor will give you a goal for your blood pressure. Your goal will be based on your health and your age. High blood pressure (hypertension) means that the top number stays high, or the bottom number stays high, or both. High blood pressure increases the risk of stroke, heart attack, and other problems. You and your doctor will talk about your risks of these problems based on your blood pressure. What happens when you have high blood pressure? · Blood flows through your arteries with too much force. Over time, this damages the walls of your arteries. But you can't feel it. High blood pressure usually doesn't cause symptoms. · Fat and calcium start to build up in your arteries. This buildup is called plaque. Plaque makes your arteries narrower and stiffer. Blood can't flow through them as easily. · This lack of good blood flow starts to damage some of the organs in your body. This can lead to problems such as coronary artery disease and heart attack, heart failure, stroke, kidney failure, and eye damage. How can you prevent high blood pressure? · Stay at a healthy weight. · Try to limit how much sodium you eat to less than 2,300 milligrams (mg) a day. If you limit your sodium to 1,500 mg a day, you can lower your blood pressure even more. ? Buy foods that are labeled \"unsalted,\" \"sodium-free,\" or \"low-sodium. \" Foods labeled \"reduced-sodium\" and \"light sodium\" may still have too much sodium. ? Flavor your food with garlic, lemon juice, onion, vinegar, herbs, and spices instead of salt. Do not use soy sauce, steak sauce, onion salt, garlic salt, mustard, or ketchup on your food. ? Use less salt (or none) when recipes call for it. You can often use half the salt a recipe calls for without losing flavor. · Be physically active. Get at least 30 minutes of exercise on most days of the week. Walking is a good choice. You also may want to do other activities, such as running, swimming, cycling, or playing tennis or team sports. · Limit alcohol to 2 drinks a day for men and 1 drink a day for women. · Eat plenty of fruits, vegetables, and low-fat dairy products. Eat less saturated and total fats. How is high blood pressure treated? · Your doctor will suggest making lifestyle changes to help your heart. For example, your doctor may ask you to eat healthy foods, quit smoking, lose extra weight, and be more active. · If lifestyle changes don't help enough, your doctor may recommend that you take medicine. · When blood pressure is very high, medicines are needed to lower it. Follow-up care is a key part of your treatment and safety. Be sure to make and go to all appointments, and call your doctor if you are having problems. It's also a good idea to know your test results and keep a list of the medicines you take. Where can you learn more? Go to http://rosi-shell.info/. Enter P501 in the search box to learn more about \"Learning About High Blood Pressure. \" Current as of: April 9, 2019 Content Version: 12.2 © 1575-5782 [employer]. Care instructions adapted under license by [employer] (which disclaims liability or warranty for this information). If you have questions about a medical condition or this instruction, always ask your healthcare professional. [address] any warranty or liability for your use of this information. Home Blood Pressure Test: About This Test 
What is it? A home blood pressure test allows you to keep track of your blood pressure at home. Blood pressure is a measure of the force of blood against the walls of your arteries. Blood pressure readings include two numbers, such as 130/80 (say \"130 over 80\"). The first number is the systolic pressure. The second number is the diastolic pressure. Why is this test done? You may do this test at home to: · Find out if you have high blood pressure. · Track your blood pressure if you have high blood pressure. · Track how well medicine is working to reduce high blood pressure. · Check how lifestyle changes, such as weight loss and exercise, are affecting blood pressure. How can you prepare for the test? 
· Do not use caffeine, tobacco, or medicines known to raise blood pressure (such as nasal decongestant sprays) for at least 30 minutes before taking your blood pressure. · Do not exercise for at least 30 minutes before taking your blood pressure. What happens before the test? 
Take your blood pressure while you feel comfortable and relaxed. Sit quietly with both feet on the floor for at least 5 minutes before the test. 
What happens during the test? 
· Sit with your arm slightly bent and resting on a table so that your upper arm is at the same level as your heart. · Roll up your sleeve or take off your shirt to expose your upper arm. · Wrap the blood pressure cuff around your upper arm so that the lower edge of the cuff is about 1 inch above the bend of your elbow. Proceed with the following steps depending on if you are using an automatic or manual pressure monitor. Automatic blood pressure monitors · Press the on/off button on the automatic monitor and wait until the ready-to-measure \"heart\" symbol appears next to zero in the display window. · Press the start button. The cuff will inflate and deflate by itself. · Your blood pressure numbers will appear on the screen. · Write your numbers in your log book, along with the date and time. Manual blood pressure monitors · Place the earpieces of a stethoscope in your ears, and place the bell of the stethoscope over the artery, just below the cuff. · Close the valve on the rubber inflating bulb. · Squeeze the bulb rapidly with your opposite hand to inflate the cuff until the dial or column of mercury reads about 30 mm Hg higher than your usual systolic pressure. If you do not know your usual pressure, inflate the cuff to 210 mm Hg or until the pulse at your wrist disappears. · Open the pressure valve just slightly by twisting or pressing the valve on the bulb. · As you watch the pressure slowly fall, note the level on the dial at which you first start to hear a pulsing or tapping sound through the stethoscope. This is your systolic blood pressure. · Continue letting the air out slowly. The sounds will become muffled and will finally disappear. Note the pressure when the sounds completely disappear. This is your diastolic blood pressure. Let out all the remaining air. · Write your numbers in your log book, along with the date and time. What else should you know about the test? 
It is more accurate to take the average of several readings made throughout the day than to rely on a single reading. It's normal for blood pressure to go up and down throughout the day. Follow-up care is a key part of your treatment and safety. Be sure to make and go to all appointments, and call your doctor if you are having problems. It's also a good idea to keep a list of the medicines you take. Where can you learn more? Go to http://rosi-shell.info/. Enter C427 in the search box to learn more about \"Home Blood Pressure Test: About This Test.\" Current as of: April 9, 2019 Content Version: 12.2 © 3924-3762 [employer].  Care instructions adapted under license by [address] (which disclaims liability or warranty for this information). If you have questions about a medical condition or this instruction, always ask your healthcare professional. [address] any warranty or liability for your use of this information.

## 2019-11-26 NOTE — PROGRESS NOTES
Rm 7    Chief Complaint   Patient presents with    Hypertension     1. Have you been to the ER, urgent care clinic since your last visit? Hospitalized since your last visit? [facility], 2 weeks ago, sinus infection    2. Have you seen or consulted any other health care providers outside of the [facility] since your last visit? Include any pap smears or colon screening.  No    Health Maintenance Due   Topic Date Due    FOBT Q 1 YEAR AGE 50-75  06/30/2016     3 most recent PHQ Screens 11/26/2019   Little interest or pleasure in doing things Not at all   Feeling down, depressed, irritable, or hopeless Not at all   Total Score PHQ 2 0     Learning Assessment 3/11/2016   PRIMARY LEARNER Patient   HIGHEST LEVEL OF EDUCATION - PRIMARY LEARNER  SOME COLLEGE   BARRIERS PRIMARY LEARNER NONE   CO-LEARNER CAREGIVER No   PRIMARY LANGUAGE ENGLISH   LEARNER PREFERENCE PRIMARY READING   ANSWERED BY Patient   RELATIONSHIP SELF

## 2019-11-26 NOTE — PROGRESS NOTES
HISTORY OF PRESENT ILLNESS  Adi Monday is a 49 y.o. male presents for routine visit  HPI  Past Medical History:   Diagnosis Date    Arthritis     Borderline diabetic     Diabetes ([address]     Family history of premature CAD     Hypertension        Current Outpatient Medications   Medication Sig    sildenafil citrate (VIAGRA) 25 mg tablet TAKE 1-4 TABLETS BY MOUTH 30 MINUTES PRIOR TO ROMANCE. DO NOT REPEAT IN 24 HOURS    hydroCHLOROthiazide (MICROZIDE) 12.5 mg capsule TAKE ONE CAPSULE BY MOUTH EVERY DAY    JENTADUETO 2.5-1,000 mg per tablet TAKE 1 TABLET BY MOUTH TWICE A DAY WITH MEALS    amLODIPine (NORVASC) 10 mg tablet TAKE 1 TABLET BY MOUTH EVERY DAY    citalopram (CELEXA) 10 mg tablet Take 2 Tabs by mouth daily.  albuterol (PROVENTIL HFA, VENTOLIN HFA, PROAIR HFA) 90 mcg/actuation inhaler INHALE 2 PUFFS BY MOUTH EVERY 4 HOURS AS NEEDED FOR WHEEZE    Insulin Needles, Disposable, (NOVOFINE 32) 32 gauge x 1/4\" ndle USE FOR ONE SHOT DAILY    rosuvastatin (CRESTOR) 10 mg tablet TAKE 1 TABLET BY MOUTH EVERY DAY    montelukast (SINGULAIR) 10 mg tablet TAKE 1 TABLET BY MOUTH DAILY    Insulin Needles, Disposable, (NOVOFINE 32) 32 gauge x 1/4\" ndle USE FOR ONE SHOT DAILY    Insulin Needles, Disposable, (NOVOFINE 32) 32 gauge x 1/4\" ndle USE FOR ONE SHOT DAILY    lidocaine (LIDODERM) 5 % APPLY PATCH TO THE AFFECTED AREA FOR 12 HOURS A DAY AND REMOVE FOR 12 HOURS A DAY.  Insulin Needles, Disposable, (NOVOFINE 32) 32 gauge x 1/4\" ndle USE FOR ONE SHOT DAILY    liraglutide (VICTOZA 2-DANIELA) 0.6 mg/0.1 mL (18 mg/3 mL) pnij INJECT 1.2 MG ONCE DAILY    cyclobenzaprine (FLEXERIL) 10 mg tablet Take 1 Tab by mouth three (3) times daily as needed for Muscle Spasm(s).  glucose blood VI test strips (CONTOUR NEXT TEST STRIPS) strip PATIENT IS TO CHECK BLOOD SUGAR TWICE DAILY. DX: E11.9    coenzyme Q-10 (CO Q-10) 200 mg capsule Take 1 Cap by mouth daily.     cinnamon bark (CINNAMON) 500 mg cap Take 500 mg by mouth two (2) times a day. No current facility-administered medications for this visit. Diabetes managed by Dr. Stuart Marin, hemoglobin A1c 6.2%, 8/30/19    He is compliant with medical management     Going to gym 4 days weekly    Follows a healthy  diet     He plans to do a career change, feels like has hit a wall on the job    Celexa effective, no longer sees counselor     He and clarenceabdulaziz have      Was at [facility] 2 weeks ago for sinus infection, feels better   Review of Systems   Constitutional: Positive for weight loss (intentional ). Eyes: Negative. Respiratory: Negative. Cardiovascular: Negative. Gastrointestinal: Negative. Genitourinary: Negative. Musculoskeletal: Negative. Neurological: Negative. Psychiatric/Behavioral: Negative for depression. The patient is not nervous/anxious and does not have insomnia. Physical Exam  Constitutional:       Appearance: Normal appearance. HENT:      Head: Normocephalic and atraumatic. Neck:      Musculoskeletal: Normal range of motion and neck supple. Cardiovascular:      Rate and Rhythm: Normal rate and regular rhythm. Pulmonary:      Effort: Pulmonary effort is normal.      Breath sounds: Normal breath sounds. Neurological:      Mental Status: He is alert. Psychiatric:         Mood and Affect: Mood normal.         Behavior: Behavior normal.         ASSESSMENT and PLAN    ICD-10-CM ICD-9-CM    1. Essential hypertension with goal blood pressure less than 130/80 I10 401.9    2. Controlled type 2 diabetes mellitus with other specified complication, without long-term current use of insulin (HCC) E11.69 250.80    3. Encounter for immunization Z23 V03.89 ZOSTER VACC RECOMBINANT ADJUVANTED   4. Depression, unspecified depression type F32.9 311    5.  Renal insufficiency N28.9 593.9          current treatment plan is effective, no change in therapy  lab results and schedule of future lab studies reviewed with patient    Patient voices understanding and acceptance of this advice and will call back if any further questions or concerns. An After Visit Summary was printed and given to the patient.   reviewed medications and side effects in detail

## 2019-12-13 DIAGNOSIS — R05.9 COUGH: ICD-10-CM

## 2019-12-13 RX ORDER — MONTELUKAST SODIUM 10 MG/1
TABLET ORAL
Qty: 90 TAB | Refills: 1 | Status: SHIPPED | OUTPATIENT
Start: 2019-12-13 | End: 2020-06-15

## 2020-01-06 DIAGNOSIS — F32.A DEPRESSION, UNSPECIFIED DEPRESSION TYPE: ICD-10-CM

## 2020-01-09 RX ORDER — CITALOPRAM 10 MG/1
20 TABLET ORAL DAILY
Qty: 90 TAB | Refills: 1 | Status: SHIPPED | OUTPATIENT
Start: 2020-01-09 | End: 2020-08-03

## 2020-01-18 RX ORDER — LINAGLIPTIN AND METFORMIN HYDROCHLORIDE 2.5; 1 MG/1; MG/1
TABLET, FILM COATED ORAL
Qty: 60 TAB | Refills: 2 | Status: SHIPPED | OUTPATIENT
Start: 2020-01-18 | End: 2020-04-13

## 2020-02-03 ENCOUNTER — OFFICE VISIT (OUTPATIENT)
Dept: INTERNAL MEDICINE CLINIC | Age: 55
End: 2020-02-03

## 2020-02-03 VITALS
BODY MASS INDEX: 27.19 KG/M2 | HEART RATE: 85 BPM | WEIGHT: 183.6 LBS | DIASTOLIC BLOOD PRESSURE: 93 MMHG | SYSTOLIC BLOOD PRESSURE: 135 MMHG | RESPIRATION RATE: 16 BRPM | OXYGEN SATURATION: 98 % | HEIGHT: 69 IN | TEMPERATURE: 98 F

## 2020-02-03 DIAGNOSIS — G47.00 INSOMNIA, UNSPECIFIED TYPE: ICD-10-CM

## 2020-02-03 DIAGNOSIS — F32.A DEPRESSION, UNSPECIFIED DEPRESSION TYPE: Primary | ICD-10-CM

## 2020-02-03 DIAGNOSIS — E11.69 CONTROLLED TYPE 2 DIABETES MELLITUS WITH OTHER SPECIFIED COMPLICATION, WITHOUT LONG-TERM CURRENT USE OF INSULIN (HCC): ICD-10-CM

## 2020-02-03 DIAGNOSIS — I10 ESSENTIAL HYPERTENSION WITH GOAL BLOOD PRESSURE LESS THAN 130/80: ICD-10-CM

## 2020-02-03 RX ORDER — TRAZODONE HYDROCHLORIDE 50 MG/1
50 TABLET ORAL
Qty: 30 TAB | Refills: 2 | Status: SHIPPED | OUTPATIENT
Start: 2020-02-03 | End: 2020-03-06 | Stop reason: SDUPTHER

## 2020-02-03 NOTE — PROGRESS NOTES
HISTORY OF PRESENT ILLNESS  Freedom Alaniz is a 49 y.o. male presents for acute care   HPI  Past Medical History:   Diagnosis Date    Arthritis     Borderline diabetic     Diabetes ([address]     Family history of premature CAD     Hypertension        Current Outpatient Medications on File Prior to Visit   Medication Sig Dispense Refill    JENTADUETO 2.5-1,000 mg per tablet TAKE 1 TABLET BY MOUTH TWICE A DAY WITH MEALS 60 Tab 2    citalopram (CELEXA) 10 mg tablet Take 2 Tabs by mouth daily. 90 Tab 1    montelukast (SINGULAIR) 10 mg tablet TAKE 1 TABLET BY MOUTH DAILY 90 Tab 1    sildenafil citrate (VIAGRA) 25 mg tablet TAKE 1-4 TABLETS BY MOUTH 30 MINUTES PRIOR TO ROMANCE. DO NOT REPEAT IN 24 HOURS 25 Tab 3    hydroCHLOROthiazide (MICROZIDE) 12.5 mg capsule TAKE ONE CAPSULE BY MOUTH EVERY DAY 90 Cap 1    amLODIPine (NORVASC) 10 mg tablet TAKE 1 TABLET BY MOUTH EVERY DAY 90 Tab 1    albuterol (PROVENTIL HFA, VENTOLIN HFA, PROAIR HFA) 90 mcg/actuation inhaler INHALE 2 PUFFS BY MOUTH EVERY 4 HOURS AS NEEDED FOR WHEEZE 8.5 Inhaler 0    Insulin Needles, Disposable, (NOVOFINE 32) 32 gauge x 1/4\" ndle USE FOR ONE SHOT DAILY 100 Pen Needle 3    rosuvastatin (CRESTOR) 10 mg tablet TAKE 1 TABLET BY MOUTH EVERY DAY 90 Tab 2    Insulin Needles, Disposable, (NOVOFINE 32) 32 gauge x 1/4\" ndle USE FOR ONE SHOT DAILY 100 Pen Needle 3    Insulin Needles, Disposable, (NOVOFINE 32) 32 gauge x 1/4\" ndle USE FOR ONE SHOT DAILY 100 Pen Needle 1    Insulin Needles, Disposable, (NOVOFINE 32) 32 gauge x 1/4\" ndle USE FOR ONE SHOT DAILY 100 Pen Needle 6    liraglutide (VICTOZA 2-DANIELA) 0.6 mg/0.1 mL (18 mg/3 mL) pnij INJECT 1.2 MG ONCE DAILY 18 mL 3    glucose blood VI test strips (CONTOUR NEXT TEST STRIPS) strip PATIENT IS TO CHECK BLOOD SUGAR TWICE DAILY. DX: E11.9 200 Strip 3    coenzyme Q-10 (CO Q-10) 200 mg capsule Take 1 Cap by mouth daily.  30 Cap 11    cinnamon bark (CINNAMON) 500 mg cap Take 500 mg by mouth two (2) times a day.  lidocaine (LIDODERM) 5 % APPLY PATCH TO THE AFFECTED AREA FOR 12 HOURS A DAY AND REMOVE FOR 12 HOURS A DAY. 30 Patch 0    cyclobenzaprine (FLEXERIL) 10 mg tablet Take 1 Tab by mouth three (3) times daily as needed for Muscle Spasm(s). 30 Tab 0     No current facility-administered medications on file prior to visit. 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 psychotherapist     Past Medical History:   Diagnosis Date    Arthritis     Borderline diabetic     [facility]     Family history of premature CAD     Hypertension          Current Outpatient Medications   Medication Sig    traZODone (DESYREL) 50 mg tablet Take 1 Tab by mouth nightly.  JENTADUETO 2.5-1,000 mg per tablet TAKE 1 TABLET BY MOUTH TWICE A DAY WITH MEALS    citalopram (CELEXA) 10 mg tablet Take 2 Tabs by mouth daily.  montelukast (SINGULAIR) 10 mg tablet TAKE 1 TABLET BY MOUTH DAILY    sildenafil citrate (VIAGRA) 25 mg tablet TAKE 1-4 TABLETS BY MOUTH 30 MINUTES PRIOR TO ROMANCE. DO NOT REPEAT IN 24 HOURS    hydroCHLOROthiazide (MICROZIDE) 12.5 mg capsule TAKE ONE CAPSULE BY MOUTH EVERY DAY    amLODIPine (NORVASC) 10 mg tablet TAKE 1 TABLET BY MOUTH EVERY DAY    albuterol (PROVENTIL HFA, VENTOLIN HFA, PROAIR HFA) 90 mcg/actuation inhaler INHALE 2 PUFFS BY MOUTH EVERY 4 HOURS AS NEEDED FOR WHEEZE    Insulin Needles, Disposable, (NOVOFINE 32) 32 gauge x 1/4\" ndle USE FOR ONE SHOT DAILY    rosuvastatin (CRESTOR) 10 mg tablet TAKE 1 TABLET BY MOUTH EVERY DAY    Insulin Needles, Disposable, (NOVOFINE 32) 32 gauge x 1/4\" ndle USE FOR ONE SHOT DAILY    Insulin Needles, Disposable, (NOVOFINE 32) 32 gauge x 1/4\" ndle USE FOR ONE SHOT DAILY    Insulin Needles, Disposable, (NOVOFINE 32) 32 gauge x 1/4\" ndle USE FOR ONE SHOT DAILY    liraglutide (VICTOZA 2-DANIELA) 0.6 mg/0.1 mL (18 mg/3 mL) pnij INJECT 1.2 MG ONCE DAILY    glucose blood VI test strips (CONTOUR NEXT TEST STRIPS) strip PATIENT IS TO CHECK BLOOD SUGAR TWICE DAILY. DX: E11.9    coenzyme Q-10 (CO Q-10) 200 mg capsule Take 1 Cap by mouth daily.  cinnamon bark (CINNAMON) 500 mg cap Take 500 mg by mouth two (2) times a day. No current facility-administered medications for this visit. No Known Allergies  Review of Systems   Constitutional: Positive for malaise/fatigue. Negative for chills and fever. HENT: Negative. Eyes: Negative. Respiratory: Negative. Cardiovascular: Negative. Skin: Negative. Neurological: Positive for dizziness and headaches. Psychiatric/Behavioral: Positive for depression and memory loss. Negative for hallucinations, substance abuse and suicidal ideas. The patient is nervous/anxious and has insomnia. Visit Vitals  BP (!) 135/93 (BP 1 Location: Left arm, BP Patient Position: Sitting)   Pulse 85   Temp 98 °F (36.7 °C) (Oral)   Resp 16   Ht 5' 9\" (1.753 m)   Wt 183 lb 9.6 oz (83.3 kg)   SpO2 98%   BMI 27.11 kg/m²     Physical Exam  Constitutional:       Appearance: Normal appearance. HENT:      Head: Normocephalic and atraumatic. Cardiovascular:      Rate and Rhythm: Normal rate and regular rhythm. Pulmonary:      Effort: Pulmonary effort is normal.      Breath sounds: Normal breath sounds. Neurological:      Mental Status: He is alert and oriented to person, place, and time.  Mental status is at baseline. Cranial Nerves: No cranial nerve deficit. Psychiatric:         Mood and Affect: Mood normal.         Behavior: Behavior normal.         Thought Content: Thought content normal.         Judgment: Judgment normal.         ASSESSMENT and PLAN    ICD-10-CM ICD-9-CM    1. Depression, unspecified depression type F32.9 311 REFERRAL TO NEUROPSYCHOLOGY   2. Insomnia, unspecified type G47.00 780.52 traZODone (DESYREL) 50 mg tablet   3. Essential hypertension with goal blood pressure less than 130/80 I10 401.9    4. Controlled type 2 diabetes mellitus with other specified complication, without long-term current use of insulin ([facility]) E11.69 250.80      Diagnoses and all orders for this visit:    1. Depression, unspecified depression type  -     REFERRAL TO NEUROPSYCHOLOGY    2. Insomnia, unspecified type  -     traZODone (DESYREL) 50 mg tablet; Take 1 Tab by mouth nightly. 3. Essential hypertension with goal blood pressure less than 130/80    4. Controlled type 2 diabetes mellitus with other specified complication, without long-term current use of insulin ([facility]      Follow-up and Dispositions    · Return in about 4 weeks (around 3/2/2020) for depression, impaired memory . Discussed likely sleep deprivation, depression as cause of impaired memory. Encouraged neuropsychology consult   Defer change in antidepressant   reviewed medications and side effects in detail  Patient voices understanding and acceptance of this advice and will call back if any further questions or concerns. An After Visit Summary was printed and given to the patient.

## 2020-02-03 NOTE — PROGRESS NOTES
Rm 8    Chief Complaint   Patient presents with    Medication Evaluation     pt states hes having headaches, forgetforness, having a hard time focusing, sick on stomach. thinks its due to medication. Pt states he did have a fall last December. Not sure if its related to symptoms. 1. Have you been to the ER, urgent care clinic since your last visit? Hospitalized since your last visit? No    2. Have you seen or consulted any other health care providers outside of the [address] [place] [address] since your last visit? Include any pap smears or colon screening.  No    Health Maintenance Due   Topic Date Due    FOBT Q 1 YEAR AGE 50-75  06/30/2016    HEMOGLOBIN A1C Q6M  02/29/2020     3 most recent PHQ Screens 2/3/2020   Little interest or pleasure in doing things Not at all   Feeling down, depressed, irritable, or hopeless Not at all   Total Score PHQ 2 0       Learning Assessment 3/11/2016   PRIMARY LEARNER Patient   HIGHEST LEVEL OF EDUCATION - PRIMARY LEARNER  SOME COLLEGE   BARRIERS PRIMARY LEARNER NONE   CO-LEARNER CAREGIVER No   PRIMARY LANGUAGE ENGLISH   LEARNER PREFERENCE PRIMARY READING   ANSWERED BY Patient   RELATIONSHIP SELF

## 2020-02-03 NOTE — PATIENT INSTRUCTIONS
Depression and Chronic Disease: Care Instructions Your Care Instructions A chronic disease is one that you have for a long time. Some chronic diseases can be controlled, but they usually cannot be cured. Depression is common in people with chronic diseases, but it often goes unnoticed. Many people have concerns about seeking treatment for a mental health problem. You may think it's a sign of weakness, or you don't want people to know about it. It's important to overcome these reasons for not seeking treatment. Treating depression or anxiety is good for your health. Follow-up care is a key part of your treatment and safety. Be sure to make and go to all appointments, and call your doctor if you are having problems. It's also a good idea to know your test results and keep a list of the medicines you take. How can you care for yourself at home? Watch for symptoms of depression The symptoms of depression are often subtle at first. You may think they are caused by your disease rather than depression. Or you may think it is normal to be depressed when you have a chronic disease. If you are depressed you may: · Feel sad or hopeless. · Feel guilty or worthless. · Not enjoy the things you used to enjoy. · Feel hopeless, as though life is not worth living. · Have trouble thinking or remembering. · Have low energy, and you may not eat or sleep well. · Pull away from others. · Think often about death or killing yourself. (Keep the numbers for these national suicide hotlines: 2-623-007-TALK [1-780.869.1885] and 0-507-EMHDVPN [1-189.985.6651]. ) Get treatment By treating your depression, you can feel more hopeful and have more energy. If you feel better, you may take better care of yourself, so your health may improve. · Talk to your doctor if you have any changes in mood during treatment for your disease. · Ask your doctor for help.  Counseling, antidepressant medicine, or a combination of the two can help most people with depression. Often a combination works best. Counseling can also help you cope with having a chronic disease. When should you call for help? Call 911 anytime you think you may need emergency care. For example, call if: 
  · You feel like hurting yourself or someone else.  
  · Someone you know has depression and is about to attempt or is attempting suicide.  
[facility] your doctor now or seek immediate medical care if: 
  · You hear voices.  
  · Someone you know has depression and: 
? Starts to give away his or her possessions. ? Uses illegal drugs or drinks alcohol heavily. ? Talks or writes about death, including writing suicide notes or talking about guns, knives, or pills. ? Starts to spend a lot of time alone. ? Acts very aggressively or suddenly appears calm.  
 Watch closely for changes in your health, and be sure to contact your doctor if: 
  · You do not get better as expected. Where can you learn more? Go to http://rosi-shell.info/. Enter P787 in the search box to learn more about \"Depression and Chronic Disease: Care Instructions. \" Current as of: May 28, 2019 Content Version: 12.2 © 0164-6796 [employer]. Care instructions adapted under license by [employer] (which disclaims liability or warranty for this information). If you have questions about a medical condition or this instruction, always ask your healthcare professional. [place] [address] any warranty or liability for your use of this information. Recovering From Depression: Care Instructions Your Care Instructions Taking good care of yourself is important as you recover from depression. In time, your symptoms will fade as your treatment takes hold. Do not give up. Instead, focus your energy on getting better. Your mood will improve. It just takes some time.  Focus on things that can help you feel better, such as being with friends and family, eating well, and getting enough rest. But take things slowly. Do not do too much too soon. You will begin to feel better gradually. Follow-up care is a key part of your treatment and safety. Be sure to make and go to all appointments, and call your doctor if you are having problems. It's also a good idea to know your test results and keep a list of the medicines you take. How can you care for yourself at home? Be realistic · If you have a large task to do, break it up into smaller steps you can handle, and just do what you can. · You may want to put off important decisions until your depression has lifted. If you have plans that will have a major impact on your life, such as marriage, divorce, or a job change, try to wait a bit. Talk it over with friends and loved ones who can help you look at the overall picture first. 
· Reaching out to people for help is important. Do not isolate yourself. Let your family and friends help you. Find someone you can trust and confide in, and talk to that person. · Be patient, and be kind to yourself. Remember that depression is not your fault and is not something you can overcome with willpower alone. Treatment is necessary for depression, just like for any other illness. Feeling better takes time, and your mood will improve little by little. Stay active · Stay busy and get outside. Take a walk, or try some other light exercise. · Talk with your doctor about an exercise program. Exercise can help with mild depression. · Go to a movie or concert. Take part in a Judaism activity or other social gathering. Go to a ball game. · Ask a friend to have dinner with you. Take care of yourself · Eat a balanced diet with plenty of fresh fruits and vegetables, whole grains, and lean protein. If you have lost your appetite, eat small snacks rather than large meals. · Avoid drinking alcohol or using illegal drugs. Do not take medicines that have not been prescribed for you. They may interfere with medicines you may be taking for depression, or they may make your depression worse. · Take your medicines exactly as they are prescribed. You may start to feel better within 1 to 3 weeks of taking antidepressant medicine. But it can take as many as 6 to 8 weeks to see more improvement. If you have questions or concerns about your medicines, or if you do not notice any improvement by 3 weeks, talk to your doctor. · If you have any side effects from your medicine, tell your doctor. Antidepressants can make you feel tired, dizzy, or nervous. Some people have dry mouth, constipation, headaches, sexual problems, or diarrhea. Many of these side effects are mild and will go away on their own after you have been taking the medicine for a few weeks. Some may last longer. Talk to your doctor if side effects are bothering you too much. You might be able to try a different medicine. · Get enough sleep. If you have problems sleeping: 
? Go to bed at the same time every night, and get up at the same time every morning. ? Keep your bedroom dark and quiet. ? Do not exercise after 5:00 p.m. ? Avoid drinks with caffeine after 5:00 p.m. · Avoid sleeping pills unless they are prescribed by the doctor treating your depression. Sleeping pills may make you groggy during the day, and they may interact with other medicine you are taking. · If you have any other illnesses, such as diabetes, heart disease, or high blood pressure, make sure to continue with your treatment. Tell your doctor about all of the medicines you take, including those with or without a prescription. · Keep the numbers for these national suicide hotlines: 0-888-310-TALK (6-955.149.7059) and 3-740-ILNGCZH (1-296.320.6034). If you or someone you know talks about suicide or feeling hopeless, get help right away. When should you call for help? Call 911 anytime you think you may need emergency care. For example, call if: 
  · You feel like hurting yourself or someone else.  
  · Someone you know has depression and is about to attempt or is attempting suicide.  
[facility] your doctor now or seek immediate medical care if: 
  · You hear voices.  
  · Someone you know has depression and: 
? Starts to give away his or her possessions. ? Uses illegal drugs or drinks alcohol heavily. ? Talks or writes about death, including writing suicide notes or talking about guns, knives, or pills. ? Starts to spend a lot of time alone. ? Acts very aggressively or suddenly appears calm.  
 Watch closely for changes in your health, and be sure to contact your doctor if: 
  · You do not get better as expected. Where can you learn more? Go to http://rosiFilmBreakshell.info/. Enter B261 in the search box to learn more about \"Recovering From Depression: Care Instructions. \" Current as of: May 28, 2019 Content Version: 12.2 © 5914-6755 [email or web address]. Care instructions adapted under license by [employer] (which disclaims liability or warranty for this information). If you have questions about a medical condition or this instruction, always ask your healthcare professional. [address] any warranty or liability for your use of this information. Learning About Sleeping Well What does sleeping well mean? Sleeping well means getting enough sleep. How much sleep is enough varies among people. The number of hours you sleep is not as important as how you feel when you wake up. If you do not feel refreshed, you probably need more sleep. Another sign of not getting enough sleep is feeling tired during the day. The average total nightly sleep time is 7½ to 8 hours. Healthy adults may need a little more or a little less than this. Why is getting enough sleep important? Getting enough quality sleep is a basic part of good health. When your sleep suffers, your mood and your thoughts can suffer too. You may find yourself feeling more grumpy or stressed. Not getting enough sleep also can lead to serious problems, including injury, accidents, anxiety, and depression. What might cause poor sleeping? Many things can cause sleep problems, including: · Stress. Stress can be caused by fear about a single event, such as giving a speech. Or you may have ongoing stress, such as worry about work or school. · Depression, anxiety, and other mental or emotional conditions. · Changes in your sleep habits or surroundings. This includes changes that happen where you sleep, such as noise, light, or sleeping in a different bed. It also includes changes in your sleep pattern, such as having jet lag or working a late shift. · Health problems, such as pain, breathing problems, and restless legs syndrome. · Lack of regular exercise. How can you help yourself? Here are some tips that may help you sleep more soundly and wake up feeling more refreshed. Your sleeping area · Use your bedroom only for sleeping and sex. A bit of light reading may help you fall asleep. But if it doesn't, do your reading elsewhere in the house. Don't watch TV in bed. · Be sure your bed is big enough to stretch out comfortably, especially if you have a sleep partner. · Keep your bedroom quiet, dark, and cool. Use curtains, blinds, or a sleep mask to block out light. To block out noise, use earplugs, soothing music, or a \"white noise\" machine. Your evening and bedtime routine · Create a relaxing bedtime routine. You might want to take a warm shower or bath, listen to soothing music, or drink a cup of noncaffeinated tea. · Go to bed at the same time every night. And get up at the same time every morning, even if you feel tired. What to avoid · Limit caffeine (coffee, tea, caffeinated sodas) during the day, and don't have any for at least 4 to 6 hours before bedtime. · Don't drink alcohol before bedtime. Alcohol can cause you to wake up more often during the night. · Don't smoke or use tobacco, especially in the evening. Nicotine can keep you awake. · Don't take naps during the day, especially close to bedtime. · Don't lie in bed awake for too long. If you can't fall asleep, or if you wake up in the middle of the night and can't get back to sleep within 15 minutes or so, get out of bed and go to another room until you feel sleepy. · Don't take medicine right before bed that may keep you awake or make you feel hyper or energized. Your doctor can tell you if your medicine may do this and if you can take it earlier in the day. If you can't sleep · Imagine yourself in a peaceful, pleasant scene. Focus on the details and feelings of being in a place that is relaxing. · Get up and do a quiet or boring activity until you feel sleepy. · Don't drink any liquids after 6 p.m. if you wake up often because you have to go to the bathroom. Where can you learn more? Go to http://rosi-shell.info/. Enter M321 in the search box to learn more about \"Learning About Sleeping Well. \" Current as of: May 28, 2019 Content Version: 12.2 © 7834-1468 [employer]. Care instructions adapted under license by [employer] (which disclaims liability or warranty for this information). If you have questions about a medical condition or this instruction, always ask your healthcare professional. [place] [address] any warranty or liability for your use of this information. Learning About High Blood Pressure What is high blood pressure? Blood pressure is a measure of how hard the blood pushes against the walls of your arteries. It's normal for blood pressure to go up and down throughout the day, but if it stays up, you have high blood pressure. Another name for high blood pressure is hypertension. Two numbers tell you your blood pressure. The first number is the systolic pressure. It shows how hard the blood pushes when your heart is pumping. The second number is the diastolic pressure. It shows how hard the blood pushes between heartbeats, when your heart is relaxed and filling with blood. Your doctor will give you a goal for your blood pressure. Your goal will be based on your health and your age. High blood pressure (hypertension) means that the top number stays high, or the bottom number stays high, or both. High blood pressure increases the risk of stroke, heart attack, and other problems. You and your doctor will talk about your risks of these problems based on your blood pressure. What happens when you have high blood pressure? · Blood flows through your arteries with too much force. Over time, this damages the walls of your arteries. But you can't feel it. High blood pressure usually doesn't cause symptoms. · Fat and calcium start to build up in your arteries. This buildup is called plaque. Plaque makes your arteries narrower and stiffer. Blood can't flow through them as easily. · This lack of good blood flow starts to damage some of the organs in your body. This can lead to problems such as coronary artery disease and heart attack, heart failure, stroke, kidney failure, and eye damage. How can you prevent high blood pressure? · Stay at a healthy weight. · Try to limit how much sodium you eat to less than 2,300 milligrams (mg) a day. If you limit your sodium to 1,500 mg a day, you can lower your blood pressure even more. ? Buy foods that are labeled \"unsalted,\" \"sodium-free,\" or \"low-sodium. \" Foods labeled \"reduced-sodium\" and \"light sodium\" may still have too much sodium. ? Flavor your food with garlic, lemon juice, onion, vinegar, herbs, and spices instead of salt.  Do not use soy sauce, steak sauce, onion salt, garlic salt, mustard, or ketchup on your food. ? Use less salt (or none) when recipes call for it. You can often use half the salt a recipe calls for without losing flavor. · Be physically active. Get at least 30 minutes of exercise on most days of the week. Walking is a good choice. You also may want to do other activities, such as running, swimming, cycling, or playing tennis or team sports. · Limit alcohol to 2 drinks a day for men and 1 drink a day for women. · Eat plenty of fruits, vegetables, and low-fat dairy products. Eat less saturated and total fats. How is high blood pressure treated? · Your doctor will suggest making lifestyle changes to help your heart. For example, your doctor may ask you to eat healthy foods, quit smoking, lose extra weight, and be more active. · If lifestyle changes don't help enough, your doctor may recommend that you take medicine. · When blood pressure is very high, medicines are needed to lower it. Follow-up care is a key part of your treatment and safety. Be sure to make and go to all appointments, and call your doctor if you are having problems. It's also a good idea to know your test results and keep a list of the medicines you take. Where can you learn more? Go to http://rosi-shell.info/. Enter P501 in the search box to learn more about \"Learning About High Blood Pressure. \" Current as of: April 9, 2019 Content Version: 12.2 © 2898-6512 [employer]. Care instructions adapted under license by [employer] (which disclaims liability or warranty for this information). If you have questions about a medical condition or this instruction, always ask your healthcare professional. [place] [address] any warranty or liability for your use of this information.

## 2020-02-14 ENCOUNTER — DOCUMENTATION ONLY (OUTPATIENT)
Dept: ENDOCRINOLOGY | Age: 55
End: 2020-02-14

## 2020-03-05 LAB
ALBUMIN SERPL-MCNC: 4.3 G/DL (ref 3.8–4.9)
ALBUMIN/GLOB SERPL: 1.5 {RATIO} (ref 1.2–2.2)
ALP SERPL-CCNC: 51 IU/L (ref 39–117)
ALT SERPL-CCNC: 11 IU/L (ref 0–44)
AST SERPL-CCNC: 15 IU/L (ref 0–40)
BILIRUB SERPL-MCNC: 0.3 MG/DL (ref 0–1.2)
BUN SERPL-MCNC: 17 MG/DL (ref 6–24)
BUN/CREAT SERPL: 10 (ref 9–20)
CALCIUM SERPL-MCNC: 9.5 MG/DL (ref 8.7–10.2)
CHLORIDE SERPL-SCNC: 102 MMOL/L (ref 96–106)
CHOLEST SERPL-MCNC: 144 MG/DL (ref 100–199)
CO2 SERPL-SCNC: 25 MMOL/L (ref 20–29)
CREAT SERPL-MCNC: 1.63 MG/DL (ref 0.76–1.27)
EST. AVERAGE GLUCOSE BLD GHB EST-MCNC: 134 MG/DL
GLOBULIN SER CALC-MCNC: 2.8 G/DL (ref 1.5–4.5)
GLUCOSE SERPL-MCNC: 124 MG/DL (ref 65–99)
HBA1C MFR BLD: 6.3 % (ref 4.8–5.6)
HDLC SERPL-MCNC: 38 MG/DL
INTERPRETATION, 910389: NORMAL
INTERPRETATION: NORMAL
LDLC SERPL CALC-MCNC: 81 MG/DL (ref 0–99)
Lab: NORMAL
PDF IMAGE, 910387: NORMAL
POTASSIUM SERPL-SCNC: 4 MMOL/L (ref 3.5–5.2)
PROT SERPL-MCNC: 7.1 G/DL (ref 6–8.5)
SODIUM SERPL-SCNC: 143 MMOL/L (ref 134–144)
TRIGL SERPL-MCNC: 125 MG/DL (ref 0–149)
VLDLC SERPL CALC-MCNC: 25 MG/DL (ref 5–40)

## 2020-03-06 ENCOUNTER — OFFICE VISIT (OUTPATIENT)
Dept: ENDOCRINOLOGY | Age: 55
End: 2020-03-06

## 2020-03-06 ENCOUNTER — OFFICE VISIT (OUTPATIENT)
Dept: INTERNAL MEDICINE CLINIC | Age: 55
End: 2020-03-06

## 2020-03-06 VITALS
HEIGHT: 69 IN | DIASTOLIC BLOOD PRESSURE: 81 MMHG | SYSTOLIC BLOOD PRESSURE: 119 MMHG | WEIGHT: 190.4 LBS | HEART RATE: 83 BPM | BODY MASS INDEX: 28.2 KG/M2

## 2020-03-06 VITALS
WEIGHT: 190.8 LBS | RESPIRATION RATE: 15 BRPM | HEART RATE: 75 BPM | OXYGEN SATURATION: 97 % | TEMPERATURE: 98.3 F | BODY MASS INDEX: 28.26 KG/M2 | HEIGHT: 69 IN | SYSTOLIC BLOOD PRESSURE: 135 MMHG | DIASTOLIC BLOOD PRESSURE: 86 MMHG

## 2020-03-06 DIAGNOSIS — G47.00 INSOMNIA, UNSPECIFIED TYPE: ICD-10-CM

## 2020-03-06 DIAGNOSIS — E78.2 MIXED HYPERLIPIDEMIA: ICD-10-CM

## 2020-03-06 DIAGNOSIS — I10 ESSENTIAL HYPERTENSION: ICD-10-CM

## 2020-03-06 DIAGNOSIS — E11.9 TYPE 2 DIABETES MELLITUS WITHOUT COMPLICATION, WITHOUT LONG-TERM CURRENT USE OF INSULIN (HCC): Primary | ICD-10-CM

## 2020-03-06 DIAGNOSIS — E11.9 TYPE 2 DIABETES MELLITUS WITHOUT COMPLICATION, WITHOUT LONG-TERM CURRENT USE OF INSULIN (HCC): ICD-10-CM

## 2020-03-06 DIAGNOSIS — F32.A DEPRESSION, UNSPECIFIED DEPRESSION TYPE: Primary | ICD-10-CM

## 2020-03-06 RX ORDER — TRAZODONE HYDROCHLORIDE 50 MG/1
50 TABLET ORAL
Qty: 90 TAB | Refills: 3 | Status: SHIPPED | OUTPATIENT
Start: 2020-03-06 | End: 2021-05-04

## 2020-03-06 NOTE — PATIENT INSTRUCTIONS

## 2020-03-06 NOTE — PROGRESS NOTES
HISTORY OF PRESENT ILLNESS  Joan York is a 49 y.o. male presents for short interval follow up insomnia and depression  HPI  Trazodone effective for sleep, now sleeps  5-6 hours was 2-3     Memory and job performance better     Depression improved     He is scheduled to see therapist     Job still stressful. He is ready for another job     Saw tigre today, diabetes controlled. walking steps at work, less time at gym     Compliant with medical management    Past Medical History:   Diagnosis Date    Arthritis     Borderline diabetic     Diabetes ([address]     Family history of premature CAD     Hypertension        Current Outpatient Medications   Medication Sig    traZODone (DESYREL) 50 mg tablet Take 1 Tab by mouth nightly.  liraglutide (VICTOZA 2-DANIELA) 0.6 mg/0.1 mL (18 mg/3 mL) pnij INJECT 1.2 MG ONCE DAILY    JENTADUETO 2.5-1,000 mg per tablet TAKE 1 TABLET BY MOUTH TWICE A DAY WITH MEALS    citalopram (CELEXA) 10 mg tablet Take 2 Tabs by mouth daily.  montelukast (SINGULAIR) 10 mg tablet TAKE 1 TABLET BY MOUTH DAILY    sildenafil citrate (VIAGRA) 25 mg tablet TAKE 1-4 TABLETS BY MOUTH 30 MINUTES PRIOR TO ROMANCE. DO NOT REPEAT IN 24 HOURS    hydroCHLOROthiazide (MICROZIDE) 12.5 mg capsule TAKE ONE CAPSULE BY MOUTH EVERY DAY    amLODIPine (NORVASC) 10 mg tablet TAKE 1 TABLET BY MOUTH EVERY DAY    albuterol (PROVENTIL HFA, VENTOLIN HFA, PROAIR HFA) 90 mcg/actuation inhaler INHALE 2 PUFFS BY MOUTH EVERY 4 HOURS AS NEEDED FOR WHEEZE    Insulin Needles, Disposable, (NOVOFINE 32) 32 gauge x 1/4\" ndle USE FOR ONE SHOT DAILY    rosuvastatin (CRESTOR) 10 mg tablet TAKE 1 TABLET BY MOUTH EVERY DAY    Insulin Needles, Disposable, (NOVOFINE 32) 32 gauge x 1/4\" ndle USE FOR ONE SHOT DAILY    Insulin Needles, Disposable, (NOVOFINE 32) 32 gauge x 1/4\" ndle USE FOR ONE SHOT DAILY    Insulin Needles, Disposable, (NOVOFINE 32) 32 gauge x 1/4\" ndle USE FOR ONE SHOT DAILY    glucose blood VI test strips (CONTOUR NEXT TEST STRIPS) strip PATIENT IS TO CHECK BLOOD SUGAR TWICE DAILY. DX: E11.9    coenzyme Q-10 (CO Q-10) 200 mg capsule Take 1 Cap by mouth daily.  cinnamon bark (CINNAMON) 500 mg cap Take 500 mg by mouth two (2) times a day. No current facility-administered medications for this visit. No Known Allergies                 Review of Systems   All other systems reviewed and are negative. Visit Vitals  /86 (BP 1 Location: Left arm, BP Patient Position: Sitting)   Pulse 75   Temp 98.3 °F (36.8 °C) (Oral)   Resp 15   Ht 5' 9\" (1.753 m)   Wt 190 lb 12.8 oz (86.5 kg)   SpO2 97%   BMI 28.18 kg/m²     Physical Exam  Constitutional:       Appearance: Normal appearance. Cardiovascular:      Rate and Rhythm: Normal rate and regular rhythm. Pulmonary:      Effort: Pulmonary effort is normal.      Breath sounds: Normal breath sounds. Neurological:      Mental Status: He is alert. Psychiatric:         Mood and Affect: Mood normal.         Behavior: Behavior normal.         Thought Content: Thought content normal.         Judgment: Judgment normal.         ASSESSMENT and PLAN    ICD-10-CM ICD-9-CM    1. Depression, unspecified depression type F32.9 311    2. Insomnia, unspecified type G47.00 780.52 traZODone (DESYREL) 50 mg tablet   3. Essential hypertension I10 401.9      Follow-up and Dispositions    · Return in about 6 months (around 9/6/2020) for htn, insomnia .        current treatment plan is effective, no change in therapy  reviewed diet, exercise and weight control  reviewed medications and side effects in detail

## 2020-03-06 NOTE — PROGRESS NOTES
Chief Complaint   Patient presents with    Diabetes     pcp and pharmacy verified   Records since last visit reviewed. History of Present Illness: Jorge Damon is a 49 y.o. male here for follow up of diabetes. He was diagnosed with diabetes 2015. His A1C in September 2019 was down to 6.2% he had good weight loss and his BGs were doing well so we stopped his Pioglitazone. Last week his A1C was 6.3%  Pt is still taking  Victoza 1.2mg every morning and Linagliptin/Metformin 2.5/1000mg BID. His weight today was 190 pounds. \"I have fallen off the wagon with exercise and I have gained some more belly fat. Classes are over and now the bills are starting to come in and I am looking for a new position. \"    Pt notes he is feeling a little more down. He has been working on adjusting his diet with more whole foods and less convenience foods. He is enrolling in [address] [place] [address] to get a masters in biblical studies. No history of vascular disease. No history of neuropathy, or nephropathy. Last eye exam was June 2018, no retinopathy. He has not had any cortisone injections. He notes his knee pain has resolved with the weigh loss. Current Outpatient Medications   Medication Sig    liraglutide (VICTOZA 2-DANIELA) 0.6 mg/0.1 mL (18 mg/3 mL) pnij INJECT 1.2 MG ONCE DAILY    traZODone (DESYREL) 50 mg tablet Take 1 Tab by mouth nightly.  JENTADUETO 2.5-1,000 mg per tablet TAKE 1 TABLET BY MOUTH TWICE A DAY WITH MEALS    citalopram (CELEXA) 10 mg tablet Take 2 Tabs by mouth daily.  montelukast (SINGULAIR) 10 mg tablet TAKE 1 TABLET BY MOUTH DAILY    sildenafil citrate (VIAGRA) 25 mg tablet TAKE 1-4 TABLETS BY MOUTH 30 MINUTES PRIOR TO ROMANCE. DO NOT REPEAT IN 24 HOURS    hydroCHLOROthiazide (MICROZIDE) 12.5 mg capsule TAKE ONE CAPSULE BY MOUTH EVERY DAY    amLODIPine (NORVASC) 10 mg tablet TAKE 1 TABLET BY MOUTH EVERY DAY    albuterol (PROVENTIL HFA, VENTOLIN HFA, PROAIR HFA) 90 mcg/actuation inhaler INHALE 2 PUFFS BY MOUTH EVERY 4 HOURS AS NEEDED FOR WHEEZE    Insulin Needles, Disposable, (NOVOFINE 32) 32 gauge x 1/4\" ndle USE FOR ONE SHOT DAILY    rosuvastatin (CRESTOR) 10 mg tablet TAKE 1 TABLET BY MOUTH EVERY DAY    Insulin Needles, Disposable, (NOVOFINE 32) 32 gauge x 1/4\" ndle USE FOR ONE SHOT DAILY    Insulin Needles, Disposable, (NOVOFINE 32) 32 gauge x 1/4\" ndle USE FOR ONE SHOT DAILY    Insulin Needles, Disposable, (NOVOFINE 32) 32 gauge x 1/4\" ndle USE FOR ONE SHOT DAILY    glucose blood VI test strips (CONTOUR NEXT TEST STRIPS) strip PATIENT IS TO CHECK BLOOD SUGAR TWICE DAILY. DX: E11.9    coenzyme Q-10 (CO Q-10) 200 mg capsule Take 1 Cap by mouth daily.  cinnamon bark (CINNAMON) 500 mg cap Take 500 mg by mouth two (2) times a day. No current facility-administered medications for this visit. No Known Allergies  Review of Systems:  - Eyes: no blurry vision or double vision  - Cardiovascular: no chest pain  - Respiratory: no SOB or cough  - Musculoskeletal: chronic knee pain is improved  - Neurological: no numbness/tingling in extremities    Physical Examination:  Blood pressure 119/81, pulse 83, height 5' 9\" (1.753 m), weight 190 lb 6.4 oz (86.4 kg).    General: pleasant, no distress, good eye contact   Neck: no carotid bruits  Cardiovascular: regular, normal rate, nl s1 and s2, no m/r/g, 2+ DP pulses   Respiratory: clear bilaterally  Integumentary: no edema, no foot ulcers  Psychiatric: normal mood and affect    Diabetic foot exam:     Left Foot:   Visual Exam: normal    Pulse DP: 2+ (normal)   Filament test: normal sensation    Vibratory sensation: normal      Right Foot:   Visual Exam: normal    Pulse DP: 2+ (normal)   Filament test: normal sensation    Vibratory sensation: normal        Data Reviewed:   Component      Latest Ref Rng & Units 3/4/2020 3/4/2020 3/4/2020           7:46 AM  7:46 AM  7:46 AM   Glucose      65 - 99 mg/dL 124 (H)     BUN      6 - 24 mg/dL 17 Creatinine      0.76 - 1.27 mg/dL 1.63 (H)     GFR est non-AA      >59 mL/min/1.73 47 (L)     GFR est AA      >59 mL/min/1.73 55 (L)     BUN/Creatinine ratio      9 - 20 10     Sodium      134 - 144 mmol/L 143     Potassium      3.5 - 5.2 mmol/L 4.0     Chloride      96 - 106 mmol/L 102     CO2      20 - 29 mmol/L 25     Calcium      8.7 - 10.2 mg/dL 9.5     Protein, total      6.0 - 8.5 g/dL 7.1     Albumin      3.8 - 4.9 g/dL 4.3     GLOBULIN, TOTAL      1.5 - 4.5 g/dL 2.8     A-G Ratio      1.2 - 2.2 1.5     Bilirubin, total      0.0 - 1.2 mg/dL 0.3     Alk. phosphatase      39 - 117 IU/L 51     AST      0 - 40 IU/L 15     ALT (SGPT)      0 - 44 IU/L 11     Cholesterol, total      100 - 199 mg/dL  144    Triglyceride      0 - 149 mg/dL  125    HDL Cholesterol      >39 mg/dL  38 (L)    VLDL, calculated      5 - 40 mg/dL  25    LDL, calculated      0 - 99 mg/dL  81    Hemoglobin A1c, (calculated)      4.8 - 5.6 %   6.3 (H)   Estimated average glucose      mg/dL   134       Assessment/Plan:   1) DM > His A1C last week was 6.3% Pt encouraged to keep up the good work and continue the Victoza 1.2mg daily, Linagliptin/Metformin 2.5/1000mg BID. Pt to check her BGs twice per day. 2) HTN > BP at goal on his current regimen. No changes needed at this time. 3) HLD > Pt's lipid panel was at goal in March 2020, pt is on Rosuvastatin 10mg daily, which he is tolerating well. This is a high intensity statin regimen. Pt voices understanding and agreement with the plan. RTC 6 months    Follow-up and Dispositions    · Return in about 6 months (around 9/6/2020).          Copy sent to:  Dr. Narendra Bey

## 2020-03-06 NOTE — PROGRESS NOTES
Rm 9    Chief Complaint   Patient presents with    Depression     Better per pt    Other     impaired memory, pt states memory is better. The medication is helping him get more rest         1. Have you been to the ER, urgent care clinic since your last visit? Hospitalized since your last visit? No    2. Have you seen or consulted any other health care providers outside of the [address] [place] [address] since your last visit? Include any pap smears or colon screening.  No        Health Maintenance Due   Topic Date Due    FOBT Q1Y Age 54-65  06/30/2016           3 most recent PHQ Screens 2/3/2020   Little interest or pleasure in doing things Not at all   Feeling down, depressed, irritable, or hopeless Not at all   Total Score PHQ 2 0           Learning Assessment 3/11/2016   PRIMARY LEARNER Patient   HIGHEST LEVEL OF EDUCATION - PRIMARY LEARNER  SOME COLLEGE   BARRIERS PRIMARY LEARNER NONE   CO-LEARNER CAREGIVER No   PRIMARY LANGUAGE ENGLISH   LEARNER PREFERENCE PRIMARY READING   ANSWERED BY Patient   RELATIONSHIP SELF

## 2020-03-20 RX ORDER — ROSUVASTATIN CALCIUM 10 MG/1
TABLET, COATED ORAL
Qty: 90 TAB | Refills: 2 | Status: SHIPPED | OUTPATIENT
Start: 2020-03-20 | End: 2020-12-22

## 2020-03-25 DIAGNOSIS — J45.20 MILD INTERMITTENT ASTHMA WITHOUT COMPLICATION: ICD-10-CM

## 2020-03-25 RX ORDER — ALBUTEROL SULFATE 90 UG/1
2 AEROSOL, METERED RESPIRATORY (INHALATION)
Qty: 8.5 INHALER | Refills: 0 | Status: SHIPPED | OUTPATIENT
Start: 2020-03-25 | End: 2022-08-02

## 2020-03-25 NOTE — TELEPHONE ENCOUNTER
----- Message from Lei Garrett sent at 3/25/2020 12:35 PM EDT -----  Regarding: NP Rachel/refill  Pt needs a refill on Albuterol Inhaler call into [facility], number on file, pt can be reach at [phone]

## 2020-03-25 NOTE — TELEPHONE ENCOUNTER
Last visit 03/06/2020 NP Shayy Penn   Next appointment 09/11/2020  Previous refill encounter(s) 08/26/2019 Proventil HFA INH 8.5     Requested Prescriptions     Pending Prescriptions Disp Refills    albuterol (PROVENTIL HFA, VENTOLIN HFA, PROAIR HFA) 90 mcg/actuation inhaler 8.5 Inhaler 0     Sig: Take 2 Puffs by inhalation every four (4) hours as needed for Wheezing.

## 2020-04-13 RX ORDER — LINAGLIPTIN AND METFORMIN HYDROCHLORIDE 2.5; 1 MG/1; MG/1
TABLET, FILM COATED ORAL
Qty: 60 TAB | Refills: 2 | Status: SHIPPED | OUTPATIENT
Start: 2020-04-13 | End: 2020-07-07

## 2020-05-05 RX ORDER — HYDROCHLOROTHIAZIDE 12.5 MG/1
CAPSULE ORAL
Qty: 90 CAP | Refills: 1 | Status: SHIPPED | OUTPATIENT
Start: 2020-05-05 | End: 2020-10-31

## 2020-05-18 NOTE — TELEPHONE ENCOUNTER
Patient is returning your call and can be reached at [phone].
developmental training/parent/caregiver education & training

## 2020-05-29 ENCOUNTER — TELEPHONE (OUTPATIENT)
Dept: INTERNAL MEDICINE CLINIC | Age: 55
End: 2020-05-29

## 2020-05-29 NOTE — TELEPHONE ENCOUNTER
I spoke to pt. Due to the increased renal risk from toradol, I advised him not to use toradol. Systemic corticosteroid not a good option either due to DM2. So, I suggested meloxicam which has at least a better renal profile than toradol. I cautioned pt that any NSAID could effect renal function, though.

## 2020-05-29 NOTE — TELEPHONE ENCOUNTER
----- Message from Jo Torres sent at 5/29/2020  8:33 AM EDT -----  Regarding: Rachel DELGADO/Telephone  General Message/Vendor Calls    Caller's first and last name:  PT      Reason for call:  Advise whether pt can take new Rx \"Toradol 10mg\" Rx   To take 4 times per day for 5 days due inflammation (L) shoulder    Callback required yes/no and why:  Yes, today, will new Rx affect pt's DM      Best contact number(s):  [phone]      Details to clarify the request:  Pt stated, he was seen at [facility] yesterday, Dr. Tobi Sosa. Pt after receiving his PCP advise, will need to contact Dr. Lamont Rutledge to send this Rx to his pharmacy.       Jo Torres

## 2020-06-13 ENCOUNTER — HOSPITAL ENCOUNTER (EMERGENCY)
Age: 55
Discharge: HOME | End: 2020-06-13
Attending: EMERGENCY MEDICINE
Payer: COMMERCIAL

## 2020-06-13 VITALS
RESPIRATION RATE: 18 BRPM | WEIGHT: 204.15 LBS | DIASTOLIC BLOOD PRESSURE: 79 MMHG | SYSTOLIC BLOOD PRESSURE: 128 MMHG | HEIGHT: 69 IN | HEART RATE: 98 BPM | BODY MASS INDEX: 30.24 KG/M2 | OXYGEN SATURATION: 97 % | TEMPERATURE: 99.2 F

## 2020-06-13 DIAGNOSIS — I10 ESSENTIAL HYPERTENSION WITH GOAL BLOOD PRESSURE LESS THAN 130/80: ICD-10-CM

## 2020-06-13 DIAGNOSIS — R05.9 COUGH: ICD-10-CM

## 2020-06-13 DIAGNOSIS — T63.461A WASP STING, ACCIDENTAL OR UNINTENTIONAL, INITIAL ENCOUNTER: Primary | ICD-10-CM

## 2020-06-13 PROCEDURE — 74011250636 HC RX REV CODE- 250/636: Performed by: PHYSICIAN ASSISTANT

## 2020-06-13 PROCEDURE — 99284 EMERGENCY DEPT VISIT MOD MDM: CPT

## 2020-06-13 PROCEDURE — 96372 THER/PROPH/DIAG INJ SC/IM: CPT

## 2020-06-13 PROCEDURE — 74011250637 HC RX REV CODE- 250/637: Performed by: PHYSICIAN ASSISTANT

## 2020-06-13 RX ORDER — FAMOTIDINE 20 MG/1
20 TABLET, FILM COATED ORAL 2 TIMES DAILY
Qty: 20 TAB | Refills: 0 | Status: SHIPPED | OUTPATIENT
Start: 2020-06-13 | End: 2020-07-01

## 2020-06-13 RX ORDER — FAMOTIDINE 20 MG/1
20 TABLET, FILM COATED ORAL
Status: COMPLETED | OUTPATIENT
Start: 2020-06-13 | End: 2020-06-13

## 2020-06-13 RX ORDER — METHYLPREDNISOLONE 4 MG/1
TABLET ORAL
Qty: 1 DOSE PACK | Refills: 0 | Status: SHIPPED | OUTPATIENT
Start: 2020-06-13 | End: 2020-06-29 | Stop reason: ALTCHOICE

## 2020-06-13 RX ORDER — EPINEPHRINE 0.3 MG/.3ML
0.3 INJECTION SUBCUTANEOUS
Qty: 0.3 ML | Refills: 1 | Status: SHIPPED | OUTPATIENT
Start: 2020-06-13 | End: 2020-06-13

## 2020-06-13 RX ORDER — DIPHENHYDRAMINE HCL 25 MG
25 CAPSULE ORAL
Status: COMPLETED | OUTPATIENT
Start: 2020-06-13 | End: 2020-06-13

## 2020-06-13 RX ORDER — CETIRIZINE HCL 10 MG
10 TABLET ORAL DAILY
Qty: 20 TAB | Refills: 0 | Status: SHIPPED | OUTPATIENT
Start: 2020-06-13 | End: 2020-12-11

## 2020-06-13 RX ADMIN — FAMOTIDINE 20 MG: 20 TABLET, FILM COATED ORAL at 18:52

## 2020-06-13 RX ADMIN — METHYLPREDNISOLONE SODIUM SUCCINATE 125 MG: 125 INJECTION, POWDER, FOR SOLUTION INTRAMUSCULAR; INTRAVENOUS at 18:52

## 2020-06-13 RX ADMIN — DIPHENHYDRAMINE HYDROCHLORIDE 25 MG: 25 CAPSULE ORAL at 18:52

## 2020-06-13 NOTE — ED NOTES
1945: Christo Mukherjee reviewed discharge instructions with the patient. The patient verbalized understanding. Patient discharged home with stable vitals. Patient out of ED ambulatory with discharge instructions in hand. Opportunity for questions and clarification provided. No further complaints noted at this time.

## 2020-06-13 NOTE — ED PROVIDER NOTES
EMERGENCY DEPARTMENT HISTORY AND PHYSICAL EXAM      Date: 6/13/2020  Patient Name: Cindi Fitzgerald    History of Presenting Illness     Chief Complaint   Patient presents with    Insect Bite     Patient reports being stung by a wasp aprox. 45 minutes ago. Patient reports a \"funny feeling\" when swallowing. Patient speaking in complete sentences at this time. History Provided By: Patient    HPI: Cindi Fitzgerald, 49 y.o. male presents ambulatory to the ED with cc of an hour or so of 6 out of 10 constant, achy tenderness to the skin of his neck that is worse with palpation and started when he was cooking on the grill outside and was accidentally stung by a wasp. He tells me years ago he had an allergy to bee stings and was prescribed an EpiPen, however he does not have an EpiPen. He denies any difficulty swallowing. There has been no nausea, vomiting or diarrhea. There is no difficulty breathing. He has not noticed any rash on his skin. There are no other complaints, changes, or physical findings at this time. PCP: Erasto Armenta NP    Current Outpatient Medications   Medication Sig Dispense Refill    methylPREDNISolone (Medrol, Josue,) 4 mg tablet Per Dose Pack instructions 1 Dose Pack 0    famotidine (Pepcid) 20 mg tablet Take 1 Tab by mouth two (2) times a day. 20 Tab 0    cetirizine (ZyrTEC) 10 mg tablet Take 1 Tab by mouth daily. 20 Tab 0    EPINEPHrine (EPIPEN) 0.3 mg/0.3 mL injection 0.3 mL by IntraMUSCular route once as needed for Allergic Response for up to 1 dose.  0.3 mL 1    hydroCHLOROthiazide (MICROZIDE) 12.5 mg capsule TAKE ONE CAPSULE BY MOUTH EVERY DAY 90 Cap 1    Jentadueto 2.5-1,000 mg per tablet TAKE 1 TABLET BY MOUTH TWICE A DAY WITH MEALS 60 Tab 2    albuterol (PROVENTIL HFA, VENTOLIN HFA, PROAIR HFA) 90 mcg/actuation inhaler Take 2 Puffs by inhalation every four (4) hours as needed for Wheezing. 8.5 Inhaler 0    rosuvastatin (CRESTOR) 10 mg tablet TAKE 1 TABLET BY MOUTH EVERY DAY 90 Tab 2    traZODone (DESYREL) 50 mg tablet Take 1 Tab by mouth nightly. 90 Tab 3    liraglutide (VICTOZA 2-DANIELA) 0.6 mg/0.1 mL (18 mg/3 mL) pnij INJECT 1.2 MG ONCE DAILY 18 mL 3    citalopram (CELEXA) 10 mg tablet Take 2 Tabs by mouth daily. 90 Tab 1    montelukast (SINGULAIR) 10 mg tablet TAKE 1 TABLET BY MOUTH DAILY 90 Tab 1    sildenafil citrate (VIAGRA) 25 mg tablet TAKE 1-4 TABLETS BY MOUTH 30 MINUTES PRIOR TO ROMANCE. DO NOT REPEAT IN 24 HOURS 25 Tab 3    amLODIPine (NORVASC) 10 mg tablet TAKE 1 TABLET BY MOUTH EVERY DAY 90 Tab 1    Insulin Needles, Disposable, (NOVOFINE 32) 32 gauge x 1/4\" ndle USE FOR ONE SHOT DAILY 100 Pen Needle 3    Insulin Needles, Disposable, (NOVOFINE 32) 32 gauge x 1/4\" ndle USE FOR ONE SHOT DAILY 100 Pen Needle 3    Insulin Needles, Disposable, (NOVOFINE 32) 32 gauge x 1/4\" ndle USE FOR ONE SHOT DAILY 100 Pen Needle 1    Insulin Needles, Disposable, (NOVOFINE 32) 32 gauge x 1/4\" ndle USE FOR ONE SHOT DAILY 100 Pen Needle 6    glucose blood VI test strips (CONTOUR NEXT TEST STRIPS) strip PATIENT IS TO CHECK BLOOD SUGAR TWICE DAILY. DX: E11.9 200 Strip 3    coenzyme Q-10 (CO Q-10) 200 mg capsule Take 1 Cap by mouth daily. 30 Cap 11    cinnamon bark (CINNAMON) 500 mg cap Take 500 mg by mouth two (2) times a day.        Past History     Past Medical History:  Past Medical History:   Diagnosis Date    Arthritis     Borderline diabetic     Diabetes ([address]     Family history of premature CAD     Hypertension        Past Surgical History:  Past Surgical History:   Procedure Laterality Date    HX ORTHOPAEDIC      right knee       Family History:  Family History   Problem Relation Age of Onset   John Loser Cancer Mother         leukemia    Alzheimer Mother     Hypertension Mother     Diabetes Father     Heart Disease Father     Hypertension Father     Elevated Lipids Father    John Loser Glaucoma Father     Diabetes Sister     Heart Disease Sister     Cancer Sister Breast    Hypertension Brother     Cancer Maternal Aunt         Breast    Glaucoma Paternal Aunt     Diabetes Brother        Social History:  Social History     Tobacco Use    Smoking status: Never Smoker    Smokeless tobacco: Never Used   Substance Use Topics    Alcohol use: Yes     Alcohol/week: 0.0 standard drinks     Comment: social    Drug use: No       Allergies:  No Known Allergies  Review of Systems   Review of Systems   Constitutional: Negative for fatigue and fever. HENT: Negative for congestion, ear pain and rhinorrhea. Eyes: Negative for pain and redness. Respiratory: Negative for cough and wheezing. Cardiovascular: Negative for chest pain and palpitations. Gastrointestinal: Negative for abdominal pain, nausea and vomiting. Genitourinary: Negative for dysuria, frequency and urgency. Musculoskeletal: Negative for back pain, neck pain and neck stiffness. Skin: Positive for color change (Wasp sting to the left side of the neck). Negative for rash and wound. Neurological: Negative for weakness, light-headedness, numbness and headaches. Physical Exam   Physical Exam  Vitals signs and nursing note reviewed. Constitutional:       General: He is not in acute distress. Appearance: He is well-developed. He is not toxic-appearing. HENT:      Head: Normocephalic and atraumatic. No right periorbital erythema or left periorbital erythema. Jaw: No trismus. Right Ear: External ear normal.      Left Ear: External ear normal.      Nose: Nose normal.      Mouth/Throat:      Pharynx: Uvula midline. Eyes:      General: No scleral icterus. Conjunctiva/sclera: Conjunctivae normal.      Pupils: Pupils are equal, round, and reactive to light. Neck:      Musculoskeletal: Full passive range of motion without pain and normal range of motion. Cardiovascular:      Rate and Rhythm: Normal rate and regular rhythm. Heart sounds: Normal heart sounds.    Pulmonary: Effort: Pulmonary effort is normal. No tachypnea, accessory muscle usage or respiratory distress. Breath sounds: Normal breath sounds. No decreased breath sounds or wheezing. Abdominal:      Palpations: Abdomen is soft. Abdomen is not rigid. Tenderness: There is no abdominal tenderness. There is no guarding. Musculoskeletal: Normal range of motion. Skin:     Findings: Rash present. Rash is papular. Comments: There is mild redness and swelling of the skin of the left side of the neck without urticaria. There is no facial swelling, specifically there is no swelling of the lips or around the eyes. There is mild redness is limited to the skin of the neck and there is no urticaria or other lesions elsewhere on the body. Neurological:      Mental Status: He is alert and oriented to person, place, and time. He is not disoriented. GCS: GCS eye subscore is 4. GCS verbal subscore is 5. GCS motor subscore is 6. Cranial Nerves: No cranial nerve deficit. Sensory: No sensory deficit. Psychiatric:         Speech: Speech normal.       Diagnostic Study Results     Labs -   No results found for this or any previous visit (from the past 12 hour(s)). Radiologic Studies -   No orders to display     CT Results  (Last 48 hours)    None        CXR Results  (Last 48 hours)    None        Medical Decision Making   I am the first provider for this patient. I reviewed the vital signs, available nursing notes, past medical history, past surgical history, family history and social history. Vital Signs-Reviewed the patient's vital signs.   Patient Vitals for the past 12 hrs:   Temp Pulse Resp BP SpO2   06/13/20 1915    128/79 97 %   06/13/20 1900    132/85 97 %   06/13/20 1845    128/78 96 %   06/13/20 1830    130/83 96 %   06/13/20 1755 99.2 °F (37.3 °C) 98 18 (!) 151/92 98 %       Pulse Oximetry Analysis - 98% on RA    Records Reviewed: Nursing Notes, Old Medical Records, Previous Radiology Studies and Previous Laboratory Studies    Provider Notes (Medical Decision Making): Afebrile; well-appearing; presentation is not consistent with anaphylaxis or angioedema or other worrisome systemic process; given his stated bee sting allergy will start on a steroids and antihistamines; believe safe for discharge; will offer prescription for EpiPen for anaphylaxis    ED Course:   Initial assessment performed. The patients presenting problems have been discussed, and they are in agreement with the care plan formulated and outlined with them. I have encouraged them to ask questions as they arise throughout their visit. Disposition:  Discharge    PLAN:  1. Discharge Medication List as of 6/13/2020  7:41 PM      START taking these medications    Details   methylPREDNISolone (Medrol, Josue,) 4 mg tablet Per Dose Pack instructions, Normal, Disp-1 Dose Pack, R-0      famotidine (Pepcid) 20 mg tablet Take 1 Tab by mouth two (2) times a day., Normal, Disp-20 Tab, R-0      cetirizine (ZyrTEC) 10 mg tablet Take 1 Tab by mouth daily. , Normal, Disp-20 Tab, R-0      EPINEPHrine (EPIPEN) 0.3 mg/0.3 mL injection 0.3 mL by IntraMUSCular route once as needed for Allergic Response for up to 1 dose., Normal, Disp-0.3 mL, R-1         CONTINUE these medications which have NOT CHANGED    Details   hydroCHLOROthiazide (MICROZIDE) 12.5 mg capsule TAKE ONE CAPSULE BY MOUTH EVERY DAY, Normal, Disp-90 Cap, R-1      Jentadueto 2.5-1,000 mg per tablet TAKE 1 TABLET BY MOUTH TWICE A DAY WITH MEALS, Normal, Disp-60 Tab, R-2      albuterol (PROVENTIL HFA, VENTOLIN HFA, PROAIR HFA) 90 mcg/actuation inhaler Take 2 Puffs by inhalation every four (4) hours as needed for Wheezing., Normal, Disp-8.5 Inhaler, R-0      rosuvastatin (CRESTOR) 10 mg tablet TAKE 1 TABLET BY MOUTH EVERY DAY, Normal, Disp-90 Tab, R-2      traZODone (DESYREL) 50 mg tablet Take 1 Tab by mouth nightly., Normal, Disp-90 Tab, R-3      liraglutide (VICTOZA 2-DANIELA) 0.6 mg/0.1 mL (18 mg/3 mL) pnij INJECT 1.2 MG ONCE DAILY, Normal, Disp-18 mL, R-3      citalopram (CELEXA) 10 mg tablet Take 2 Tabs by mouth daily. , Normal, Disp-90 Tab, R-1      montelukast (SINGULAIR) 10 mg tablet TAKE 1 TABLET BY MOUTH DAILY, Normal, Disp-90 Tab, R-1      sildenafil citrate (VIAGRA) 25 mg tablet TAKE 1-4 TABLETS BY MOUTH 30 MINUTES PRIOR TO ROMANCE. DO NOT REPEAT IN 24 HOURS, NormalPlease consider 90 day supplies to promote better adherenceDisp-25 Tab, R-3      amLODIPine (NORVASC) 10 mg tablet TAKE 1 TABLET BY MOUTH EVERY DAY, Normal, Disp-90 Tab, R-1      !! Insulin Needles, Disposable, (NOVOFINE 32) 32 gauge x 1/4\" ndle USE FOR ONE SHOT DAILY, Normal, Disp-100 Pen Needle, R-3      !! Insulin Needles, Disposable, (NOVOFINE 32) 32 gauge x 1/4\" ndle USE FOR ONE SHOT DAILY, Normal, Disp-100 Pen Needle, R-3      !! Insulin Needles, Disposable, (NOVOFINE 32) 32 gauge x 1/4\" ndle USE FOR ONE SHOT DAILY, Normal, Disp-100 Pen Needle, R-1      !! Insulin Needles, Disposable, (NOVOFINE 32) 32 gauge x 1/4\" ndle USE FOR ONE SHOT DAILY, Normal, Disp-100 Pen Needle, R-6      glucose blood VI test strips (CONTOUR NEXT TEST STRIPS) strip PATIENT IS TO CHECK BLOOD SUGAR TWICE DAILY. DX: E11.9, Normal, Disp-200 Strip, R-3      coenzyme Q-10 (CO Q-10) 200 mg capsule Take 1 Cap by mouth daily. , Normal, Disp-30 Cap, R-11      cinnamon bark (CINNAMON) 500 mg cap Take 500 mg by mouth two (2) times a day., Historical Med       !! - Potential duplicate medications found. Please discuss with provider. 2.   Follow-up Information     Follow up With Specialties Details Why Contact Info    Jerica Kitchen NP Family Practice Schedule an appointment as soon as possible for a visit As needed Jaciel Smith  [phone]          Return to ED if worse     Diagnosis     Clinical Impression:   1.  Wasp sting, accidental or unintentional, initial encounter

## 2020-06-15 RX ORDER — MONTELUKAST SODIUM 10 MG/1
TABLET ORAL
Qty: 90 TAB | Refills: 1 | Status: SHIPPED | OUTPATIENT
Start: 2020-06-15 | End: 2021-04-02

## 2020-06-15 RX ORDER — AMLODIPINE BESYLATE 10 MG/1
TABLET ORAL
Qty: 90 TAB | Refills: 1 | Status: SHIPPED | OUTPATIENT
Start: 2020-06-15 | End: 2020-12-22

## 2020-06-20 ENCOUNTER — TELEPHONE (OUTPATIENT)
Dept: INTERNAL MEDICINE CLINIC | Age: 55
End: 2020-06-20

## 2020-06-20 ENCOUNTER — HOSPITAL ENCOUNTER (EMERGENCY)
Age: 55
Discharge: HOME | End: 2020-06-20
Attending: EMERGENCY MEDICINE
Payer: COMMERCIAL

## 2020-06-20 VITALS
OXYGEN SATURATION: 100 % | TEMPERATURE: 98.8 F | WEIGHT: 207.01 LBS | HEART RATE: 88 BPM | SYSTOLIC BLOOD PRESSURE: 156 MMHG | BODY MASS INDEX: 30.66 KG/M2 | HEIGHT: 69 IN | RESPIRATION RATE: 16 BRPM | DIASTOLIC BLOOD PRESSURE: 89 MMHG

## 2020-06-20 DIAGNOSIS — J02.9 PHARYNGITIS, UNSPECIFIED ETIOLOGY: Primary | ICD-10-CM

## 2020-06-20 DIAGNOSIS — B37.0 CANDIDA INFECTION, ORAL: ICD-10-CM

## 2020-06-20 PROCEDURE — 99282 EMERGENCY DEPT VISIT SF MDM: CPT

## 2020-06-20 NOTE — TELEPHONE ENCOUNTER
On-Call Note  Received call from patient. At 7:42 am.     Reports that he was stung by a wasp ~ 1 week ago on throat. Has been on prednisone since. In the last 2 days has had bleeding and pus from lips. Increasing pain in throat and pain with swallowing. Advised patient to seek in person care at urgent care. With report of pus I explained my concern was for an infection. Patient expressed understanding. Since call has been seen at ER and discharged with chay.      Roseline Grissom MD

## 2020-06-20 NOTE — ED PROVIDER NOTES
EMERGENCY DEPARTMENT HISTORY AND PHYSICAL EXAM      Date: 6/20/2020  Patient Name: Antwon May    History of Presenting Illness     Chief Complaint   Patient presents with    Sore Throat     Pt states he was seen here this week for a bee sting, and was prescribed prednisone. Pt states since starting the prednisone he has had sore throat, chapped lips that have been bleeding, and over productive saliva. Pt denies cp, cough, fevers, or sob. History Provided By: Patient    HPI: Antwon May, 49 y.o. male  presents to the ED with cc of sore throat. Patient was treated about 1 week ago for a bee sting. He was placed on prednisone which she has completed about 2 days ago. He states he now has chapped lips and sore throat. He states his lips ooze blood because are so chapped. He also states he has had hypersalivation. No fevers or chills. No neck swelling. No chest pain or shortness of breath. No nausea vomiting or diarrhea. Past History     Past Medical History:  Past Medical History:   Diagnosis Date    Arthritis     Borderline diabetic     Diabetes ([facility] [address]     Family history of premature CAD     Hypertension        Past Surgical History:  Past Surgical History:   Procedure Laterality Date    HX ORTHOPAEDIC      right knee       Medications:  No current facility-administered medications on file prior to encounter. Current Outpatient Medications on File Prior to Encounter   Medication Sig Dispense Refill    amLODIPine (NORVASC) 10 mg tablet TAKE 1 TABLET BY MOUTH EVERY DAY 90 Tab 1    montelukast (SINGULAIR) 10 mg tablet TAKE 1 TABLET BY MOUTH DAILY 90 Tab 1    methylPREDNISolone (Medrol, Josue,) 4 mg tablet Per Dose Pack instructions 1 Dose Pack 0    famotidine (Pepcid) 20 mg tablet Take 1 Tab by mouth two (2) times a day. 20 Tab 0    cetirizine (ZyrTEC) 10 mg tablet Take 1 Tab by mouth daily.  20 Tab 0    hydroCHLOROthiazide (MICROZIDE) 12.5 mg capsule TAKE ONE CAPSULE BY MOUTH EVERY DAY 90 Cap 1    Jentadueto 2.5-1,000 mg per tablet TAKE 1 TABLET BY MOUTH TWICE A DAY WITH MEALS 60 Tab 2    albuterol (PROVENTIL HFA, VENTOLIN HFA, PROAIR HFA) 90 mcg/actuation inhaler Take 2 Puffs by inhalation every four (4) hours as needed for Wheezing. 8.5 Inhaler 0    rosuvastatin (CRESTOR) 10 mg tablet TAKE 1 TABLET BY MOUTH EVERY DAY 90 Tab 2    traZODone (DESYREL) 50 mg tablet Take 1 Tab by mouth nightly. 90 Tab 3    liraglutide (VICTOZA 2-DANIELA) 0.6 mg/0.1 mL (18 mg/3 mL) pnij INJECT 1.2 MG ONCE DAILY 18 mL 3    citalopram (CELEXA) 10 mg tablet Take 2 Tabs by mouth daily. 90 Tab 1    sildenafil citrate (VIAGRA) 25 mg tablet TAKE 1-4 TABLETS BY MOUTH 30 MINUTES PRIOR TO ROMANCE. DO NOT REPEAT IN 24 HOURS 25 Tab 3    Insulin Needles, Disposable, (NOVOFINE 32) 32 gauge x 1/4\" ndle USE FOR ONE SHOT DAILY 100 Pen Needle 3    Insulin Needles, Disposable, (NOVOFINE 32) 32 gauge x 1/4\" ndle USE FOR ONE SHOT DAILY 100 Pen Needle 3    Insulin Needles, Disposable, (NOVOFINE 32) 32 gauge x 1/4\" ndle USE FOR ONE SHOT DAILY 100 Pen Needle 1    Insulin Needles, Disposable, (NOVOFINE 32) 32 gauge x 1/4\" ndle USE FOR ONE SHOT DAILY 100 Pen Needle 6    glucose blood VI test strips (CONTOUR NEXT TEST STRIPS) strip PATIENT IS TO CHECK BLOOD SUGAR TWICE DAILY. DX: E11.9 200 Strip 3    coenzyme Q-10 (CO Q-10) 200 mg capsule Take 1 Cap by mouth daily. 30 Cap 11    cinnamon bark (CINNAMON) 500 mg cap Take 500 mg by mouth two (2) times a day.          Family History:  Family History   Problem Relation Age of Onset   [facility] Cancer Mother         leukemia    Alzheimer Mother     Hypertension Mother     Diabetes Father     Heart Disease Father     Hypertension Father     Elevated Lipids Father     Glaucoma Father     Diabetes Sister     Heart Disease Sister     Cancer Sister         Breast    Hypertension Brother     Cancer Maternal Aunt         Breast    Glaucoma Paternal Aunt     Diabetes Brother Social History:  Social History     Tobacco Use    Smoking status: Never Smoker    Smokeless tobacco: Never Used   Substance Use Topics    Alcohol use: Yes     Alcohol/week: 0.0 standard drinks     Comment: social    Drug use: No       Allergies: Allergies   Allergen Reactions    Bee Sting [Sting, Bee] Hives       All the above components of the past  history are auto-populated from the electronic record. They have been reviewed and the patient has been interviewed for any pertinent past history that pertains to the patient's chief complaint and reason for visit. Not all pre-populated components may be accurate at the time this note was generated. Review of Systems   Review of Systems   Constitutional: Negative for fever. HENT: Positive for mouth sores and sore throat. Respiratory: Negative for shortness of breath. Cardiovascular: Negative for chest pain. Gastrointestinal: Negative for constipation, diarrhea, nausea and vomiting. Genitourinary: Negative for difficulty urinating. Neurological: Negative for dizziness and light-headedness. Physical Exam   Physical Exam  Vitals signs and nursing note reviewed. Constitutional:       General: He is not in acute distress. Appearance: He is well-developed. He is not ill-appearing. HENT:      Mouth/Throat:      Mouth: Mucous membranes are moist. Oral lesions present. Tongue: Lesions present. Pharynx: Oropharyngeal exudate and posterior oropharyngeal erythema present. Neck:      Musculoskeletal: Neck supple. Cardiovascular:      Rate and Rhythm: Normal rate and regular rhythm. Pulmonary:      Effort: Pulmonary effort is normal. No accessory muscle usage or respiratory distress. Breath sounds: Normal breath sounds. Lymphadenopathy:      Cervical: No cervical adenopathy. Skin:     General: Skin is warm and dry. Neurological:      Mental Status: He is alert and oriented to person, place, and time. Diagnostic Study Results       Medical Decision Making     I reviewed the vital signs, available nursing notes, past medical history, past surgical history, family history and social history. Vital Signs-Reviewed the patient's vital signs. Patient Vitals for the past 24 hrs:   Temp Pulse Resp BP SpO2   06/20/20 0831 98.8 °F (37.1 °C) 88 16 156/89 100 %         Records Reviewed: Nursing Notes and Old Medical Records    Provider Notes (Medical Decision Making):   Patient presents with sore throat and has oral lesions after being on prednisone. The lesions in the oropharynx appear more like ulcerations and thrush. We will place him on Magic mouthwash. Recommend Aquaphor lip repair for chapped lips. He has completed his course of prednisone. ED Course:   Initial assessment performed. The patients presenting problems have been discussed, and they are in agreement with the care plan formulated and outlined with them. I have encouraged them to ask questions as they arise throughout their visit. Orders Placed This Encounter    diphenhydrAMINE 12.5 mg/5 mL elix 150 mg, lidocaine 2 % soln 60 mL, alum-mag hydroxide-simeth 200-200-20 mg/5 mL susp 60 mL, nystatin 100,000 unit/mL susp 6,000,000 Units       Procedures      Critical Care Time:   0    Disposition:  Discharge  9:44 AM    The patient's emergency department evaluation is now complete. I have reviewed all labs, imaging, and pertinent information. I have discussed all results with the patient and/or family. Based on our evaluation today I do believe that the patient is safe to be discharged home. The patient has been provided with at home instructions that are pertinent to their complaint today, although these may not be specific to the exact diagnosis. I have reviewed the patient's home medications and attempted to reconcile if not already done so by pharmacy or nursing staff. I have discussed all new prescriptions with the patient. The patient has been encouraged to follow-up with primary care doctor and/or specialist, and these have been discussed with the patient. The patient has been advised that they may return to the emergency department if they have any worsening symptoms and or new symptoms that are of concern to them. Verbal discharge instructions may have also been provided to the patient that may not be specifically contained in the written discharge instructions. The patient has been given opportunity to ask questions prior to discharge. PLAN:  1. Discharge Medication List as of 6/20/2020  9:21 AM      START taking these medications    Details   diphenhydrAMINE 12.5 mg/5 mL elix 150 mg, lidocaine 2 % soln 60 mL, alum-mag hydroxide-simeth 200-200-20 mg/5 mL susp 60 mL, nystatin 100,000 unit/mL susp 6,000,000 Units Take 10 mL by mouth every six (6) hours. , Print, Disp-240 mL, R-0         CONTINUE these medications which have NOT CHANGED    Details   amLODIPine (NORVASC) 10 mg tablet TAKE 1 TABLET BY MOUTH EVERY DAY, Normal, Disp-90 Tab, R-1      montelukast (SINGULAIR) 10 mg tablet TAKE 1 TABLET BY MOUTH DAILY, Normal, Disp-90 Tab, R-1      methylPREDNISolone (Medrol, Josue,) 4 mg tablet Per Dose Pack instructions, Normal, Disp-1 Dose Pack, R-0      famotidine (Pepcid) 20 mg tablet Take 1 Tab by mouth two (2) times a day., Normal, Disp-20 Tab, R-0      cetirizine (ZyrTEC) 10 mg tablet Take 1 Tab by mouth daily. , Normal, Disp-20 Tab, R-0      hydroCHLOROthiazide (MICROZIDE) 12.5 mg capsule TAKE ONE CAPSULE BY MOUTH EVERY DAY, Normal, Disp-90 Cap, R-1      Jentadueto 2.5-1,000 mg per tablet TAKE 1 TABLET BY MOUTH TWICE A DAY WITH MEALS, Normal, Disp-60 Tab, R-2      albuterol (PROVENTIL HFA, VENTOLIN HFA, PROAIR HFA) 90 mcg/actuation inhaler Take 2 Puffs by inhalation every four (4) hours as needed for Wheezing., Normal, Disp-8.5 Inhaler, R-0      rosuvastatin (CRESTOR) 10 mg tablet TAKE 1 TABLET BY MOUTH EVERY DAY, Normal, Disp-90 Tab, R-2      traZODone (DESYREL) 50 mg tablet Take 1 Tab by mouth nightly., Normal, Disp-90 Tab, R-3      liraglutide (VICTOZA 2-DANIELA) 0.6 mg/0.1 mL (18 mg/3 mL) pnij INJECT 1.2 MG ONCE DAILY, Normal, Disp-18 mL, R-3      citalopram (CELEXA) 10 mg tablet Take 2 Tabs by mouth daily. , Normal, Disp-90 Tab, R-1      sildenafil citrate (VIAGRA) 25 mg tablet TAKE 1-4 TABLETS BY MOUTH 30 MINUTES PRIOR TO ROMANCE. DO NOT REPEAT IN 24 HOURS, NormalPlease consider 90 day supplies to promote better adherenceDisp-25 Tab, R-3      !! Insulin Needles, Disposable, (NOVOFINE 32) 32 gauge x 1/4\" ndle USE FOR ONE SHOT DAILY, Normal, Disp-100 Pen Needle, R-3      !! Insulin Needles, Disposable, (NOVOFINE 32) 32 gauge x 1/4\" ndle USE FOR ONE SHOT DAILY, Normal, Disp-100 Pen Needle, R-3      !! Insulin Needles, Disposable, (NOVOFINE 32) 32 gauge x 1/4\" ndle USE FOR ONE SHOT DAILY, Normal, Disp-100 Pen Needle, R-1      !! Insulin Needles, Disposable, (NOVOFINE 32) 32 gauge x 1/4\" ndle USE FOR ONE SHOT DAILY, Normal, Disp-100 Pen Needle, R-6      glucose blood VI test strips (CONTOUR NEXT TEST STRIPS) strip PATIENT IS TO CHECK BLOOD SUGAR TWICE DAILY. DX: E11.9, Normal, Disp-200 Strip, R-3      coenzyme Q-10 (CO Q-10) 200 mg capsule Take 1 Cap by mouth daily. , Normal, Disp-30 Cap, R-11      cinnamon bark (CINNAMON) 500 mg cap Take 500 mg by mouth two (2) times a day., Historical Med       !! - Potential duplicate medications found. Please discuss with provider. 2.   Follow-up Information     Follow up With Specialties Details Why Contact Info    Randa Poe NP Family Practice Schedule an appointment as soon as possible for a visit in 2 days  [address]  [phone]          Return to ED if worse     Diagnosis     Clinical Impression:   1. Pharyngitis, unspecified etiology    2. Candida infection, oral            This note will not be viewable in MyChart.

## 2020-06-22 ENCOUNTER — PATIENT OUTREACH (OUTPATIENT)
Dept: FAMILY MEDICINE CLINIC | Age: 55
End: 2020-06-22

## 2020-06-22 NOTE — PROGRESS NOTES
Patient contacted regarding recent discharge and COVID-19 risk. Discussed COVID-19 related testing which was not done at this time. Test results were not done. Patient informed of results, if available? no    Care Transition Nurse/ Ambulatory Care Manager contacted the patient by telephone to perform post discharge assessment. Verified name and  with patient as identifiers. Patient has following risk factors of: diabetes. CTN/ACM reviewed discharge instructions, medical action plan and red flags related to discharge diagnosis. Reviewed and educated them on any new and changed medications related to discharge diagnosis. Advised obtaining a 90-day supply of all daily and as-needed medications. Education provided regarding infection prevention, and signs and symptoms of COVID-19 and when to seek medical attention with patient who verbalized understanding. Discussed exposure protocols and quarantine from [address] you at higher risk for severe illness  and given an opportunity for questions and concerns. The patient agrees to contact the COVID-19 hotline [phone] or PCP office for questions related to their healthcare. CTN/ACM provided contact information for future reference. From CDC: Are you at higher risk for severe illness?  Wash your hands often.  Avoid close contact (6 feet, which is about two arm lengths) with people who are sick.  Put distance between yourself and other people if COVID-19 is spreading in your community.  Clean and disinfect frequently touched surfaces.  Avoid all cruise travel and non-essential air travel.  Call your healthcare professional if you have concerns about COVID-19 and your underlying condition or if you are sick.     For more information on steps you can take to protect yourself, see CDC's How to Protect Yourself      Patient/family/caregiver given information for Jasmin Fallon and agrees to enroll no  Patient's preferred e-mail: n/a  Patient's preferred phone number: n/a  Based on Loop alert triggers, patient will be contacted by nurse care manager for worsening symptoms. Plan for follow-up call in 7-14 days based on severity of symptoms and risk factors.

## 2020-06-29 ENCOUNTER — VIRTUAL VISIT (OUTPATIENT)
Dept: INTERNAL MEDICINE CLINIC | Age: 55
End: 2020-06-29

## 2020-06-29 DIAGNOSIS — T50.905A ADVERSE EFFECT OF DRUG, INITIAL ENCOUNTER: Primary | ICD-10-CM

## 2020-06-29 DIAGNOSIS — T78.40XA ALLERGIC REACTION, INITIAL ENCOUNTER: ICD-10-CM

## 2020-06-29 NOTE — PROGRESS NOTES
Recent Travel Screening and Travel History documentation     Travel Screening       Question Response     In the last month, have you been in contact with someone who was confirmed or suspected to have Coronavirus / COVID-19? No / Unsure     Do you have any of the following symptoms? Sore throat     Have you traveled internationally in the last month? No      Travel History   Travel since 05/29/20     No documented travel since 05/29/20          Chief Complaint   Patient presents with   [facility] ED Follow-up     Mouth still bothering pt, face has gone down, and bottom lip still bleeding some with scabs. Pt was stung on neck. 1. Have you been to the ER, urgent care clinic since your last visit? Hospitalized since your last visit? ED, 6/20/20, sore throat    2. Have you seen or consulted any other health care providers outside of the [address] [place] [address] since your last visit? Include any pap smears or colon screening.  No        Health Maintenance Due   Topic Date Due    FOBT Q1Y Age 54-65  06/30/2016    Eye Exam Retinal or Dilated  06/01/2020           3 most recent PHQ Screens 6/29/2020   Little interest or pleasure in doing things Not at all   Feeling down, depressed, irritable, or hopeless Not at all   Total Score PHQ 2 0           Learning Assessment 3/11/2016   PRIMARY LEARNER Patient   HIGHEST LEVEL OF EDUCATION - PRIMARY LEARNER  SOME COLLEGE   BARRIERS PRIMARY LEARNER NONE   CO-LEARNER CAREGIVER No   PRIMARY LANGUAGE ENGLISH   LEARNER PREFERENCE PRIMARY READING   ANSWERED BY Patient   RELATIONSHIP SELF

## 2020-07-01 RX ORDER — EPINEPHRINE 0.3 MG/.3ML
0.3 INJECTION SUBCUTANEOUS
Qty: 1 SYRINGE | Refills: 5 | Status: SHIPPED | OUTPATIENT
Start: 2020-07-01 | End: 2020-07-01

## 2020-07-01 NOTE — PROGRESS NOTES
Subjective  Loretta Gann is a 48 y.o. male presents for [address] [facility], who was evaluated through a synchronous (real-time) audio-video encounter, and/or his healthcare decision maker, is aware that it is a billable service, with coverage as determined by his insurance carrier. He provided verbal consent to proceed: Yes, and patient identification was verified. It was conducted pursuant to the emergency declaration under the [address], [address] [place] [address] and the [employer] Act. A caregiver was present when appropriate. Ability to conduct physical exam was limited. I was at home. The patient was at home. HPI   Patient seen in ED  6/13 for with c/o wasp sting left neck and \" funny feeling\" when he swallowed. Exam negative for angioedema, was started on oral steroids and antihistamine  He returned to ED on 6/20 with ST and chapped, bleeding lips. Exam revealed oral lesion and oropharyngeal exudate. He was diagnosed with drug reaction likely  to Prednisone and  was prescribed Magic Mouth Wash. He reports he is getting better. Denies respiratory or GI symptoms. Lip lesion covered by scab. Oral lesion mostly resolved     Past Medical History:   Diagnosis Date    Arthritis     Borderline diabetic     Diabetes ([facility] [address]     Family history of premature CAD     Hypertension      Current Outpatient Medications   Medication Sig    EPINEPHrine (EPIPEN) 0.3 mg/0.3 mL injection 0.3 mL by IntraMUSCular route once as needed for Allergic Response for up to 1 dose.  glucose blood VI test strips (Contour Next Test Strips) strip PATIENT IS TO CHECK BLOOD SUGAR TWICE DAILY. DX: E11.9    amLODIPine (NORVASC) 10 mg tablet TAKE 1 TABLET BY MOUTH EVERY DAY    montelukast (SINGULAIR) 10 mg tablet TAKE 1 TABLET BY MOUTH DAILY    cetirizine (ZyrTEC) 10 mg tablet Take 1 Tab by mouth daily.     hydroCHLOROthiazide (MICROZIDE) 12.5 mg capsule TAKE ONE CAPSULE BY MOUTH EVERY DAY    Jentadueto 2.5-1,000 mg per tablet TAKE 1 TABLET BY MOUTH TWICE A DAY WITH MEALS    albuterol (PROVENTIL HFA, VENTOLIN HFA, PROAIR HFA) 90 mcg/actuation inhaler Take 2 Puffs by inhalation every four (4) hours as needed for Wheezing.  rosuvastatin (CRESTOR) 10 mg tablet TAKE 1 TABLET BY MOUTH EVERY DAY    traZODone (DESYREL) 50 mg tablet Take 1 Tab by mouth nightly.  liraglutide (VICTOZA 2-DANIELA) 0.6 mg/0.1 mL (18 mg/3 mL) pnij INJECT 1.2 MG ONCE DAILY    citalopram (CELEXA) 10 mg tablet Take 2 Tabs by mouth daily.  sildenafil citrate (VIAGRA) 25 mg tablet TAKE 1-4 TABLETS BY MOUTH 30 MINUTES PRIOR TO ROMANCE. DO NOT REPEAT IN 24 HOURS    Insulin Needles, Disposable, (NOVOFINE 32) 32 gauge x 1/4\" ndle USE FOR ONE SHOT DAILY    Insulin Needles, Disposable, (NOVOFINE 32) 32 gauge x 1/4\" ndle USE FOR ONE SHOT DAILY    Insulin Needles, Disposable, (NOVOFINE 32) 32 gauge x 1/4\" ndle USE FOR ONE SHOT DAILY    Insulin Needles, Disposable, (NOVOFINE 32) 32 gauge x 1/4\" ndle USE FOR ONE SHOT DAILY    coenzyme Q-10 (CO Q-10) 200 mg capsule Take 1 Cap by mouth daily.  cinnamon bark (CINNAMON) 500 mg cap Take 500 mg by mouth two (2) times a day.  diphenhydrAMINE 12.5 mg/5 mL elix 150 mg, lidocaine 2 % soln 60 mL, alum-mag hydroxide-simeth 200-200-20 mg/5 mL susp 60 mL, nystatin 100,000 unit/mL susp 6,000,000 Units Take 10 mL by mouth every six (6) hours. No current facility-administered medications for this visit. Allergies   Allergen Reactions    Bee Sting [Sting, Bee] Hives     Past Surgical History:   Procedure Laterality Date    HX ORTHOPAEDIC      right knee         Review of Systems   Constitutional: Negative for chills and fever. HENT: Negative for sore throat. Respiratory: Negative. Cardiovascular: Negative. Gastrointestinal: Negative. Skin: Negative for itching and rash.    Neurological: Negative for dizziness and headaches. Objective  Physical Exam  Constitutional:       General: He is not in acute distress. Appearance: Normal appearance. He is not ill-appearing. Skin:     General: Skin is warm and dry. Neurological:      Mental Status: He is alert. Psychiatric:         Mood and Affect: Mood normal.         Thought Content: Thought content normal.          Assessment & Plan    ICD-10-CM ICD-9-CM    1. Adverse effect of drug, initial encounter T50.905A E947.9    2. Allergic reaction, initial encounter T78.40XA 995.3 EPINEPHrine (EPIPEN) 0.3 mg/0.3 mL injection       resolving. Advised to continue current management  lab results and schedule of future lab studies reviewed with patient  reviewed medications and side effects in detail    Patient voices understanding and acceptance of this advice and will call back if any further questions or concerns.   Fariba Donovan NP

## 2020-07-07 RX ORDER — LINAGLIPTIN AND METFORMIN HYDROCHLORIDE 2.5; 1 MG/1; MG/1
TABLET, FILM COATED ORAL
Qty: 60 TAB | Refills: 2 | Status: SHIPPED | OUTPATIENT
Start: 2020-07-07 | End: 2020-09-04

## 2020-07-20 ENCOUNTER — TELEPHONE (OUTPATIENT)
Dept: ENDOCRINOLOGY | Age: 55
End: 2020-07-20

## 2020-07-20 NOTE — TELEPHONE ENCOUNTER
Called to make the pt aware that his insurance does not cover the contour next meter and test strips. Pt was then made aware that due to him not being on an insulin pump, I am unable to completed a PA. Pt then stated that he will call his insurance to see is covered and the will give me a call with that information.

## 2020-07-24 ENCOUNTER — HOSPITAL ENCOUNTER (EMERGENCY)
Age: 55
Discharge: HOME | End: 2020-07-24
Attending: EMERGENCY MEDICINE
Payer: COMMERCIAL

## 2020-07-24 VITALS
SYSTOLIC BLOOD PRESSURE: 145 MMHG | OXYGEN SATURATION: 98 % | WEIGHT: 205.91 LBS | HEART RATE: 91 BPM | BODY MASS INDEX: 30.5 KG/M2 | RESPIRATION RATE: 16 BRPM | DIASTOLIC BLOOD PRESSURE: 90 MMHG | TEMPERATURE: 98.4 F | HEIGHT: 69 IN

## 2020-07-24 DIAGNOSIS — Z20.822 CLOSE EXPOSURE TO COVID-19 VIRUS: Primary | ICD-10-CM

## 2020-07-24 PROCEDURE — 87635 SARS-COV-2 COVID-19 AMP PRB: CPT

## 2020-07-24 PROCEDURE — 99282 EMERGENCY DEPT VISIT SF MDM: CPT

## 2020-07-24 RX ORDER — ZINC SULFATE 50(220)MG
220 CAPSULE ORAL DAILY
Qty: 5 CAP | Refills: 0 | Status: SHIPPED | OUTPATIENT
Start: 2020-07-24 | End: 2020-07-29

## 2020-07-24 RX ORDER — ASCORBIC ACID 500 MG
500 TABLET ORAL 2 TIMES DAILY
Qty: 10 TAB | Refills: 0 | Status: SHIPPED | OUTPATIENT
Start: 2020-07-24 | End: 2020-07-24

## 2020-07-24 RX ORDER — ASCORBIC ACID 500 MG
500 TABLET ORAL 2 TIMES DAILY
Qty: 10 TAB | Refills: 0 | Status: SHIPPED | OUTPATIENT
Start: 2020-07-24 | End: 2020-07-29

## 2020-07-24 RX ORDER — ZINC SULFATE 50(220)MG
220 CAPSULE ORAL DAILY
Qty: 5 CAP | Refills: 0 | Status: SHIPPED | OUTPATIENT
Start: 2020-07-24 | End: 2020-07-24

## 2020-07-24 NOTE — ED NOTES
Called pt he says he won't come back for discharge paperwork as he can see it my chart  Also okay to fax or electronically send in prescriptions. Will ask ERIKA Viera if this can be done or if I can call them in.

## 2020-07-24 NOTE — ED PROVIDER NOTES
EMERGENCY DEPARTMENT HISTORY AND PHYSICAL EXAM      Date: 7/24/2020  Patient Name: Socorro Albert    History of Presenting Illness     Chief Complaint   Patient presents with    Concern For COVID-19 (Coronavirus)     pt has been exposed to three people with Covid pt has no symptoms; his ex wife is positive; his daughter is positive but no symptoms. History Provided By: Patient    HPI: Socorro Albert, 48 y.o. male presents ambulatory to the Emergency Dept with concern for COVID. He reports he has had exposure to multiple individuals with same and concern for exposure to other immune suppressed family members. He denied chest pain, shortness of breath, congestion, cough, fever/chills. No rash/lesion. No N/V/D. Pt denied tobacco use. Chief Complaint: requesting COVID testing due to exposure  Duration: 1 Weeks  Timing:  Acute  Location: N/A  Quality: pt denied symptoms  Severity: N/A  Modifying Factors: pt with exposure to multiple family members with +COVID   Associated Symptoms: denies any other associated signs or symptoms        There are no other complaints, changes, or physical findings at this time. PCP: Geri Jean NP    Current Outpatient Medications   Medication Sig Dispense Refill    zinc sulfate (Zinc-220) 220 (50) mg capsule Take 1 Cap by mouth daily for 5 days. 5 Cap 0    ascorbic acid, vitamin C, (Vitamin C) 500 mg tablet Take 1 Tab by mouth two (2) times a day for 5 days. 10 Tab 0    Jentadueto 2.5-1,000 mg per tablet TAKE 1 TABLET BY MOUTH TWICE A DAY WITH MEALS 60 Tab 2    glucose blood VI test strips (Contour Next Test Strips) strip PATIENT IS TO CHECK BLOOD SUGAR TWICE DAILY. DX: E11.9 200 Strip 0    diphenhydrAMINE 12.5 mg/5 mL elix 150 mg, lidocaine 2 % soln 60 mL, alum-mag hydroxide-simeth 200-200-20 mg/5 mL susp 60 mL, nystatin 100,000 unit/mL susp 6,000,000 Units Take 10 mL by mouth every six (6) hours.  240 mL 0    amLODIPine (NORVASC) 10 mg tablet TAKE 1 TABLET BY MOUTH EVERY DAY 90 Tab 1    montelukast (SINGULAIR) 10 mg tablet TAKE 1 TABLET BY MOUTH DAILY 90 Tab 1    cetirizine (ZyrTEC) 10 mg tablet Take 1 Tab by mouth daily. 20 Tab 0    hydroCHLOROthiazide (MICROZIDE) 12.5 mg capsule TAKE ONE CAPSULE BY MOUTH EVERY DAY 90 Cap 1    albuterol (PROVENTIL HFA, VENTOLIN HFA, PROAIR HFA) 90 mcg/actuation inhaler Take 2 Puffs by inhalation every four (4) hours as needed for Wheezing. 8.5 Inhaler 0    rosuvastatin (CRESTOR) 10 mg tablet TAKE 1 TABLET BY MOUTH EVERY DAY 90 Tab 2    traZODone (DESYREL) 50 mg tablet Take 1 Tab by mouth nightly. 90 Tab 3    liraglutide (VICTOZA 2-DANIELA) 0.6 mg/0.1 mL (18 mg/3 mL) pnij INJECT 1.2 MG ONCE DAILY 18 mL 3    citalopram (CELEXA) 10 mg tablet Take 2 Tabs by mouth daily. 90 Tab 1    sildenafil citrate (VIAGRA) 25 mg tablet TAKE 1-4 TABLETS BY MOUTH 30 MINUTES PRIOR TO ROMANCE. DO NOT REPEAT IN 24 HOURS 25 Tab 3    Insulin Needles, Disposable, (NOVOFINE 32) 32 gauge x 1/4\" ndle USE FOR ONE SHOT DAILY 100 Pen Needle 3    Insulin Needles, Disposable, (NOVOFINE 32) 32 gauge x 1/4\" ndle USE FOR ONE SHOT DAILY 100 Pen Needle 3    Insulin Needles, Disposable, (NOVOFINE 32) 32 gauge x 1/4\" ndle USE FOR ONE SHOT DAILY 100 Pen Needle 1    Insulin Needles, Disposable, (NOVOFINE 32) 32 gauge x 1/4\" ndle USE FOR ONE SHOT DAILY 100 Pen Needle 6    coenzyme Q-10 (CO Q-10) 200 mg capsule Take 1 Cap by mouth daily. 30 Cap 11    cinnamon bark (CINNAMON) 500 mg cap Take 500 mg by mouth two (2) times a day.          Past History     Past Medical History:  Past Medical History:   Diagnosis Date    Arthritis     Borderline diabetic     Diabetes ([address]     Family history of premature CAD     Hypertension        Past Surgical History:  Past Surgical History:   Procedure Laterality Date    HX ORTHOPAEDIC      right knee       Family History:  Family History   Problem Relation Age of Onset    Cancer Mother         leukemia    Alzheimer Mother     Hypertension Mother     Diabetes Father     Heart Disease Father     Hypertension Father    Caffie Rafa Elevated Lipids Father     Glaucoma Father     Diabetes Sister     Heart Disease Sister     Cancer Sister         Breast    Hypertension Brother     Cancer Maternal Aunt         Breast    Glaucoma Paternal Aunt     Diabetes Brother        Social History:  Social History     Tobacco Use    Smoking status: Never Smoker    Smokeless tobacco: Never Used   Substance Use Topics    Alcohol use: Yes     Alcohol/week: 0.0 standard drinks     Comment: social    Drug use: No       Allergies: Allergies   Allergen Reactions    Bee Sting [Sting, Bee] Hives         Review of Systems   Review of Systems   Constitutional: Negative for chills and fever. HENT: Negative for congestion, rhinorrhea and sore throat. Eyes: Negative for photophobia and visual disturbance. Respiratory: Negative for cough and shortness of breath. Cardiovascular: Negative for chest pain and palpitations. Gastrointestinal: Negative for abdominal pain, diarrhea, nausea and vomiting. Endocrine: Negative for polydipsia, polyphagia and polyuria. Genitourinary: Negative for dysuria and hematuria. Musculoskeletal: Negative for neck pain and neck stiffness. Skin: Negative for rash and wound. Allergic/Immunologic: Negative for food allergies and immunocompromised state. Neurological: Negative for dizziness and headaches. Hematological: Negative for adenopathy. Does not bruise/bleed easily. Psychiatric/Behavioral: Negative for agitation and confusion. All other systems reviewed and are negative. Physical Exam   Physical Exam  Vitals signs and nursing note reviewed. Constitutional:       General: He is not in acute distress. Appearance: Normal appearance. He is well-developed and normal weight. He is not ill-appearing, toxic-appearing or diaphoretic. HENT:      Head: Normocephalic and atraumatic.       Nose: Nose normal. No congestion or rhinorrhea. Mouth/Throat:      Mouth: Mucous membranes are moist.      Pharynx: No oropharyngeal exudate. Eyes:      General: No scleral icterus. Right eye: No discharge. Left eye: No discharge. Extraocular Movements: Extraocular movements intact. Conjunctiva/sclera: Conjunctivae normal.      Pupils: Pupils are equal, round, and reactive to light. Neck:      Musculoskeletal: Normal range of motion and neck supple. Thyroid: No thyromegaly. Vascular: No JVD. Trachea: No tracheal deviation. Cardiovascular:      Rate and Rhythm: Normal rate and regular rhythm. Pulses: Normal pulses. Heart sounds: Normal heart sounds. Pulmonary:      Effort: Pulmonary effort is normal. No respiratory distress. Breath sounds: Normal breath sounds. No wheezing. Abdominal:      Palpations: Abdomen is soft. Tenderness: There is no right CVA tenderness or left CVA tenderness. Musculoskeletal: Normal range of motion. General: No deformity. Lymphadenopathy:      Cervical: No cervical adenopathy. Skin:     General: Skin is warm and dry. Coloration: Skin is not pale. Neurological:      General: No focal deficit present. Mental Status: He is alert and oriented to person, place, and time. Motor: No abnormal muscle tone. Coordination: Coordination normal.   Psychiatric:         Mood and Affect: Mood normal.         Behavior: Behavior normal.         Judgment: Judgment normal.         Diagnostic Study Results     Labs -     Recent Results (from the past 12 hour(s))   SARS-COV-2    Collection Time: 07/24/20  2:23 PM   Result Value Ref Range    Specimen source Nasopharyngeal      SARS-CoV-2 PENDING     Specimen source Nasopharyngeal         Radiologic Studies -   No orders to display         Medical Decision Making   I am the first provider for this patient.     I reviewed the vital signs, available nursing notes, past medical history, past surgical history, family history and social history. Vital Signs-Reviewed the patient's vital signs. Patient Vitals for the past 12 hrs:   Temp Pulse Resp BP SpO2   07/24/20 1323 98.4 °F (36.9 °C) 91 16 145/90 98 %         Records Reviewed: Nursing Notes, Old Medical Records, Previous Radiology Studies and Previous Laboratory Studies      ED Course:   Initial assessment performed. The patients presenting problems have been discussed, and they are in agreement with the care plan formulated and outlined with them. I have encouraged them to ask questions as they arise throughout their visit. The evaluation, management, and disposition decisions of this patient have been made in the context of the current and rapidly developing COVID-19 pandemic. In my clinical judgment, the balance of clinical factors dictate expedited evaluation and discharge from the ED. I have carefully considered the risk and benefits of prolonged ED workups and/or hospitalization vs their risk of acquiring or transmitting COVID-19. I have made reasonable efforts to conserve healthcare resources and defer to safe outpatient alternatives when feasible. I have also discussed the importance of social distancing and proper hygiene to the patient. Based on an appropriate medical screening exam, there is currently no evidence of an emergency medical condition in the patient, and he is clinically safe for discharge. This was a collective decision made with the patient and/or any available family/caretakers. They expressed understanding and agreement with the above. Joe Clay, [id number]       PLAN:  1. Discharge Medication List as of 7/24/2020  1:57 PM      START taking these medications    Details   zinc sulfate (Zinc-220) 220 (50) mg capsule Take 1 Cap by mouth daily for 5 days. , Print, Disp-5 Cap,R-0      ascorbic acid, vitamin C, (Vitamin C) 500 mg tablet Take 1 Tab by mouth two (2) times a day for 5 days. , Print, Disp-10 Tab,R-0         CONTINUE these medications which have NOT CHANGED    Details   Jentadueto 2.5-1,000 mg per tablet TAKE 1 TABLET BY MOUTH TWICE A DAY WITH MEALS, Normal, Disp-60 Tab,R-2      glucose blood VI test strips (Contour Next Test Strips) strip PATIENT IS TO CHECK BLOOD SUGAR TWICE DAILY. DX: E11.9, Normal, Disp-200 Strip, R-0      diphenhydrAMINE 12.5 mg/5 mL elix 150 mg, lidocaine 2 % soln 60 mL, alum-mag hydroxide-simeth 200-200-20 mg/5 mL susp 60 mL, nystatin 100,000 unit/mL susp 6,000,000 Units Take 10 mL by mouth every six (6) hours. , Print, Disp-240 mL, R-0      amLODIPine (NORVASC) 10 mg tablet TAKE 1 TABLET BY MOUTH EVERY DAY, Normal, Disp-90 Tab, R-1      montelukast (SINGULAIR) 10 mg tablet TAKE 1 TABLET BY MOUTH DAILY, Normal, Disp-90 Tab, R-1      cetirizine (ZyrTEC) 10 mg tablet Take 1 Tab by mouth daily. , Normal, Disp-20 Tab, R-0      hydroCHLOROthiazide (MICROZIDE) 12.5 mg capsule TAKE ONE CAPSULE BY MOUTH EVERY DAY, Normal, Disp-90 Cap, R-1      albuterol (PROVENTIL HFA, VENTOLIN HFA, PROAIR HFA) 90 mcg/actuation inhaler Take 2 Puffs by inhalation every four (4) hours as needed for Wheezing., Normal, Disp-8.5 Inhaler, R-0      rosuvastatin (CRESTOR) 10 mg tablet TAKE 1 TABLET BY MOUTH EVERY DAY, Normal, Disp-90 Tab, R-2      traZODone (DESYREL) 50 mg tablet Take 1 Tab by mouth nightly., Normal, Disp-90 Tab, R-3      liraglutide (VICTOZA 2-DANIELA) 0.6 mg/0.1 mL (18 mg/3 mL) pnij INJECT 1.2 MG ONCE DAILY, Normal, Disp-18 mL, R-3      citalopram (CELEXA) 10 mg tablet Take 2 Tabs by mouth daily. , Normal, Disp-90 Tab, R-1      sildenafil citrate (VIAGRA) 25 mg tablet TAKE 1-4 TABLETS BY MOUTH 30 MINUTES PRIOR TO ROMANCE. DO NOT REPEAT IN 24 HOURS, NormalPlease consider 90 day supplies to promote better adherenceDisp-25 Tab, R-3      !! Insulin Needles, Disposable, (NOVOFINE 32) 32 gauge x 1/4\" ndle USE FOR ONE SHOT DAILY, Normal, Disp-100 Pen Needle, R-3      !!  Insulin Needles, Disposable, (NOVOFINE 32) 32 gauge x 1/4\" ndle USE FOR ONE SHOT DAILY, Normal, Disp-100 Pen Needle, R-3      !! Insulin Needles, Disposable, (NOVOFINE 32) 32 gauge x 1/4\" ndle USE FOR ONE SHOT DAILY, Normal, Disp-100 Pen Needle, R-1      !! Insulin Needles, Disposable, (NOVOFINE 32) 32 gauge x 1/4\" ndle USE FOR ONE SHOT DAILY, Normal, Disp-100 Pen Needle, R-6      coenzyme Q-10 (CO Q-10) 200 mg capsule Take 1 Cap by mouth daily. , Normal, Disp-30 Cap, R-11      cinnamon bark (CINNAMON) 500 mg cap Take 500 mg by mouth two (2) times a day., Historical Med       !! - Potential duplicate medications found. Please discuss with provider. 2.   Follow-up Information     Follow up With Specialties Details Why Contact Info    Girish Dumont NP Family Medicine   [id number]   [phone]      [facility] EMERGENCY DEPT Emergency Medicine  If symptoms worsen [address] [place], [place] [address] [facility]  [phone]        Return to ED if worse     Diagnosis     Clinical Impression:   1.  Close exposure to COVID-19 virus

## 2020-07-25 ENCOUNTER — PATIENT OUTREACH (OUTPATIENT)
Dept: CASE MANAGEMENT | Age: 55
End: 2020-07-25

## 2020-07-25 LAB
SARS-COV-2, COV2NT: NOT DETECTED
SOURCE, COVRS: NORMAL
SPECIMEN SOURCE, FCOV2M: NORMAL

## 2020-07-25 NOTE — PROGRESS NOTES
Patient contacted regarding COVID-19 exposure. Discussed COVID-19 related testing which was available at this time. Test results were pending. Patient informed of results, if available? Patient informed that results were pending      Care Transition Nurse/ Ambulatory Care Manager contacted the patient by telephone to perform post discharge assessment. Verified name and  with patient as identifiers. Provided introduction to self, and explanation of the CTN/ACM role, and reason for call due to risk factors for infection and/or exposure to COVID-19. Symptoms reviewed with patient who verbalized the following symptoms: no new symptoms. Due to no new or worsening symptoms encounter was not routed to provider for escalation. Discussed follow-up appointments. If no appointment was previously scheduled, appointment scheduling offered: no  awaiting result of test   [facility] follow up appointment(s):   Future Appointments   Date Time Provider Porsha Carcamo   2020  8:30 AM Sol Curtis MD [facility] BS AMB   2020  8:00 AM Carlos Manuel Simmons NP [facility] BS AMB     Non-BS follow up appointment(s): n/a      Advance Care Planning:   Does patient have an Advance Directive: not on file     Patient has following risk factors of: diabetes. CTN/ACM reviewed discharge instructions, medical action plan and red flags such as increased shortness of breath, increasing fever and signs of decompensation with patient who verbalized understanding. Discussed exposure protocols and quarantine with CDC Guidelines What to do if you are sick with coronavirus disease .  Patient was given an opportunity for questions and concerns. The patient agrees to contact the [facility] exposure line [phone], [facility] department [facility]  ([phone]) and PCP office for questions related to their healthcare. CTN/ACM provided contact information for future needs.     Reviewed and educated patient on any new and changed medications related to discharge diagnosis. Patient/family/caregiver given information for [employer] and agrees to enroll yes  Patient's preferred e-mail:  [email or web address]   Patient's preferred phone number: [phone]  Based on Loop alert triggers, patient will be contacted by nurse care manager for worsening symptoms. Pt will be further monitored by COVID Loop Team based on severity of symptoms and risk factors.

## 2020-08-01 DIAGNOSIS — F32.A DEPRESSION, UNSPECIFIED DEPRESSION TYPE: ICD-10-CM

## 2020-08-03 RX ORDER — CITALOPRAM 10 MG/1
TABLET ORAL
Qty: 90 TAB | Refills: 0 | Status: SHIPPED | OUTPATIENT
Start: 2020-08-03 | End: 2020-09-10 | Stop reason: DRUGHIGH

## 2020-08-04 ENCOUNTER — DOCUMENTATION ONLY (OUTPATIENT)
Dept: INTERNAL MEDICINE CLINIC | Age: 55
End: 2020-08-04

## 2020-08-04 NOTE — PROGRESS NOTES
Citalopram PA Case: [id number], Status: Approved, Coverage Starts on: 8/4/2020 12:00:00 AM, Coverage Ends on: 8/4/2021 12:00:00 AM.

## 2020-09-02 ENCOUNTER — TELEPHONE (OUTPATIENT)
Dept: ENDOCRINOLOGY | Age: 55
End: 2020-09-02

## 2020-09-03 NOTE — TELEPHONE ENCOUNTER
9/3/2020  2:28 PM          Mr. Kenyon Morrison stated that if you can resubmit the test strip, it should go through this time.             Thanks

## 2020-09-04 ENCOUNTER — VIRTUAL VISIT (OUTPATIENT)
Dept: ENDOCRINOLOGY | Age: 55
End: 2020-09-04
Payer: COMMERCIAL

## 2020-09-04 DIAGNOSIS — E78.2 MIXED HYPERLIPIDEMIA: ICD-10-CM

## 2020-09-04 DIAGNOSIS — E11.9 TYPE 2 DIABETES MELLITUS WITHOUT COMPLICATION, WITHOUT LONG-TERM CURRENT USE OF INSULIN (HCC): Primary | ICD-10-CM

## 2020-09-04 DIAGNOSIS — N52.8 OTHER MALE ERECTILE DYSFUNCTION: ICD-10-CM

## 2020-09-04 DIAGNOSIS — I10 ESSENTIAL HYPERTENSION: ICD-10-CM

## 2020-09-04 PROCEDURE — 99214 OFFICE O/P EST MOD 30 MIN: CPT | Performed by: INTERNAL MEDICINE

## 2020-09-04 RX ORDER — LIRAGLUTIDE 6 MG/ML
INJECTION SUBCUTANEOUS
Qty: 9 ADJUSTABLE DOSE PRE-FILLED PEN SYRINGE | Refills: 3 | Status: SHIPPED | OUTPATIENT
Start: 2020-09-04 | End: 2020-11-30

## 2020-09-04 RX ORDER — BLOOD SUGAR DIAGNOSTIC
STRIP MISCELLANEOUS
Qty: 200 STRIP | Refills: 3 | Status: SHIPPED | OUTPATIENT
Start: 2020-09-04 | End: 2020-09-14 | Stop reason: CLARIF

## 2020-09-04 RX ORDER — FLUTICASONE PROPIONATE 50 MCG
1 SPRAY, SUSPENSION (ML) NASAL DAILY
COMMUNITY
End: 2020-12-11

## 2020-09-04 NOTE — PROGRESS NOTES
Chief Complaint   Patient presents with    Diabetes     No recent lab; DOXY [phone]     Other     Pharmacy: [facility]   Records since last visit reviewed. **THIS IS A VIRTUAL VISIT VIA A VIDEO ENCOUNTER. PATIENT AGREED TO HAVE THEIR CARE DELIVERED OVER VIDEO IN PLACE OF THEIR REGULARLY SCHEDULED OFFICE VISIT**      History of Present Illness: Guero Bowman is a 48 y.o. male here for follow up of diabetes. He was diagnosed with diabetes 2015. His A1C in September 2019 was down to 6.2% he had good weight loss and his BGs were doing well so we stopped his Pioglitazone. Pt notes he has had weight gain since the start of the Quarantine. He is up to 206 pounds. He notes that with [employer] he has joined weight loss and diabetes coaching group. He is going to start working with a nutritionist through [facility] to help with weight loss and BG control. He notes that he has been scared to go out of his house so he has been eating more and poorer food choices. He has been under a lot of stress and is in the process of getting his master's degree. In [place] for biblical studies. He notes that with the higher stress he has been drinking more sodas. Pt is still taking  Victoza 1.2mg every morning and Linagliptin/Metformin 2.5/1000mg BID. He notes [employer] would not cover his strips, but he talked to them and they said that they do cover the strips. He denies issues of hypoglycemia. He was told the Linagliptin/Metformin 2.5/1000mg any longer. He was given a list of covered alternatives, which he is going to discuss with his PCP. Pt notes that both sets of his twins and their mother tested COVID positive, but he has tested negative. Pt is drinking coffee in the morning and \"I may eat something small in the afternoon, but most days I don't\", then he will have dinner and an HS snack. He will have dinner around 6-7PM, last night he had hot wings and a regular soda.   His evening snack will consist of chips, popcorn or ice cream.    No history of vascular disease. No history of neuropathy, or nephropathy. Last eye exam was June 2018, no retinopathy. He had a bee sting a few month ago and he was on a 7 day course of Abx, that caused hives and he was on steroids to address that. Current Outpatient Medications   Medication Sig    fluticasone propionate (FLONASE) 50 mcg/actuation nasal spray 1 [place] by Nasal route daily.  citalopram (CELEXA) 10 mg tablet Take 2 tablets by mouth once daily    Jentadueto 2.5-1,000 mg per tablet TAKE 1 TABLET BY MOUTH TWICE A DAY WITH MEALS    glucose blood VI test strips (Contour Next Test Strips) strip PATIENT IS TO CHECK BLOOD SUGAR TWICE DAILY. DX: E11.9    diphenhydrAMINE 12.5 mg/5 mL elix 150 mg, lidocaine 2 % soln 60 mL, alum-mag hydroxide-simeth 200-200-20 mg/5 mL susp 60 mL, nystatin 100,000 unit/mL susp 6,000,000 Units Take 10 mL by mouth every six (6) hours.  amLODIPine (NORVASC) 10 mg tablet TAKE 1 TABLET BY MOUTH EVERY DAY    montelukast (SINGULAIR) 10 mg tablet TAKE 1 TABLET BY MOUTH DAILY    cetirizine (ZyrTEC) 10 mg tablet Take 1 Tab by mouth daily.  hydroCHLOROthiazide (MICROZIDE) 12.5 mg capsule TAKE ONE CAPSULE BY MOUTH EVERY DAY    albuterol (PROVENTIL HFA, VENTOLIN HFA, PROAIR HFA) 90 mcg/actuation inhaler Take 2 Puffs by inhalation every four (4) hours as needed for Wheezing.  rosuvastatin (CRESTOR) 10 mg tablet TAKE 1 TABLET BY MOUTH EVERY DAY    traZODone (DESYREL) 50 mg tablet Take 1 Tab by mouth nightly.  liraglutide (VICTOZA 2-DANIELA) 0.6 mg/0.1 mL (18 mg/3 mL) pnij INJECT 1.2 MG ONCE DAILY    sildenafil citrate (VIAGRA) 25 mg tablet TAKE 1-4 TABLETS BY MOUTH 30 MINUTES PRIOR TO ROMANCE. DO NOT REPEAT IN 24 HOURS    Insulin Needles, Disposable, (NOVOFINE 32) 32 gauge x 1/4\" ndle USE FOR ONE SHOT DAILY    coenzyme Q-10 (CO Q-10) 200 mg capsule Take 1 Cap by mouth daily.     cinnamon bark (CINNAMON) 500 mg cap Take 500 mg by mouth two (2) times a day. No current facility-administered medications for this visit. Allergies   Allergen Reactions    Bee Sting [Sting, Bee] Hives     Review of Systems:  - Eyes: no blurry vision or double vision  - Cardiovascular: no chest pain  - Respiratory: no SOB or cough  - Musculoskeletal: chronic knee pain is improved  - Neurological: no numbness/tingling in extremities    Physical Examination:  There were no vitals taken for this visit. - GENERAL: NCAT, Appears well nourished   - EYES: EOMI, non-icteric, no proptosis   - Ear/Nose/Throat: NCAT, no visible inflammation or masses   - CARDIOVASCULAR: no cyanosis, no visible JVD   - RESPIRATORY: respiratory effort normal without any distress or labored breathing   - MUSCULOSKELETAL: Normal ROM of neck and upper extremities observed   - SKIN: No rash on face   - NEUROLOGIC:  No facial asymmetry (Cranial nerve 7 motor function), No gaze palsy   - PSYCHIATRIC: Normal affect, Normal insight and judgement     Data Reviewed:   None  Assessment/Plan:   1) DM > Pt to work on dietary and lifestyle interventions. Will increase his Victoza to 1.8mg daily. Pt to check her BGs twice per day. 2) HTN > Will not make any changes to his BP regimen at this time. Will order a urine MA    3) HLD > Pt's lipid panel was at goal in March 2020, pt is on Rosuvastatin 10mg daily, which he is tolerating well. This is a high intensity statin regimen. Pt voices understanding and agreement with the plan.     RTC 6 months        Copy sent to:  Dr. Yanna Awad

## 2020-09-10 ENCOUNTER — VIRTUAL VISIT (OUTPATIENT)
Dept: INTERNAL MEDICINE CLINIC | Age: 55
End: 2020-09-10
Payer: COMMERCIAL

## 2020-09-10 ENCOUNTER — DOCUMENTATION ONLY (OUTPATIENT)
Dept: ENDOCRINOLOGY | Age: 55
End: 2020-09-10

## 2020-09-10 DIAGNOSIS — I10 ESSENTIAL HYPERTENSION WITH GOAL BLOOD PRESSURE LESS THAN 130/80: Primary | ICD-10-CM

## 2020-09-10 DIAGNOSIS — F81.9 ADULT LEARNING DISORDER: ICD-10-CM

## 2020-09-10 DIAGNOSIS — F32.A DEPRESSION, UNSPECIFIED DEPRESSION TYPE: ICD-10-CM

## 2020-09-10 PROCEDURE — 99213 OFFICE O/P EST LOW 20 MIN: CPT | Performed by: NURSE PRACTITIONER

## 2020-09-10 RX ORDER — CITALOPRAM 20 MG/1
20 TABLET, FILM COATED ORAL DAILY
Qty: 90 TAB | Refills: 3 | Status: SHIPPED | OUTPATIENT
Start: 2020-09-10 | End: 2022-07-07

## 2020-09-10 RX ORDER — CITALOPRAM 10 MG/1
TABLET ORAL
Qty: 90 TAB | Refills: 0 | Status: CANCELLED | OUTPATIENT
Start: 2020-09-10

## 2020-09-11 NOTE — PROGRESS NOTES
Subjective  Loretta Gann is a 48 y.o. male presents for routine visit    Loretta Gann, who was evaluated through a synchronous (real-time) audio-video encounter, and/or his healthcare decision maker, is aware that it is a billable service, with coverage as determined by his insurance carrier. He provided verbal consent to proceed: Yes, and patient identification was verified. It was conducted pursuant to the emergency declaration under the [facility], [address] [place] [address] and the [employer] Act. A caregiver was present when appropriate. Ability to conduct physical exam was limited. I was in the office. The patient was at home. HPI   He is compliant with medical management  Recent visit with endocrinologist; no change in medical management   Upcoming colonoscopy September 11  He has regained some weight, now 205 lbs  Blood pressure yesterday 136/80, pulse 79  Some depression d/t personal stressors   Likes working from home d/t pandemic   Now believes he has dyslexia, would like to be evaluated for this. Working on advanced degrees and had difficulty in some areas of learning. He has not discussed this before     Past Medical History:   Diagnosis Date    Arthritis     Borderline diabetic     Diabetes ([facility] [address]     Family history of premature CAD     Hypertension        Current Outpatient Medications   Medication Sig    linagliptin-metFORMIN (JENTADUETO) 2.5-1,000 mg per tablet Take 1 Tab by mouth two (2) times daily (with meals).  citalopram (CELEXA) 20 mg tablet Take 1 Tab by mouth daily.  fluticasone propionate (FLONASE) 50 mcg/actuation nasal spray 1 [place] by Nasal route daily.     liraglutide (Victoza 2-Josue) 0.6 mg/0.1 mL (18 mg/3 mL) pnij INJECT 1.8 MG ONCE DAILY    amLODIPine (NORVASC) 10 mg tablet TAKE 1 TABLET BY MOUTH EVERY DAY    montelukast (SINGULAIR) 10 mg tablet TAKE 1 TABLET BY MOUTH DAILY    cetirizine (ZyrTEC) 10 mg tablet Take 1 Tab by mouth daily.  hydroCHLOROthiazide (MICROZIDE) 12.5 mg capsule TAKE ONE CAPSULE BY MOUTH EVERY DAY    albuterol (PROVENTIL HFA, VENTOLIN HFA, PROAIR HFA) 90 mcg/actuation inhaler Take 2 Puffs by inhalation every four (4) hours as needed for Wheezing.  rosuvastatin (CRESTOR) 10 mg tablet TAKE 1 TABLET BY MOUTH EVERY DAY    traZODone (DESYREL) 50 mg tablet Take 1 Tab by mouth nightly.  coenzyme Q-10 (CO Q-10) 200 mg capsule Take 1 Cap by mouth daily.  Blood-Glucose Meter (OneTouch Verio Meter) misc Check sugars twice per day. E11.9 Insurance preferance    glucose blood VI test strips (OneTouch Verio test strips) strip Check sugars twice per day. E11.9. Insurance preferance    Insulin Needles, Disposable, (NOVOFINE 32) 32 gauge x 1/4\" ndle USE FOR ONE SHOT DAILY    cinnamon bark (CINNAMON) 500 mg cap Take 500 mg by mouth two (2) times a day. No current facility-administered medications for this visit. Allergies   Allergen Reactions    Bee Sting [Sting, Bee] Hives       Review of Systems   Constitutional: Negative. Eyes: Negative. Respiratory: Negative. Cardiovascular: Negative. Gastrointestinal: Negative. Genitourinary: Negative. Musculoskeletal: Negative. Neurological: Negative for dizziness and headaches. Psychiatric/Behavioral: Positive for depression. The patient is nervous/anxious. Objective    Physical Exam  Constitutional:       Appearance: Normal appearance. Neurological:      Mental Status: He is alert. Mental status is at baseline. Psychiatric:         Mood and Affect: Mood normal.         Thought Content: Thought content normal.          Assessment & Plan    ICD-10-CM ICD-9-CM    1. Essential hypertension with goal blood pressure less than 130/80  I10 401.9    2. Depression, unspecified depression type  F32.9 311 citalopram (CELEXA) 20 mg tablet   3.  Adult learning disorder  F81.9 315.9 REFERRAL TO NEUROPSYCHOLOGY     Follow-up and Dispositions    · Return in about 3 months (around 12/10/2020) for htn. Blood pressure acceptable. Reviewed lifestyle management  Discussed alternate treatment options for depression. He elects to increase current medication. Reviewed lifestyle management   lab results and schedule of future lab studies reviewed with patient  reviewed diet, exercise and weight control  reviewed medications and side effects in detail      Patient voices understanding and acceptance of this advice and will call back if any further questions or concerns.   Gabbi , NP

## 2020-09-14 RX ORDER — BLOOD-GLUCOSE METER
EACH MISCELLANEOUS
Qty: 1 EACH | Refills: 0 | Status: SHIPPED | OUTPATIENT
Start: 2020-09-14 | End: 2022-03-03

## 2020-09-14 RX ORDER — BLOOD SUGAR DIAGNOSTIC
STRIP MISCELLANEOUS
Qty: 200 STRIP | Refills: 3 | Status: SHIPPED | OUTPATIENT
Start: 2020-09-14 | End: 2020-09-28

## 2020-09-28 RX ORDER — BLOOD SUGAR DIAGNOSTIC
STRIP MISCELLANEOUS
Qty: 200 STRIP | Refills: 3 | Status: SHIPPED | OUTPATIENT
Start: 2020-09-28 | End: 2020-12-11

## 2020-09-29 ENCOUNTER — TELEPHONE (OUTPATIENT)
Dept: ENDOCRINOLOGY | Age: 55
End: 2020-09-29

## 2020-09-29 NOTE — TELEPHONE ENCOUNTER
Spoke with patient. He cannot get into [employer] at this time. Trouble with computer. He states that he is in a program with [employer] that sent him a meter and strips. Advised to call pharmacy to have them cancel the Rx for Contour Test strips. Patient agreed.

## 2020-10-06 RX ORDER — LINAGLIPTIN AND METFORMIN HYDROCHLORIDE 2.5; 1 MG/1; MG/1
TABLET, FILM COATED ORAL
Qty: 60 TAB | Refills: 2 | Status: SHIPPED | OUTPATIENT
Start: 2020-10-06 | End: 2020-12-15

## 2020-10-24 LAB
ALBUMIN SERPL-MCNC: 4.9 G/DL (ref 3.8–4.9)
ALBUMIN/CREAT UR: 9 MG/G CREAT (ref 0–29)
ALBUMIN/GLOB SERPL: 1.8 {RATIO} (ref 1.2–2.2)
ALP SERPL-CCNC: 64 IU/L (ref 39–117)
ALT SERPL-CCNC: 13 IU/L (ref 0–44)
AST SERPL-CCNC: 14 IU/L (ref 0–40)
BILIRUB SERPL-MCNC: 0.3 MG/DL (ref 0–1.2)
BUN SERPL-MCNC: 17 MG/DL (ref 6–24)
BUN/CREAT SERPL: 11 (ref 9–20)
CALCIUM SERPL-MCNC: 10.1 MG/DL (ref 8.7–10.2)
CHLORIDE SERPL-SCNC: 98 MMOL/L (ref 96–106)
CHOLEST SERPL-MCNC: 140 MG/DL (ref 100–199)
CO2 SERPL-SCNC: 29 MMOL/L (ref 20–29)
CREAT SERPL-MCNC: 1.54 MG/DL (ref 0.76–1.27)
CREAT UR-MCNC: 130.5 MG/DL
EST. AVERAGE GLUCOSE BLD GHB EST-MCNC: 143 MG/DL
GLOBULIN SER CALC-MCNC: 2.8 G/DL (ref 1.5–4.5)
GLUCOSE SERPL-MCNC: 98 MG/DL (ref 65–99)
HBA1C MFR BLD: 6.6 % (ref 4.8–5.6)
HDLC SERPL-MCNC: 40 MG/DL
INTERPRETATION, 910389: NORMAL
INTERPRETATION: NORMAL
LDLC SERPL CALC-MCNC: 78 MG/DL (ref 0–99)
Lab: NORMAL
MICROALBUMIN UR-MCNC: 11.5 UG/ML
PDF IMAGE, 910387: NORMAL
POTASSIUM SERPL-SCNC: 4.1 MMOL/L (ref 3.5–5.2)
PROT SERPL-MCNC: 7.7 G/DL (ref 6–8.5)
SODIUM SERPL-SCNC: 140 MMOL/L (ref 134–144)
TRIGL SERPL-MCNC: 119 MG/DL (ref 0–149)
VLDLC SERPL CALC-MCNC: 22 MG/DL (ref 5–40)

## 2020-10-31 RX ORDER — HYDROCHLOROTHIAZIDE 12.5 MG/1
CAPSULE ORAL
Qty: 90 CAP | Refills: 1 | Status: SHIPPED | OUTPATIENT
Start: 2020-10-31 | End: 2021-04-23

## 2020-11-04 ENCOUNTER — PATIENT MESSAGE (OUTPATIENT)
Dept: INTERNAL MEDICINE CLINIC | Age: 55
End: 2020-11-04

## 2020-11-29 DIAGNOSIS — F32.A DEPRESSION, UNSPECIFIED DEPRESSION TYPE: ICD-10-CM

## 2020-11-30 RX ORDER — CITALOPRAM 10 MG/1
TABLET ORAL
Qty: 90 TAB | Refills: 0 | Status: SHIPPED | OUTPATIENT
Start: 2020-11-30 | End: 2020-12-11

## 2020-11-30 RX ORDER — LIRAGLUTIDE 6 MG/ML
INJECTION SUBCUTANEOUS
Qty: 18 ML | Refills: 0 | Status: SHIPPED | OUTPATIENT
Start: 2020-11-30 | End: 2020-12-11 | Stop reason: DRUGHIGH

## 2020-12-11 ENCOUNTER — VIRTUAL VISIT (OUTPATIENT)
Dept: ENDOCRINOLOGY | Age: 55
End: 2020-12-11
Payer: COMMERCIAL

## 2020-12-11 DIAGNOSIS — N52.8 OTHER MALE ERECTILE DYSFUNCTION: ICD-10-CM

## 2020-12-11 DIAGNOSIS — E78.2 MIXED HYPERLIPIDEMIA: ICD-10-CM

## 2020-12-11 DIAGNOSIS — I10 ESSENTIAL HYPERTENSION: ICD-10-CM

## 2020-12-11 DIAGNOSIS — E11.9 TYPE 2 DIABETES MELLITUS WITHOUT COMPLICATION, WITHOUT LONG-TERM CURRENT USE OF INSULIN (HCC): Primary | ICD-10-CM

## 2020-12-11 PROCEDURE — 99214 OFFICE O/P EST MOD 30 MIN: CPT | Performed by: INTERNAL MEDICINE

## 2020-12-11 RX ORDER — LIRAGLUTIDE 6 MG/ML
1.8 INJECTION SUBCUTANEOUS DAILY
Qty: 9 ML | Refills: 5 | Status: SHIPPED | OUTPATIENT
Start: 2020-12-11 | End: 2021-02-07

## 2020-12-11 NOTE — PROGRESS NOTES
Chief Complaint   Patient presents with    Diabetes     PCP and Pharmacy verified   [address] [place] [address] Ca Foley: [phone]   Records since last visit reviewed. **THIS IS A VIRTUAL VISIT VIA A VIDEO ENCOUNTER. PATIENT AGREED TO HAVE THEIR CARE DELIVERED OVER VIDEO IN PLACE OF THEIR REGULARLY SCHEDULED OFFICE VISIT**      History of Present Illness: Madalyn Stafford is a 48 y.o. male here for follow up of diabetes. He was diagnosed with diabetes 2015. His A1C September 2019 was down to 6.2% he had good weight loss and his BGs were doing well so we stopped his Pioglitazone. Pt notes he has had weight gain since the start of the Quarantine. He is up to 206 pounds. He notes that with [employer] he has joined weight loss and diabetes coaching group. He is going to start working with a nutritionist through [facility] to help with weight loss and BG control. His A1C in October 2020 was 6.6%. Pt notes his stress is up and his weight is up to 208 pounds. He has not been going to the gym and he is not active. \"I get off work I Netflix and cook\". He is checking his BGs every day. He notes his BGs were running in the 140 or less. He denies issues of hypoglycemia. He notes that he has been scared to go out of his house so he has been eating more and poorer food choices. He has been under a lot of stress and is in the process of getting his master's degree. In [place] for biblical studies. He took a semester off because of the stress of COVID and all the work he was doing on-line. He notes that with the higher stress he has been drinking more sodas. Pt is still taking  Victoza 1.8mg every morning and Jentadueto 2.5/1000mg BID. Pt notes that both sets of his twins and their mother tested COVID positive, but he has tested negative. Pt is drinking coffee in the morning, but no breakfast.  He has lunch around 1130AM-Noon, yesterday he had hot dogs and beans and water.   He will have dinner around 6-7PM, last nght he had cabbage, string beans and fried trout. His evening snack will consist of chips, popcorn or ice cream.    No history of vascular disease. No history of neuropathy, or nephropathy. Last eye exam was October 2020, no retinopathy. Current Outpatient Medications   Medication Sig    liraglutide (Victoza 3-Josue) 0.6 mg/0.1 mL (18 mg/3 mL) pnij 1.8 mg by SubCUTAneous route daily.  hydroCHLOROthiazide (MICROZIDE) 12.5 mg capsule TAKE 1 CAPSULE BY MOUTH EVERY DAY    Jentadueto 2.5-1,000 mg per tablet TAKE 1 TABLET BY MOUTH TWICE A DAY WITH MEALS    Blood-Glucose Meter (OneTouch Verio Meter) misc Check sugars twice per day. E11.9 Insurance preferance    citalopram (CELEXA) 20 mg tablet Take 1 Tab by mouth daily.  amLODIPine (NORVASC) 10 mg tablet TAKE 1 TABLET BY MOUTH EVERY DAY    montelukast (SINGULAIR) 10 mg tablet TAKE 1 TABLET BY MOUTH DAILY    albuterol (PROVENTIL HFA, VENTOLIN HFA, PROAIR HFA) 90 mcg/actuation inhaler Take 2 Puffs by inhalation every four (4) hours as needed for Wheezing.  rosuvastatin (CRESTOR) 10 mg tablet TAKE 1 TABLET BY MOUTH EVERY DAY    traZODone (DESYREL) 50 mg tablet Take 1 Tab by mouth nightly.  Insulin Needles, Disposable, (NOVOFINE 32) 32 gauge x 1/4\" ndle USE FOR ONE SHOT DAILY    coenzyme Q-10 (CO Q-10) 200 mg capsule Take 1 Cap by mouth daily.  cinnamon bark (CINNAMON) 500 mg cap Take 500 mg by mouth two (2) times a day. No current facility-administered medications for this visit. Allergies   Allergen Reactions    Bee Sting [Sting, Bee] Hives     Review of Systems:  - Eyes: no blurry vision or double vision  - Cardiovascular: no chest pain  - Respiratory: no SOB or cough  - Musculoskeletal: chronic knee pain is improved  - Neurological: no numbness/tingling in extremities    Physical Examination:  There were no vitals taken for this visit.    - GENERAL: NCAT, Appears well nourished   - EYES: EOMI, non-icteric, no proptosis   - Ear/Nose/Throat: NCAT, no visible inflammation or masses   - CARDIOVASCULAR: no cyanosis, no visible JVD   - RESPIRATORY: respiratory effort normal without any distress or labored breathing   - MUSCULOSKELETAL: Normal ROM of neck and upper extremities observed   - SKIN: No rash on face   - NEUROLOGIC:  No facial asymmetry (Cranial nerve 7 motor function), No gaze palsy   - PSYCHIATRIC: Normal affect, Normal insight and judgement     Data Reviewed:   Component      Latest Ref Rng & Units 10/23/2020 10/23/2020 10/23/2020 10/23/2020           4:30 PM  4:30 PM  4:30 PM  4:30 PM   Glucose      65 - 99 mg/dL  98     BUN      6 - 24 mg/dL  17     Creatinine      0.76 - 1.27 mg/dL  1.54 (H)     GFR est non-AA      >59 mL/min/1.73  50 (L)     GFR est AA      >59 mL/min/1.73  58 (L)     BUN/Creatinine ratio      9 - 20  11     Sodium      134 - 144 mmol/L  140     Potassium      3.5 - 5.2 mmol/L  4.1     Chloride      96 - 106 mmol/L  98     CO2      20 - 29 mmol/L  29     Calcium      8.7 - 10.2 mg/dL  10.1     Protein, total      6.0 - 8.5 g/dL  7.7     Albumin      3.8 - 4.9 g/dL  4.9     GLOBULIN, TOTAL      1.5 - 4.5 g/dL  2.8     A-G Ratio      1.2 - 2.2  1.8     Bilirubin, total      0.0 - 1.2 mg/dL  0.3     Alk. phosphatase      39 - 117 IU/L  64     AST      0 - 40 IU/L  14     ALT      0 - 44 IU/L  13     Cholesterol, total      100 - 199 mg/dL 140      Triglyceride      0 - 149 mg/dL 119      HDL Cholesterol      >39 mg/dL 40      VLDL Cholesterol Bala      5 - 40 mg/dL 22      LDL Cholesterol      0 - 99 mg/dL 78      Creatinine, urine      Not Estab. mg/dL   130.5    Microalbumin, urine      Not Estab. ug/mL   11.5    Microalbumin/Creat. Ratio      0 - 29 mg/g creat   9    Hemoglobin A1c, (calculated)      4.8 - 5.6 %    6.6 (H)   Estimated average glucose      mg/dL    143     Assessment/Plan:   1) DM > His A1C was 6.6% in October 2020. Pt to continue the Victoza 1.8mg daily and Jentadueto 2.5/1000mg BID.  Pt to check her BGs twice per day. 2) HTN > Will not make any changes to his BP regimen at this time. 3) HLD > Pt's lipid panel was at goal in October 2020, pt is on Rosuvastatin 10mg daily, which he is tolerating well. This is a high intensity statin regimen. Pt voices understanding and agreement with the plan.     RTC 4 months        Copy sent to:  Dr. Author Montanez

## 2020-12-15 RX ORDER — LINAGLIPTIN AND METFORMIN HYDROCHLORIDE 2.5; 1 MG/1; MG/1
TABLET, FILM COATED ORAL
Qty: 60 TAB | Refills: 2 | Status: SHIPPED | OUTPATIENT
Start: 2020-12-15 | End: 2021-03-23

## 2020-12-18 DIAGNOSIS — I10 ESSENTIAL HYPERTENSION WITH GOAL BLOOD PRESSURE LESS THAN 130/80: ICD-10-CM

## 2020-12-22 RX ORDER — ROSUVASTATIN CALCIUM 10 MG/1
TABLET, COATED ORAL
Qty: 90 TAB | Refills: 2 | Status: SHIPPED | OUTPATIENT
Start: 2020-12-22 | End: 2021-09-06

## 2020-12-22 RX ORDER — AMLODIPINE BESYLATE 10 MG/1
TABLET ORAL
Qty: 90 TAB | Refills: 1 | Status: SHIPPED | OUTPATIENT
Start: 2020-12-22 | End: 2021-06-14

## 2021-01-29 ENCOUNTER — VIRTUAL VISIT (OUTPATIENT)
Dept: INTERNAL MEDICINE CLINIC | Age: 56
End: 2021-01-29
Payer: COMMERCIAL

## 2021-01-29 DIAGNOSIS — J06.9 URI, ACUTE: Primary | ICD-10-CM

## 2021-01-29 PROCEDURE — 99213 OFFICE O/P EST LOW 20 MIN: CPT | Performed by: NURSE PRACTITIONER

## 2021-01-29 RX ORDER — BENZONATATE 200 MG/1
200 CAPSULE ORAL
Qty: 30 CAP | Refills: 0 | Status: SHIPPED | OUTPATIENT
Start: 2021-01-29 | End: 2021-02-05

## 2021-01-29 RX ORDER — CEFDINIR 300 MG/1
300 CAPSULE ORAL 2 TIMES DAILY
Qty: 20 CAP | Refills: 0 | Status: SHIPPED | OUTPATIENT
Start: 2021-01-29 | End: 2021-02-08

## 2021-01-29 NOTE — PROGRESS NOTES
Subjective  Ed Waddell is a 48 y.o. male presents for acute care     Ed Waddell, who was evaluated through a synchronous (real-time) audio-video encounter, and/or his healthcare decision maker, is aware that it is a billable service, with coverage as determined by his insurance carrier. He provided verbal consent to proceed: Yes, and patient identification was verified. It was conducted pursuant to the emergency declaration under the [address] [place] [address] and the [employer] Act. A caregiver was present when appropriate. Ability to conduct physical exam was limited. I was in the office. The patient was in the office. HPI   He reports dry cough, ST, hoarseness, mild frontal headache for ~ 1 month  Believes headaches dt using 2 computer monitors for work  Hoarseness worse after prolong taking   Wheezing first thing in mornings  Not using Albuterol inhaler   Negative COVID 19 January 13      Past Medical History:   Diagnosis Date    Arthritis     Borderline diabetic     Diabetes ([facility] [address]     Family history of premature CAD     Hypertension        Current Outpatient Medications on File Prior to Visit   Medication Sig Dispense Refill    rosuvastatin (CRESTOR) 10 mg tablet TAKE 1 TABLET BY MOUTH EVERY DAY 90 Tab 2    amLODIPine (NORVASC) 10 mg tablet TAKE 1 TABLET BY MOUTH EVERY DAY 90 Tab 1    Jentadueto 2.5-1,000 mg per tablet TAKE 1 TABLET BY MOUTH TWICE A DAY WITH MEALS 60 Tab 2    liraglutide (Victoza 3-Josue) 0.6 mg/0.1 mL (18 mg/3 mL) pnij 1.8 mg by SubCUTAneous route daily. 9 mL 5    hydroCHLOROthiazide (MICROZIDE) 12.5 mg capsule TAKE 1 CAPSULE BY MOUTH EVERY DAY 90 Cap 1    Blood-Glucose Meter (OneTouch Verio Meter) misc Check sugars twice per day. E11.9 Insurance preferance 1 Each 0    citalopram (CELEXA) 20 mg tablet Take 1 Tab by mouth daily.  90 Tab 3    montelukast (SINGULAIR) 10 mg tablet TAKE 1 TABLET BY MOUTH DAILY 90 Tab 1    albuterol (PROVENTIL HFA, VENTOLIN HFA, PROAIR HFA) 90 mcg/actuation inhaler Take 2 Puffs by inhalation every four (4) hours as needed for Wheezing. 8.5 Inhaler 0    traZODone (DESYREL) 50 mg tablet Take 1 Tab by mouth nightly. 90 Tab 3    Insulin Needles, Disposable, (NOVOFINE 32) 32 gauge x 1/4\" ndle USE FOR ONE SHOT DAILY 100 Pen Needle 6    coenzyme Q-10 (CO Q-10) 200 mg capsule Take 1 Cap by mouth daily. 30 Cap 11    cinnamon bark (CINNAMON) 500 mg cap Take 500 mg by mouth two (2) times a day. No current facility-administered medications on file prior to visit. Allergies   Allergen Reactions    Latex Itching    Bee Sting [Sting, Bee] Hives           Review of Systems   Constitutional: Negative for chills, fever and malaise/fatigue. HENT: Positive for congestion and sore throat. Eyes: Negative. Respiratory: Positive for cough and wheezing. Negative for sputum production and shortness of breath. Cardiovascular: Negative. Neurological: Positive for headaches. Negative for dizziness. Objective  Physical Exam  Constitutional:       General: He is not in acute distress. Appearance: Normal appearance. Pulmonary:      Comments: No conversational dyspnea, speaks in full sentences   Neurological:      Mental Status: He is alert. Mental status is at baseline. Psychiatric:         Mood and Affect: Mood normal.         Behavior: Behavior normal.         Thought Content: Thought content normal.         Judgment: Judgment normal.          Assessment & Plan    ICD-10-CM ICD-9-CM    1. URI, acute  J06.9 465.9 benzonatate (TESSALON) 200 mg capsule      cefdinir (OMNICEF) 300 mg capsule     Diagnoses and all orders for this visit:    1. URI, acute  -     benzonatate (TESSALON) 200 mg capsule; Take 1 Cap by mouth three (3) times daily as needed for Cough for up to 7 days. -     cefdinir (OMNICEF) 300 mg capsule;  Take 1 Cap by mouth two (2) times a day for 10 days.             Reviewed supportive care measure     lab results and schedule of future lab studies reviewed with patient  Ciro Madrid NP

## 2021-01-29 NOTE — PROGRESS NOTES
Virtual visit [phone]    Chief Complaint   Patient presents with    Cough     dry cough    Sore Throat     pt states the more he talks the worst it gets    Headache   Sx started about a month ago  Covid test negative  No covid vaccine  0/10 pain, just some discomfort per pt      1. Have you been to the ER, urgent care clinic since your last visit? Hospitalized since your last visit? No    2. Have you seen or consulted any other health care providers outside of the [address] [place] [address] since your last visit? Include any pap smears or colon screening. No        Health Maintenance Due   Topic Date Due    COVID-19 Vaccine (1 of 2) 06/30/1982    Eye Exam Retinal or Dilated  06/01/2020           3 most recent PHQ Screens 1/29/2021   Little interest or pleasure in doing things Not at all   Feeling down, depressed, irritable, or hopeless Not at all   Total Score PHQ 2 0           Learning Assessment 3/11/2016   PRIMARY LEARNER Patient   HIGHEST LEVEL OF EDUCATION - PRIMARY LEARNER  SOME COLLEGE   BARRIERS PRIMARY LEARNER NONE   CO-LEARNER CAREGIVER No   PRIMARY LANGUAGE ENGLISH   LEARNER PREFERENCE PRIMARY READING   ANSWERED BY Patient   RELATIONSHIP SELF         An After Visit Summary was printed and given to the patient.

## 2021-02-07 RX ORDER — LIRAGLUTIDE 6 MG/ML
INJECTION SUBCUTANEOUS
Qty: 18 ML | Refills: 0 | Status: SHIPPED | OUTPATIENT
Start: 2021-02-07 | End: 2021-09-24

## 2021-02-09 RX ORDER — LIRAGLUTIDE 6 MG/ML
INJECTION SUBCUTANEOUS
Qty: 3 ADJUSTABLE DOSE PRE-FILLED PEN SYRINGE | Refills: 3 | Status: SHIPPED | OUTPATIENT
Start: 2021-02-09 | End: 2021-09-24

## 2021-02-09 NOTE — TELEPHONE ENCOUNTER
----- Message from Stephon Wilson sent at 2/9/2021 12:33 PM EST -----  Regarding: /Refill  Medication Refill    Caller (if not patient):      Relationship of caller (if not patient):  [phone]    Best contact number(s):      Name of medication and dosage if Elaine Alfredo      Is patient out of this medication (yes/no):yes    [facility] DR DANI ROMAN name:    Pharmacy listed in chart? (yes/no):yes  Pharmacy phone [phone]      Details to clarify the request:Pt is at the pharmacy and is informing that the pharmacy refill wrong medication and is requesting a updated prescription for the [address] [place] [address] .       Stephon Wilson

## 2021-03-03 ENCOUNTER — VIRTUAL VISIT (OUTPATIENT)
Dept: INTERNAL MEDICINE CLINIC | Age: 56
End: 2021-03-03
Payer: COMMERCIAL

## 2021-03-03 DIAGNOSIS — Z91.09 ENVIRONMENTAL ALLERGIES: Primary | ICD-10-CM

## 2021-03-03 PROCEDURE — 99213 OFFICE O/P EST LOW 20 MIN: CPT | Performed by: NURSE PRACTITIONER

## 2021-03-03 NOTE — PROGRESS NOTES
Virtual visit [phone]    Chief Complaint   Patient presents with    Cough     constant dry cough    Skin Problem     skin irritation    Itchy Eye    Wheezing   Pt states that he has been working from home since March of last year, and he had some house work done. He states that since they done that work his issues has gotten worst. He's not sure if its d/t mold or lead exposure. 0/10 pain today   fasting    1. Have you been to the ER, urgent care clinic since your last visit? Hospitalized since your last visit? No    2. Have you seen or consulted any other health care providers outside of the [address] [place] [address] since your last visit? Include any pap smears or colon screening. No        Health Maintenance Due   Topic Date Due    Hepatitis C Screening  Never done    COVID-19 Vaccine (1 of 2) Never done    Eye Exam Retinal or Dilated  06/01/2020    Foot Exam Q1  03/06/2021           3 most recent PHQ Screens 3/3/2021   Little interest or pleasure in doing things Not at all   Feeling down, depressed, irritable, or hopeless Not at all   Total Score PHQ 2 0           Learning Assessment 3/11/2016   PRIMARY LEARNER Patient   HIGHEST LEVEL OF EDUCATION - PRIMARY LEARNER  SOME COLLEGE   BARRIERS PRIMARY LEARNER NONE   CO-LEARNER CAREGIVER No   PRIMARY LANGUAGE ENGLISH   LEARNER PREFERENCE PRIMARY READING   ANSWERED BY Patient   RELATIONSHIP SELF         An After Visit Summary was printed and given to the patient.

## 2021-03-05 NOTE — PROGRESS NOTES
Subjective  Carine Ch is a 48 y.o. male presents for acute care    Carine Ch, who was evaluated through a synchronous (real-time) audio-video encounter, and/or his healthcare decision maker, is aware that it is a billable service, with coverage as determined by his insurance carrier. He provided verbal consent to proceed: Yes, and patient identification was verified. This visit was conducted pursuant to the emergency declaration under the [address] [place] [address], [address] [place] [address] and the [employer] Act. A caregiver was present when appropriate. Ability to conduct physical exam was limited. The patient was located in a state where the provider was credentialed to provide care. --Logan Kelly NP on 3/5/2021 at 3:04 AM              HPI     He reports skin irritation, itchy eyes, cough for > 6 months and getting worse   Symptoms years round, not d/t known seasonal allergies   Current allergy medications ineffective   He is constantly sick with respiratory symptoms   He notes house he rents is old, in disrepair, suspects mold problem. Symptoms stated 3 months after he moved in   He request blood test to identify mold   Landlord will not order professional mold inspection or home repairs  He does not plan to renew lease        Past Medical History:   Diagnosis Date    Arthritis     Borderline diabetic     Diabetes ([facility] [address]     Family history of premature CAD     Hypertension        Current Outpatient Medications   Medication Sig    liraglutide (Victoza 3-Josue) 0.6 mg/0.1 mL (18 mg/3 mL) pnij Inject 1.8 mg daily  (new dosage)    citalopram (CELEXA) 10 mg tablet Take 2 Tabs by mouth daily.     rosuvastatin (CRESTOR) 10 mg tablet TAKE 1 TABLET BY MOUTH EVERY DAY    amLODIPine (NORVASC) 10 mg tablet TAKE 1 TABLET BY MOUTH EVERY DAY    Jentadueto 2.5-1,000 mg per tablet TAKE 1 TABLET BY MOUTH TWICE A DAY WITH MEALS    hydroCHLOROthiazide (MICROZIDE) 12.5 mg capsule TAKE 1 CAPSULE BY MOUTH EVERY DAY    Blood-Glucose Meter (OneTouch Verio Meter) misc Check sugars twice per day. E11.9 Insurance preferance    citalopram (CELEXA) 20 mg tablet Take 1 Tab by mouth daily.  montelukast (SINGULAIR) 10 mg tablet TAKE 1 TABLET BY MOUTH DAILY    albuterol (PROVENTIL HFA, VENTOLIN HFA, PROAIR HFA) 90 mcg/actuation inhaler Take 2 Puffs by inhalation every four (4) hours as needed for Wheezing.  traZODone (DESYREL) 50 mg tablet Take 1 Tab by mouth nightly.  Insulin Needles, Disposable, (NOVOFINE 32) 32 gauge x 1/4\" ndle USE FOR ONE SHOT DAILY    coenzyme Q-10 (CO Q-10) 200 mg capsule Take 1 Cap by mouth daily.  cinnamon bark (CINNAMON) 500 mg cap Take 500 mg by mouth two (2) times a day.  Victoza 2-Josue 0.6 mg/0.1 mL (18 mg/3 mL) pnij INJECT 1.2 MG SUBCUTANEOUSLY ONCE DAILY     No current facility-administered medications for this visit. Allergies   Allergen Reactions    Latex Itching    Bee Sting [Sting, Bee] Hives       Review of Systems   Constitutional: Negative for chills and fever. Eyes: Positive for discharge. Respiratory: Positive for cough. Negative for hemoptysis, shortness of breath and wheezing. Neurological: Negative for dizziness and headaches. Endo/Heme/Allergies: Positive for environmental allergies. Objective  There were no vitals taken for this visit. Physical Exam  Constitutional:       General: He is not in acute distress. Appearance: Normal appearance. He is not ill-appearing. Neurological:      Mental Status: He is alert. Mental status is at baseline. Assessment & Plan    ICD-10-CM ICD-9-CM    1. Environmental allergies  Z91.09 V15.09        patient advised provider unaware of any lab test to evaluate for mold.   Recommend consult with Allergist             Gloria Brooks NP

## 2021-03-11 DIAGNOSIS — E11.9 TYPE 2 DIABETES MELLITUS WITHOUT COMPLICATION, WITHOUT LONG-TERM CURRENT USE OF INSULIN (HCC): ICD-10-CM

## 2021-03-23 RX ORDER — LINAGLIPTIN AND METFORMIN HYDROCHLORIDE 2.5; 1 MG/1; MG/1
TABLET, FILM COATED ORAL
Qty: 60 TAB | Refills: 2 | Status: SHIPPED | OUTPATIENT
Start: 2021-03-23 | End: 2021-07-02

## 2021-03-25 LAB
ALBUMIN SERPL-MCNC: 4.4 G/DL (ref 3.8–4.9)
ALBUMIN/CREAT UR: 11 MG/G CREAT (ref 0–29)
ALBUMIN/GLOB SERPL: 1.4 {RATIO} (ref 1.2–2.2)
ALP SERPL-CCNC: 64 IU/L (ref 39–117)
ALT SERPL-CCNC: 15 IU/L (ref 0–44)
AST SERPL-CCNC: 18 IU/L (ref 0–40)
BILIRUB SERPL-MCNC: 0.3 MG/DL (ref 0–1.2)
BUN SERPL-MCNC: 18 MG/DL (ref 6–24)
BUN/CREAT SERPL: 11 (ref 9–20)
CALCIUM SERPL-MCNC: 9.4 MG/DL (ref 8.7–10.2)
CHLORIDE SERPL-SCNC: 101 MMOL/L (ref 96–106)
CHOLEST SERPL-MCNC: 156 MG/DL (ref 100–199)
CO2 SERPL-SCNC: 26 MMOL/L (ref 20–29)
CREAT SERPL-MCNC: 1.7 MG/DL (ref 0.76–1.27)
CREAT UR-MCNC: 119.5 MG/DL
EST. AVERAGE GLUCOSE BLD GHB EST-MCNC: 169 MG/DL
GLOBULIN SER CALC-MCNC: 3.1 G/DL (ref 1.5–4.5)
GLUCOSE SERPL-MCNC: 154 MG/DL (ref 65–99)
HBA1C MFR BLD: 7.5 % (ref 4.8–5.6)
HDLC SERPL-MCNC: 35 MG/DL
IMP & REVIEW OF LAB RESULTS: NORMAL
INTERPRETATION: NORMAL
LDLC SERPL CALC-MCNC: 96 MG/DL (ref 0–99)
Lab: NORMAL
MICROALBUMIN UR-MCNC: 12.7 UG/ML
PDF IMAGE, 910387: NORMAL
POTASSIUM SERPL-SCNC: 4.2 MMOL/L (ref 3.5–5.2)
PROT SERPL-MCNC: 7.5 G/DL (ref 6–8.5)
SODIUM SERPL-SCNC: 142 MMOL/L (ref 134–144)
TRIGL SERPL-MCNC: 137 MG/DL (ref 0–149)
VLDLC SERPL CALC-MCNC: 25 MG/DL (ref 5–40)

## 2021-03-31 DIAGNOSIS — R05.9 COUGH: ICD-10-CM

## 2021-04-02 ENCOUNTER — OFFICE VISIT (OUTPATIENT)
Dept: ENDOCRINOLOGY | Age: 56
End: 2021-04-02
Payer: COMMERCIAL

## 2021-04-02 VITALS
HEART RATE: 93 BPM | DIASTOLIC BLOOD PRESSURE: 80 MMHG | BODY MASS INDEX: 31.75 KG/M2 | WEIGHT: 215 LBS | SYSTOLIC BLOOD PRESSURE: 135 MMHG | RESPIRATION RATE: 14 BRPM

## 2021-04-02 DIAGNOSIS — E78.2 MIXED HYPERLIPIDEMIA: ICD-10-CM

## 2021-04-02 DIAGNOSIS — I10 ESSENTIAL HYPERTENSION: ICD-10-CM

## 2021-04-02 DIAGNOSIS — E11.9 TYPE 2 DIABETES MELLITUS WITHOUT COMPLICATION, WITHOUT LONG-TERM CURRENT USE OF INSULIN (HCC): Primary | ICD-10-CM

## 2021-04-02 PROCEDURE — 3051F HG A1C>EQUAL 7.0%<8.0%: CPT | Performed by: INTERNAL MEDICINE

## 2021-04-02 PROCEDURE — 99214 OFFICE O/P EST MOD 30 MIN: CPT | Performed by: INTERNAL MEDICINE

## 2021-04-02 RX ORDER — MONTELUKAST SODIUM 10 MG/1
TABLET ORAL
Qty: 90 TAB | Refills: 1 | Status: SHIPPED | OUTPATIENT
Start: 2021-04-02 | End: 2021-09-23

## 2021-04-02 NOTE — PROGRESS NOTES
Chief Complaint   Patient presents with    Diabetes     PCP and pharmacy verified. DOXY. ME      Records since last visit reviewed. History of Present Illness: Nydia De León is a 48 y.o. male here for follow up of diabetes. He was diagnosed with diabetes 2015. His A1C September 2019 was down to 6.2% he had good weight loss and his BGs were doing well so we stopped his Pioglitazone. Pt notes he has had weight gain since the start of the Quarantine. He is up to 206 pounds. He notes that with ANTHEM he has joined weight loss and diabetes coaching group. Last week his A1C was up from 6.6% to 7.5%. Looking at Dr. Pari Durán most recent note, Mr. Vick Blanchard has been having issues of cough and recurrent URI for the last few months. Dr. Richardson Angry referred him to pulmonology for evaluation. Pt notes that he has purchased a scale and \"it has been a source of stress. I know I have been gaining weight. I have slipped into bad habits of snacking, not exercising and not following my diet\". Pts weight today was 215 pounds. \"I am trying to get back on track, I know I have lost about 5 pounds recently and I started taking a \"KETO supplement\" in the evening and is drinking more water. He has been working with a nutritionist through [facility] to help with weight loss and BG control. Pt notes his stress is up and his weight and BGs have increases as well. He has not been going to the gym and he is not active. \"I get off work I Netflix and cook\". Pt notes he is moving at the end of the month and the new appointments do have a gym that is open for use. Pt had an infected tooth, which was extracted and he was treated with Abx two weeks ago. He has further dental work planned. \"It is now a work in progress\". He is checking his BGs twice per day. His BGs are running in the 110-150s range. He denies issues of hypoglycemia. Pt is still taking  Victoza 1.8mg every morning and Jentadueto 2.5/1000mg BID.      Pt is drinking coffee in the morning, but no breakfast.  He has brunch around 1030-11AM, yesterday he had quesadilla with chicken and flavored water. He will have dinner around 6-7PM, last [facility] he did not eat dinner, but he had a bowl of ice cream last night. No history of vascular disease. No history of neuropathy, or nephropathy. Last eye exam was October 2020, no retinopathy. Current Outpatient Medications   Medication Sig    montelukast (SINGULAIR) 10 mg tablet TAKE 1 TABLET BY MOUTH EVERY DAY    Jentadueto 2.5-1,000 mg per tablet TAKE 1 TABLET BY MOUTH TWICE A DAY WITH MEALS    liraglutide (Victoza 3-Josue) 0.6 mg/0.1 mL (18 mg/3 mL) pnij Inject 1.8 mg daily  (new dosage)    Victoza 2-Josue 0.6 mg/0.1 mL (18 mg/3 mL) pnij INJECT 1.2 MG SUBCUTANEOUSLY ONCE DAILY    rosuvastatin (CRESTOR) 10 mg tablet TAKE 1 TABLET BY MOUTH EVERY DAY    amLODIPine (NORVASC) 10 mg tablet TAKE 1 TABLET BY MOUTH EVERY DAY    hydroCHLOROthiazide (MICROZIDE) 12.5 mg capsule TAKE 1 CAPSULE BY MOUTH EVERY DAY    Blood-Glucose Meter (OneTouch Verio Meter) misc Check sugars twice per day. E11.9 Insurance preferance    citalopram (CELEXA) 20 mg tablet Take 1 Tab by mouth daily.  albuterol (PROVENTIL HFA, VENTOLIN HFA, PROAIR HFA) 90 mcg/actuation inhaler Take 2 Puffs by inhalation every four (4) hours as needed for Wheezing.  traZODone (DESYREL) 50 mg tablet Take 1 Tab by mouth nightly.  Insulin Needles, Disposable, (NOVOFINE 32) 32 gauge x 1/4\" ndle USE FOR ONE SHOT DAILY    coenzyme Q-10 (CO Q-10) 200 mg capsule Take 1 Cap by mouth daily.  cinnamon bark (CINNAMON) 500 mg cap Take 500 mg by mouth two (2) times a day. No current facility-administered medications for this visit.       Allergies   Allergen Reactions    Latex Itching    Bee Sting [Sting, Bee] Hives     Review of Systems:  - Eyes: no blurry vision or double vision  - Cardiovascular: no chest pain  - Respiratory: + cough   - Musculoskeletal: chronic knee pain, chronic left shoulder stiffness  - Neurological: no numbness/tingling in extremities    Physical Examination:  Blood pressure 135/80, pulse 93, resp. rate 14, weight 215 lb (97.5 kg). General: pleasant, no distress, good eye contact   Neck: no carotid bruits  Cardiovascular: regular, normal rate, nl s1 and s2, no m/r/g, 2+ DP pulses   Respiratory: clear bilaterally  Integumentary: no edema, no foot ulcers  Psychiatric: normal mood and affect    Diabetic foot exam:     Left Foot:   Visual Exam: normal    Pulse DP: 2+ (normal)   Filament test: normal sensation    Vibratory sensation: normal      Right Foot:   Visual Exam: normal    Pulse DP: 2+ (normal)   Filament test: normal sensation    Vibratory sensation: normal        Data Reviewed:   Component      Latest Ref Rng & Units 3/24/2021 3/24/2021 3/24/2021 3/24/2021           7:43 AM  7:43 AM  7:43 AM  7:43 AM   Glucose      65 - 99 mg/dL  154 (H)     BUN      6 - 24 mg/dL  18     Creatinine      0.76 - 1.27 mg/dL  1.70 (H)     GFR est non-AA      >59 mL/min/1.73  45 (L)     GFR est AA      >59 mL/min/1.73  52 (L)     BUN/Creatinine ratio      9 - 20  11     Sodium      134 - 144 mmol/L  142     Potassium      3.5 - 5.2 mmol/L  4.2     Chloride      96 - 106 mmol/L  101     CO2      20 - 29 mmol/L  26     Calcium      8.7 - 10.2 mg/dL  9.4     Protein, total      6.0 - 8.5 g/dL  7.5     Albumin      3.8 - 4.9 g/dL  4.4     GLOBULIN, TOTAL      1.5 - 4.5 g/dL  3.1     A-G Ratio      1.2 - 2.2  1.4     Bilirubin, total      0.0 - 1.2 mg/dL  0.3     Alk. phosphatase      39 - 117 IU/L  64     AST      0 - 40 IU/L  18     ALT      0 - 44 IU/L  15     Cholesterol, total      100 - 199 mg/dL   156    Triglyceride      0 - 149 mg/dL   137    HDL Cholesterol      >39 mg/dL   35 (L)    VLDL, calculated      5 - 40 mg/dL   25    LDL, calculated      0 - 99 mg/dL   96    Creatinine, urine      Not Estab. mg/dL 119.5      Microalbumin, urine      Not Estab. ug/mL 12.7      Microalbumin/Creat. Ratio      0 - 29 mg/g creat 11      Hemoglobin A1c, (calculated)      4.8 - 5.6 %    7.5 (H)   Estimated average glucose      mg/dL    169       Assessment/Plan:   1) DM > His A1C last week increased to 7.5%. Pt to continue the Victoza 1.8mg daily and Jentadueto 2.5/1000mg BID. Pt to check her BGs twice per day. We discussed the need to get back to working on dietary and lifestyle changes. 2) HTN > His BP today was 135/80. Will not make any changes to his BP regimen at this time. His Urine MA/Cr was not elevated. 3) HLD > Pt's lipid panel was at goal in March 2021, pt is on Rosuvastatin 10mg daily, which he is tolerating well. This is a high intensity statin regimen. Pt voices understanding and agreement with the plan.       RTC 6 months        Copy sent to:  Dr. Jasbir Harley

## 2021-04-23 RX ORDER — HYDROCHLOROTHIAZIDE 12.5 MG/1
CAPSULE ORAL
Qty: 90 CAP | Refills: 1 | Status: SHIPPED | OUTPATIENT
Start: 2021-04-23 | End: 2021-10-22

## 2021-05-02 DIAGNOSIS — G47.00 INSOMNIA, UNSPECIFIED TYPE: ICD-10-CM

## 2021-05-04 RX ORDER — TRAZODONE HYDROCHLORIDE 50 MG/1
TABLET ORAL
Qty: 90 TAB | Refills: 0 | Status: SHIPPED | OUTPATIENT
Start: 2021-05-04 | End: 2021-07-21

## 2021-06-12 DIAGNOSIS — I10 ESSENTIAL HYPERTENSION WITH GOAL BLOOD PRESSURE LESS THAN 130/80: ICD-10-CM

## 2021-06-14 RX ORDER — AMLODIPINE BESYLATE 10 MG/1
TABLET ORAL
Qty: 90 TABLET | Refills: 1 | Status: SHIPPED | OUTPATIENT
Start: 2021-06-14 | End: 2021-12-03

## 2021-07-02 RX ORDER — LINAGLIPTIN AND METFORMIN HYDROCHLORIDE 2.5; 1 MG/1; MG/1
TABLET, FILM COATED ORAL
Qty: 60 TABLET | Refills: 2 | Status: SHIPPED | OUTPATIENT
Start: 2021-07-02 | End: 2021-09-30

## 2021-07-19 DIAGNOSIS — G47.00 INSOMNIA, UNSPECIFIED TYPE: ICD-10-CM

## 2021-07-21 RX ORDER — TRAZODONE HYDROCHLORIDE 50 MG/1
TABLET ORAL
Qty: 90 TABLET | Refills: 0 | Status: SHIPPED | OUTPATIENT
Start: 2021-07-21 | End: 2021-11-09

## 2021-07-21 RX ORDER — SILDENAFIL 25 MG/1
TABLET, FILM COATED ORAL
Qty: 25 TABLET | Refills: 0 | Status: SHIPPED | OUTPATIENT
Start: 2021-07-21 | End: 2021-07-23

## 2021-07-23 RX ORDER — SILDENAFIL 25 MG/1
TABLET, FILM COATED ORAL
Qty: 25 TABLET | Refills: 0 | Status: SHIPPED | OUTPATIENT
Start: 2021-07-23 | End: 2021-09-29

## 2021-08-25 ENCOUNTER — OFFICE VISIT (OUTPATIENT)
Dept: INTERNAL MEDICINE CLINIC | Age: 56
End: 2021-08-25
Payer: COMMERCIAL

## 2021-08-25 VITALS
TEMPERATURE: 99.1 F | HEART RATE: 88 BPM | BODY MASS INDEX: 31.84 KG/M2 | RESPIRATION RATE: 16 BRPM | DIASTOLIC BLOOD PRESSURE: 90 MMHG | WEIGHT: 215 LBS | OXYGEN SATURATION: 98 % | SYSTOLIC BLOOD PRESSURE: 130 MMHG | HEIGHT: 69 IN

## 2021-08-25 DIAGNOSIS — M25.561 ACUTE PAIN OF RIGHT KNEE: ICD-10-CM

## 2021-08-25 DIAGNOSIS — R05.9 COUGH: ICD-10-CM

## 2021-08-25 DIAGNOSIS — I10 ESSENTIAL HYPERTENSION WITH GOAL BLOOD PRESSURE LESS THAN 130/80: Primary | ICD-10-CM

## 2021-08-25 PROCEDURE — 99213 OFFICE O/P EST LOW 20 MIN: CPT | Performed by: NURSE PRACTITIONER

## 2021-08-25 NOTE — PROGRESS NOTES
Subjective  Estella Jeans is a 55 y.o. male. HPI   Blood pressure trending up for a while   Attributes this to weight gain   Walking every day 6-10,000 steps   Weights 3 days weekly   Slight right knee issues lately, does not limit activities   Weight goal 180  Working on dietary changes   He has seen nutritionist    considering [facility] program     right toe nail dark, painful believes d/t pressure from walking shoes     Stressing  about pandemic   Working from home   Victoza injections cause abdominal sore ness     Dry cough. Will follow up with allergist  in September for allergy testing   Cough triggered by allergies   Wonders about vitamin supplements     Past Medical History:   Diagnosis Date    Arthritis     Borderline diabetic     Diabetes ([facility] [address]     Family history of premature CAD     Hypertension        Allergies   Allergen Reactions    Latex Itching    Bee Sting [Sting, Bee] Hives       Past Surgical History:   Procedure Laterality Date    HX ORTHOPAEDIC      right knee       Current Outpatient Medications   Medication Sig    sildenafil citrate (VIAGRA) 25 mg tablet TAKE 1-4 TABLETS BY MOUTH 30 MINUTES PRIOR TO ROMANCE. DO NOT REPEAT IN 24 HOURS    traZODone (DESYREL) 50 mg tablet Take 1 tablet by mouth nightly    Jentadueto 2.5-1,000 mg per tablet TAKE 1 TABLET BY MOUTH TWICE A DAY WITH MEALS    amLODIPine (NORVASC) 10 mg tablet TAKE 1 TABLET BY MOUTH EVERY DAY    hydroCHLOROthiazide (MICROZIDE) 12.5 mg capsule TAKE 1 CAPSULE BY MOUTH EVERY DAY    montelukast (SINGULAIR) 10 mg tablet TAKE 1 TABLET BY MOUTH EVERY DAY    liraglutide (Victoza 3-Josue) 0.6 mg/0.1 mL (18 mg/3 mL) pnij Inject 1.8 mg daily  (new dosage)    rosuvastatin (CRESTOR) 10 mg tablet TAKE 1 TABLET BY MOUTH EVERY DAY    albuterol (PROVENTIL HFA, VENTOLIN HFA, PROAIR HFA) 90 mcg/actuation inhaler Take 2 Puffs by inhalation every four (4) hours as needed for Wheezing.     coenzyme Q-10 (CO Q-10) 200 mg capsule Take 1 Cap by mouth daily.  cinnamon bark (CINNAMON) 500 mg cap Take 500 mg by mouth two (2) times a day.  Victoza 2-Josue 0.6 mg/0.1 mL (18 mg/3 mL) pnij INJECT 1.2 MG SUBCUTANEOUSLY ONCE DAILY    Blood-Glucose Meter (OneTouch Verio Meter) misc Check sugars twice per day. E11.9 Insurance preferance    citalopram (CELEXA) 20 mg tablet Take 1 Tab by mouth daily.  Insulin Needles, Disposable, (NOVOFINE 32) 32 gauge x 1/4\" ndle USE FOR ONE SHOT DAILY     No current facility-administered medications for this visit. Review of Systems   Constitutional: Negative. Eyes: Negative. Respiratory: Positive for cough. Negative for sputum production and shortness of breath. Cardiovascular: Negative. Gastrointestinal: Positive for abdominal pain. Genitourinary: Negative. Musculoskeletal: Positive for joint pain. Neurological: Negative for dizziness and headaches. Psychiatric/Behavioral: Negative for depression. The patient is nervous/anxious. Objective  Visit Vitals  BP (!) 130/90 (BP 1 Location: Right upper arm, BP Patient Position: Sitting)   Pulse 88   Temp 99.1 °F (37.3 °C) (Oral)   Resp 16   Ht 5' 9\" (1.753 m)   Wt 215 lb (97.5 kg)   SpO2 98%   BMI 31.75 kg/m²     Physical Exam  Constitutional:       Appearance: Normal appearance. Cardiovascular:      Rate and Rhythm: Normal rate and regular rhythm. Pulses: Normal pulses. Heart sounds: Normal heart sounds. Musculoskeletal:      Right knee: Crepitus present. No swelling or deformity. Left knee: Crepitus present. No swelling or deformity. Neurological:      Mental Status: He is alert. Gait: Gait normal.   Psychiatric:         Attention and Perception: Attention normal.         Mood and Affect: Mood is anxious. Speech: Speech normal.         Behavior: Behavior normal.         Cognition and Memory: Cognition normal.          Assessment & Plan    ICD-10-CM ICD-9-CM    1.  Essential hypertension with goal blood pressure less than 130/80  I10 401.9    2. Cough  R05 786.2    3. Acute pain of right knee  M25.561 719.46      Follow-up and Dispositions    · Return in about 3 months (around 11/25/2021). Encouraged to optimize BP control with lifestyle changes   Cough d/t environmental allergies. Encouraged trial of Flonase   Advise knee pain likely d/t mild DJD. Encouraged to wear knee brace during exercise     lab results and schedule of future lab studies reviewed with patient  reviewed diet, exercise and weight control  cardiovascular risk and specific lipid/LDL goals reviewed  reviewed medications and side effects in detail    Patient voices understanding and acceptance of this advice and will call back if any further questions or concerns.   Jeff Sher NP

## 2021-08-25 NOTE — PROGRESS NOTES
RM 7    Chief Complaint   Patient presents with    Routine    Follow-up    Blood Pressure Check       Patient reports he took his BP at home 2 weeks ago and it was 144/91. Reports concern that BP has been high for about 4 months. 1. Have you been to the ER, urgent care clinic since your last visit? Hospitalized since your last visit? No    2. Have you seen or consulted any other health care providers outside of the [address] [place] [address] since your last visit? Include any pap smears or colon screening. No      Visit Vitals  BP (!) 143/91 (BP 1 Location: Right upper arm, BP Patient Position: Sitting)   Pulse 88   Temp 99.1 °F (37.3 °C) (Oral)   Resp 16   Ht 5' 9\" (1.753 m)   Wt 215 lb (97.5 kg)   SpO2 98%   BMI 31.75 kg/m²       3 most recent PHQ Screens 8/25/2021   Little interest or pleasure in doing things Not at all   Feeling down, depressed, irritable, or hopeless Not at all   Total Score PHQ 2 0         Health Maintenance Due   Topic Date Due    Hepatitis C Screening  Never done    COVID-19 Vaccine (1) Never done    Eye Exam Retinal or Dilated  06/01/2020       Learning Assessment 3/11/2016   PRIMARY LEARNER Patient   HIGHEST LEVEL OF EDUCATION - PRIMARY LEARNER  SOME COLLEGE   BARRIERS PRIMARY LEARNER NONE   CO-LEARNER CAREGIVER No   PRIMARY LANGUAGE ENGLISH   LEARNER PREFERENCE PRIMARY READING   ANSWERED BY Patient   RELATIONSHIP SELF         AVS  education, follow up, and recommendations provided and addressed with patient.   services used to advise patient No

## 2021-08-25 NOTE — PATIENT INSTRUCTIONS
Home Blood Pressure Test: About This Test  What is it? A home blood pressure test allows you to keep track of your blood pressure at home. Blood pressure is a measure of the force of blood against the walls of your arteries. Blood pressure readings include two numbers, such as 130/80 (say \"130 over 80\"). The first number is the systolic pressure. The second number is the diastolic pressure. Why is this test done? You may do this test at home to:  · Find out if you have high blood pressure. · Track your blood pressure if you have high blood pressure. · Track how well medicine is working to reduce high blood pressure. · Check how lifestyle changes, such as weight loss and exercise, are affecting blood pressure. How do you prepare for the test?  For at least 30 minutes before you take your blood pressure, don't exercise, drink caffeine, or smoke. Empty your bladder before the test. Sit quietly with your back straight and both feet on the floor for at least 5 minutes. This helps you take your blood pressure while you feel comfortable and relaxed. How is the test done? · If your doctor recommends it, take your blood pressure twice a day. Take it in the morning and evening. · Sit with your arm slightly bent and resting on a table so that your upper arm is at the same level as your heart. · Use the same arm each time you take your blood pressure. · Place the blood pressure cuff on the bare skin of your upper arm. You may have to roll up your sleeve, remove your arm from the sleeve, or take your shirt off. · Wrap the blood pressure cuff around your upper arm so that the lower edge of the cuff is about 1 inch above the bend of your elbow. · Do not move, talk, or text while you take your blood pressure. Follow the instructions that came with your blood pressure monitor. They might be different from the following. · Press the on/off button on the automatic monitor.  Then you may need to wait until the screen says the monitor is ready. · Press the start button. The cuff will inflate and deflate by itself. · Your blood pressure numbers will appear on the screen. · Wait one minute and take your blood pressure again. · If your monitor does not automatically save your numbers, write them in your log book, along with the date and time. Follow-up care is a key part of your treatment and safety. Be sure to make and go to all appointments, and call your doctor if you are having problems. It's also a good idea to keep a list of the medicines you take. Where can you learn more? Go to http://www.zavala.com/  Enter C427 in the search box to learn more about \"Home Blood Pressure Test: About This Test.\"  Current as of: August 31, 2020               Content Version: 12.8  © 9450-4393 [employer]. Care instructions adapted under license by [employer] (which disclaims liability or warranty for this information). If you have questions about a medical condition or this instruction, always ask your healthcare professional. [place] [address] any warranty or liability for your use of this information. Home Blood Pressure Test: About This Test  What is it? A home blood pressure test allows you to keep track of your blood pressure at home. Blood pressure is a measure of the force of blood against the walls of your arteries. Blood pressure readings include two numbers, such as 130/80 (say \"130 over 80\"). The first number is the systolic pressure. The second number is the diastolic pressure. Why is this test done? You may do this test at home to:  · Find out if you have high blood pressure. · Track your blood pressure if you have high blood pressure. · Track how well medicine is working to reduce high blood pressure. · Check how lifestyle changes, such as weight loss and exercise, are affecting blood pressure.   How do you prepare for the test?  For at least 30 minutes before you take your blood pressure, don't exercise, drink caffeine, or smoke. Empty your bladder before the test. Sit quietly with your back straight and both feet on the floor for at least 5 minutes. This helps you take your blood pressure while you feel comfortable and relaxed. How is the test done? · If your doctor recommends it, take your blood pressure twice a day. Take it in the morning and evening. · Sit with your arm slightly bent and resting on a table so that your upper arm is at the same level as your heart. · Use the same arm each time you take your blood pressure. · Place the blood pressure cuff on the bare skin of your upper arm. You may have to roll up your sleeve, remove your arm from the sleeve, or take your shirt off. · Wrap the blood pressure cuff around your upper arm so that the lower edge of the cuff is about 1 inch above the bend of your elbow. · Do not move, talk, or text while you take your blood pressure. Follow the instructions that came with your blood pressure monitor. They might be different from the following. · Press the on/off button on the automatic monitor. Then you may need to wait until the screen says the monitor is ready. · Press the start button. The cuff will inflate and deflate by itself. · Your blood pressure numbers will appear on the screen. · Wait one minute and take your blood pressure again. · If your monitor does not automatically save your numbers, write them in your log book, along with the date and time. Follow-up care is a key part of your treatment and safety. Be sure to make and go to all appointments, and call your doctor if you are having problems. It's also a good idea to keep a list of the medicines you take. Where can you learn more?   Go to http://www.gray.com/  Enter C427 in the search box to learn more about \"Home Blood Pressure Test: About This Test.\"  Current as of: August 31, 2020               Content Version: 12.8  © 2006-2021 [email or web address]. Care instructions adapted under license by [employer] (which disclaims liability or warranty for this information). If you have questions about a medical condition or this instruction, always ask your healthcare professional. [address] any warranty or liability for your use of this information. Learning About High Blood Pressure  What is high blood pressure? Blood pressure is a measure of how hard the blood pushes against the walls of your arteries. It's normal for blood pressure to go up and down throughout the day. But if it stays up, you have high blood pressure. Another name for high blood pressure is hypertension. Two numbers tell you your blood pressure. The first number is the systolic pressure (top number). It shows how hard the blood pushes when your heart is pumping. The second number is the diastolic pressure (bottom number). It shows how hard the blood pushes between heartbeats, when your heart is relaxed and filling with blood. Your doctor will give you a goal for your blood pressure based on your health and your age. High blood pressure (hypertension) means that the top number stays high, or the bottom number stays high, or both. High blood pressure increases the risk of stroke, heart attack, and other problems. What happens when you have high blood pressure? · Blood flows through your arteries with too much force. Over time, this can damage the heart and the walls of your arteries. But you can't feel it. High blood pressure usually doesn't cause symptoms. · High blood pressure makes your heart work harder. And that can lead to heart failure, which means your heart doesn't pump as much blood as your body needs. · Fat and calcium start to build up in your arteries. This buildup is called hardening of the arteries.  It can cause many problems including a heart attack and stroke. · Arteries also carry blood and oxygen to organs like your eyes, kidneys, and brain. If high blood pressure damages those arteries, it can lead to vision loss, kidney disease, stroke, and a higher risk of dementia. How can you prevent high blood pressure? · Stay at a healthy weight. · Try to limit how much sodium you eat to less than 2,300 milligrams (mg) a day. If you limit your sodium to 1,500 mg a day, you can lower your blood pressure even more. ? Buy foods that are labeled \"unsalted,\" \"sodium-free,\" or \"low-sodium. \" Foods labeled \"reduced-sodium\" and \"light sodium\" may still have too much sodium. ? Flavor your food with garlic, lemon juice, onion, vinegar, herbs, and spices instead of salt. Do not use soy sauce, steak sauce, onion salt, garlic salt, mustard, or ketchup on your food. ? Use less salt (or none) when recipes call for it. You can often use half the salt a recipe calls for without losing flavor. · Be physically active. Get at least 30 minutes of exercise on most days of the week. Walking is a good choice. You also may want to do other activities, such as running, swimming, cycling, or playing tennis or team sports. · Limit alcohol to 2 drinks a day for men and 1 drink a day for women. · Eat plenty of fruits, vegetables, and low-fat dairy products. Eat less saturated and total fats. How is high blood pressure treated? · Your doctor will suggest making lifestyle changes to help your heart. For example, your doctor may ask you to eat healthy foods, quit smoking, lose extra weight, and be more active. · If lifestyle changes don't help enough, your doctor may recommend that you take medicine. · When blood pressure is very high, medicines are needed to lower it. Follow-up care is a key part of your treatment and safety. Be sure to make and go to all appointments, and call your doctor if you are having problems.  It's also a good idea to know your test results and keep a list of the medicines you take. Where can you learn more? Go to http://www.Vertical Communications.com/  Enter P501 in the search box to learn more about \"Learning About High Blood Pressure. \"  Current as of: August 31, 2020               Content Version: 12.8  © 2006-2021 [employer]. Care instructions adapted under license by [employer] (which disclaims liability or warranty for this information). If you have questions about a medical condition or this instruction, always ask your healthcare professional. [address] any warranty or liability for your use of this information. Learning About High Blood Pressure  What is high blood pressure? Blood pressure is a measure of how hard the blood pushes against the walls of your arteries. It's normal for blood pressure to go up and down throughout the day. But if it stays up, you have high blood pressure. Another name for high blood pressure is hypertension. Two numbers tell you your blood pressure. The first number is the systolic pressure (top number). It shows how hard the blood pushes when your heart is pumping. The second number is the diastolic pressure (bottom number). It shows how hard the blood pushes between heartbeats, when your heart is relaxed and filling with blood. Your doctor will give you a goal for your blood pressure based on your health and your age. High blood pressure (hypertension) means that the top number stays high, or the bottom number stays high, or both. High blood pressure increases the risk of stroke, heart attack, and other problems. What happens when you have high blood pressure? · Blood flows through your arteries with too much force. Over time, this can damage the heart and the walls of your arteries. But you can't feel it. High blood pressure usually doesn't cause symptoms. · High blood pressure makes your heart work harder.  And that can lead to heart failure, which means your heart doesn't pump as much blood as your body needs. · Fat and calcium start to build up in your arteries. This buildup is called hardening of the arteries. It can cause many problems including a heart attack and stroke. · Arteries also carry blood and oxygen to organs like your eyes, kidneys, and brain. If high blood pressure damages those arteries, it can lead to vision loss, kidney disease, stroke, and a higher risk of dementia. How can you prevent high blood pressure? · Stay at a healthy weight. · Try to limit how much sodium you eat to less than 2,300 milligrams (mg) a day. If you limit your sodium to 1,500 mg a day, you can lower your blood pressure even more. ? Buy foods that are labeled \"unsalted,\" \"sodium-free,\" or \"low-sodium. \" Foods labeled \"reduced-sodium\" and \"light sodium\" may still have too much sodium. ? Flavor your food with garlic, lemon juice, onion, vinegar, herbs, and spices instead of salt. Do not use soy sauce, steak sauce, onion salt, garlic salt, mustard, or ketchup on your food. ? Use less salt (or none) when recipes call for it. You can often use half the salt a recipe calls for without losing flavor. · Be physically active. Get at least 30 minutes of exercise on most days of the week. Walking is a good choice. You also may want to do other activities, such as running, swimming, cycling, or playing tennis or team sports. · Limit alcohol to 2 drinks a day for men and 1 drink a day for women. · Eat plenty of fruits, vegetables, and low-fat dairy products. Eat less saturated and total fats. How is high blood pressure treated? · Your doctor will suggest making lifestyle changes to help your heart. For example, your doctor may ask you to eat healthy foods, quit smoking, lose extra weight, and be more active. · If lifestyle changes don't help enough, your doctor may recommend that you take medicine.   · When blood pressure is very high, medicines are needed to lower it.  Follow-up care is a key part of your treatment and safety. Be sure to make and go to all appointments, and call your doctor if you are having problems. It's also a good idea to know your test results and keep a list of the medicines you take. Where can you learn more? Go to http://www.zavala.com/  Enter P501 in the search box to learn more about \"Learning About High Blood Pressure. \"  Current as of: August 31, 2020               Content Version: 12.8  © 2006-2021 [employer]. Care instructions adapted under license by [employer] (which disclaims liability or warranty for this information). If you have questions about a medical condition or this instruction, always ask your healthcare professional. [address] any warranty or liability for your use of this information.

## 2021-09-06 RX ORDER — ROSUVASTATIN CALCIUM 10 MG/1
TABLET, COATED ORAL
Qty: 90 TABLET | Refills: 2 | Status: SHIPPED | OUTPATIENT
Start: 2021-09-06 | End: 2022-05-25

## 2021-09-13 ENCOUNTER — TELEPHONE (OUTPATIENT)
Dept: ENDOCRINOLOGY | Age: 56
End: 2021-09-13

## 2021-09-13 NOTE — TELEPHONE ENCOUNTER
9/13/2021  4:28 PM    Lab order shows active future with date 09/27 and patient lost the lab order that Dr Carina Oakley gave him. His appt is 09/24.     Thanks,  Desirae Castano

## 2021-09-14 DIAGNOSIS — E11.9 TYPE 2 DIABETES MELLITUS WITHOUT COMPLICATION, WITHOUT LONG-TERM CURRENT USE OF INSULIN (HCC): Primary | ICD-10-CM

## 2021-09-14 NOTE — TELEPHONE ENCOUNTER
Labs have been reordered. Advised patient the he can go to [facility] a few days prior to his appointment.

## 2021-09-18 LAB
ALBUMIN SERPL-MCNC: 4.6 G/DL (ref 3.8–4.9)
ALBUMIN/GLOB SERPL: 1.6 {RATIO} (ref 1.2–2.2)
ALP SERPL-CCNC: 68 IU/L (ref 44–121)
ALT SERPL-CCNC: 24 IU/L (ref 0–44)
AST SERPL-CCNC: 19 IU/L (ref 0–40)
BILIRUB SERPL-MCNC: 0.3 MG/DL (ref 0–1.2)
BUN SERPL-MCNC: 13 MG/DL (ref 6–24)
BUN/CREAT SERPL: 8 (ref 9–20)
CALCIUM SERPL-MCNC: 10.1 MG/DL (ref 8.7–10.2)
CHLORIDE SERPL-SCNC: 103 MMOL/L (ref 96–106)
CO2 SERPL-SCNC: 26 MMOL/L (ref 20–29)
CREAT SERPL-MCNC: 1.55 MG/DL (ref 0.76–1.27)
EST. AVERAGE GLUCOSE BLD GHB EST-MCNC: 174 MG/DL
GLOBULIN SER CALC-MCNC: 2.9 G/DL (ref 1.5–4.5)
GLUCOSE SERPL-MCNC: 156 MG/DL (ref 65–99)
HBA1C MFR BLD: 7.7 % (ref 4.8–5.6)
INTERPRETATION: NORMAL
POTASSIUM SERPL-SCNC: 4.5 MMOL/L (ref 3.5–5.2)
PROT SERPL-MCNC: 7.5 G/DL (ref 6–8.5)
SODIUM SERPL-SCNC: 144 MMOL/L (ref 134–144)

## 2021-09-22 DIAGNOSIS — R05.9 COUGH: ICD-10-CM

## 2021-09-23 RX ORDER — MONTELUKAST SODIUM 10 MG/1
TABLET ORAL
Qty: 90 TABLET | Refills: 1 | Status: SHIPPED | OUTPATIENT
Start: 2021-09-23 | End: 2022-01-10

## 2021-09-24 ENCOUNTER — OFFICE VISIT (OUTPATIENT)
Dept: ENDOCRINOLOGY | Age: 56
End: 2021-09-24
Payer: COMMERCIAL

## 2021-09-24 ENCOUNTER — TELEPHONE (OUTPATIENT)
Dept: ENDOCRINOLOGY | Age: 56
End: 2021-09-24

## 2021-09-24 VITALS
HEIGHT: 69 IN | SYSTOLIC BLOOD PRESSURE: 142 MMHG | BODY MASS INDEX: 32.58 KG/M2 | WEIGHT: 220 LBS | DIASTOLIC BLOOD PRESSURE: 86 MMHG | HEART RATE: 87 BPM

## 2021-09-24 DIAGNOSIS — I10 ESSENTIAL HYPERTENSION: ICD-10-CM

## 2021-09-24 DIAGNOSIS — N52.8 OTHER MALE ERECTILE DYSFUNCTION: ICD-10-CM

## 2021-09-24 DIAGNOSIS — E11.9 TYPE 2 DIABETES MELLITUS WITHOUT COMPLICATION, WITHOUT LONG-TERM CURRENT USE OF INSULIN (HCC): Primary | ICD-10-CM

## 2021-09-24 DIAGNOSIS — E78.2 MIXED HYPERLIPIDEMIA: ICD-10-CM

## 2021-09-24 PROCEDURE — 99214 OFFICE O/P EST MOD 30 MIN: CPT | Performed by: INTERNAL MEDICINE

## 2021-09-24 PROCEDURE — 3051F HG A1C>EQUAL 7.0%<8.0%: CPT | Performed by: INTERNAL MEDICINE

## 2021-09-24 NOTE — TELEPHONE ENCOUNTER
Patient is calling regarding his medication that Dr Elaine Ewing prescribed this morning. [facility] told him he needed a pre authorization for the rx and it was sent back to the office. I told the patient that we would look out for that PA and send it over once completed.

## 2021-09-24 NOTE — PATIENT INSTRUCTIONS
Stop the Victoza and start Ozempic 0.25mg weekly for 4 weeks and then increase to 0.5mg weekly.    After you have been on the 0.5mg for 4-6 weeks call me and let me know if you tolerate it well and we can increase to the 1.0mg.

## 2021-09-24 NOTE — LETTER
9/24/2021    Patient: Marah Bass   YOB: 1965   Date of Visit: 9/24/2021     [place]  [id number] [facility] [address]  Via In Basket    Dear Lauro Aase, NP,      Thank you for referring Mr. Ambreen Cisneros to [address] [place] [address] for evaluation. My notes for this consultation are attached. If you have questions, please do not hesitate to call me. I look forward to following your patient along with you.       Sincerely,    Tani Mora MD

## 2021-09-24 NOTE — PROGRESS NOTES
Chief Complaint   Patient presents with    Diabetes     pcp and pharmacy verified   Records since last visit reviewed. History of Present Illness: Shahriar Ball is a 55 y.o. male here for follow up of diabetes. He was diagnosed with diabetes 2015. His A1C September 2019 was down to 6.2% he had good weight loss and his BGs were doing well so we stopped his Pioglitazone. Pt notes he has had weight gain since the start of the Quarantine. He is up to 206 pounds. He notes that with [employer] he has joined weight loss and diabetes coaching group. Last week his A1C was up from 7.5% to 7.7%. His weight is up from 207 pounds to 220 pounds. \"I walk every day, I lift weights 3 days per week and nothing seems to help. I can not seem to lose the weight again. \"  His goal is to get to 180 pounds. His BP has also been higher with the weight gain. Pt notes he just got back from vacation and he is having trouble getting his Victoza refilled. He is considering going with the [facility] loss. Pt has received both COVID vaccinations (Rogerio Duncan). He has still been working with a nutritionist through [facility] to help with weight loss and BG control. Pt notes he has completed his move and \"I love my new apartment, they have great walking paths and a good gym. Pt is still taking  Victoza 1.8mg every morning and Jentadueto 2.5/1000mg BID. He is testing his BGs once per week. Pt is drinking coffee in the morning, but no breakfast.  He has brunch/lunch around 1130AM, yesterday he had quesadilla with chicken and flavored water. He will have dinner around 3-6PM, last nght he had chicken, potato wedges and water. He has been drinking more Gatorade. No history of vascular disease. No history of neuropathy, or nephropathy. Last eye exam was October 2020, no retinopathy. He is now seeing an allergist for allergies and a daily dry cough.  He is now taking flonase, which has been helpful. Current Outpatient Medications   Medication Sig    semaglutide (OZEMPIC) 0.25 mg or 0.5 mg/dose (2 mg/1.5 ml) subq pen 0.5 mg by SubCUTAneous route every seven (7) days.  montelukast (SINGULAIR) 10 mg tablet TAKE 1 TABLET BY MOUTH EVERY DAY    rosuvastatin (CRESTOR) 10 mg tablet TAKE 1 TABLET BY MOUTH EVERY DAY    sildenafil citrate (VIAGRA) 25 mg tablet TAKE 1-4 TABLETS BY MOUTH 30 MINUTES PRIOR TO ROMANCE. DO NOT REPEAT IN 24 HOURS    traZODone (DESYREL) 50 mg tablet Take 1 tablet by mouth nightly    Jentadueto 2.5-1,000 mg per tablet TAKE 1 TABLET BY MOUTH TWICE A DAY WITH MEALS    amLODIPine (NORVASC) 10 mg tablet TAKE 1 TABLET BY MOUTH EVERY DAY    hydroCHLOROthiazide (MICROZIDE) 12.5 mg capsule TAKE 1 CAPSULE BY MOUTH EVERY DAY    Blood-Glucose Meter (OneTouch Verio Meter) misc Check sugars twice per day. E11.9 Insurance preferance    citalopram (CELEXA) 20 mg tablet Take 1 Tab by mouth daily.  albuterol (PROVENTIL HFA, VENTOLIN HFA, PROAIR HFA) 90 mcg/actuation inhaler Take 2 Puffs by inhalation every four (4) hours as needed for Wheezing.  Insulin Needles, Disposable, (NOVOFINE 32) 32 gauge x 1/4\" ndle USE FOR ONE SHOT DAILY    coenzyme Q-10 (CO Q-10) 200 mg capsule Take 1 Cap by mouth daily.  cinnamon bark (CINNAMON) 500 mg cap Take 500 mg by mouth two (2) times a day. No current facility-administered medications for this visit. Allergies   Allergen Reactions    Latex Itching    Bee Sting [Sting, Bee] Hives     Review of Systems:  - Eyes: no blurry vision or double vision  - Cardiovascular: no chest pain  - Respiratory: no SOB, + daily dry cough   - Musculoskeletal: chronic knee pain, chronic left shoulder stiffness  - Neurological: no numbness/tingling in extremities    Physical Examination:  Blood pressure (!) 142/86, pulse 87, height 5' 9\" (1.753 m), weight 220 lb (99.8 kg).    General: pleasant, no distress, good eye contact   Neck: no carotid bruits  Cardiovascular: regular, normal rate, nl s1 and s2, no m/r/g, 2+ DP pulses   Respiratory: clear bilaterally  Integumentary: no edema, no foot ulcers  Psychiatric: normal mood and affect    Diabetic foot exam:     Left Foot:   Visual Exam: normal    Pulse DP: 2+ (normal)   Filament test: normal sensation    Vibratory sensation: normal      Right Foot:   Visual Exam: normal    Pulse DP: 2+ (normal)   Filament test: normal sensation    Vibratory sensation: normal        Data Reviewed:   Component      Latest Ref Rng & Units 9/17/2021 9/17/2021          12:38 PM 12:38 PM   Glucose      65 - 99 mg/dL  156 (H)   BUN      6 - 24 mg/dL  13   Creatinine      0.76 - 1.27 mg/dL  1.55 (H)   GFR est non-AA      >59 mL/min/1.73  50 (L)   GFR est AA      >59 mL/min/1.73  57 (L)   BUN/Creatinine ratio      9 - 20  8 (L)   Sodium      134 - 144 mmol/L  144   Potassium      3.5 - 5.2 mmol/L  4.5   Chloride      96 - 106 mmol/L  103   CO2      20 - 29 mmol/L  26   Calcium      8.7 - 10.2 mg/dL  10.1   Protein, total      6.0 - 8.5 g/dL  7.5   Albumin      3.8 - 4.9 g/dL  4.6   GLOBULIN, TOTAL      1.5 - 4.5 g/dL  2.9   A-G Ratio      1.2 - 2.2  1.6   Bilirubin, total      0.0 - 1.2 mg/dL  0.3   Alk. phosphatase      44 - 121 IU/L  68   AST      0 - 40 IU/L  19   ALT      0 - 44 IU/L  24   Hemoglobin A1c, (calculated)      4.8 - 5.6 % 7.7 (H)    Estimated average glucose      mg/dL 174        Assessment/Plan:   1) DM > His A1C last week increased to 7.7%. Because the Victoza does not seem to be helping any longer, will change pt to Ozempic and titrate up the the maximum tolerated dose. For not pt to continue the Jentadueto 2.5/1000mg BID. Pt to check her BGs twice per day. 2) HTN > His BP today was 142/86. Will not make any changes to his BP regimen at this time. 3) HLD > Pt's lipid panel was at goal in March 2021, pt is on Rosuvastatin 10mg daily, which he is tolerating well. This is a high intensity statin regimen. Pt voices understanding and agreement with the plan. RTC 4 months    Follow-up and Dispositions    · Return in about 4 months (around 1/24/2022).          Copy sent to:  Dr. Shanti Chew

## 2021-09-27 DIAGNOSIS — E78.2 MIXED HYPERLIPIDEMIA: ICD-10-CM

## 2021-09-27 DIAGNOSIS — E11.9 TYPE 2 DIABETES MELLITUS WITHOUT COMPLICATION, WITHOUT LONG-TERM CURRENT USE OF INSULIN (HCC): ICD-10-CM

## 2021-09-27 DIAGNOSIS — I10 ESSENTIAL HYPERTENSION: ICD-10-CM

## 2021-09-27 NOTE — TELEPHONE ENCOUNTER
Per M: Sheryl Grayson (Meng: Nasima Matta) - [phone]       Status: PA Response - Approved  Created: September 24th, 2021

## 2021-09-29 RX ORDER — SILDENAFIL 25 MG/1
TABLET, FILM COATED ORAL
Qty: 25 TABLET | Refills: 3 | Status: SHIPPED | OUTPATIENT
Start: 2021-09-29

## 2021-09-30 RX ORDER — LINAGLIPTIN AND METFORMIN HYDROCHLORIDE 2.5; 1 MG/1; MG/1
TABLET, FILM COATED ORAL
Qty: 60 TABLET | Refills: 2 | Status: SHIPPED | OUTPATIENT
Start: 2021-09-30 | End: 2021-11-23

## 2021-10-07 ENCOUNTER — TELEPHONE (OUTPATIENT)
Dept: PEDIATRICS CLINIC | Age: 56
End: 2021-10-07

## 2021-10-08 ENCOUNTER — TELEPHONE (OUTPATIENT)
Dept: PEDIATRICS CLINIC | Age: 56
End: 2021-10-08

## 2021-10-08 NOTE — TELEPHONE ENCOUNTER
----- Message from Harry Fonseca sent at 10/7/2021  4:14 PM EDT -----  Regarding: RE: DANNY Ramos/ Telephone  My apologies. Corrected  ----- Message -----  From: Omega Bustos  Sent: 10/7/2021   3:28 PM EDT  To: Harry Fonseca  Subject: RE: DANNY Rachel/ Telephone                           Not a patient at [facility]  ----- Message -----  From: Queta Santizo  Sent: 10/7/2021   3:21 PM EDT  To: Suzie Mason [facility]  Subject: NP Rachel/ Telephone                               General Message/Vendor Calls    Caller's first and last name: Patient       Reason for call: Flu shot inquiry      Callback required yes/no and why: Yes. To advise      Best contact number(s):  [phone]      Details to clarify the request: Advise patient if is ok to receive the Flu shot .  If so patient would like to schedule an appointment      Winifred Fonseca

## 2021-10-22 RX ORDER — HYDROCHLOROTHIAZIDE 12.5 MG/1
CAPSULE ORAL
Qty: 90 CAPSULE | Refills: 1 | Status: SHIPPED | OUTPATIENT
Start: 2021-10-22 | End: 2022-04-12

## 2021-11-07 DIAGNOSIS — G47.00 INSOMNIA, UNSPECIFIED TYPE: ICD-10-CM

## 2021-11-09 RX ORDER — TRAZODONE HYDROCHLORIDE 50 MG/1
TABLET ORAL
Qty: 90 TABLET | Refills: 0 | Status: SHIPPED | OUTPATIENT
Start: 2021-11-09 | End: 2022-05-16

## 2021-11-23 ENCOUNTER — OFFICE VISIT (OUTPATIENT)
Dept: INTERNAL MEDICINE CLINIC | Age: 56
End: 2021-11-23
Payer: COMMERCIAL

## 2021-11-23 VITALS
OXYGEN SATURATION: 97 % | HEIGHT: 69 IN | HEART RATE: 91 BPM | BODY MASS INDEX: 32.91 KG/M2 | DIASTOLIC BLOOD PRESSURE: 86 MMHG | RESPIRATION RATE: 16 BRPM | WEIGHT: 222.2 LBS | TEMPERATURE: 98.7 F | SYSTOLIC BLOOD PRESSURE: 134 MMHG

## 2021-11-23 DIAGNOSIS — Z23 NEEDS FLU SHOT: ICD-10-CM

## 2021-11-23 DIAGNOSIS — N28.9 RENAL INSUFFICIENCY: ICD-10-CM

## 2021-11-23 DIAGNOSIS — E11.69 CONTROLLED TYPE 2 DIABETES MELLITUS WITH OTHER SPECIFIED COMPLICATION, WITHOUT LONG-TERM CURRENT USE OF INSULIN (HCC): ICD-10-CM

## 2021-11-23 DIAGNOSIS — M54.12 CERVICAL RADICULOPATHY: ICD-10-CM

## 2021-11-23 DIAGNOSIS — I10 ESSENTIAL HYPERTENSION WITH GOAL BLOOD PRESSURE LESS THAN 130/80: Primary | ICD-10-CM

## 2021-11-23 DIAGNOSIS — E78.2 MIXED HYPERLIPIDEMIA: ICD-10-CM

## 2021-11-23 DIAGNOSIS — Z11.59 NEED FOR HEPATITIS C SCREENING TEST: ICD-10-CM

## 2021-11-23 LAB
ALBUMIN SERPL-MCNC: 4 G/DL (ref 3.5–5)
ALBUMIN/GLOB SERPL: 1.1 {RATIO} (ref 1.1–2.2)
ALP SERPL-CCNC: 75 U/L (ref 45–117)
ALT SERPL-CCNC: 34 U/L (ref 12–78)
ANION GAP SERPL CALC-SCNC: 7 MMOL/L (ref 5–15)
AST SERPL-CCNC: 18 U/L (ref 15–37)
BASOPHILS # BLD: 0.1 K/UL (ref 0–0.1)
BASOPHILS NFR BLD: 1 % (ref 0–1)
BILIRUB SERPL-MCNC: 0.4 MG/DL (ref 0.2–1)
BUN SERPL-MCNC: 19 MG/DL (ref 6–20)
BUN/CREAT SERPL: 11 (ref 12–20)
CALCIUM SERPL-MCNC: 9.7 MG/DL (ref 8.5–10.1)
CHLORIDE SERPL-SCNC: 103 MMOL/L (ref 97–108)
CHOLEST SERPL-MCNC: 166 MG/DL
CO2 SERPL-SCNC: 29 MMOL/L (ref 21–32)
CREAT SERPL-MCNC: 1.76 MG/DL (ref 0.7–1.3)
DIFFERENTIAL METHOD BLD: NORMAL
EOSINOPHIL # BLD: 0.3 K/UL (ref 0–0.4)
EOSINOPHIL NFR BLD: 3 % (ref 0–7)
ERYTHROCYTE [DISTWIDTH] IN BLOOD BY AUTOMATED COUNT: 13.5 % (ref 11.5–14.5)
GLOBULIN SER CALC-MCNC: 3.7 G/DL (ref 2–4)
GLUCOSE SERPL-MCNC: 187 MG/DL (ref 65–100)
HCT VFR BLD AUTO: 41 % (ref 36.6–50.3)
HCV AB SERPL QL IA: NONREACTIVE
HDLC SERPL-MCNC: 39 MG/DL
HDLC SERPL: 4.3 {RATIO} (ref 0–5)
HGB BLD-MCNC: 13.2 G/DL (ref 12.1–17)
IMM GRANULOCYTES # BLD AUTO: 0 K/UL (ref 0–0.04)
IMM GRANULOCYTES NFR BLD AUTO: 0 % (ref 0–0.5)
LDLC SERPL CALC-MCNC: 84.4 MG/DL (ref 0–100)
LYMPHOCYTES # BLD: 2.6 K/UL (ref 0.8–3.5)
LYMPHOCYTES NFR BLD: 27 % (ref 12–49)
MCH RBC QN AUTO: 27 PG (ref 26–34)
MCHC RBC AUTO-ENTMCNC: 32.2 G/DL (ref 30–36.5)
MCV RBC AUTO: 83.8 FL (ref 80–99)
MONOCYTES # BLD: 0.6 K/UL (ref 0–1)
MONOCYTES NFR BLD: 6 % (ref 5–13)
NEUTS SEG # BLD: 6 K/UL (ref 1.8–8)
NEUTS SEG NFR BLD: 63 % (ref 32–75)
NRBC # BLD: 0 K/UL (ref 0–0.01)
NRBC BLD-RTO: 0 PER 100 WBC
PLATELET # BLD AUTO: 301 K/UL (ref 150–400)
PMV BLD AUTO: 10.8 FL (ref 8.9–12.9)
POTASSIUM SERPL-SCNC: 4.2 MMOL/L (ref 3.5–5.1)
PROT SERPL-MCNC: 7.7 G/DL (ref 6.4–8.2)
RBC # BLD AUTO: 4.89 M/UL (ref 4.1–5.7)
SODIUM SERPL-SCNC: 139 MMOL/L (ref 136–145)
TRIGL SERPL-MCNC: 213 MG/DL (ref ?–150)
VLDLC SERPL CALC-MCNC: 42.6 MG/DL
WBC # BLD AUTO: 9.4 K/UL (ref 4.1–11.1)

## 2021-11-23 PROCEDURE — 90686 IIV4 VACC NO PRSV 0.5 ML IM: CPT | Performed by: NURSE PRACTITIONER

## 2021-11-23 PROCEDURE — 3051F HG A1C>EQUAL 7.0%<8.0%: CPT | Performed by: NURSE PRACTITIONER

## 2021-11-23 PROCEDURE — 90471 IMMUNIZATION ADMIN: CPT | Performed by: NURSE PRACTITIONER

## 2021-11-23 PROCEDURE — 99213 OFFICE O/P EST LOW 20 MIN: CPT | Performed by: NURSE PRACTITIONER

## 2021-11-23 RX ORDER — OLMESARTAN MEDOXOMIL 20 MG/1
20 TABLET ORAL DAILY
Qty: 90 TABLET | Refills: 3 | Status: SHIPPED | OUTPATIENT
Start: 2021-11-23 | End: 2022-02-25

## 2021-11-23 NOTE — PROGRESS NOTES
Rm 9  Recent Travel Screening and Travel History documentation     Travel Screening     Question   Response    In the last month, have you been in contact with someone who was confirmed or suspected to have Coronavirus / COVID-19? No / Unsure    Have you had a COVID-19 viral test in the last 14 days? No    Do you have any of the following new or worsening symptoms? None of these    Have you traveled internationally or domestically in the last month? No      Travel History   Travel since 10/23/21    No documented travel since 10/23/21         Chief Complaint   Patient presents with    Hypertension    Neck Pain     pops and cracks alot, causes headaches and numbness in fingers. Constant, but noticed its getting worse over the last month         1. Have you been to the ER, urgent care clinic since your last visit? Hospitalized since your last visit? No    2. Have you seen or consulted any other health care providers outside of the [address] [place] [address] since your last visit? Include any pap smears or colon screening. No        Health Maintenance Due   Topic Date Due    Hepatitis C Screening  Never done    COVID-19 Vaccine (1) Never done    Eye Exam Retinal or Dilated  06/01/2020    Flu Vaccine (1) 09/01/2021           3 most recent PHQ Screens 11/23/2021   Little interest or pleasure in doing things Not at all   Feeling down, depressed, irritable, or hopeless Not at all   Total Score PHQ 2 0           Learning Assessment 3/11/2016   PRIMARY LEARNER Patient   HIGHEST LEVEL OF EDUCATION - PRIMARY LEARNER  SOME COLLEGE   BARRIERS PRIMARY LEARNER NONE   CO-LEARNER CAREGIVER No   PRIMARY LANGUAGE ENGLISH   LEARNER PREFERENCE PRIMARY READING   ANSWERED BY Patient   RELATIONSHIP SELF         An After Visit Summary was printed and given to the patient. After obtaining consent, and per orders of JULIOCESAR Ramos, injection of Influenza given by Maggie Smart LPN.  Patient instructed to remain in clinic for 5-10 minutes afterwards, and to report any adverse reaction to me immediately.

## 2021-11-23 NOTE — PATIENT INSTRUCTIONS
Learning About Relief for Back Pain  What is back strain? Back strain is an injury that happens when you overstretch, or pull, a muscle in your back. You may hurt your back in an accident or when you exercise or lift something. Most back pain gets better with rest and time. You can take care of yourself at home to help your back heal.  What can you do first to relieve back pain? When you first feel back pain, try these steps:  · Walk. Take a short walk (10 to 20 minutes) on a level surface (no slopes, hills, or stairs) every 2 to 3 hours. Walk only distances you can manage without pain, especially leg pain. · Relax. Find a comfortable position for rest. Some people are comfortable on the floor or a medium-firm bed with a small pillow under their head and another under their knees. Some people prefer to lie on their side with a pillow between their knees. Don't stay in one position for too long. · Try heat or ice. Try using a heating pad on a low or medium setting, or take a warm shower, for 15 to 20 minutes every 2 to 3 hours. Or you can buy single-use heat wraps that last up to 8 hours. You can also try an ice pack for 10 to 15 minutes every 2 to 3 hours. You can use an ice pack or a bag of frozen vegetables wrapped in a thin towel. There is not strong evidence that either heat or ice will help, but you can try them to see if they help. You may also want to try switching between heat and cold. · Take pain medicine exactly as directed. ? If the doctor gave you a prescription medicine for pain, take it as prescribed. ? If you are not taking a prescription pain medicine, ask your doctor if you can take an over-the-counter medicine. What else can you do? · Stretch and exercise. Exercises that increase flexibility may relieve your pain and make it easier for your muscles to keep your spine in a good, neutral position. And don't forget to keep walking. · Do self-massage.  You can use self-massage to unwind after work or school or to energize yourself in the morning. You can easily massage your feet, hands, or neck. Self-massage works best if you are in comfortable clothes and are sitting or lying in a comfortable position. Use oil or lotion to massage bare skin. · Reduce stress. Back pain can lead to a vicious [place]: Distress about the pain tenses the muscles in your back, which in turn causes more pain. Learn how to relax your mind and your muscles to lower your stress. Where can you learn more? Go to http://www.gray.com/  Enter Q517 in the search box to learn more about \"Learning About Relief for Back Pain. \"  Current as of: July 1, 2021               Content Version: 13.0  © 2006-2021 [employer]. Care instructions adapted under license by [employer] (which disclaims liability or warranty for this information). If you have questions about a medical condition or this instruction, always ask your healthcare professional. [place] [address] any warranty or liability for your use of this information. Pinched Nerve in the Neck: Care Instructions  Your Care Instructions  A pinched nerve in the neck happens when a vertebra or disc in the upper part of your spine is damaged. This damage can happen because of an injury. Or it can just happen with age. The changes caused by the damage may put pressure on a nearby nerve root, pinching it. This causes symptoms such as sharp pain in your neck, shoulder, arm, hand, or back. You may also have tingling or numbness. Sometimes it makes your arm weaker. The symptoms are usually worse when you turn your head or strain your neck. For many people, the symptoms get better over time and finally go away. Early treatment usually includes medicines for pain and swelling. Sometimes physical therapy and special exercises may help. Follow-up care is a key part of your treatment and safety.  Be sure to make and go to all appointments, and call your doctor if you are having problems. It's also a good idea to know your test results and keep a list of the medicines you take. How can you care for yourself at home? · Be safe with medicines. Read and follow all instructions on the label. ? If the doctor gave you a prescription medicine for pain, take it as prescribed. ? If you are not taking a prescription pain medicine, ask your doctor if you can take an over-the-counter medicine. · Try using a heating pad on a low or medium setting for 15 to 20 minutes every 2 or 3 hours. Try a warm shower in place of one session with the heating pad. You can also buy single-use heat wraps that last up to 8 hours. · You can also try an ice pack for 10 to 15 minutes every 2 to 3 hours. There isn't strong evidence that either heat or ice will help. But you can try them to see if they help you. · Don't spend too long in one position. Take short breaks to move around and change positions. · Wear a seat belt and shoulder harness when you are in a car. · Sleep with a pillow under your head and neck that keeps your neck straight. · If you were given a neck brace (cervical collar) to limit neck motion, wear it as instructed for as many days as your doctor tells you to. Do not wear it longer than you were told to. Wearing a brace for too long can lead to neck stiffness and can weaken the neck muscles. · Follow your doctor's instructions for gentle neck-stretching exercises. · Do not smoke. Smoking can slow healing of your discs. If you need help quitting, talk to your doctor about stop-smoking programs and medicines. These can increase your chances of quitting for good. · Avoid strenuous work or exercise until your doctor says it is okay. When should you call for help? Call 911 anytime you think you may need emergency care. For example, call if:    · You are unable to move an arm or a leg at all.    Call your doctor now or seek immediate medical care if:    · You have new or worse symptoms in your arms, legs, chest, belly, or buttocks. Symptoms may include:  ? Numbness or tingling. ? Weakness. ? Pain.     · You lose bladder or bowel control. Watch closely for changes in your health, and be sure to contact your doctor if:    · You are not getting better as expected. Where can you learn more? Go to http://www.gray.com/  Enter I287 in the search box to learn more about \"Pinched Nerve in the Neck: Care Instructions. \"  Current as of: July 1, 2021               Content Version: 13.0  © 5466-8453 [employer]. Care instructions adapted under license by [employer] (which disclaims liability or warranty for this information). If you have questions about a medical condition or this instruction, always ask your healthcare professional. [address] any warranty or liability for your use of this information. Home Blood Pressure Test: About This Test  What is it? A home blood pressure test allows you to keep track of your blood pressure at home. Blood pressure is a measure of the force of blood against the walls of your arteries. Blood pressure readings include two numbers, such as 130/80 (say \"130 over 80\"). The first number is the systolic pressure. The second number is the diastolic pressure. Why is this test done? You may do this test at home to:  · Find out if you have high blood pressure. · Track your blood pressure if you have high blood pressure. · Track how well medicine is working to reduce high blood pressure. · Check how lifestyle changes, such as weight loss and exercise, are affecting blood pressure. How do you prepare for the test?  For at least 30 minutes before you take your blood pressure, don't exercise, drink caffeine, or smoke.  Empty your bladder before the test. Sit quietly with your back straight and both feet on the floor for at least 5 minutes. This helps you take your blood pressure while you feel comfortable and relaxed. How is the test done? · If your doctor recommends it, take your blood pressure twice a day. Take it in the morning and evening. · Sit with your arm slightly bent and resting on a table so that your upper arm is at the same level as your heart. · Use the same arm each time you take your blood pressure. · Place the blood pressure cuff on the bare skin of your upper arm. You may have to roll up your sleeve, remove your arm from the sleeve, or take your shirt off. · Wrap the blood pressure cuff around your upper arm so that the lower edge of the cuff is about 1 inch above the bend of your elbow. · Do not move, talk, or text while you take your blood pressure. Follow the instructions that came with your blood pressure monitor. They might be different from the following. · Press the on/off button on the automatic monitor. Then you may need to wait until the screen says the monitor is ready. · Press the start button. The cuff will inflate and deflate by itself. · Your blood pressure numbers will appear on the screen. · Wait one minute and take your blood pressure again. · If your monitor does not automatically save your numbers, write them in your log book, along with the date and time. Follow-up care is a key part of your treatment and safety. Be sure to make and go to all appointments, and call your doctor if you are having problems. It's also a good idea to keep a list of the medicines you take. Where can you learn more? Go to http://www.OrganizedWisdom.com/  Enter C427 in the search box to learn more about \"Home Blood Pressure Test: About This Test.\"  Current as of: April 29, 2021               Content Version: 13.0  © 8708-7176 [employer]. Care instructions adapted under license by [employer] (which disclaims liability or warranty for this information).  If you have questions about a medical condition or this instruction, always ask your healthcare professional. [address] any warranty or liability for your use of this information. High Blood Pressure: Care Instructions  Overview     It's normal for blood pressure to go up and down throughout the day. But if it stays up, you have high blood pressure. Another name for high blood pressure is hypertension. Despite what a lot of people think, high blood pressure usually doesn't cause headaches or make you feel dizzy or lightheaded. It usually has no symptoms. But it does increase your risk of stroke, heart attack, and other problems. You and your doctor will talk about your risks of these problems based on your blood pressure. Your doctor will give you a goal for your blood pressure. Your goal will be based on your health and your age. Lifestyle changes, such as eating healthy and being active, are always important to help lower blood pressure. You might also take medicine to reach your blood pressure goal.  Follow-up care is a key part of your treatment and safety. Be sure to make and go to all appointments, and call your doctor if you are having problems. It's also a good idea to know your test results and keep a list of the medicines you take. How can you care for yourself at home? Medical treatment  · If you stop taking your medicine, your blood pressure will go back up. You may take one or more types of medicine to lower your blood pressure. Be safe with medicines. Take your medicine exactly as prescribed. Call your doctor if you think you are having a problem with your medicine. · Talk to your doctor before you start taking aspirin every day. Aspirin can help certain people lower their risk of a heart attack or stroke. But taking aspirin isn't right for everyone, because it can cause serious bleeding. · See your doctor regularly.  You may need to see the doctor more often at first or until your blood pressure comes down. · If you are taking blood pressure medicine, talk to your doctor before you take decongestants or anti-inflammatory medicine, such as ibuprofen. Some of these medicines can raise blood pressure. · Learn how to check your blood pressure at home. Lifestyle changes  · Stay at a healthy weight. This is especially important if you put on weight around the waist. Losing even 10 pounds can help you lower your blood pressure. · If your doctor recommends it, get more exercise. Walking is a good choice. Bit by bit, increase the amount you walk every day. Try for at least 30 minutes on most days of the week. You also may want to swim, bike, or do other activities. · Avoid or limit alcohol. Talk to your doctor about whether you can drink any alcohol. · Try to limit how much sodium you eat to less than 2,300 milligrams (mg) a day. Your doctor may ask you to try to eat less than 1,500 mg a day. · Eat plenty of fruits (such as bananas and oranges), vegetables, legumes, whole grains, and low-fat dairy products. · Lower the amount of saturated fat in your diet. Saturated fat is found in animal products such as milk, cheese, and meat. Limiting these foods may help you lose weight and also lower your risk for heart disease. · Do not smoke. Smoking increases your risk for heart attack and stroke. If you need help quitting, talk to your doctor about stop-smoking programs and medicines. These can increase your chances of quitting for good. When should you call for help? Call 911  anytime you think you may need emergency care. This may mean having symptoms that suggest that your blood pressure is causing a serious heart or blood vessel problem. Your blood pressure may be over 180/120. For example, call 911 if:    · You have symptoms of a heart attack. These may include:  ? Chest pain or pressure, or a strange feeling in the chest.  ? Sweating. ? Shortness of breath.   ? Nausea or vomiting. ? Pain, pressure, or a strange feeling in the back, neck, jaw, or upper belly or in one or both shoulders or arms. ? Lightheadedness or sudden weakness. ? A fast or irregular heartbeat.     · You have symptoms of a stroke. These may include:  ? Sudden numbness, tingling, weakness, or loss of movement in your face, arm, or leg, especially on only one side of your body. ? Sudden vision changes. ? Sudden trouble speaking. ? Sudden confusion or trouble understanding simple statements. ? Sudden problems with walking or balance. ? A sudden, severe headache that is different from past headaches.     · You have severe back or belly pain. Do not wait until your blood pressure comes down on its own. Get help right away. Call your doctor now or seek immediate care if:    · Your blood pressure is much higher than normal (such as 180/120 or higher), but you don't have symptoms.     · You think high blood pressure is causing symptoms, such as:  ? Severe headache.  ? Blurry vision. Watch closely for changes in your health, and be sure to contact your doctor if:    · Your blood pressure measures higher than your doctor recommends at least 2 times. That means the top number is higher or the bottom number is higher, or both.     · You think you may be having side effects from your blood pressure medicine. Where can you learn more? Go to http://rosi-shell.info/  Enter [id number] in the search box to learn more about \"High Blood Pressure: Care Instructions. \"  Current as of: April 29, 2021               Content Version: 13.0  © 2006-2021 [employer]. Care instructions adapted under license by [employer] (which disclaims liability or warranty for this information).  If you have questions about a medical condition or this instruction, always ask your healthcare professional. [place] [address] any warranty or liability for your use of this information. High Cholesterol: Care Instructions  Overview     Cholesterol is a type of fat in your blood. It is needed for many body functions, such as making new cells. Cholesterol is made by your body. It also comes from food you eat. High cholesterol means that you have too much of the fat in your blood. This raises your risk of a heart attack and stroke. LDL and HDL are part of your total cholesterol. LDL is the \"bad\" cholesterol. High LDL can raise your risk for coronary artery disease, heart attack, and stroke. HDL is the \"good\" cholesterol. It helps clear bad cholesterol from the body. High HDL is linked with a lower risk of coronary artery disease, heart attack, and stroke. Your cholesterol levels help your doctor find out your risk for having a heart attack or stroke. You and your doctor can talk about whether you need to lower your risk and what treatment is best for you. Treatment options include a heart-healthy lifestyle and medicine. Both options can help lower your cholesterol and your risk. The way you choose to lower your risk will depend on how high your risk is for heart attack and stroke. It will also depend on how you feel about taking medicines. Follow-up care is a key part of your treatment and safety. Be sure to make and go to all appointments, and call your doctor if you are having problems. It's also a good idea to know your test results and keep a list of the medicines you take. How can you care for yourself at home? · Eat heart-healthy foods. ? Eat fruits, vegetables, whole grains, beans, and other high-fiber foods. ? Eat lean proteins, such as seafood, lean meats, beans, nuts, and soy products. ? Eat healthy fats, such as canola and olive oil. ? Choose foods that are low in saturated fat. ? Limit sodium and alcohol. ? Limit drinks and foods with added sugar. · Be physically active. Try to do moderate activity at least 2½ hours a week.  Or try to do vigorous activity at least 1¼ hours a week. You may want to walk or try other activities, such as running, swimming, cycling, or playing tennis or team sports. · Stay at a healthy weight or lose weight by making the changes in eating and physical activity listed above. Losing just a small amount of weight, even 5 to 10 pounds, can help reduce your risk for having a heart attack or stroke. · Do not smoke. · Manage other health problems. These include diabetes and high blood pressure. If you think you may have a problem with alcohol or drug use, talk to your doctor. · If you take medicine, take it exactly as prescribed. Call your doctor if you think you are having a problem with your medicine. · Check with your doctor or pharmacist before you use any other medicines, including over-the-counter medicines. Make sure your doctor knows all of the medicines, vitamins, herbal products, and supplements you take. Taking some medicines together can cause problems. When should you call for help? Watch closely for changes in your health, and be sure to contact your doctor if:    · You need help making lifestyle changes.     · You have questions about your medicine. Where can you learn more? Go to http://www.gray.com/  Enter C609 in the search box to learn more about \"High Cholesterol: Care Instructions. \"  Current as of: April 29, 2021               Content Version: 13.0  © 2006-2021 [employer]. Care instructions adapted under license by [employer] (which disclaims liability or warranty for this information). If you have questions about a medical condition or this instruction, always ask your healthcare professional. [place] [address] any warranty or liability for your use of this information.

## 2021-11-23 NOTE — PROGRESS NOTES
Subjective  Bree Lopes is a 55 y.o. male presents for routine visit   HPI     He is compliant with medical management   Diabetes managed by Dr Chanda Adams   Will get COVID booster     Posterior neck popping, cracking, causing severe headache, disrupts sleep   Saw ortho provider several years ago for similar symptoms ; had injections       Past Medical History:   Diagnosis Date    Arthritis     Borderline diabetic     Diabetes ([address]     Family history of premature CAD     Hypertension        Allergies   Allergen Reactions    Latex Itching    Bee Sting [Sting, Bee] Hives       Past Surgical History:   Procedure Laterality Date    HX ORTHOPAEDIC      right knee       Current Outpatient Medications   Medication Sig    olmesartan (BENICAR) 20 mg tablet Take 1 Tablet by mouth daily.  traZODone (DESYREL) 50 mg tablet Take 1 tablet by mouth nightly    hydroCHLOROthiazide (MICROZIDE) 12.5 mg capsule TAKE 1 CAPSULE BY MOUTH EVERY DAY    sildenafil citrate (VIAGRA) 25 mg tablet TAKE 1-4 TABLETS BY MOUTH 30 MINUTES PRIOR TO ROMANCE. DO NOT REPEAT IN 24 HOURS    semaglutide (OZEMPIC) 0.25 mg or 0.5 mg/dose (2 mg/1.5 ml) subq pen 0.5 mg by SubCUTAneous route every seven (7) days.  montelukast (SINGULAIR) 10 mg tablet TAKE 1 TABLET BY MOUTH EVERY DAY    rosuvastatin (CRESTOR) 10 mg tablet TAKE 1 TABLET BY MOUTH EVERY DAY    amLODIPine (NORVASC) 10 mg tablet TAKE 1 TABLET BY MOUTH EVERY DAY    Blood-Glucose Meter (OneTouch Verio Meter) misc Check sugars twice per day. E11.9 Insurance preferance    citalopram (CELEXA) 20 mg tablet Take 1 Tab by mouth daily.  albuterol (PROVENTIL HFA, VENTOLIN HFA, PROAIR HFA) 90 mcg/actuation inhaler Take 2 Puffs by inhalation every four (4) hours as needed for Wheezing.  Insulin Needles, Disposable, (NOVOFINE 32) 32 gauge x 1/4\" ndle USE FOR ONE SHOT DAILY    coenzyme Q-10 (CO Q-10) 200 mg capsule Take 1 Cap by mouth daily.     cinnamon bark (CINNAMON) 500 mg cap Take 500 mg by mouth two (2) times a day. No current facility-administered medications for this visit. Review of Systems   Constitutional: Negative. HENT: Negative. Cardiovascular: Negative for chest pain. Gastrointestinal: Negative. Musculoskeletal: Positive for neck pain. Skin: Negative. Neurological: Positive for headaches. Negative for dizziness. Objective  Visit Vitals  /86 (BP 1 Location: Left upper arm, BP Patient Position: Sitting, BP Cuff Size: Adult)   Pulse 91   Temp 98.7 °F (37.1 °C) (Oral)   Resp 16   Ht 5' 9\" (1.753 m)   Wt 222 lb 3.2 oz (100.8 kg)   SpO2 97%   BMI 32.81 kg/m²     Physical Exam  Constitutional:       Appearance: Normal appearance. Eyes:      Conjunctiva/sclera: Conjunctivae normal.   Neck:      Thyroid: No thyroid mass or thyromegaly. Vascular: Normal carotid pulses. No carotid bruit. Cardiovascular:      Rate and Rhythm: Normal rate and regular rhythm. Pulses: Normal pulses. Heart sounds: Normal heart sounds. Pulmonary:      Effort: Pulmonary effort is normal.   Musculoskeletal:      Cervical back: No torticollis. Spinous process tenderness and muscular tenderness present. Decreased range of motion. Lymphadenopathy:      Cervical: No cervical adenopathy. Skin:     General: Skin is warm and dry. Neurological:      Mental Status: He is alert and oriented to person, place, and time. Mental status is at baseline. Cranial Nerves: No cranial nerve deficit. Motor: No weakness. Gait: Gait normal.   Psychiatric:         Mood and Affect: Mood normal.         Behavior: Behavior normal.         Thought Content: Thought content normal.          Assessment & Plan    ICD-10-CM ICD-9-CM    1. Essential hypertension with goal blood pressure less than 130/80  G44 688.1 METABOLIC PANEL, COMPREHENSIVE      olmesartan (BENICAR) 20 mg tablet      METABOLIC PANEL, COMPREHENSIVE   2.  Cervical radiculopathy  M54.12 723.4 REFERRAL TO ORTHOPEDIC SURGERY   3. Needs flu shot  Z23 V04.81 INFLUENZA VIRUS VAC QUAD,SPLIT,PRESV FREE SYRINGE IM   4. Mixed hyperlipidemia  L87.9 768.1 METABOLIC PANEL, COMPREHENSIVE      LIPID PANEL      LIPID PANEL      METABOLIC PANEL, COMPREHENSIVE   5. Controlled type 2 diabetes mellitus with other specified complication, without long-term current use of insulin (HCC)  K95.12 090.89 METABOLIC PANEL, COMPREHENSIVE      METABOLIC PANEL, COMPREHENSIVE   6. Renal insufficiency  N28.9 593.9 CBC WITH AUTOMATED DIFF      CBC WITH AUTOMATED DIFF   7. Need for hepatitis C screening test  Z11.59 V73.89 HEPATITIS C AB      HEPATITIS C AB     Orders Placed This Encounter    INFLUENZA VIRUS VAC QUAD,SPLIT,PRESV FREE SYRINGE IM (Flulaval, Fluzone, Fluarix) (09374)    METABOLIC PANEL, COMPREHENSIVE    LIPID PANEL    CBC WITH AUTOMATED DIFF    HEPATITIS C AB    REFERRAL TO ORTHOPEDIC SURGERY    olmesartan (BENICAR) 20 mg tablet     Follow-up and Dispositions    · Return in about 4 weeks (around 12/21/2021) for htn. Encouraged to optimize BP control with lifestyle management     Discussed management options for cervical neck pain. He elects to return to orthopaedist   lab results and schedule of future lab studies reviewed with patient  reviewed diet, exercise and weight control    Patient voices understanding and acceptance of this advice and will call back if any further questions or concerns.   Clarence Kidney, NP

## 2021-12-02 DIAGNOSIS — I10 ESSENTIAL HYPERTENSION WITH GOAL BLOOD PRESSURE LESS THAN 130/80: ICD-10-CM

## 2021-12-03 RX ORDER — AMLODIPINE BESYLATE 10 MG/1
TABLET ORAL
Qty: 90 TABLET | Refills: 1 | Status: SHIPPED | OUTPATIENT
Start: 2021-12-03 | End: 2021-12-21

## 2021-12-06 ENCOUNTER — TELEPHONE (OUTPATIENT)
Dept: ENDOCRINOLOGY | Age: 56
End: 2021-12-06

## 2021-12-06 RX ORDER — SEMAGLUTIDE 1.34 MG/ML
1 INJECTION, SOLUTION SUBCUTANEOUS
Qty: 3 EACH | Refills: 3 | Status: SHIPPED | OUTPATIENT
Start: 2021-12-06

## 2021-12-06 NOTE — TELEPHONE ENCOUNTER
Patient called and left message that he was instructed to call office and let know when he finished his DM medication. He has finished and wants a call back about the next step. Contact number [phone].

## 2021-12-06 NOTE — TELEPHONE ENCOUNTER
Pt has completed the Ozempic 0.5mg weekly and tolerating it well. Will increase his dose to 1.0mg weekly. Pt voices understanding and agreement with the plan.

## 2021-12-15 DIAGNOSIS — N18.9 CHRONIC KIDNEY DISEASE, UNSPECIFIED CKD STAGE: Primary | ICD-10-CM

## 2021-12-21 ENCOUNTER — OFFICE VISIT (OUTPATIENT)
Dept: INTERNAL MEDICINE CLINIC | Age: 56
End: 2021-12-21
Payer: COMMERCIAL

## 2021-12-21 VITALS
HEART RATE: 90 BPM | OXYGEN SATURATION: 96 % | SYSTOLIC BLOOD PRESSURE: 128 MMHG | HEIGHT: 69 IN | RESPIRATION RATE: 16 BRPM | TEMPERATURE: 98.5 F | BODY MASS INDEX: 32.23 KG/M2 | DIASTOLIC BLOOD PRESSURE: 88 MMHG | WEIGHT: 217.6 LBS

## 2021-12-21 DIAGNOSIS — I10 ESSENTIAL HYPERTENSION WITH GOAL BLOOD PRESSURE LESS THAN 130/80: Primary | ICD-10-CM

## 2021-12-21 DIAGNOSIS — N28.9 RENAL INSUFFICIENCY: ICD-10-CM

## 2021-12-21 PROCEDURE — 99213 OFFICE O/P EST LOW 20 MIN: CPT | Performed by: NURSE PRACTITIONER

## 2021-12-21 NOTE — PROGRESS NOTES
Rm 7  Recent Travel Screening and Travel History documentation     Travel Screening     Question   Response    In the last month, have you been in contact with someone who was confirmed or suspected to have Coronavirus / COVID-19? No / Unsure    Have you had a COVID-19 viral test in the last 14 days? No    Do you have any of the following new or worsening symptoms? None of these    Have you traveled internationally or domestically in the last month? No      Travel History   Travel since 11/21/21    No documented travel since 11/21/21           Chief Complaint   Patient presents with    Hypertension         1. Have you been to the ER, urgent care clinic since your last visit? Hospitalized since your last visit? No    2. Have you seen or consulted any other health care providers outside of the [address] [place] [address] since your last visit? Include any pap smears or colon screening. No        Health Maintenance Due   Topic Date Due    COVID-19 Vaccine (1) Never done    Eye Exam Retinal or Dilated  06/01/2020           3 most recent PHQ Screens 12/21/2021   Little interest or pleasure in doing things Not at all   Feeling down, depressed, irritable, or hopeless Not at all   Total Score PHQ 2 0           Learning Assessment 3/11/2016   PRIMARY LEARNER Patient   HIGHEST LEVEL OF EDUCATION - PRIMARY LEARNER  SOME COLLEGE   BARRIERS PRIMARY LEARNER NONE   CO-LEARNER CAREGIVER No   PRIMARY LANGUAGE ENGLISH   LEARNER PREFERENCE PRIMARY READING   ANSWERED BY Patient   RELATIONSHIP SELF         An After Visit Summary was printed and given to the patient.

## 2021-12-21 NOTE — PATIENT INSTRUCTIONS
DASH Diet: Care Instructions  Your Care Instructions     The DASH diet is an eating plan that can help lower your blood pressure. DASH stands for Dietary Approaches to Stop Hypertension. Hypertension is high blood pressure. The DASH diet focuses on eating foods that are high in calcium, potassium, and magnesium. These nutrients can lower blood pressure. The foods that are highest in these nutrients are fruits, vegetables, low-fat dairy products, nuts, seeds, and legumes. But taking calcium, potassium, and magnesium supplements instead of eating foods that are high in those nutrients does not have the same effect. The DASH diet also includes whole grains, fish, and poultry. The DASH diet is one of several lifestyle changes your doctor may recommend to lower your high blood pressure. Your doctor may also want you to decrease the amount of sodium in your diet. Lowering sodium while following the DASH diet can lower blood pressure even further than just the DASH diet alone. Follow-up care is a key part of your treatment and safety. Be sure to make and go to all appointments, and call your doctor if you are having problems. It's also a good idea to know your test results and keep a list of the medicines you take. How can you care for yourself at home? Following the DASH diet  · Eat 4 to 5 servings of fruit each day. A serving is 1 medium-sized piece of fruit, ½ cup chopped or canned fruit, 1/4 cup dried fruit, or 4 ounces (½ cup) of fruit juice. Choose fruit more often than fruit juice. · Eat 4 to 5 servings of vegetables each day. A serving is 1 cup of lettuce or raw leafy vegetables, ½ cup of chopped or cooked vegetables, or 4 ounces (½ cup) of vegetable juice. Choose vegetables more often than vegetable juice. · Get 2 to 3 servings of low-fat and fat-free dairy each day. A serving is 8 ounces of milk, 1 cup of yogurt, or 1 ½ ounces of cheese. · Eat 6 to 8 servings of grains each day.  A serving is 1 slice of bread, 1 ounce of dry cereal, or ½ cup of cooked rice, pasta, or cooked cereal. Try to choose whole-grain products as much as possible. · Limit lean meat, poultry, and fish to 2 servings each day. A serving is 3 ounces, about the size of a deck of cards. · Eat 4 to 5 servings of nuts, seeds, and legumes (cooked dried beans, lentils, and split peas) each week. A serving is 1/3 cup of nuts, 2 tablespoons of seeds, or ½ cup of cooked beans or peas. · Limit fats and oils to 2 to 3 servings each day. A serving is 1 teaspoon of vegetable oil or 2 tablespoons of salad dressing. · Limit sweets and added sugars to 5 servings or less a week. A serving is 1 tablespoon jelly or jam, ½ cup sorbet, or 1 cup of lemonade. · Eat less than 2,300 milligrams (mg) of sodium a day. If you limit your sodium to 1,500 mg a day, you can lower your blood pressure even more. · Be aware that all of these are the suggested number of servings for people who eat 1,800 to 2,000 calories a day. Your recommended number of servings may be different if you need more or fewer calories. Tips for success  · Start small. Do not try to make dramatic changes to your diet all at once. You might feel that you are missing out on your favorite foods and then be more likely to not follow the plan. Make small changes, and stick with them. Once those changes become habit, add a few more changes. · Try some of the following:  ? Make it a goal to eat a fruit or vegetable at every meal and at snacks. This will make it easy to get the recommended amount of fruits and vegetables each day. ? Try yogurt topped with fruit and nuts for a snack or healthy dessert. ? Add lettuce, tomato, cucumber, and onion to sandwiches. ? Combine a ready-made pizza crust with low-fat mozzarella cheese and lots of vegetable toppings. Try using tomatoes, squash, spinach, broccoli, carrots, cauliflower, and onions. ?  Have a variety of cut-up vegetables with a low-fat dip as an appetizer instead of chips and dip. ? Sprinkle sunflower seeds or chopped almonds over salads. Or try adding chopped walnuts or almonds to cooked vegetables. ? Try some vegetarian meals using beans and peas. Add garbanzo or kidney beans to salads. Make burritos and tacos with mashed douglas beans or black beans. Where can you learn more? Go to http://rosi-shell.info/  Enter H967 in the search box to learn more about \"DASH Diet: Care Instructions. \"  Current as of: April 29, 2021               Content Version: 13.0  © 4240-3582 [employer]. Care instructions adapted under license by [employer] (which disclaims liability or warranty for this information). If you have questions about a medical condition or this instruction, always ask your healthcare professional. [address] any warranty or liability for your use of this information. Home Blood Pressure Test: About This Test  What is it? A home blood pressure test allows you to keep track of your blood pressure at home. Blood pressure is a measure of the force of blood against the walls of your arteries. Blood pressure readings include two numbers, such as 130/80 (say \"130 over 80\"). The first number is the systolic pressure. The second number is the diastolic pressure. Why is this test done? You may do this test at home to:  · Find out if you have high blood pressure. · Track your blood pressure if you have high blood pressure. · Track how well medicine is working to reduce high blood pressure. · Check how lifestyle changes, such as weight loss and exercise, are affecting blood pressure. How do you prepare for the test?  For at least 30 minutes before you take your blood pressure, don't exercise, drink caffeine, or smoke. Empty your bladder before the test. Sit quietly with your back straight and both feet on the floor for at least 5 minutes.  This helps you take your blood pressure while you feel comfortable and relaxed. How is the test done? · If your doctor recommends it, take your blood pressure twice a day. Take it in the morning and evening. · Sit with your arm slightly bent and resting on a table so that your upper arm is at the same level as your heart. · Use the same arm each time you take your blood pressure. · Place the blood pressure cuff on the bare skin of your upper arm. You may have to roll up your sleeve, remove your arm from the sleeve, or take your shirt off. · Wrap the blood pressure cuff around your upper arm so that the lower edge of the cuff is about 1 inch above the bend of your elbow. · Do not move, talk, or text while you take your blood pressure. Follow the instructions that came with your blood pressure monitor. They might be different from the following. · Press the on/off button on the automatic monitor. Then you may need to wait until the screen says the monitor is ready. · Press the start button. The cuff will inflate and deflate by itself. · Your blood pressure numbers will appear on the screen. · Wait one minute and take your blood pressure again. · If your monitor does not automatically save your numbers, write them in your log book, along with the date and time. Follow-up care is a key part of your treatment and safety. Be sure to make and go to all appointments, and call your doctor if you are having problems. It's also a good idea to keep a list of the medicines you take. Where can you learn more? Go to http://www.Axtria.com/  Enter C427 in the search box to learn more about \"Home Blood Pressure Test: About This Test.\"  Current as of: April 29, 2021               Content Version: 13.0  © 9135-3715 [employer]. Care instructions adapted under license by [employer] (which disclaims liability or warranty for this information).  If you have questions about a medical condition or this instruction, always ask your healthcare professional. [place] [address] any warranty or liability for your use of this information. Learning About High Blood Pressure  What is high blood pressure? Blood pressure is a measure of how hard the blood pushes against the walls of your arteries. It's normal for blood pressure to go up and down throughout the day. But if it stays up, you have high blood pressure. Another name for high blood pressure is hypertension. Two numbers tell you your blood pressure. The first number is the systolic pressure (top number). It shows how hard the blood pushes when your heart is pumping. The second number is the diastolic pressure (bottom number). It shows how hard the blood pushes between heartbeats, when your heart is relaxed and filling with blood. Your doctor will give you a goal for your blood pressure based on your health and your age. High blood pressure (hypertension) means that the top number stays high, or the bottom number stays high, or both. High blood pressure increases the risk of stroke, heart attack, and other problems. What happens when you have high blood pressure? · Blood flows through your arteries with too much force. Over time, this can damage the heart and the walls of your arteries. But you can't feel it. High blood pressure usually doesn't cause symptoms. · High blood pressure makes your heart work harder. And that can lead to heart failure, which means your heart doesn't pump as much blood as your body needs. · Fat and calcium start to build up in your arteries. This buildup is called hardening of the arteries. It can cause many problems including a heart attack and stroke. · Arteries also carry blood and oxygen to organs like your eyes, kidneys, and brain. If high blood pressure damages those arteries, it can lead to vision loss, kidney disease, stroke, and a higher risk of dementia.   How can you prevent high blood pressure? · Stay at a healthy weight. · Try to limit how much sodium you eat to less than 2,300 milligrams (mg) a day. If you limit your sodium to 1,500 mg a day, you can lower your blood pressure even more. ? Buy foods that are labeled \"unsalted,\" \"sodium-free,\" or \"low-sodium. \" Foods labeled \"reduced-sodium\" and \"light sodium\" may still have too much sodium. ? Flavor your food with garlic, lemon juice, onion, vinegar, herbs, and spices instead of salt. Do not use soy sauce, steak sauce, onion salt, garlic salt, mustard, or ketchup on your food. ? Use less salt (or none) when recipes call for it. You can often use half the salt a recipe calls for without losing flavor. · Be physically active. Get at least 30 minutes of exercise on most days of the week. Walking is a good choice. You also may want to do other activities, such as running, swimming, cycling, or playing tennis or team sports. · Limit alcohol to 2 drinks a day for men and 1 drink a day for women. · Eat plenty of fruits, vegetables, and low-fat dairy products. Eat less saturated and total fats. How is high blood pressure treated? · Your doctor will suggest making lifestyle changes to help your heart. For example, your doctor may ask you to eat healthy foods, quit smoking, lose extra weight, and be more active. · If lifestyle changes don't help enough, your doctor may recommend that you take medicine. · When blood pressure is very high, medicines are needed to lower it. Follow-up care is a key part of your treatment and safety. Be sure to make and go to all appointments, and call your doctor if you are having problems. It's also a good idea to know your test results and keep a list of the medicines you take. Where can you learn more? Go to http://www.zavala.com/  Enter P501 in the search box to learn more about \"Learning About High Blood Pressure. \"  Current as of: April 29, 2021               Content Version: 13.0  © 3336-9945 [employer]. Care instructions adapted under license by [employer] (which disclaims liability or warranty for this information). If you have questions about a medical condition or this instruction, always ask your healthcare professional. [place] [address] any warranty or liability for your use of this information.

## 2022-01-09 DIAGNOSIS — R05.9 COUGH: ICD-10-CM

## 2022-01-10 RX ORDER — MONTELUKAST SODIUM 10 MG/1
TABLET ORAL
Qty: 90 TABLET | Refills: 1 | Status: SHIPPED | OUTPATIENT
Start: 2022-01-10 | End: 2022-07-07

## 2022-01-10 RX ORDER — LINAGLIPTIN AND METFORMIN HYDROCHLORIDE 2.5; 1 MG/1; MG/1
1 TABLET, FILM COATED ORAL 2 TIMES DAILY WITH MEALS
Qty: 180 TABLET | Refills: 0 | Status: SHIPPED | OUTPATIENT
Start: 2022-01-10 | End: 2022-04-07

## 2022-01-10 NOTE — TELEPHONE ENCOUNTER
[facility] is requesting a refill on behalf of the pt via fax for the Jentadueto 2.5-1,000 mg. Please review. Last visit 12/21/2021 NP Sarmad Sanchez   Next appointment 06/21/2022 NP Sarmad Sanchez   Previous refill encounter(s)   09/23/2021 Singulair #90 with 1 refill,  09/30/2021 Jentadueto #60 with 2 refills - was discontinued on 11/23/2021 - reason: Not a current medication. Requested Prescriptions     Pending Prescriptions Disp Refills    montelukast (SINGULAIR) 10 mg tablet [Pharmacy Med Name: MONTELUKAST SOD 10 MG TABLET] 90 Tablet 1     Sig: TAKE 1 TABLET BY MOUTH EVERY DAY    linagliptin-metFORMIN (Jentadueto) 2.5-1,000 mg per tablet 180 Tablet 0     Sig: Take 1 Tablet by mouth two (2) times daily (with meals).

## 2022-01-31 DIAGNOSIS — I10 ESSENTIAL HYPERTENSION: ICD-10-CM

## 2022-01-31 DIAGNOSIS — E78.2 MIXED HYPERLIPIDEMIA: ICD-10-CM

## 2022-01-31 DIAGNOSIS — E11.9 TYPE 2 DIABETES MELLITUS WITHOUT COMPLICATION, WITHOUT LONG-TERM CURRENT USE OF INSULIN (HCC): Primary | ICD-10-CM

## 2022-02-05 ENCOUNTER — TRANSCRIBE ORDER (OUTPATIENT)
Dept: SCHEDULING | Age: 57
End: 2022-02-05

## 2022-02-05 DIAGNOSIS — N18.30 CHRONIC KIDNEY DISEASE, STAGE III (MODERATE) (HCC): Primary | ICD-10-CM

## 2022-02-07 ENCOUNTER — HOSPITAL ENCOUNTER (OUTPATIENT)
Dept: ULTRASOUND IMAGING | Age: 57
Discharge: HOME | End: 2022-02-07
Attending: INTERNAL MEDICINE
Payer: COMMERCIAL

## 2022-02-07 DIAGNOSIS — N18.30 CHRONIC KIDNEY DISEASE, STAGE III (MODERATE) (HCC): ICD-10-CM

## 2022-02-07 PROCEDURE — 76770 US EXAM ABDO BACK WALL COMP: CPT

## 2022-02-25 ENCOUNTER — OFFICE VISIT (OUTPATIENT)
Dept: ENDOCRINOLOGY | Age: 57
End: 2022-02-25
Payer: COMMERCIAL

## 2022-02-25 VITALS
SYSTOLIC BLOOD PRESSURE: 120 MMHG | HEART RATE: 89 BPM | DIASTOLIC BLOOD PRESSURE: 71 MMHG | HEIGHT: 69 IN | WEIGHT: 213 LBS | BODY MASS INDEX: 31.55 KG/M2

## 2022-02-25 DIAGNOSIS — E11.9 TYPE 2 DIABETES MELLITUS WITHOUT COMPLICATION, WITHOUT LONG-TERM CURRENT USE OF INSULIN (HCC): Primary | ICD-10-CM

## 2022-02-25 DIAGNOSIS — I10 ESSENTIAL HYPERTENSION: ICD-10-CM

## 2022-02-25 DIAGNOSIS — E78.2 MIXED HYPERLIPIDEMIA: ICD-10-CM

## 2022-02-25 DIAGNOSIS — N52.8 OTHER MALE ERECTILE DYSFUNCTION: ICD-10-CM

## 2022-02-25 PROCEDURE — 99214 OFFICE O/P EST MOD 30 MIN: CPT | Performed by: INTERNAL MEDICINE

## 2022-02-25 RX ORDER — OLMESARTAN MEDOXOMIL, AMLODIPINE AND HYDROCHLOROTHIAZIDE TABLET 20/5/12.5 MG 20; 5; 12.5 MG/1; MG/1; MG/1
TABLET ORAL DAILY
COMMUNITY
Start: 2021-11-22

## 2022-02-25 RX ORDER — BECLOMETHASONE DIPROPIONATE HFA 40 UG/1
AEROSOL, METERED RESPIRATORY (INHALATION) DAILY
COMMUNITY
Start: 2021-10-11

## 2022-02-25 RX ORDER — OMEPRAZOLE 40 MG/1
CAPSULE, DELAYED RELEASE ORAL
COMMUNITY
Start: 2021-12-06 | End: 2022-06-21

## 2022-02-25 NOTE — PROGRESS NOTES
Chief Complaint   Patient presents with    Diabetes     pcp and pharmacy verififed   Records since last visit reviewed. History of Present Illness: Rudy Sanchez is a 55 y.o. male here for follow up of diabetes. He was diagnosed with diabetes 2015. At our last visit in September 2021 his A1C had increased to 7.7% and his weight had increased to 220 pounds. Because the Victoza did not seem to be helping any longer, I changed pt to Ozempic and continued the Jentadueto 2.5/1000mg BID. Since that time he as tolerated the Ozempic and in December 2021 I increased his dose to 1.0mg weekly. In November 2021, his eGFR decreased from the 50's to the 45s and his PCP referred him to a Nephrologist.  Pt reports that he saw his nephrologist Dr. Chilo Florence recently and they [facility] labs. Will request these results. \"He did say my kidney function was worse from November 2021, he said to stay well hydrated, control my BG and BP. His weight today was 213 pounds. He notes that his weight this AM was 206 at home. Pt is currently taking Ozempic 1.0mg weekly and Jentadueto 2.5/1000mg BID . Pt notes he was having issues of neuropathy and pain in his neck. He notes he was having issues of neck pain and he did receive steroid injections, which pt notes did help with the pain. \"I have not had anymore pain since then. \"    Pt notes he was diagnosed with neuropathy in his feet so he is going to a foot specialist to get custom fitted shoes. Pt notes he is walking every day and is trying to get 10,000 + steps per day. Pt is testing his BGs once per day, in the mid-day. His BGs have been running in the 110-140s for the last month. His A1C last week was 8.2% by Dr. Chilo Florence. His weight is down from 222 pounds to 206 pounds. Pt notes that with the Ozempic he is not as hungry and is eating less and is not snacking as much. He notes that he is still in the  weight loss and diabetes coaching group.     He is considering going with the [facility]. Pt has received all three COVID vaccinations (Rogerio Duncan). Pt is drinking coffee in the morning, but no breakfast.  He has his first meal around 5-530PM. Last night he had a big mac, fries, no drink. He notes he will occasionally snack on grapes, oranges or string cheese. No history of vascular disease. Pt has been diagnosed with neuropathy and has CKD III. Last eye exam was September 2021, no retinopathy. Current Outpatient Medications   Medication Sig    Olmesartan-amLODIPine-HCTZ 20-5-12.5 mg tab     beclomethasone dipropionate (Qvar RediHaler) 40 mcg/actuation HFAb inhaler daily.  omeprazole (PRILOSEC) 40 mg capsule omeprazole 40 mg capsule,delayed release   1 tablet at night    montelukast (SINGULAIR) 10 mg tablet TAKE 1 TABLET BY MOUTH EVERY DAY    linagliptin-metFORMIN (Jentadueto) 2.5-1,000 mg per tablet Take 1 Tablet by mouth two (2) times daily (with meals).  semaglutide (Ozempic) 1 mg/dose (4 mg/3 mL) pnij 1 mg by SubCUTAneous route every seven (7) days.  traZODone (DESYREL) 50 mg tablet Take 1 tablet by mouth nightly    hydroCHLOROthiazide (MICROZIDE) 12.5 mg capsule TAKE 1 CAPSULE BY MOUTH EVERY DAY    sildenafil citrate (VIAGRA) 25 mg tablet TAKE 1-4 TABLETS BY MOUTH 30 MINUTES PRIOR TO ROMANCE. DO NOT REPEAT IN 24 HOURS    rosuvastatin (CRESTOR) 10 mg tablet TAKE 1 TABLET BY MOUTH EVERY DAY    Blood-Glucose Meter (OneTouch Verio Meter) misc Check sugars twice per day. E11.9 Insurance preferance    citalopram (CELEXA) 20 mg tablet Take 1 Tab by mouth daily.  albuterol (PROVENTIL HFA, VENTOLIN HFA, PROAIR HFA) 90 mcg/actuation inhaler Take 2 Puffs by inhalation every four (4) hours as needed for Wheezing.  Insulin Needles, Disposable, (NOVOFINE 32) 32 gauge x 1/4\" ndle USE FOR ONE SHOT DAILY    coenzyme Q-10 (CO Q-10) 200 mg capsule Take 1 Cap by mouth daily.     cinnamon bark (CINNAMON) 500 mg cap Take 500 mg by mouth two (2) times a day. No current facility-administered medications for this visit. Allergies   Allergen Reactions    Latex Itching    Bee Sting [Sting, Bee] Hives     Review of Systems:  - Eyes: no blurry vision or double vision  - Cardiovascular: no chest pain  - Respiratory: no SOB, his dry cough has improved   - Musculoskeletal: chronic knee pain, chronic left shoulder stiffness  - Neurological: no numbness/tingling in extremities    Physical Examination:  Blood pressure 120/71, pulse 89, height 5' 9\" (1.753 m), weight 213 lb (96.6 kg). General: pleasant, no distress, good eye contact   Neck: no carotid bruits  Cardiovascular: regular, normal rate, nl s1 and s2, no m/r/g, 2+ DP pulses   Respiratory: clear bilaterally  Integumentary: no edema, no foot ulcers  Psychiatric: normal mood and affect    Diabetic foot exam:     Left Foot:   Visual Exam: normal    Pulse DP: 2+ (normal)   Filament test: normal sensation    Vibratory sensation: normal      Right Foot:   Visual Exam: normal    Pulse DP: 2+ (normal)   Filament test: normal sensation    Vibratory sensation: normal        Data Reviewed:   Pt had labs drawn by Dr. Va Santizo. Will request his recent lab results. Assessment/Plan:   1) DM > His A1C last week was 8.2%, but he had a steroid injection in the last 3 months and he has continued to work on dietary and lifestyle changes, his weight is down and his BGs readings for the last month has been in the 110-140s range. For now pt to continue the Ozempic 1.0mg weekly and Jentadueto 2.5/1000mg BID. Pt to check her BGs twice per day. Pt to check a various times of day (fasting, before dinner and HS) so we can see if he has had spikes in his BG that have not previously been seen. I will talk with Dr. Va Santizo about starting him on an SGLT-2 agent if his BGs do not improve. 2) HTN > His BP today was 120/70. Will not make any changes to his BP regimen at this time.      3) HLD > Pt's lipid panel was at goal in March 2021, pt is on Rosuvastatin 10mg daily, which he is tolerating well. This is a high intensity statin regimen. Pt voices understanding and agreement with the plan. RTC 4 months    Follow-up and Dispositions    · Return in about 4 months (around 6/25/2022).          Copy sent to:  Dr. Abraham Pichardo

## 2022-02-25 NOTE — LETTER
2/25/2022    Patient: Juanito Nieves   YOB: 1965   Date of Visit: 2/25/2022     Gustavo Lim NP  [id number] [facility],[address] [address]  Via In Basket    Dear Gustavo Lim NP,      Thank you for referring Mr. Luci Garces to [address] [place] [address] for evaluation. My notes for this consultation are attached. If you have questions, please do not hesitate to call me. I look forward to following your patient along with you.       Sincerely,    Sree Avelar MD

## 2022-03-03 RX ORDER — BLOOD-GLUCOSE METER
EACH MISCELLANEOUS
Qty: 1 EACH | Refills: 0 | Status: SHIPPED | OUTPATIENT
Start: 2022-03-03

## 2022-03-07 RX ORDER — BLOOD SUGAR DIAGNOSTIC
STRIP MISCELLANEOUS
Qty: 200 STRIP | Refills: 3 | Status: SHIPPED | OUTPATIENT
Start: 2022-03-07

## 2022-03-07 NOTE — TELEPHONE ENCOUNTER
3/7/2022    Pt called and left a vm at 8:40 am stating he wants a prescription sent to [address] for the One Touch Verio Test Strips. Pt can be reached at [address] was not disclosed.      Thanks,   Alexis Nixon

## 2022-03-18 PROBLEM — E11.9 TYPE 2 DIABETES MELLITUS WITHOUT COMPLICATION, WITHOUT LONG-TERM CURRENT USE OF INSULIN (HCC): Status: ACTIVE | Noted: 2017-11-21

## 2022-03-19 PROBLEM — E11.21 TYPE 2 DIABETES WITH NEPHROPATHY (HCC): Status: ACTIVE | Noted: 2018-03-07

## 2022-03-20 PROBLEM — E66.9 OBESITY (BMI 30-39.9): Status: ACTIVE | Noted: 2018-06-07

## 2022-04-07 RX ORDER — LINAGLIPTIN AND METFORMIN HYDROCHLORIDE 2.5; 1 MG/1; MG/1
TABLET, FILM COATED ORAL
Qty: 180 TABLET | Refills: 0 | Status: SHIPPED | OUTPATIENT
Start: 2022-04-07 | End: 2022-07-05

## 2022-04-12 RX ORDER — HYDROCHLOROTHIAZIDE 12.5 MG/1
CAPSULE ORAL
Qty: 90 CAPSULE | Refills: 1 | Status: SHIPPED | OUTPATIENT
Start: 2022-04-12 | End: 2022-10-05

## 2022-04-15 ENCOUNTER — OFFICE VISIT (OUTPATIENT)
Dept: INTERNAL MEDICINE CLINIC | Age: 57
End: 2022-04-15
Payer: COMMERCIAL

## 2022-04-15 VITALS
TEMPERATURE: 98.7 F | DIASTOLIC BLOOD PRESSURE: 87 MMHG | RESPIRATION RATE: 16 BRPM | WEIGHT: 200 LBS | HEART RATE: 84 BPM | BODY MASS INDEX: 29.53 KG/M2 | SYSTOLIC BLOOD PRESSURE: 142 MMHG | OXYGEN SATURATION: 99 %

## 2022-04-15 DIAGNOSIS — J20.9 BRONCHITIS WITH BRONCHOSPASM: ICD-10-CM

## 2022-04-15 DIAGNOSIS — R04.0 EPISTAXIS: ICD-10-CM

## 2022-04-15 DIAGNOSIS — J01.00 ACUTE NON-RECURRENT MAXILLARY SINUSITIS: ICD-10-CM

## 2022-04-15 DIAGNOSIS — R05.9 COUGH: Primary | ICD-10-CM

## 2022-04-15 LAB — SARS-COV-2 POC: NEGATIVE

## 2022-04-15 PROCEDURE — 87426 SARSCOV CORONAVIRUS AG IA: CPT | Performed by: INTERNAL MEDICINE

## 2022-04-15 PROCEDURE — 99214 OFFICE O/P EST MOD 30 MIN: CPT | Performed by: INTERNAL MEDICINE

## 2022-04-15 RX ORDER — PREDNISONE 20 MG/1
60 TABLET ORAL DAILY
Qty: 15 TABLET | Refills: 0 | Status: SHIPPED | OUTPATIENT
Start: 2022-04-15 | End: 2022-04-20

## 2022-04-15 RX ORDER — AZITHROMYCIN 250 MG/1
TABLET, FILM COATED ORAL
Qty: 6 TABLET | Refills: 0 | Status: SHIPPED | OUTPATIENT
Start: 2022-04-15 | End: 2022-06-21 | Stop reason: ALTCHOICE

## 2022-04-15 RX ORDER — BENZONATATE 200 MG/1
200 CAPSULE ORAL
Qty: 21 CAPSULE | Refills: 0 | Status: SHIPPED | OUTPATIENT
Start: 2022-04-15 | End: 2022-04-22

## 2022-04-15 NOTE — PROGRESS NOTES
Melany Anand (: 1965) is a 55 y.o. male, established patient, here for evaluation of the following chief complaint(s):  Chief Complaint   Patient presents with    Allergies    Fever     started tuesday, broke yesterday     Cough     dry     Sneezing       Assessment and Plan:       ICD-10-CM ICD-9-CM    1. Cough  R05.9 786.2 AMB POC SARS-COV-2      NOVEL CORONAVIRUS (COVID-19)   2. Bronchitis with bronchospasm  J20.9 490 predniSONE (DELTASONE) 20 mg tablet      azithromycin (ZITHROMAX) 250 mg tablet      benzonatate (TESSALON) 200 mg capsule   3. Acute non-recurrent maxillary sinusitis  J01.00 461.0 azithromycin (ZITHROMAX) 250 mg tablet   4. Epistaxis--right side only. R04.0 784.7        1-3:  Medication(s), management and follow-up based on response reviewed at visit. Reviewed typical course of illness, duration of symptoms, and exam findings. 4.  Mgt and follow-up reviewed. Follow-up and Dispositions    · Return if symptoms worsen or fail to improve.       lab results and schedule of future lab studies reviewed with patient  reviewed medications and side effects in detail    For additional documentation of information and/or recommendations discussed this visit, please see notes in instructions. Plan and evaluation (above) reviewed with pt at visit  Patient voiced understanding of plan and provided with time to ask/review questions. After Visit Summary (AVS) provided to pt after visit with additional instructions as needed/reviewed. Future Appointments   Date Time Provider Porsha Carcamo   2022  8:00 AM Nate South NP [facility] BS AMB   2022  8:50 AM Iqra Malcolm MD [facility] BS AMB   --Updated future visits after patient check-out. History of Present Illness:     Notes (nursing/rooming note copied below in italics):  As above. Notes symptoms as above. Reviewed antibiotic and steroid mgt based on symptoms and exam findings above/below.   Mgt plan and questions reviewed with pt at visit. Nursing screenings reviewed by provider at visit. Prior to Admission medications    Medication Sig Start Date End Date Taking? Authorizing Provider   hydroCHLOROthiazide (MICROZIDE) 12.5 mg capsule TAKE 1 CAPSULE BY MOUTH EVERY DAY 4/12/22  Yes Crystal Sears NP   Jentadueto 2.5-1,000 mg per tablet TAKE 1 TABLET BY MOUTH TWICE A DAY WITH MEALS 4/7/22  Yes Crystal Sears NP   glucose blood VI test strips (OneTouch Verio test strips) strip Check blood sugar 2 times per day. DX: E11.9 3/7/22  Yes Levy Vinson MD   OneAkiuch Verio Reflect Meter misc TWICE DAILY 3/3/22  Yes Levy Vinson MD   Olmesartan-amLODIPine-HCTZ 20-5-12.5 mg tab  11/22/21  Yes Provider, Historical   omeprazole (PRILOSEC) 40 mg capsule omeprazole 40 mg capsule,delayed release   1 tablet at night 12/6/21  Yes Provider, Historical   semaglutide (Ozempic) 1 mg/dose (4 mg/3 mL) pnij 1 mg by SubCUTAneous route every seven (7) days. 12/6/21  Yes Levy Vinson MD   traZODone (DESYREL) 50 mg tablet Take 1 tablet by mouth nightly 11/9/21  Yes Crystal Sears NP   sildenafil citrate (VIAGRA) 25 mg tablet TAKE 1-4 TABLETS BY MOUTH 30 MINUTES PRIOR TO ROMANCE. DO NOT REPEAT IN 24 HOURS 9/29/21  Yes Levy Vinson MD   rosuvastatin (CRESTOR) 10 mg tablet TAKE 1 TABLET BY MOUTH EVERY DAY 9/6/21  Yes Crystal Sears NP   citalopram (CELEXA) 20 mg tablet Take 1 Tab by mouth daily. 9/10/20  Yes Crystal Sears NP   Insulin Needles, Disposable, (NOVOFINE 32) 32 gauge x 1/4\" ndle USE FOR ONE SHOT DAILY 4/15/19  Yes Levy Vinson MD   coenzyme Q-10 (CO Q-10) 200 mg capsule Take 1 Cap by mouth daily. 9/8/16  Yes Addison Nava MD   cinnamon bark (CINNAMON) 500 mg cap Take 500 mg by mouth two (2) times a day. Yes Provider, Historical   beclomethasone dipropionate (Qvar RediHaler) 40 mcg/actuation HFAb inhaler daily.   Patient not taking: Reported on 4/15/2022 10/11/21   Provider, Historical   montelukast (SINGULAIR) 10 mg tablet TAKE 1 TABLET BY MOUTH EVERY DAY  Patient not taking: Reported on 4/15/2022 1/10/22   Wilmar Garces NP   albuterol (PROVENTIL HFA, VENTOLIN HFA, PROAIR HFA) 90 mcg/actuation inhaler Take 2 Puffs by inhalation every four (4) hours as needed for Wheezing. Patient not taking: Reported on 4/15/2022 3/25/20   Wilmar Garces NP        ROS    Vitals:    04/15/22 1605   BP: (!) 142/87   Pulse: 84   Resp: 16   Temp: 98.7 °F (37.1 °C)   TempSrc: Oral   SpO2: 99%   Weight: 200 lb (90.7 kg)   PainSc:   0 - No pain      Body mass index is 29.53 kg/m². Physical Exam:     Physical Exam  Vitals and nursing note reviewed. Constitutional:       General: He is not in acute distress. Appearance: Normal appearance. He is well-developed. He is not diaphoretic. HENT:      Head: Normocephalic and atraumatic. Mouth/Throat:      Mouth: Mucous membranes are moist.   Eyes:      General: No scleral icterus. Right eye: No discharge. Left eye: No discharge. Conjunctiva/sclera: Conjunctivae normal.   Cardiovascular:      Rate and Rhythm: Normal rate and regular rhythm. Pulses: Normal pulses. Heart sounds: Normal heart sounds. No murmur heard. No friction rub. No gallop. Pulmonary:      Effort: Pulmonary effort is normal. No respiratory distress. Breath sounds: Normal breath sounds. No stridor. No wheezing, rhonchi or rales. Comments: Scattered expiratory wheezing. Good air movement. Abdominal:      General: Bowel sounds are normal. There is no distension. Palpations: Abdomen is soft. Tenderness: There is no abdominal tenderness. There is no guarding or rebound. Musculoskeletal:         General: No swelling, tenderness or deformity. Cervical back: Neck supple. No rigidity. No muscular tenderness. Right lower leg: No edema. Left lower leg: No edema. Lymphadenopathy:      Cervical: No cervical adenopathy. Skin:     General: Skin is warm. Coloration: Skin is not jaundiced or pale. Findings: No bruising, erythema or rash. Neurological:      General: No focal deficit present. Mental Status: He is alert. Motor: No abnormal muscle tone. Coordination: Coordination normal.      Gait: Gait normal.   Psychiatric:         Mood and Affect: Mood normal.         Behavior: Behavior normal.         Thought Content: Thought content normal.         Judgment: Judgment normal.        Results for orders placed or performed in visit on 04/15/22   AMB POC SARS-COV-2   Result Value Ref Range    SARS-COV-2 POC Negative       An electronic signature was used to authenticate this note.   -- Stefani Patel MD

## 2022-04-15 NOTE — PROGRESS NOTES
Chief Complaint   Patient presents with    Allergies    Fever     started tuesday, broke yesterday     Cough     dry     Sneezing     Visit Vitals  BP (!) 142/87   Pulse 84   Temp 98.7 °F (37.1 °C) (Oral)   Resp 16   Wt 200 lb (90.7 kg)   SpO2 99%   BMI 29.53 kg/m²     1. Have you been to the ER, urgent care clinic since your last visit? Hospitalized since your last visit?no    2. Have you seen or consulted any other health care providers outside of the [address] [place] [address] since your last visit? Include any pap smears or colon screening.  no

## 2022-04-15 NOTE — PATIENT INSTRUCTIONS
Results for orders placed or performed in visit on 04/15/22   AMB POC SARS-COV-2   Result Value Ref Range    SARS-COV-2 POC Negative Negative         1. The 2nd nasal swab COVID test will result in 1-3 days. This test is a \"send out\" and is a different/more sensitive test than the result above (which was also done today). 2.  Over The Counter (OTC) Cough medicines:  Use guaifenesin cough medicine OTC to help loosen secretions and cough up mucus. Use dextromethorphan (DM) cough medicine OTC to help suppress cough. Mucinex DM has both above meds in a 12-hour dosing formulation. You can use instead of Tessalon (benzonatate) if preferred. May also use honey-based cough meds (lozenges or syrups) in addition to above meds to help suppress/soothe cough.

## 2022-04-16 LAB
INPATIENT: NORMAL
SARS-COV-2, NAA 2 DAY TAT: NORMAL
SARS-COV-2, NAA: NOT DETECTED

## 2022-05-09 ENCOUNTER — TELEPHONE (OUTPATIENT)
Dept: ENDOCRINOLOGY | Age: 57
End: 2022-05-09

## 2022-05-09 NOTE — TELEPHONE ENCOUNTER
5/9/2022  9:58 AM  Patient left message on vm at 9.52 today stating that he would like to have dr. Digna Perla fill out a form for him for [place] to continue to work telework.  Please call him back at [phone]

## 2022-05-09 NOTE — TELEPHONE ENCOUNTER
KYAI: Spoke with patient. 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 He is concerned about [employer] COVID. He would like to continue to work at home. He will send questions that need to be answered by way of [employer]. The responses need to be on letterhead from this office. He said the letter can be emailed to him (he will provide his email address).

## 2022-05-16 DIAGNOSIS — G47.00 INSOMNIA, UNSPECIFIED TYPE: ICD-10-CM

## 2022-05-16 RX ORDER — TRAZODONE HYDROCHLORIDE 50 MG/1
TABLET ORAL
Qty: 90 TABLET | Refills: 0 | Status: SHIPPED | OUTPATIENT
Start: 2022-05-16 | End: 2022-08-14

## 2022-05-25 RX ORDER — ROSUVASTATIN CALCIUM 10 MG/1
TABLET, COATED ORAL
Qty: 90 TABLET | Refills: 2 | Status: SHIPPED | OUTPATIENT
Start: 2022-05-25

## 2022-06-21 ENCOUNTER — OFFICE VISIT (OUTPATIENT)
Dept: INTERNAL MEDICINE CLINIC | Age: 57
End: 2022-06-21
Payer: COMMERCIAL

## 2022-06-21 VITALS
HEIGHT: 69 IN | DIASTOLIC BLOOD PRESSURE: 76 MMHG | TEMPERATURE: 98.6 F | RESPIRATION RATE: 18 BRPM | BODY MASS INDEX: 28.88 KG/M2 | OXYGEN SATURATION: 100 % | SYSTOLIC BLOOD PRESSURE: 122 MMHG | HEART RATE: 76 BPM | WEIGHT: 195 LBS

## 2022-06-21 DIAGNOSIS — F43.20 ADJUSTMENT DISORDER, UNSPECIFIED TYPE: ICD-10-CM

## 2022-06-21 DIAGNOSIS — E11.69 CONTROLLED TYPE 2 DIABETES MELLITUS WITH OTHER SPECIFIED COMPLICATION, WITHOUT LONG-TERM CURRENT USE OF INSULIN (HCC): ICD-10-CM

## 2022-06-21 DIAGNOSIS — Z23 ENCOUNTER FOR IMMUNIZATION: ICD-10-CM

## 2022-06-21 DIAGNOSIS — E78.2 MIXED HYPERLIPIDEMIA: ICD-10-CM

## 2022-06-21 DIAGNOSIS — N18.9 CHRONIC KIDNEY DISEASE, UNSPECIFIED CKD STAGE: ICD-10-CM

## 2022-06-21 DIAGNOSIS — I10 ESSENTIAL HYPERTENSION WITH GOAL BLOOD PRESSURE LESS THAN 130/80: Primary | ICD-10-CM

## 2022-06-21 PROCEDURE — 3044F HG A1C LEVEL LT 7.0%: CPT | Performed by: NURSE PRACTITIONER

## 2022-06-21 PROCEDURE — 90677 PCV20 VACCINE IM: CPT | Performed by: NURSE PRACTITIONER

## 2022-06-21 PROCEDURE — 99213 OFFICE O/P EST LOW 20 MIN: CPT | Performed by: NURSE PRACTITIONER

## 2022-06-21 NOTE — PATIENT INSTRUCTIONS
Adjustment Disorder: Care Instructions  Overview     Adjustment disorder is a mental health condition that results from stress and can cause severe emotional and behavioral responses. But your response to the stress is far more severe than expected. It is severe enough to affect your work or social life and may lead to depression and physical pains and problems. Events that may cause this response can include a divorce, money problems, or starting school or a new job. It might be anything that causes some stress. This disorder is most often a short-term condition. It happens within 3 months of the stressful event or change. If the response lasts longer than 6 months after the event ends, you may have a different mental health condition. Follow-up care is a key part of your treatment and safety. Be sure to make and go to all appointments, and call your doctor if you are having problems. It's also a good idea to know your test results and keep a list of the medicines you take. How can you care for yourself at home? · Go to all counseling sessions. Do not skip any because you are feeling better. · If your doctor prescribed medicines, take them exactly as prescribed. Call your doctor if you think you are having a problem with your medicine. You will get more details on the specific medicines your doctor prescribes. · Discuss the causes of your stress with a good friend or family member. Or you can join a support group for people with similar problems. Talking to others sometimes relieves stress. · Get at least 30 minutes of exercise on most days of the week. Walking is a good choice. You also may want to do other activities, such as running, swimming, cycling, or playing tennis or team sports. Relaxation techniques  Do relaxation exercises 10 to 20 minutes a day. You can play soothing, relaxing music while you do them, if you wish.   · Tell others in your house that you are going to do your relaxation exercises. Ask them not to disturb you. · Find a comfortable, quiet place. · Lie down on your back, or sit with your back straight. · Focus on your breathing. Make it slow and steady. · Breathe in through your nose. Breathe out through either your nose or mouth. · Breathe deeply, filling up the area between your navel and your rib cage. Breathe so that your belly goes up and down. · Do not hold your breath. · Breathe like this for 5 to 10 minutes. Notice the feeling of calmness throughout your whole body. As you continue to breathe slowly and deeply, relax by doing these next steps for another 5 to 10 minutes:  · Tighten and relax each muscle group in your body. Start at your toes, and work your way up to your head. · Imagine your muscle groups relaxing and getting heavy. · Empty your mind of all thoughts. · Let yourself relax more and more deeply. · Be aware of the state of calmness that surrounds you. · When your relaxation time is over, you can bring yourself back to alertness by moving your fingers and toes. Then move your hands and feet. And then move your entire body. Sometimes people fall asleep during relaxation. But they most often wake up soon. · Always give yourself time to return to full alertness before you drive a car. Wait to do anything that might cause an accident if you are not fully alert. Never play a relaxation tape while you drive a car. When should you call for help? Call 911 anytime you think you may need emergency care. For example, call if:    · You feel you cannot stop from hurting yourself or someone else. If you or someone you know talks about suicide, self-harm, or feeling hopeless, get help right away. Call the [address] [place] [address] at [phone] ([phone]) or text HOME to 127925 to access the Crisis Text Line. Consider saving these numbers in your phone.   Watch closely for changes in your health, and be sure to contact your doctor if:    · You have new anxiety, or your anxiety gets worse.     · You have been feeling sad, depressed, or hopeless or have lost interest in things that you usually enjoy.     · You do not get better as expected. Where can you learn more? Go to http://www.gray.com/  Enter R087 in the search box to learn more about \"Adjustment Disorder: Care Instructions. \"  Current as of: June 16, 2021               Content Version: 13.2  © 2006-2022 [employer]. Care instructions adapted under license by [employer] (which disclaims liability or warranty for this information). If you have questions about a medical condition or this instruction, always ask your healthcare professional. [address] any warranty or liability for your use of this information. Home Blood Pressure Test: About This Test  What is it? A home blood pressure test allows you to keep track of your blood pressure at home. Blood pressure is a measure of the force of blood against the walls of your arteries. Blood pressure readings include two numbers, such as 130/80 (say \"130 over 80\"). The first number is the systolic pressure. The second number is the diastolic pressure. Why is this test done? You may do this test at home to:  · Find out if you have high blood pressure. · Track your blood pressure if you have high blood pressure. · Track how well medicine is working to reduce high blood pressure. · Check how lifestyle changes, such as weight loss and exercise, are affecting blood pressure. How do you prepare for the test?  For at least 30 minutes before you take your blood pressure, don't exercise, drink caffeine, or smoke. Empty your bladder before the test. Sit quietly with your back straight and both feet on the floor for at least 5 minutes. This helps you take your blood pressure while you feel comfortable and relaxed. How is the test done?   · If your doctor recommends it, take your blood pressure twice a day. Take it in the morning and evening. · Sit with your arm slightly bent and resting on a table so that your upper arm is at the same level as your heart. · Use the same arm each time you take your blood pressure. · Place the blood pressure cuff on the bare skin of your upper arm. You may have to roll up your sleeve, remove your arm from the sleeve, or take your shirt off. · Wrap the blood pressure cuff around your upper arm so that the lower edge of the cuff is about 1 inch above the bend of your elbow. · Do not move, talk, or text while you take your blood pressure. Follow the instructions that came with your blood pressure monitor. They might be different from the following. · Press the on/off button on the automatic monitor. Then you may need to wait until the screen says the monitor is ready. · Press the start button. The cuff will inflate and deflate by itself. · Your blood pressure numbers will appear on the screen. · Wait one minute and take your blood pressure again. · If your monitor does not automatically save your numbers, write them in your log book, along with the date and time. Follow-up care is a key part of your treatment and safety. Be sure to make and go to all appointments, and call your doctor if you are having problems. It's also a good idea to keep a list of the medicines you take. Where can you learn more? Go to http://www.VHT.com/  Enter C427 in the search box to learn more about \"Home Blood Pressure Test: About This Test.\"  Current as of: January 10, 2022               Content Version: 13.2  © 6923-9543 [employer]. Care instructions adapted under license by [employer] (which disclaims liability or warranty for this information).  If you have questions about a medical condition or this instruction, always ask your healthcare professional. [employer] disclaims any warranty or liability for your use of this information. DASH Diet: Care Instructions  Your Care Instructions     The DASH diet is an eating plan that can help lower your blood pressure. DASH stands for Dietary Approaches to Stop Hypertension. Hypertension is high blood pressure. The DASH diet focuses on eating foods that are high in calcium, potassium, and magnesium. These nutrients can lower blood pressure. The foods that are highest in these nutrients are fruits, vegetables, low-fat dairy products, nuts, seeds, and legumes. But taking calcium, potassium, and magnesium supplements instead of eating foods that are high in those nutrients does not have the same effect. The DASH diet also includes whole grains, fish, and poultry. The DASH diet is one of several lifestyle changes your doctor may recommend to lower your high blood pressure. Your doctor may also want you to decrease the amount of sodium in your diet. Lowering sodium while following the DASH diet can lower blood pressure even further than just the DASH diet alone. Follow-up care is a key part of your treatment and safety. Be sure to make and go to all appointments, and call your doctor if you are having problems. It's also a good idea to know your test results and keep a list of the medicines you take. How can you care for yourself at home? Following the DASH diet  · Eat 4 to 5 servings of fruit each day. A serving is 1 medium-sized piece of fruit, ½ cup chopped or canned fruit, 1/4 cup dried fruit, or 4 ounces (½ cup) of fruit juice. Choose fruit more often than fruit juice. · Eat 4 to 5 servings of vegetables each day. A serving is 1 cup of lettuce or raw leafy vegetables, ½ cup of chopped or cooked vegetables, or 4 ounces (½ cup) of vegetable juice. Choose vegetables more often than vegetable juice. · Get 2 to 3 servings of low-fat and fat-free dairy each day. A serving is 8 ounces of milk, 1 cup of yogurt, or 1 ½ ounces of cheese.   · Eat 6 to 8 servings of grains each day. A serving is 1 slice of bread, 1 ounce of dry cereal, or ½ cup of cooked rice, pasta, or cooked cereal. Try to choose whole-grain products as much as possible. · Limit lean meat, poultry, and fish to 2 servings each day. A serving is 3 ounces, about the size of a deck of cards. · Eat 4 to 5 servings of nuts, seeds, and legumes (cooked dried beans, lentils, and split peas) each week. A serving is 1/3 cup of nuts, 2 tablespoons of seeds, or ½ cup of cooked beans or peas. · Limit fats and oils to 2 to 3 servings each day. A serving is 1 teaspoon of vegetable oil or 2 tablespoons of salad dressing. · Limit sweets and added sugars to 5 servings or less a week. A serving is 1 tablespoon jelly or jam, ½ cup sorbet, or 1 cup of lemonade. · Eat less than 2,300 milligrams (mg) of sodium a day. If you limit your sodium to 1,500 mg a day, you can lower your blood pressure even more. · Be aware that all of these are the suggested number of servings for people who eat 1,800 to 2,000 calories a day. Your recommended number of servings may be different if you need more or fewer calories. Tips for success  · Start small. Do not try to make dramatic changes to your diet all at once. You might feel that you are missing out on your favorite foods and then be more likely to not follow the plan. Make small changes, and stick with them. Once those changes become habit, add a few more changes. · Try some of the following:  ? Make it a goal to eat a fruit or vegetable at every meal and at snacks. This will make it easy to get the recommended amount of fruits and vegetables each day. ? Try yogurt topped with fruit and nuts for a snack or healthy dessert. ? Add lettuce, tomato, cucumber, and onion to sandwiches. ? Combine a ready-made pizza crust with low-fat mozzarella cheese and lots of vegetable toppings. Try using tomatoes, squash, spinach, broccoli, carrots, cauliflower, and onions. ?  Have a variety of cut-up vegetables with a low-fat dip as an appetizer instead of chips and dip. ? Sprinkle sunflower seeds or chopped almonds over salads. Or try adding chopped walnuts or almonds to cooked vegetables. ? Try some vegetarian meals using beans and peas. Add garbanzo or kidney beans to salads. Make burritos and tacos with mashed douglas beans or black beans. Where can you learn more? Go to http://www.zavala.com/  Enter H967 in the search box to learn more about \"DASH Diet: Care Instructions. \"  Current as of: January 10, 2022               Content Version: 13.2  © 2006-2022 [employer]. Care instructions adapted under license by "[employer] (which disclaims liability or warranty for this information). If you have questions about a medical condition or this instruction, always ask your healthcare professional. [place] [address] any warranty or liability for your use of this information. High Cholesterol: Care Instructions  Overview     Cholesterol is a type of fat in your blood. It is needed for many body functions, such as making new cells. Cholesterol is made by your body. It also comes from food you eat. High cholesterol means that you have too much of the fat in your blood. This raises your risk of a heart attack and stroke. LDL and HDL are part of your total cholesterol. LDL is the \"bad\" cholesterol. High LDL can raise your risk for coronary artery disease, heart attack, and stroke. HDL is the \"good\" cholesterol. It helps clear bad cholesterol from the body. High HDL is linked with a lower risk of coronary artery disease, heart attack, and stroke. Your cholesterol levels help your doctor find out your risk for having a heart attack or stroke. You and your doctor can talk about whether you need to lower your risk and what treatment is best for you. Treatment options include a heart-healthy lifestyle and medicine.  Both options can help lower your cholesterol and your risk. The way you choose to lower your risk will depend on how high your risk is for heart attack and stroke. It will also depend on how you feel about taking medicines. Follow-up care is a key part of your treatment and safety. Be sure to make and go to all appointments, and call your doctor if you are having problems. It's also a good idea to know your test results and keep a list of the medicines you take. How can you care for yourself at home? · Eat heart-healthy foods. ? Eat fruits, vegetables, whole grains, beans, and other high-fiber foods. ? Eat lean proteins, such as seafood, lean meats, beans, nuts, and soy products. ? Eat healthy fats, such as canola and olive oil. ? Choose foods that are low in saturated fat. ? Limit sodium and alcohol. ? Limit drinks and foods with added sugar. · Be physically active. Try to do moderate activity at least 2½ hours a week. Or try to do vigorous activity at least 1¼ hours a week. You may want to walk or try other activities, such as running, swimming, cycling, or playing tennis or team sports. · Stay at a healthy weight or lose weight by making the changes in eating and physical activity listed above. Losing just a small amount of weight, even 5 to 10 pounds, can help reduce your risk for having a heart attack or stroke. · Do not smoke. · Manage other health problems. These include diabetes and high blood pressure. If you think you may have a problem with alcohol or drug use, talk to your doctor. · If you take medicine, take it exactly as prescribed. Call your doctor if you think you are having a problem with your medicine. · Check with your doctor or pharmacist before you use any other medicines, including over-the-counter medicines. Make sure your doctor knows all of the medicines, vitamins, herbal products, and supplements you take. Taking some medicines together can cause problems.   When should you call for help?  Watch closely for changes in your health, and be sure to contact your doctor if:    · You need help making lifestyle changes.     · You have questions about your medicine. Where can you learn more? Go to http://www.gray.com/  Enter P494 in the search box to learn more about \"High Cholesterol: Care Instructions. \"  Current as of: January 10, 2022               Content Version: 13.2  © 2006-2022 [employer]. Care instructions adapted under license by [employer] (which disclaims liability or warranty for this information). If you have questions about a medical condition or this instruction, always ask your healthcare professional. [address] any warranty or liability for your use of this information. Learning About High Blood Pressure  What is high blood pressure? Blood pressure is a measure of how hard the blood pushes against the walls of your arteries. It's normal for blood pressure to go up and down throughout the day. But if it stays up, you have high blood pressure. Another name for high blood pressure is hypertension. Two numbers tell you your blood pressure. The first number is the systolic pressure (top number). It shows how hard the blood pushes when your heart is pumping. The second number is the diastolic pressure (bottom number). It shows how hard the blood pushes between heartbeats, when your heart is relaxed and filling with blood. Your doctor will give you a goal for your blood pressure based on your health and your age. High blood pressure (hypertension) means that the top number stays high, or the bottom number stays high, or both. High blood pressure increases the risk of stroke, heart attack, and other problems. What happens when you have high blood pressure? · Blood flows through your arteries with too much force. Over time, this can damage the heart and the walls of your arteries.  But you can't feel it. High blood pressure usually doesn't cause symptoms. · High blood pressure makes your heart work harder. And that can lead to heart failure, which means your heart doesn't pump as much blood as your body needs. · Fat and calcium start to build up in your arteries. This buildup is called hardening of the arteries. It can cause many problems including a heart attack and stroke. · Arteries also carry blood and oxygen to organs like your eyes, kidneys, and brain. If high blood pressure damages those arteries, it can lead to vision loss, kidney disease, stroke, and a higher risk of dementia. How can you prevent high blood pressure? · Stay at a healthy weight. · Try to limit how much sodium you eat to less than 2,300 milligrams (mg) a day. If you limit your sodium to 1,500 mg a day, you can lower your blood pressure even more. ? Buy foods that are labeled \"unsalted,\" \"sodium-free,\" or \"low-sodium. \" Foods labeled \"reduced-sodium\" and \"light sodium\" may still have too much sodium. ? Flavor your food with garlic, lemon juice, onion, vinegar, herbs, and spices instead of salt. Do not use soy sauce, steak sauce, onion salt, garlic salt, mustard, or ketchup on your food. ? Use less salt (or none) when recipes call for it. You can often use half the salt a recipe calls for without losing flavor. · Be physically active. Get at least 30 minutes of exercise on most days of the week. Walking is a good choice. You also may want to do other activities, such as running, swimming, cycling, or playing tennis or team sports. · Limit alcohol to 2 drinks a day for men and 1 drink a day for women. · Eat plenty of fruits, vegetables, and low-fat dairy products. Eat less saturated and total fats. How is high blood pressure treated? · Your doctor will suggest making lifestyle changes to help your heart. For example, your doctor may ask you to eat healthy foods, quit smoking, lose extra weight, and be more active.   · If lifestyle changes don't help enough, your doctor may recommend that you take medicine. · When blood pressure is very high, medicines are needed to lower it. Follow-up care is a key part of your treatment and safety. Be sure to make and go to all appointments, and call your doctor if you are having problems. It's also a good idea to know your test results and keep a list of the medicines you take. Where can you learn more? Go to http://www.zavala.com/  Enter P501 in the search box to learn more about \"Learning About High Blood Pressure. \"  Current as of: January 10, 2022               Content Version: 13.2  © 5619-2222 [employer], [employer]. Care instructions adapted under license by [employer] (which disclaims liability or warranty for this information). If you have questions about a medical condition or this instruction, always ask your healthcare professional. [place] [address] any warranty or liability for your use of this information.

## 2022-06-21 NOTE — PROGRESS NOTES
Identified pt with two pt identifiers(name and ). Chief Complaint   Patient presents with    Hypertension     6 month follow up, concerned that he is \"stuck\" with weight loss    Gas      Vitals:    22 0806   BP: 122/76   Pulse: 76   Resp: 18   Temp: 98.6 °F (37 °C)   TempSrc: Oral   SpO2: 100%   Weight: 195 lb (88.5 kg)   Height: 5' 9\" (1.753 m)   PainSc:   0 - No pain      Health Maintenance Due   Topic    COVID-19 Vaccine (1)    Pneumococcal 0-64 years (2 - PCV)    Eye Exam Retinal or Dilated     MICROALBUMIN Q1        Depression Screening:  :     3 most recent PHQ Screens 2022   Little interest or pleasure in doing things Not at all   Feeling down, depressed, irritable, or hopeless Not at all   Total Score PHQ 2 0        Fall Risk Assessment:  :     No flowsheet data found. Abuse Screening:  :     Abuse Screening Questionnaire 2019   Do you ever feel afraid of your partner? N   Are you in a relationship with someone who physically or mentally threatens you? N   Is it safe for you to go home? Y        Coordination of Care Questionnaire:  :     1. Have you been to the ER, urgent care clinic since your last visit? Hospitalized since your last visit? No  2. Have you seen or consulted any other health care providers outside of the [address] [place] [address] since your last visit? Include any pap smears or colon screening.     No

## 2022-06-21 NOTE — PROGRESS NOTES
Subjective  Dorothy Ramos is a 65 y.o. male presents for hypertension follow up   HPI   Diabetes is managed by Dr Keira Adams, follow up in July   Hemoglobin A1c higher at 8.2%. after he received steroid injections in spine   He takes medications daily as prescribed   He plans to talk with provider about diabetes medication adjustment     He wants to speak to a counselor about depression. Will establish care with mental health provider through insurer  July 5 he goes back to office full time since pandemic   Concern for complications of COVID infection d/t diabetes     Getting full custody of 6year old grandson. He needs to get him in summer camp and       Cant eat a lot, d/t Ozempic feeling sick after he eats   Colonoscopy  not due   Thinking about trying  a probiotic and Miralax    Right knee pain, was advised  injections not a option d/t CKD  He does not take any pain medication     Sees nephrologist annually       Intermittent wheezing, uses inhalers and antihistamine       Past Medical History:   Diagnosis Date    Arthritis     Borderline diabetic     Diabetes ([facility] [address]     Family history of premature CAD     Hypertension      Current Outpatient Medications   Medication Sig    rosuvastatin (CRESTOR) 10 mg tablet TAKE 1 TABLET BY MOUTH EVERY DAY    traZODone (DESYREL) 50 mg tablet Take 1 tablet by mouth nightly    hydroCHLOROthiazide (MICROZIDE) 12.5 mg capsule TAKE 1 CAPSULE BY MOUTH EVERY DAY    Jentadueto 2.5-1,000 mg per tablet TAKE 1 TABLET BY MOUTH TWICE A DAY WITH MEALS    glucose blood VI test strips (OneTouch Verio test strips) strip Check blood sugar 2 times per day. DX: E11.9    OneTouch Verio Reflect Meter misc TWICE DAILY    Olmesartan-amLODIPine-HCTZ 20-5-12.5 mg tab     beclomethasone dipropionate (Qvar RediHaler) 40 mcg/actuation HFAb inhaler daily.  semaglutide (Ozempic) 1 mg/dose (4 mg/3 mL) pnij 1 mg by SubCUTAneous route every seven (7) days.     sildenafil citrate (VIAGRA) 25 mg tablet TAKE 1-4 TABLETS BY MOUTH 30 MINUTES PRIOR TO ROMANCE. DO NOT REPEAT IN 24 HOURS    albuterol (PROVENTIL HFA, VENTOLIN HFA, PROAIR HFA) 90 mcg/actuation inhaler Take 2 Puffs by inhalation every four (4) hours as needed for Wheezing.  Insulin Needles, Disposable, (NOVOFINE 32) 32 gauge x 1/4\" ndle USE FOR ONE SHOT DAILY    coenzyme Q-10 (CO Q-10) 200 mg capsule Take 1 Cap by mouth daily.  cinnamon bark (CINNAMON) 500 mg cap Take 500 mg by mouth two (2) times a day.  montelukast (SINGULAIR) 10 mg tablet TAKE 1 TABLET BY MOUTH EVERY DAY (Patient not taking: Reported on 4/15/2022)    citalopram (CELEXA) 20 mg tablet Take 1 Tab by mouth daily. (Patient not taking: Reported on 6/21/2022)     No current facility-administered medications for this visit. Allergies   Allergen Reactions    Latex Itching    Bee Sting [Sting, Bee] Hives       Past Surgical History:   Procedure Laterality Date    HX COLONOSCOPY  09/11/2020    Dr Dariel Mays, needs 5 year follow up, hemorrhoids and poly    HX ORTHOPAEDIC      right knee       Review of Systems   Constitutional: Positive for weight loss. HENT: Negative. Eyes: Negative for blurred vision. Respiratory: Positive for cough and wheezing. Cardiovascular: Negative for chest pain and leg swelling. Gastrointestinal: Positive for nausea. Genitourinary: Negative. Musculoskeletal: Positive for joint pain. Skin: Negative for rash. Neurological: Negative for dizziness and headaches. Psychiatric/Behavioral: Positive for depression. Negative for suicidal ideas. The patient is nervous/anxious. Objective  Physical Exam  Constitutional:       General: He is not in acute distress. Appearance: He is not ill-appearing. Cardiovascular:      Rate and Rhythm: Normal rate and regular rhythm. Heart sounds: Normal heart sounds. Pulmonary:      Effort: Pulmonary effort is normal.      Breath sounds: Normal breath sounds.  No wheezing or rhonchi. Skin:     General: Skin is warm and dry. Findings: No erythema or rash. Neurological:      Mental Status: He is alert. Mental status is at baseline. Cranial Nerves: No cranial nerve deficit. Psychiatric:         Attention and Perception: Attention normal.         Mood and Affect: Mood is anxious. Speech: Speech normal.         Behavior: Behavior is cooperative. Cognition and Memory: Cognition normal.          Assessment & Plan    ICD-10-CM ICD-9-CM    1. Essential hypertension with goal blood pressure less than 130/80  J18 321.8 METABOLIC PANEL, COMPREHENSIVE      METABOLIC PANEL, COMPREHENSIVE   2. Controlled type 2 diabetes mellitus with other specified complication, without long-term current use of insulin (HCC)  X19.16 936.57 METABOLIC PANEL, COMPREHENSIVE      HEMOGLOBIN A1C WITH EAG      HEMOGLOBIN A1C WITH EAG      METABOLIC PANEL, COMPREHENSIVE   3. Chronic kidney disease, unspecified CKD stage  Q13.4 544.5 METABOLIC PANEL, COMPREHENSIVE      CBC WITH AUTOMATED DIFF      VITAMIN D, 25 HYDROXY      VITAMIN D, 25 HYDROXY      CBC WITH AUTOMATED DIFF      METABOLIC PANEL, COMPREHENSIVE   4. Mixed hyperlipidemia  E78.2 272.2 LIPID PANEL      METABOLIC PANEL, COMPREHENSIVE      METABOLIC PANEL, COMPREHENSIVE      LIPID PANEL   5. Encounter for immunization  Z23 V03.89 PNEUMOCOCCAL, PCV20, PREVNAR 20, (AGE 18 YRS+), IM, PF   6. Adjustment disorder, unspecified type  F43.20 309.9      Diagnoses and all orders for this visit:    1. Essential hypertension with goal blood pressure less than 368/85  -     METABOLIC PANEL, COMPREHENSIVE; Future    2. Controlled type 2 diabetes mellitus with other specified complication, without long-term current use of insulin (HCC)  -     METABOLIC PANEL, COMPREHENSIVE; Future  -     HEMOGLOBIN A1C WITH EAG; Future    3. Chronic kidney disease, unspecified CKD stage  -     METABOLIC PANEL, COMPREHENSIVE;  Future  -     CBC WITH AUTOMATED DIFF; Future  -     VITAMIN D, 25 HYDROXY; Future    4. Mixed hyperlipidemia  -     LIPID PANEL; Future  -     METABOLIC PANEL, COMPREHENSIVE; Future    5. Encounter for immunization  -     PNEUMOCOCCAL, PCV20, PREVNAR 20, (AGE 18 YRS+), IM, PF    6. Adjustment disorder, unspecified type      Follow-up and Dispositions    · Return in about 3 months (around 9/21/2022) for depression, htn, diabetes, hyperlipidemia. normotensive   Support plan to see [facility]  Continue diabetes care with Endocrinologist   current treatment plan is effective, no change in therapy  lab results and schedule of future lab studies reviewed with patient  reviewed diet, exercise and weight control  reviewed medications and side effects in detail    Patient voices understanding and acceptance of this advice and will call back if any further questions or concerns.   Moses Ordoñez, NP

## 2022-06-22 LAB
25(OH)D3 SERPL-MCNC: 22 NG/ML (ref 30–100)
ALBUMIN SERPL-MCNC: 4.2 G/DL (ref 3.5–5)
ALBUMIN/GLOB SERPL: 1.2 {RATIO} (ref 1.1–2.2)
ALP SERPL-CCNC: 55 U/L (ref 45–117)
ALT SERPL-CCNC: 21 U/L (ref 12–78)
ANION GAP SERPL CALC-SCNC: 6 MMOL/L (ref 5–15)
AST SERPL-CCNC: 14 U/L (ref 15–37)
BASOPHILS # BLD: 0 K/UL (ref 0–0.1)
BASOPHILS NFR BLD: 0 % (ref 0–1)
BILIRUB SERPL-MCNC: 0.4 MG/DL (ref 0.2–1)
BUN SERPL-MCNC: 20 MG/DL (ref 6–20)
BUN/CREAT SERPL: 9 (ref 12–20)
CALCIUM SERPL-MCNC: 9.3 MG/DL (ref 8.5–10.1)
CHLORIDE SERPL-SCNC: 107 MMOL/L (ref 97–108)
CHOLEST SERPL-MCNC: 150 MG/DL
CO2 SERPL-SCNC: 29 MMOL/L (ref 21–32)
CREAT SERPL-MCNC: 2.27 MG/DL (ref 0.7–1.3)
DIFFERENTIAL METHOD BLD: ABNORMAL
EOSINOPHIL # BLD: 0.3 K/UL (ref 0–0.4)
EOSINOPHIL NFR BLD: 4 % (ref 0–7)
ERYTHROCYTE [DISTWIDTH] IN BLOOD BY AUTOMATED COUNT: 13.7 % (ref 11.5–14.5)
EST. AVERAGE GLUCOSE BLD GHB EST-MCNC: 146 MG/DL
GLOBULIN SER CALC-MCNC: 3.4 G/DL (ref 2–4)
GLUCOSE SERPL-MCNC: 126 MG/DL (ref 65–100)
HBA1C MFR BLD: 6.7 % (ref 4–5.6)
HCT VFR BLD AUTO: 37.3 % (ref 36.6–50.3)
HDLC SERPL-MCNC: 35 MG/DL
HDLC SERPL: 4.3 {RATIO} (ref 0–5)
HGB BLD-MCNC: 11.9 G/DL (ref 12.1–17)
IMM GRANULOCYTES # BLD AUTO: 0 K/UL (ref 0–0.04)
IMM GRANULOCYTES NFR BLD AUTO: 0 % (ref 0–0.5)
LDLC SERPL CALC-MCNC: 75.4 MG/DL (ref 0–100)
LYMPHOCYTES # BLD: 1.8 K/UL (ref 0.8–3.5)
LYMPHOCYTES NFR BLD: 25 % (ref 12–49)
MCH RBC QN AUTO: 28.7 PG (ref 26–34)
MCHC RBC AUTO-ENTMCNC: 31.9 G/DL (ref 30–36.5)
MCV RBC AUTO: 90.1 FL (ref 80–99)
MONOCYTES # BLD: 0.3 K/UL (ref 0–1)
MONOCYTES NFR BLD: 4 % (ref 5–13)
NEUTS SEG # BLD: 4.7 K/UL (ref 1.8–8)
NEUTS SEG NFR BLD: 67 % (ref 32–75)
NRBC # BLD: 0 K/UL (ref 0–0.01)
NRBC BLD-RTO: 0 PER 100 WBC
PLATELET # BLD AUTO: 263 K/UL (ref 150–400)
PMV BLD AUTO: 10.3 FL (ref 8.9–12.9)
POTASSIUM SERPL-SCNC: 4.6 MMOL/L (ref 3.5–5.1)
PROT SERPL-MCNC: 7.6 G/DL (ref 6.4–8.2)
RBC # BLD AUTO: 4.14 M/UL (ref 4.1–5.7)
SODIUM SERPL-SCNC: 142 MMOL/L (ref 136–145)
TRIGL SERPL-MCNC: 198 MG/DL (ref ?–150)
VLDLC SERPL CALC-MCNC: 39.6 MG/DL
WBC # BLD AUTO: 7 K/UL (ref 4.1–11.1)

## 2022-07-05 RX ORDER — LINAGLIPTIN AND METFORMIN HYDROCHLORIDE 2.5; 1 MG/1; MG/1
TABLET, FILM COATED ORAL
Qty: 180 TABLET | Refills: 0 | Status: SHIPPED | OUTPATIENT
Start: 2022-07-05 | End: 2022-10-18

## 2022-07-07 ENCOUNTER — OFFICE VISIT (OUTPATIENT)
Dept: ENDOCRINOLOGY | Age: 57
End: 2022-07-07
Payer: COMMERCIAL

## 2022-07-07 VITALS
BODY MASS INDEX: 29.15 KG/M2 | SYSTOLIC BLOOD PRESSURE: 105 MMHG | HEART RATE: 97 BPM | WEIGHT: 196.8 LBS | HEIGHT: 69 IN | DIASTOLIC BLOOD PRESSURE: 74 MMHG

## 2022-07-07 DIAGNOSIS — E78.2 MIXED HYPERLIPIDEMIA: ICD-10-CM

## 2022-07-07 DIAGNOSIS — I10 ESSENTIAL HYPERTENSION: ICD-10-CM

## 2022-07-07 DIAGNOSIS — E11.9 TYPE 2 DIABETES MELLITUS WITHOUT COMPLICATION, WITHOUT LONG-TERM CURRENT USE OF INSULIN (HCC): Primary | ICD-10-CM

## 2022-07-07 DIAGNOSIS — N52.8 OTHER MALE ERECTILE DYSFUNCTION: ICD-10-CM

## 2022-07-07 PROCEDURE — 99214 OFFICE O/P EST MOD 30 MIN: CPT | Performed by: INTERNAL MEDICINE

## 2022-07-07 PROCEDURE — 3044F HG A1C LEVEL LT 7.0%: CPT | Performed by: INTERNAL MEDICINE

## 2022-07-07 RX ORDER — HYDROCHLOROTHIAZIDE 12.5 MG/1
12.5 TABLET ORAL DAILY
COMMUNITY

## 2022-07-07 RX ORDER — OLMESARTAN MEDOXOMIL 20 MG/1
20 TABLET ORAL DAILY
COMMUNITY
Start: 2022-05-13

## 2022-07-07 RX ORDER — AMLODIPINE BESYLATE 5 MG/1
5 TABLET ORAL DAILY
COMMUNITY

## 2022-07-07 NOTE — PROGRESS NOTES
Chief Complaint   Patient presents with    Diabetes     pcp and pharmacy verified   Records since last visit reviewed. History of Present Illness: Bree Lopes is a 65 y.o. male here for follow up of diabetes. He was diagnosed with diabetes 2015. At our last visit in February 2022 his A1c was 8.2%, but he had received steroid injections, which caused his BGs to run higher. We agreed to not make any changes at the time, but we discussed starting an SGLT-2 if his A1C did not improve. \"I am back in the office Mon-Thurs. I had stopped going to the gym, but they are getting better about getting the facility  and I plan to get back to the gym. \"    His A1C last week was down to 6.7%. His weight has improved from 213 pounds in February 2022 to 196 pounds today. \"My goal is to get down to 180 pounds\". He notes he has been working with a nutritionist through his insurance. His eGFR was down to the 30's in June 2022. In November 2021, his eGFR decreased from the 50's to the 45s and his PCP referred him to a Nephrologist.  Pt reports that he saw his nephrologist Dr. Nae Tyson recently and they [place] labs. Will request these results. \"He did say my kidney function was worse from November 2021, he said to stay well hydrated, control my BG and BP. Pt is still taking Ozempic 1.0mg weekly and Jentadueto 2.5/1000mg BID. Pt is testing his BGs once per day, in the mid-day. His BGs have been running in the 110-140s for the last month. Pt has received all three COVID vaccinations (Rogerio Duncan). Pt is drinking coffee in the morning, but no breakfast.  He is not eating lunch and he denies snacking during the day. He has his first meal around 5-530PM. Last night he had 4 chicken wings and water. \"I am dabbling in a plant-based diet. \"    No history of vascular disease. Pt has been diagnosed with neuropathy and has CKD III. Last eye exam was September 2021, no retinopathy.  \"I have a follow up in October. \"    Current Outpatient Medications   Medication Sig    olmesartan (BENICAR) 20 mg tablet Take 20 mg by mouth daily.  amLODIPine (NORVASC) 5 mg tablet Take 5 mg by mouth daily.  hydroCHLOROthiazide (HYDRODIURIL) 12.5 mg tablet Take 12.5 mg by mouth daily.  Jentadueto 2.5-1,000 mg per tablet TAKE 1 TABLET BY MOUTH TWICE A DAY WITH MEALS    rosuvastatin (CRESTOR) 10 mg tablet TAKE 1 TABLET BY MOUTH EVERY DAY    traZODone (DESYREL) 50 mg tablet Take 1 tablet by mouth nightly    hydroCHLOROthiazide (MICROZIDE) 12.5 mg capsule TAKE 1 CAPSULE BY MOUTH EVERY DAY    glucose blood VI test strips (OneTouch Verio test strips) strip Check blood sugar 2 times per day. DX: E11.9    OneTouch Verio Reflect Meter misc TWICE DAILY    Olmesartan-amLODIPine-HCTZ 20-5-12.5 mg tab daily.  beclomethasone dipropionate (Qvar RediHaler) 40 mcg/actuation HFAb inhaler daily.  semaglutide (Ozempic) 1 mg/dose (4 mg/3 mL) pnij 1 mg by SubCUTAneous route every seven (7) days.  sildenafil citrate (VIAGRA) 25 mg tablet TAKE 1-4 TABLETS BY MOUTH 30 MINUTES PRIOR TO ROMANCE. DO NOT REPEAT IN 24 HOURS    albuterol (PROVENTIL HFA, VENTOLIN HFA, PROAIR HFA) 90 mcg/actuation inhaler Take 2 Puffs by inhalation every four (4) hours as needed for Wheezing.  Insulin Needles, Disposable, (NOVOFINE 32) 32 gauge x 1/4\" ndle USE FOR ONE SHOT DAILY    coenzyme Q-10 (CO Q-10) 200 mg capsule Take 1 Cap by mouth daily.  cinnamon bark (CINNAMON) 500 mg cap Take 500 mg by mouth two (2) times a day. No current facility-administered medications for this visit.      Allergies   Allergen Reactions    Latex Itching    Bee Sting [Sting, Bee] Hives     Review of Systems:  - Eyes: no blurry vision or double vision  - Cardiovascular: no chest pain  - Respiratory: no SOB, his dry cough has improved   - Musculoskeletal: chronic knee pain, chronic left shoulder stiffness  - Neurological: no numbness/tingling in extremities    Physical Examination:  Blood pressure 105/74, pulse 97, height 5' 9\" (1.753 m), weight 196 lb 12.8 oz (89.3 kg). General: pleasant, no distress, good eye contact   Neck: no carotid bruits  Cardiovascular: regular, normal rate, nl s1 and s2, no m/r/g, 2+ DP pulses   Respiratory: clear bilaterally  Integumentary: no edema, no foot ulcers  Psychiatric: normal mood and affect    Diabetic foot exam:     Left Foot:   Visual Exam: normal    Pulse DP: 2+ (normal)   Filament test: normal sensation    Vibratory sensation: normal      Right Foot:   Visual Exam: normal    Pulse DP: 2+ (normal)   Filament test: normal sensation    Vibratory sensation: normal        Data Reviewed:   Component      Latest Ref Rng & Units 6/21/2022   WBC      4.1 - 11.1 K/uL 7.0   RBC      4.10 - 5.70 M/uL 4.14   HGB      12.1 - 17.0 g/dL 11.9 (L)   HCT      36.6 - 50.3 % 37.3   MCV      80.0 - 99.0 FL 90.1   MCH      26.0 - 34.0 PG 28.7   MCHC      30.0 - 36.5 g/dL 31.9   RDW      11.5 - 14.5 % 13.7   PLATELET      399 - 983 K/uL 263   MPV      8.9 - 12.9 FL 10.3   NRBC      0  WBC 0.0   ABSOLUTE NRBC      0.00 - 0.01 K/uL 0.00   NEUTROPHILS      32 - 75 % 67   LYMPHOCYTES      12 - 49 % 25   MONOCYTES      5 - 13 % 4 (L)   EOSINOPHILS      0 - 7 % 4   BASOPHILS      0 - 1 % 0   IMMATURE GRANULOCYTES      0.0 - 0.5 % 0   ABS. NEUTROPHILS      1.8 - 8.0 K/UL 4.7   ABS. LYMPHOCYTES      0.8 - 3.5 K/UL 1.8   ABS. MONOCYTES      0.0 - 1.0 K/UL 0.3   ABS. EOSINOPHILS      0.0 - 0.4 K/UL 0.3   ABS. BASOPHILS      0.0 - 0.1 K/UL 0.0   ABS. IMM.  GRANS.      0.00 - 0.04 K/UL 0.0   DF       AUTOMATED   Sodium      136 - 145 mmol/L 142   Potassium      3.5 - 5.1 mmol/L 4.6   Chloride      97 - 108 mmol/L 107   CO2      21 - 32 mmol/L 29   Anion gap      5 - 15 mmol/L 6   Glucose      65 - 100 mg/dL 126 (H)   BUN      6 - 20 MG/DL 20   Creatinine      0.70 - 1.30 MG/DL 2.27 (H)   BUN/Creatinine ratio      12 - 20   9 (L)   GFR est AA      >60 ml/min/1.73m2 37 (L)   GFR est non-AA      >60 ml/min/1.73m2 30 (L)   Calcium      8.5 - 10.1 MG/DL 9.3   Bilirubin, total      0.2 - 1.0 MG/DL 0.4   ALT      12 - 78 U/L 21   AST      15 - 37 U/L 14 (L)   Alk. phosphatase      45 - 117 U/L 55   Protein, total      6.4 - 8.2 g/dL 7.6   Albumin      3.5 - 5.0 g/dL 4.2   Globulin      2.0 - 4.0 g/dL 3.4   A-G Ratio      1.1 - 2.2   1.2   Cholesterol, total      <200 MG/   Triglyceride      <150 MG/ (H)   HDL Cholesterol      MG/DL 35   LDL, calculated      0 - 100 MG/DL 75.4   VLDL, calculated      MG/DL 39.6   CHOL/HDL Ratio      0.0 - 5.0   4.3   Hemoglobin A1c, (calculated)      4.0 - 5.6 % 6.7 (H)   Est. average glucose      mg/dL 146   Vitamin D 25-Hydroxy      30 - 100 ng/mL 22.0 (L)       Assessment/Plan:   1) DM > His A1C last week was down from 8.2% to 6.7%. Pt encouraged to keep up the good with the dietary and lifestyle changes. For now will continue the Ozempic 1.0mg weekly and Jentadueto 2.5/1000mg BID. Pt to check her BGs twice per day. Pt to check a various times of day (fasting, before dinner and HS) so we can see if he has had spikes in his BG that have not previously been seen. 2) HTN > His BP today was 105/74. Will not make any changes to his BP regimen at this time. 3) HLD > Pt's lipid panel was at goal in June 2022, pt is on Rosuvastatin 10mg daily, which he is tolerating well. This is a high intensity statin regimen. Pt voices understanding and agreement with the plan.       RTC 4 months        Copy sent to:  Dr. Jade aMlik

## 2022-07-07 NOTE — LETTER
7/7/2022    Patient: Marlon Torres   YOB: 1965   Date of Visit: 7/7/2022     Manuel Cerda NP  [id number] [address] [facility] [address]  Via In Basket    Dear Manuel Cerda NP,      Thank you for referring Mr. Olaf Tripp to [address] [place] [address] for evaluation. My notes for this consultation are attached. If you have questions, please do not hesitate to call me. I look forward to following your patient along with you.       Sincerely,    Nini Small MD

## 2022-07-26 NOTE — TELEPHONE ENCOUNTER
Last visit 2022 DANNY Swift   Next appointment 2022 DANNY Swift   Previous refill encounter(s)   2020 Epipen #1 with 5 refills. For Pharmacy Admin Tracking Only    Intervention Detail: New Rx: 1, reason: Patient Preference  Time Spent (min): 5        Requested Prescriptions     Pending Prescriptions Disp Refills    EPINEPHrine (SYMJEPI) 0.3 mg/0.3 mL syrg 1 Each 0     Si.3 mL by IntraMUSCular route once as needed for Allergic Response for up to 1 dose.

## 2022-07-27 ENCOUNTER — TELEPHONE (OUTPATIENT)
Dept: INTERNAL MEDICINE CLINIC | Age: 57
End: 2022-07-27

## 2022-07-27 RX ORDER — EPINEPHRINE 0.3 MG/.3ML
0.3 INJECTION SUBCUTANEOUS
Qty: 2 EACH | Refills: 0 | Status: CANCELLED | OUTPATIENT
Start: 2022-07-27 | End: 2022-07-27

## 2022-07-27 NOTE — TELEPHONE ENCOUNTER
Patient's plan does not cover Symjepi. Per the [address] the pt's insurance will cover the generic Epipen. For Pharmacy Admin Tracking Only    Intervention Detail: New Rx: 1, reason: Cost/Formulary Change  Time Spent (min): 5      Requested Prescriptions     Pending Prescriptions Disp Refills    EPINEPHrine (EPIPEN) 0.3 mg/0.3 mL injection 2 Each 0     Si.3 mL by IntraMUSCular route once as needed for Allergic Response for up to 1 dose.

## 2022-07-29 DIAGNOSIS — J45.20 MILD INTERMITTENT ASTHMA WITHOUT COMPLICATION: ICD-10-CM

## 2022-08-02 RX ORDER — ALBUTEROL SULFATE 90 UG/1
AEROSOL, METERED RESPIRATORY (INHALATION)
Qty: 18 G | Refills: 3 | Status: SHIPPED | OUTPATIENT
Start: 2022-08-02

## 2022-08-02 RX ORDER — EPINEPHRINE 0.3 MG/.3ML
0.3 INJECTION SUBCUTANEOUS
Qty: 2 EACH | Refills: 3 | Status: SHIPPED | OUTPATIENT
Start: 2022-08-02 | End: 2022-08-02

## 2022-08-12 DIAGNOSIS — G47.00 INSOMNIA, UNSPECIFIED TYPE: ICD-10-CM

## 2022-08-14 RX ORDER — TRAZODONE HYDROCHLORIDE 50 MG/1
TABLET ORAL
Qty: 90 TABLET | Refills: 0 | Status: SHIPPED | OUTPATIENT
Start: 2022-08-14

## 2022-09-21 ENCOUNTER — OFFICE VISIT (OUTPATIENT)
Dept: INTERNAL MEDICINE CLINIC | Age: 57
End: 2022-09-21
Payer: COMMERCIAL

## 2022-09-21 VITALS
DIASTOLIC BLOOD PRESSURE: 82 MMHG | HEART RATE: 89 BPM | TEMPERATURE: 98.5 F | SYSTOLIC BLOOD PRESSURE: 119 MMHG | HEIGHT: 69 IN | RESPIRATION RATE: 18 BRPM | WEIGHT: 195.6 LBS | BODY MASS INDEX: 28.97 KG/M2 | OXYGEN SATURATION: 100 %

## 2022-09-21 DIAGNOSIS — E78.2 MIXED HYPERLIPIDEMIA: ICD-10-CM

## 2022-09-21 DIAGNOSIS — Z23 NEEDS FLU SHOT: ICD-10-CM

## 2022-09-21 DIAGNOSIS — J45.20 MILD INTERMITTENT ASTHMA WITHOUT COMPLICATION: ICD-10-CM

## 2022-09-21 DIAGNOSIS — E11.69 CONTROLLED TYPE 2 DIABETES MELLITUS WITH OTHER SPECIFIED COMPLICATION, WITHOUT LONG-TERM CURRENT USE OF INSULIN (HCC): ICD-10-CM

## 2022-09-21 DIAGNOSIS — N18.31 STAGE 3A CHRONIC KIDNEY DISEASE (HCC): ICD-10-CM

## 2022-09-21 DIAGNOSIS — Z12.11 COLON CANCER SCREENING: ICD-10-CM

## 2022-09-21 DIAGNOSIS — M79.604 PAIN OF RIGHT LOWER EXTREMITY: ICD-10-CM

## 2022-09-21 DIAGNOSIS — I10 ESSENTIAL HYPERTENSION WITH GOAL BLOOD PRESSURE LESS THAN 130/80: Primary | ICD-10-CM

## 2022-09-21 PROBLEM — N18.30 CHRONIC RENAL DISEASE, STAGE III (HCC): Status: ACTIVE | Noted: 2022-09-21

## 2022-09-21 PROCEDURE — 99213 OFFICE O/P EST LOW 20 MIN: CPT | Performed by: NURSE PRACTITIONER

## 2022-09-21 PROCEDURE — 90686 IIV4 VACC NO PRSV 0.5 ML IM: CPT | Performed by: NURSE PRACTITIONER

## 2022-09-21 PROCEDURE — 3044F HG A1C LEVEL LT 7.0%: CPT | Performed by: NURSE PRACTITIONER

## 2022-09-21 PROCEDURE — 90471 IMMUNIZATION ADMIN: CPT | Performed by: NURSE PRACTITIONER

## 2022-09-21 NOTE — PROGRESS NOTES
After obtaining consent, and per orders of Dr Angelina Zapata, injection of Flulaval given by Agustina Ch. Patient instructed to remain in clinic for 20 minutes afterwards, and to report any adverse reaction to me immediately.

## 2022-09-21 NOTE — PROGRESS NOTES
Rm: 07    Pt states that he wants his flu shot today     Chief Complaint   Patient presents with    Follow-up     Diabetes       Visit Vitals  /82 (BP 1 Location: Left upper arm, BP Patient Position: Sitting, BP Cuff Size: Adult)   Pulse 89   Temp 98.5 °F (36.9 °C) (Oral)   Resp 18   Ht 5' 9\" (1.753 m)   Wt 195 lb 9.6 oz (88.7 kg)   SpO2 100%   BMI 28.89 kg/m²       1. Have you been to the ER, urgent care clinic since your last visit? Hospitalized since your last visit? No    2. Have you seen or consulted any other health care providers outside of the [address] [place] [address] since your last visit? Include any pap smears or colon screening.  No

## 2022-09-27 NOTE — PROGRESS NOTES
Subjective  Tila Posey is a 65 y.o. male presents for follow up   HPI. Diabetes managed by Dr Aries Barens  He takes medications daily as prescribed   He will see nephrologist Thursday   Colonoscopy  q5 years   Ophthalmology appointment scheduled. For  October 15   He has had right lower leg pain twice since LOV. Pain usually when he is in bed. Doing intermittent fasting   Feels tired   Had first appointment with psychotherapist  yesterday   Will have custody of grandson, now has temporary custody   Job is stressful   Started own business   Not exercising as much d/t busy schedule and since no longer working from home   Adequate water intake       Past Medical History:   Diagnosis Date    Arthritis     Borderline diabetic     Diabetes ([facility] [address]     Family history of premature CAD     Hypertension       Current Outpatient Medications   Medication Sig    traZODone (DESYREL) 50 mg tablet Take 1 tablet by mouth nightly    albuterol (PROVENTIL HFA, VENTOLIN HFA, PROAIR HFA) 90 mcg/actuation inhaler INHALE 2 PUFFS BY MOUTH EVERY 4 HOURS AS NEEDED FOR WHEEZING    olmesartan (BENICAR) 20 mg tablet Take 20 mg by mouth daily. amLODIPine (NORVASC) 5 mg tablet Take 5 mg by mouth daily. hydroCHLOROthiazide (HYDRODIURIL) 12.5 mg tablet Take 12.5 mg by mouth daily. Jentadueto 2.5-1,000 mg per tablet TAKE 1 TABLET BY MOUTH TWICE A DAY WITH MEALS    rosuvastatin (CRESTOR) 10 mg tablet TAKE 1 TABLET BY MOUTH EVERY DAY    hydroCHLOROthiazide (MICROZIDE) 12.5 mg capsule TAKE 1 CAPSULE BY MOUTH EVERY DAY    glucose blood VI test strips (OneTouch Verio test strips) strip Check blood sugar 2 times per day. DX: E11.9    OneTouch Verio Reflect Meter misc TWICE DAILY    Olmesartan-amLODIPine-HCTZ 20-5-12.5 mg tab daily. beclomethasone dipropionate (Qvar RediHaler) 40 mcg/actuation HFAb inhaler daily. semaglutide (Ozempic) 1 mg/dose (4 mg/3 mL) pnij 1 mg by SubCUTAneous route every seven (7) days.     sildenafil citrate (VIAGRA) 25 mg tablet TAKE 1-4 TABLETS BY MOUTH 30 MINUTES PRIOR TO ROMANCE. DO NOT REPEAT IN 24 HOURS    Insulin Needles, Disposable, (NOVOFINE 32) 32 gauge x 1/4\" ndle USE FOR ONE SHOT DAILY    coenzyme Q-10 (CO Q-10) 200 mg capsule Take 1 Cap by mouth daily. cinnamon bark 500 mg cap Take 500 mg by mouth two (2) times a day. No current facility-administered medications for this visit. Allergies   Allergen Reactions    Latex Itching    Bee Sting [Sting, Bee] Hives        Past Surgical History:   Procedure Laterality Date    HX COLONOSCOPY  09/11/2020    Dr Gilberto Ayala, needs 5 year follow up, hemorrhoids and poly    HX ORTHOPAEDIC      right knee      Review of Systems   Constitutional:  Positive for malaise/fatigue. Eyes: Negative. Respiratory: Negative. Cardiovascular: Negative. Gastrointestinal: Negative. Genitourinary: Negative. Musculoskeletal:  Positive for myalgias. Neurological: Negative. Psychiatric/Behavioral: Negative. Objective  Visit Vitals  /82 (BP 1 Location: Left upper arm, BP Patient Position: Sitting, BP Cuff Size: Adult)   Pulse 89   Temp 98.5 °F (36.9 °C) (Oral)   Resp 18   Ht 5' 9\" (1.753 m)   Wt 195 lb 9.6 oz (88.7 kg)   SpO2 100%   BMI 28.89 kg/m²      Physical Exam  Constitutional:       General: He is not in acute distress. Appearance: Normal appearance. He is not ill-appearing. HENT:      Head: Normocephalic and atraumatic. Neck:      Vascular: No carotid bruit. Cardiovascular:      Rate and Rhythm: Normal rate and regular rhythm. Pulses: Normal pulses. Heart sounds: Normal heart sounds. Musculoskeletal:      Right lower leg: No edema. Left lower leg: No edema. Lymphadenopathy:      Cervical: No cervical adenopathy. Skin:     General: Skin is warm and dry. Findings: No erythema or rash. Neurological:      Mental Status: He is alert. Mental status is at baseline. Cranial Nerves: No cranial nerve deficit.       Gait: Gait normal.   Psychiatric:         Mood and Affect: Mood normal.         Behavior: Behavior normal.         Thought Content: Thought content normal.        Assessment & Plan    ICD-10-CM ICD-9-CM    1. Essential hypertension with goal blood pressure less than 130/80  I10 401.9       2. Stage 3a chronic kidney disease (HCC)  N18.31 585.3       3. Colon cancer screening  Z12.11 V76.51 OCCULT BLOOD IMMUNOASSAY,DIAGNOSTIC      4. Needs flu shot  Z23 V04.81 INFLUENZA, FLUARIX, FLULAVAL, FLUZONE (AGE 6 MO+), AFLURIA(AGE 3Y+) IM, PF, 0.5 ML      5. Mild intermittent asthma without complication  K49.79 373.04       6. Controlled type 2 diabetes mellitus with other specified complication, without long-term current use of insulin ([facility])  E11.69 250.80       7. Mixed hyperlipidemia  E78.2 272.2       8. Pain of right lower extremity  M79.604 729.5         Diagnoses and all orders for this visit:    1. Essential hypertension with goal blood pressure less than 130/80    2. Stage 3a chronic kidney disease ([address]    3. Colon cancer screening  -     OCCULT BLOOD IMMUNOASSAY,DIAGNOSTIC; Future    4. Needs flu shot  -     INFLUENZA, FLUARIX, FLULAVAL, FLUZONE (AGE 6 MO+), AFLURIA(AGE 3Y+) IM, PF, 0.5 ML    5. Mild intermittent asthma without complication    6. Controlled type 2 diabetes mellitus with other specified complication, without long-term current use of insulin ([facility] [address]    7. Mixed hyperlipidemia    8. Pain of right lower extremity    Follow-up and Dispositions    Return in about 6 months (around 3/21/2023) for htn, hyperlipidemia, diabetes.        current treatment plan is effective, no change in therapy  lab results and schedule of future lab studies reviewed with patient  reviewed diet, exercise and weight control  cardiovascular risk and specific lipid/LDL goals reviewed  reviewed medications and side effects in detail  specific diabetic recommendations: low cholesterol diet, weight control and daily exercise discussed, home glucose monitoring emphasized, annual eye examinations at Ophthalmology discussed, and glycohemoglobin and other lab monitoring discussed      Geocelso Friday, NP

## 2022-10-05 RX ORDER — HYDROCHLOROTHIAZIDE 12.5 MG/1
CAPSULE ORAL
Qty: 90 CAPSULE | Refills: 1 | Status: SHIPPED | OUTPATIENT
Start: 2022-10-05

## 2022-10-17 RX ORDER — LINAGLIPTIN AND METFORMIN HYDROCHLORIDE 2.5; 1 MG/1; MG/1
1 TABLET, FILM COATED ORAL 2 TIMES DAILY WITH MEALS
Qty: 180 TABLET | Refills: 0 | Status: CANCELLED | OUTPATIENT
Start: 2022-10-17

## 2022-10-17 NOTE — TELEPHONE ENCOUNTER
Pt left a voice message requesting a refill.      [phone]    Last visit 09/21/2022 DANNY Hernandez   Next appointment 03/22/2023 DANNY Hernandez   Previous refill encounter(s)   07/05/2022 Jentadueto #180     For Pharmacy Admin Tracking Only    Intervention Detail: New Rx: 1, reason: Patient Preference  Time Spent (min): 5      Requested Prescriptions     Pending Prescriptions Disp Refills    Jentadueto 2.5-1,000 mg per tablet [Pharmacy Med Name: G[facility] [place] 2.5 MG-1000 MG TAB] 180 Tablet 0     Sig: TAKE 1 TABLET BY MOUTH TWICE A DAY WITH MEALS

## 2022-10-18 RX ORDER — LINAGLIPTIN AND METFORMIN HYDROCHLORIDE 2.5; 1 MG/1; MG/1
TABLET, FILM COATED ORAL
Qty: 180 TABLET | Refills: 0 | Status: SHIPPED | OUTPATIENT
Start: 2022-10-18 | End: 2022-10-19 | Stop reason: SDUPTHER

## 2022-10-18 NOTE — TELEPHONE ENCOUNTER
Writer sent pt a message via [facility]    ----------------------------------  Duplicate request: Jennifer Milo 2.5-1,000 mg #180 was sent to [address] [place] [address] on 10/18/2022. For Pharmacy Admin Tracking Only    Intervention Detail: Discontinued Rx: 1, reason: Duplicate Therapy  Time Spent (min): 5      Requested Prescriptions     Pending Prescriptions Disp Refills    linagliptin-metFORMIN (Jentadueto) 2.5-1,000 mg per tablet 180 Tablet 0     Sig: Take 1 Tablet by mouth two (2) times daily (with meals).

## 2022-10-19 RX ORDER — LINAGLIPTIN AND METFORMIN HYDROCHLORIDE 2.5; 1 MG/1; MG/1
1 TABLET, FILM COATED ORAL 2 TIMES DAILY WITH MEALS
Qty: 180 TABLET | Refills: 0 | Status: SHIPPED | OUTPATIENT
Start: 2022-10-19

## 2022-10-19 NOTE — TELEPHONE ENCOUNTER
Pt need refill on medication pinned and sent to [facility] on [address] it is the one in the chart.  Pt says he is totally out since saturday

## 2022-10-20 ENCOUNTER — TELEPHONE (OUTPATIENT)
Dept: ENDOCRINOLOGY | Age: 57
End: 2022-10-20

## 2022-10-20 ENCOUNTER — PATIENT MESSAGE (OUTPATIENT)
Dept: INTERNAL MEDICINE CLINIC | Age: 57
End: 2022-10-20

## 2022-10-20 RX ORDER — METFORMIN HYDROCHLORIDE 1000 MG/1
1000 TABLET ORAL 2 TIMES DAILY WITH MEALS
Qty: 60 TABLET | Refills: 3 | Status: SHIPPED | OUTPATIENT
Start: 2022-10-20

## 2022-10-20 NOTE — TELEPHONE ENCOUNTER
10/20/2022    Pt called and left a vm at 1:26 pm stating he is calling regarding his medication. The medication is currently out of stock and the pharmacy will not have it back in stock until Edilberto 3, 2023. Pt is not feeling well due to being off the medication for 5 days. He would like Dr. Mercedes Oneill to give him a call or if he can get an alternative. Pt can be reached at [phone].     Thanks,   Edgar Juliors

## 2022-10-20 NOTE — TELEPHONE ENCOUNTER
Patient states that [facility] told him that [facility] 2.5/1000 mg is on backorder and not available until January. Wonders if we have samples of Tradjenta. Would like a Rx sent to [facility] in [place] for Metformin 1000 mg BID #180. OK to give samples of Tradjenta 5mg to take 1/2 tab twice a day?

## 2022-11-02 ENCOUNTER — TELEPHONE (OUTPATIENT)
Dept: ENDOCRINOLOGY | Age: 57
End: 2022-11-02

## 2022-11-02 NOTE — TELEPHONE ENCOUNTER
11/2/2022  9:43 AM      Pt called and wanted to know did he need blood work before his appointment. #[phone]      Thanks,  Juliana Henao

## 2022-11-03 NOTE — TELEPHONE ENCOUNTER
FYI: Advised patient that PCP ran labs in June. Will only need an A1C, which can be obtained in office. Sees kidney doctor. Patient expressed understanding.

## 2022-11-14 RX ORDER — OLMESARTAN MEDOXOMIL 20 MG/1
TABLET ORAL
Qty: 90 TABLET | Refills: 0 | Status: SHIPPED | OUTPATIENT
Start: 2022-11-14

## 2022-11-25 ENCOUNTER — DOCUMENTATION ONLY (OUTPATIENT)
Dept: ENDOCRINOLOGY | Age: 57
End: 2022-11-25

## 2022-11-25 ENCOUNTER — OFFICE VISIT (OUTPATIENT)
Dept: ENDOCRINOLOGY | Age: 57
End: 2022-11-25
Payer: COMMERCIAL

## 2022-11-25 VITALS
SYSTOLIC BLOOD PRESSURE: 103 MMHG | BODY MASS INDEX: 27.76 KG/M2 | WEIGHT: 187.4 LBS | HEIGHT: 69 IN | DIASTOLIC BLOOD PRESSURE: 67 MMHG | HEART RATE: 87 BPM

## 2022-11-25 DIAGNOSIS — E11.9 TYPE 2 DIABETES MELLITUS WITHOUT COMPLICATION, WITHOUT LONG-TERM CURRENT USE OF INSULIN (HCC): Primary | ICD-10-CM

## 2022-11-25 DIAGNOSIS — E78.2 MIXED HYPERLIPIDEMIA: ICD-10-CM

## 2022-11-25 DIAGNOSIS — I10 ESSENTIAL HYPERTENSION: ICD-10-CM

## 2022-11-25 LAB — HBA1C MFR BLD HPLC: 6.3 %

## 2022-11-25 PROCEDURE — 83036 HEMOGLOBIN GLYCOSYLATED A1C: CPT | Performed by: INTERNAL MEDICINE

## 2022-11-25 PROCEDURE — 3044F HG A1C LEVEL LT 7.0%: CPT | Performed by: INTERNAL MEDICINE

## 2022-11-25 PROCEDURE — 99214 OFFICE O/P EST MOD 30 MIN: CPT | Performed by: INTERNAL MEDICINE

## 2022-11-25 PROCEDURE — 3078F DIAST BP <80 MM HG: CPT | Performed by: INTERNAL MEDICINE

## 2022-11-25 PROCEDURE — 3074F SYST BP LT 130 MM HG: CPT | Performed by: INTERNAL MEDICINE

## 2022-11-25 RX ORDER — OMEPRAZOLE 40 MG/1
40 CAPSULE, DELAYED RELEASE ORAL
COMMUNITY
Start: 2022-11-12

## 2022-11-25 RX ORDER — BISMUTH SUBSALICYLATE 262 MG
1 TABLET,CHEWABLE ORAL DAILY
COMMUNITY

## 2022-11-25 NOTE — PROGRESS NOTES
No chief complaint on file. Records since last visit reviewed. History of Present Illness: Kiersten Carrington is a 65 y.o. male here for follow up of diabetes. He was diagnosed with diabetes 2015. At our last visit in July 2022 his A1c was down to 6.7%, he had los weight down to 195 pounds, he has been working with a nutritionist as well. Pt encouraged to keep up the good with the dietary and lifestyle changes. For now will continue the Ozempic 1.0mg weekly and Jentadueto 2.5/1000mg BID. \"I had my meeting with the kidney specialist and he said I was in stage III kidney disease. He recommended I start medication that will protect my kidneys and help pee out more sugar. I have been doing research into a plant based diet to help slow down the progression of my kidney disease. My  has been helping me look into this diet. For the last 3 months I have been doing intermittent fasting I only eat from 11AM-7PM. I am back in the office, which has been more stressful. \"    Pt notes he is still walking and \"hunting season has started and I am walking a lot more for that. \"    Pt denies any recent illnesses, injuries or hospitalizations. His A1C today was 6.3%. His weight today was down to 187 pounds. Pt is still taking Ozempic 1.0mg weekly and Jentadueto 2.5/1000mg BID. Pt is testing his BGs intermitently and he notes his BGs have been in the 100s. Pt has received all three COVID vaccinations ([employer]). He is working on Intermittent Fasting and a plant-based diet. No history of vascular disease. Pt has been diagnosed with neuropathy and has CKD III and is followed by Dr. Nanetet Diaz. Last eye exam was October 2022, no retinopathy. Current Outpatient Medications   Medication Sig    multivitamin (ONE A DAY) tablet Take 1 Tablet by mouth daily.     olmesartan (BENICAR) 20 mg tablet Take 1 tablet by mouth once daily    Ozempic 1 mg/dose (4 mg/3 mL) pnij INJECT 1 MG SUBCUTANEOUSLY EVERY 7 DAYS    linagliptin-metFORMIN (Jentadueto) 2.5-1,000 mg per tablet Take 1 Tablet by mouth two (2) times daily (with meals). hydroCHLOROthiazide (MICROZIDE) 12.5 mg capsule TAKE 1 CAPSULE BY MOUTH EVERY DAY    traZODone (DESYREL) 50 mg tablet Take 1 tablet by mouth nightly    albuterol (PROVENTIL HFA, VENTOLIN HFA, PROAIR HFA) 90 mcg/actuation inhaler INHALE 2 PUFFS BY MOUTH EVERY 4 HOURS AS NEEDED FOR WHEEZING    amLODIPine (NORVASC) 5 mg tablet Take 5 mg by mouth daily. hydroCHLOROthiazide (HYDRODIURIL) 12.5 mg tablet Take 12.5 mg by mouth daily. rosuvastatin (CRESTOR) 10 mg tablet TAKE 1 TABLET BY MOUTH EVERY DAY    glucose blood VI test strips (OneTouch Verio test strips) strip Check blood sugar 2 times per day. DX: E11.9    OneTouch Verio Reflect Meter misc TWICE DAILY    sildenafil citrate (VIAGRA) 25 mg tablet TAKE 1-4 TABLETS BY MOUTH 30 MINUTES PRIOR TO ROMANCE. DO NOT REPEAT IN 24 HOURS    Insulin Needles, Disposable, (NOVOFINE 32) 32 gauge x 1/4\" ndle USE FOR ONE SHOT DAILY    coenzyme Q-10 (CO Q-10) 200 mg capsule Take 1 Cap by mouth daily. cinnamon bark 500 mg cap Take 500 mg by mouth two (2) times a day. omeprazole (PRILOSEC) 40 mg capsule Take 40 mg by mouth nightly. No current facility-administered medications for this visit. Allergies   Allergen Reactions    Latex Itching    Bee Sting [Sting, Bee] Hives     Review of Systems:  - Eyes: no blurry vision or double vision  - Cardiovascular: no chest pain  - Respiratory: no SOB, his dry cough has improved   - Musculoskeletal: chronic knee pain, chronic left shoulder stiffness  - Neurological: no numbness/tingling in extremities    Physical Examination:  Blood pressure 103/67, pulse 87, height 5' 9\" (1.753 m), weight 187 lb 6.4 oz (85 kg).    General: pleasant, no distress, good eye contact   Neck: no carotid bruits  Cardiovascular: regular, normal rate, nl s1 and s2, no m/r/g, 2+ DP pulses   Respiratory: clear bilaterally  Integumentary: no edema, no foot ulcers  Psychiatric: normal mood and affect    Diabetic foot exam:     Left Foot:   Visual Exam: normal    Pulse DP: 2+ (normal)   Filament test: normal sensation    Vibratory sensation: normal      Right Foot:   Visual Exam: normal    Pulse DP: 2+ (normal)   Filament test: normal sensation    Vibratory sensation: normal        Data Reviewed:   His A1C today was 6.3%. Assessment/Plan:   1) DM > His A1C today was 6.3%. We discussed the SGLT-2 agents and I recommend we start Farxiga 10mg daily. Pt to continue the Ozempic 1.0mg weekly and cut his Jentadueto 2.5/1000mg pill in half and take 1/2 a pill BID for now with the goal of weaning of the Jorden Sprawls as able. Pt to check her BGs twice per day. Pt to check a various times of day (fasting, before dinner and HS) so we can see if he has had spikes in his BG that have not previously been seen. 2) HTN > His BP today was 103/67. Will not make any changes to his BP regimen at this time. 3) HLD > Pt's lipid panel was at goal in June 2022, pt is on Rosuvastatin 10mg daily, which he is tolerating well. This is a high intensity statin regimen. Pt voices understanding and agreement with the plan.       RTC 4 months        Copy sent to:  Dr. Angi Rosas

## 2023-01-05 ENCOUNTER — OFFICE VISIT (OUTPATIENT)
Dept: INTERNAL MEDICINE CLINIC | Age: 58
End: 2023-01-05
Payer: COMMERCIAL

## 2023-01-05 VITALS
BODY MASS INDEX: 26.96 KG/M2 | DIASTOLIC BLOOD PRESSURE: 72 MMHG | TEMPERATURE: 98.1 F | SYSTOLIC BLOOD PRESSURE: 112 MMHG | RESPIRATION RATE: 17 BRPM | HEIGHT: 69 IN | WEIGHT: 182 LBS | HEART RATE: 78 BPM | OXYGEN SATURATION: 98 %

## 2023-01-05 DIAGNOSIS — Z87.19 HISTORY OF HEMORRHOIDS: ICD-10-CM

## 2023-01-05 DIAGNOSIS — E11.69 CONTROLLED TYPE 2 DIABETES MELLITUS WITH OTHER SPECIFIED COMPLICATION, WITHOUT LONG-TERM CURRENT USE OF INSULIN (HCC): ICD-10-CM

## 2023-01-05 DIAGNOSIS — K64.4 EXTERNAL HEMORRHOID: ICD-10-CM

## 2023-01-05 DIAGNOSIS — K62.89 ANAL PAIN: Primary | ICD-10-CM

## 2023-01-05 PROCEDURE — 3074F SYST BP LT 130 MM HG: CPT | Performed by: INTERNAL MEDICINE

## 2023-01-05 PROCEDURE — 99214 OFFICE O/P EST MOD 30 MIN: CPT | Performed by: INTERNAL MEDICINE

## 2023-01-05 PROCEDURE — 3078F DIAST BP <80 MM HG: CPT | Performed by: INTERNAL MEDICINE

## 2023-01-05 RX ORDER — HYDROCORTISONE ACETATE 25 MG/1
25 SUPPOSITORY RECTAL
Qty: 10 EACH | Refills: 1 | Status: SHIPPED | OUTPATIENT
Start: 2023-01-05

## 2023-01-05 NOTE — PROGRESS NOTES
Identified pt with two pt identifiers(name and ). Reviewed record in preparation for visit and have obtained necessary documentation. All patient medications has been reviewed. Chief Complaint   Patient presents with    Anal Pain     Patient stated has anal pain onset 2022 , he stated he notices some bleeding       3 most recent PHQ Screens 2022   Little interest or pleasure in doing things Not at all   Feeling down, depressed, irritable, or hopeless Not at all   Total Score PHQ 2 0     Abuse Screening Questionnaire 2019   Do you ever feel afraid of your partner? N   Are you in a relationship with someone who physically or mentally threatens you? N   Is it safe for you to go home? Y       Health Maintenance Due   Topic    COVID-19 Vaccine (1)    Hepatitis B Vaccine (1 of 3 - Risk 3-dose series)    Eye Exam Retinal or Dilated     Diabetic Alb to Cr ratio (uACR) test      Health Maintenance Review: Patient reminded of \"due or due soon\" health maintenance. I have asked the patient to contact his/her primary care provider (PCP) for follow-up on his/her health maintenance. There were no vitals filed for this visit. Wt Readings from Last 3 Encounters:   22 187 lb 6.4 oz (85 kg)   22 195 lb 9.6 oz (88.7 kg)   22 196 lb 12.8 oz (89.3 kg)     Temp Readings from Last 3 Encounters:   22 98.5 °F (36.9 °C) (Oral)   22 98.6 °F (37 °C) (Oral)   04/15/22 98.7 °F (37.1 °C) (Oral)     BP Readings from Last 3 Encounters:   22 103/67   22 119/82   22 105/74     Pulse Readings from Last 3 Encounters:   22 87   22 89   22 97       1. \"Have you been to the ER, urgent care clinic since your last visit? Hospitalized since your last visit? \" No    2. \"Have you seen or consulted any other health care providers outside of the [address] [place] [address] since your last visit? \" No

## 2023-01-05 NOTE — PROGRESS NOTES
Liliam Garcia (: 1965) is a 65 y.o. male, established patient, here for evaluation of the following chief complaint(s):  Chief Complaint   Patient presents with    Anal Pain     Patient stated has anal pain onset 2022 , he stated he notices some bleeding       Assessment and Plan:   {Assessment and QHOH:40948}      Future Appointments   Date Time Provider Porsha Carcamo   3/22/2023  8:00 AM Keith FELICIANO NP [facility] AMB   3/24/2023  8:00 AM Lety Colin MD [facility] BS AMB     --Updated future visits after patient check-out. History of Present Illness:     Notes (nursing/rooming note copied below in italics):  As above    PCP:  Juanito Martínez NP    Here to evaluate above symptoms. He has no prior visits for hemorrhoids or rectal bleeding with review and chart search. He has had sores or boils or sores in that area, but they have resolved. Last 1-2yrs ago. He notes a firm area inside rectal area--feels when wiping. He didn't work yesterday--worked from home. He notes more discomfort with sitting in regular chair at work. He started soaks and Prep H with aloe on Tuesday, after working. Had some discomfort with work Tuesday. His brother has     Colonoscopy from 2020 with findings below:    He notes his brother had hemorrhoids and had surgery for this in past.    He has had some harder stools, straining. Nursing screenings reviewed by provider at visit. {Choose one or more SmartLinks; press DELETE if none desired:[id number]}     Prior to Admission medications    Medication Sig Start Date End Date Taking? Authorizing Provider   multivitamin (ONE A DAY) tablet Take 1 Tablet by mouth daily. Yes Provider, Historical   dapagliflozin (Farxiga) 10 mg tab tablet Take 1 Tablet by mouth daily.  22  Yes Lety Colin MD   olmesartan (BENICAR) 20 mg tablet Take 1 tablet by mouth once daily 22  Yes Sina Ramos NP   Ozempic 1 mg/dose (4 mg/3 mL) pnij INJECT 1 MG SUBCUTANEOUSLY EVERY 7 DAYS 11/8/22  Yes Brigette Luke MD   linagliptin-metFORMIN (Jentadueto) 2.5-1,000 mg per tablet Take 1 Tablet by mouth two (2) times daily (with meals). 10/19/22  Yes Trenton Bae NP   hydroCHLOROthiazide (MICROZIDE) 12.5 mg capsule TAKE 1 CAPSULE BY MOUTH EVERY DAY 10/5/22  Yes Trenton Bae NP   traZODone (DESYREL) 50 mg tablet Take 1 tablet by mouth nightly 8/14/22  Yes Trenton Bae NP   albuterol (PROVENTIL HFA, VENTOLIN HFA, PROAIR HFA) 90 mcg/actuation inhaler INHALE 2 PUFFS BY MOUTH EVERY 4 HOURS AS NEEDED FOR WHEEZING 8/2/22  Yes Aline Ramos NP   amLODIPine (NORVASC) 5 mg tablet Take 5 mg by mouth daily. Yes Provider, Historical   hydroCHLOROthiazide (HYDRODIURIL) 12.5 mg tablet Take 12.5 mg by mouth daily. Yes Provider, Historical   rosuvastatin (CRESTOR) 10 mg tablet TAKE 1 TABLET BY MOUTH EVERY DAY 5/25/22  Yes Trenton Bae NP   glucose blood VI test strips (OneTouch Verio test strips) strip Check blood sugar 2 times per day. DX: E11.9 3/7/22  Yes Brigette Luke MD   OneTouch Verio Reflect Meter misc TWICE DAILY 3/3/22  Yes Brigette Luke MD   sildenafil citrate (VIAGRA) 25 mg tablet TAKE 1-4 TABLETS BY MOUTH 30 MINUTES PRIOR TO ROMANCE. DO NOT REPEAT IN 24 HOURS 9/29/21  Yes Brigette Luke MD   Insulin Needles, Disposable, (NOVOFINE 32) 32 gauge x 1/4\" ndle USE FOR ONE SHOT DAILY 4/15/19  Yes Brigette Luke MD   coenzyme Q-10 (CO Q-10) 200 mg capsule Take 1 Cap by mouth daily. 9/8/16  Yes Ashely Padilla MD   cinnamon bark 500 mg cap Take 500 mg by mouth two (2) times a day. Yes Provider, Historical   omeprazole (PRILOSEC) 40 mg capsule Take 40 mg by mouth nightly.   Patient not taking: Reported on 1/5/2023 11/12/22   Provider, Historical        ROS    Vitals:    01/05/23 1240   BP: 112/72   Pulse: 78   Resp: 17   Temp: 98.1 °F (36.7 °C)   TempSrc: Oral   SpO2: 98%   Weight: 182 lb (82.6 kg)   Height: 5' 9\" (1.753 m)      Body mass index is 26.88 kg/m². Physical Exam:     Physical Exam  Normal basic. NO edema. External rectal exam no haperone. 3 oclock ~0.5cm hemorrhoid vs skin tag. Pain moderate with palpation. No bleeding noted. Mgt reviwed. An electronic signature was used to authenticate this note.   -- Shira Tim MD rub. No gallop. Pulmonary:      Effort: Pulmonary effort is normal. No respiratory distress. Breath sounds: Normal breath sounds. No stridor. No wheezing, rhonchi or rales. Abdominal:      General: Bowel sounds are normal. There is no distension. Palpations: Abdomen is soft. Tenderness: There is no abdominal tenderness. There is no guarding or rebound. Musculoskeletal:         General: No swelling, tenderness or deformity. Right lower leg: No edema. Left lower leg: No edema. Skin:     General: Skin is warm. Coloration: Skin is not jaundiced or pale. Findings: No bruising, erythema or rash. Neurological:      General: No focal deficit present. Mental Status: He is alert. Motor: No abnormal muscle tone. Coordination: Coordination normal.      Gait: Gait normal.   Psychiatric:         Mood and Affect: Mood normal.         Behavior: Behavior normal.         Thought Content: Thought content normal.         Judgment: Judgment normal.     External rectal exam--no chaperone (pt preference). At 3 oclock, external to anal verge, there is an ~0.5cm hemorrhoid vs skin tag. Pain moderate with palpation. No bleeding noted. Mgt reviewed. No internal rectal exam performed. An electronic signature was used to authenticate this note.   -- Henry Fernández MD

## 2023-02-13 RX ORDER — ROSUVASTATIN CALCIUM 10 MG/1
TABLET, COATED ORAL
Qty: 90 TABLET | Refills: 2 | Status: SHIPPED | OUTPATIENT
Start: 2023-02-13

## 2023-02-20 DIAGNOSIS — G47.00 INSOMNIA, UNSPECIFIED TYPE: ICD-10-CM

## 2023-02-20 RX ORDER — SEMAGLUTIDE 1.34 MG/ML
1 INJECTION, SOLUTION SUBCUTANEOUS
Qty: 9 EACH | Refills: 2 | Status: SHIPPED | OUTPATIENT
Start: 2023-02-20

## 2023-02-20 RX ORDER — TRAZODONE HYDROCHLORIDE 50 MG/1
TABLET ORAL
Qty: 90 TABLET | Refills: 0 | Status: SHIPPED | OUTPATIENT
Start: 2023-02-20

## 2023-02-20 RX ORDER — OLMESARTAN MEDOXOMIL 20 MG/1
TABLET ORAL
Qty: 90 TABLET | Refills: 0 | Status: SHIPPED | OUTPATIENT
Start: 2023-02-20

## 2023-03-01 DIAGNOSIS — E11.9 TYPE 2 DIABETES MELLITUS WITHOUT COMPLICATION, WITHOUT LONG-TERM CURRENT USE OF INSULIN (HCC): ICD-10-CM

## 2023-03-01 DIAGNOSIS — E78.2 MIXED HYPERLIPIDEMIA: ICD-10-CM

## 2023-03-01 DIAGNOSIS — I10 ESSENTIAL HYPERTENSION: ICD-10-CM

## 2023-03-21 LAB
ALBUMIN SERPL-MCNC: 5 G/DL (ref 3.8–4.9)
ALBUMIN/CREAT UR: <3 MG/G CREAT (ref 0–29)
ALBUMIN/GLOB SERPL: 1.9 {RATIO} (ref 1.2–2.2)
ALP SERPL-CCNC: 63 IU/L (ref 44–121)
ALT SERPL-CCNC: 16 IU/L (ref 0–44)
AST SERPL-CCNC: 20 IU/L (ref 0–40)
BILIRUB SERPL-MCNC: 0.3 MG/DL (ref 0–1.2)
BUN SERPL-MCNC: 31 MG/DL (ref 6–24)
BUN/CREAT SERPL: 11 (ref 9–20)
CALCIUM SERPL-MCNC: 10 MG/DL (ref 8.7–10.2)
CHLORIDE SERPL-SCNC: 99 MMOL/L (ref 96–106)
CHOLEST SERPL-MCNC: 181 MG/DL (ref 100–199)
CO2 SERPL-SCNC: 24 MMOL/L (ref 20–29)
CREAT SERPL-MCNC: 2.74 MG/DL (ref 0.76–1.27)
CREAT UR-MCNC: 93.8 MG/DL
EGFRCR SERPLBLD CKD-EPI 2021: 26 ML/MIN/1.73
EST. AVERAGE GLUCOSE BLD GHB EST-MCNC: 131 MG/DL
GLOBULIN SER CALC-MCNC: 2.7 G/DL (ref 1.5–4.5)
GLUCOSE SERPL-MCNC: 99 MG/DL (ref 70–99)
HBA1C MFR BLD: 6.2 % (ref 4.8–5.6)
HDLC SERPL-MCNC: 37 MG/DL
IMP & REVIEW OF LAB RESULTS: NORMAL
LDLC SERPL CALC-MCNC: 97 MG/DL (ref 0–99)
MICROALBUMIN UR-MCNC: <3 UG/ML
POTASSIUM SERPL-SCNC: 4.5 MMOL/L (ref 3.5–5.2)
PROT SERPL-MCNC: 7.7 G/DL (ref 6–8.5)
REPORT: NORMAL
SODIUM SERPL-SCNC: 142 MMOL/L (ref 134–144)
TRIGL SERPL-MCNC: 279 MG/DL (ref 0–149)
VLDLC SERPL CALC-MCNC: 47 MG/DL (ref 5–40)

## 2023-03-22 ENCOUNTER — OFFICE VISIT (OUTPATIENT)
Dept: INTERNAL MEDICINE CLINIC | Age: 58
End: 2023-03-22
Payer: COMMERCIAL

## 2023-03-22 VITALS
RESPIRATION RATE: 18 BRPM | DIASTOLIC BLOOD PRESSURE: 80 MMHG | SYSTOLIC BLOOD PRESSURE: 114 MMHG | HEART RATE: 86 BPM | HEIGHT: 69 IN | WEIGHT: 186 LBS | BODY MASS INDEX: 27.55 KG/M2 | OXYGEN SATURATION: 98 % | TEMPERATURE: 98 F

## 2023-03-22 DIAGNOSIS — F41.9 ANXIETY: ICD-10-CM

## 2023-03-22 DIAGNOSIS — Z91.09 ENVIRONMENTAL ALLERGIES: ICD-10-CM

## 2023-03-22 DIAGNOSIS — R41.3 MEMORY IMPAIRMENT: ICD-10-CM

## 2023-03-22 DIAGNOSIS — N18.32 STAGE 3B CHRONIC KIDNEY DISEASE (HCC): ICD-10-CM

## 2023-03-22 DIAGNOSIS — K64.4 EXTERNAL HEMORRHOID: ICD-10-CM

## 2023-03-22 DIAGNOSIS — G47.00 INSOMNIA, UNSPECIFIED TYPE: ICD-10-CM

## 2023-03-22 DIAGNOSIS — N18.31 STAGE 3A CHRONIC KIDNEY DISEASE (HCC): ICD-10-CM

## 2023-03-22 DIAGNOSIS — E11.69 CONTROLLED TYPE 2 DIABETES MELLITUS WITH OTHER SPECIFIED COMPLICATION, WITHOUT LONG-TERM CURRENT USE OF INSULIN (HCC): ICD-10-CM

## 2023-03-22 DIAGNOSIS — N18.9 CHRONIC KIDNEY DISEASE, UNSPECIFIED CKD STAGE: ICD-10-CM

## 2023-03-22 DIAGNOSIS — I10 ESSENTIAL HYPERTENSION WITH GOAL BLOOD PRESSURE LESS THAN 130/80: Primary | ICD-10-CM

## 2023-03-22 DIAGNOSIS — E78.2 MIXED HYPERLIPIDEMIA: ICD-10-CM

## 2023-03-22 PROCEDURE — 3079F DIAST BP 80-89 MM HG: CPT | Performed by: NURSE PRACTITIONER

## 2023-03-22 PROCEDURE — 99214 OFFICE O/P EST MOD 30 MIN: CPT | Performed by: NURSE PRACTITIONER

## 2023-03-22 PROCEDURE — 3074F SYST BP LT 130 MM HG: CPT | Performed by: NURSE PRACTITIONER

## 2023-03-22 PROCEDURE — 3044F HG A1C LEVEL LT 7.0%: CPT | Performed by: NURSE PRACTITIONER

## 2023-03-22 NOTE — PROGRESS NOTES
Subjective  Bernadette Dunne is a 65 y.o. male. HPI    Chief Complaint   Patient presents with    Hypertension    Cholesterol Problem    Diabetes        Fasting labs ordered by Endocrinologist  Appointment next week     Three weeks ago Nephrologist discontinue Olmesartan d/t dizziness and hypotension  Symptoms started 2 weeks after he started [place]   Dizziness resolved since medication adjusted     Allergies bothering earlier than usual this year   Restarted Zyrtec daily  He also use Flonase     Started back in gym     Saw Dr Mary Austin psychologist; testing pending for dyslexia   He is struggling on job. This is causing a lot of stress and anxiety    He saw specialist for  hemorrhoids. Medication prescribed shrunk hemorrhoids. He no longer needs surgery      Past Medical History:   Diagnosis Date    Arthritis     Borderline diabetic     Diabetes ([address]     Family history of premature CAD     Hypertension         Past Surgical History:   Procedure Laterality Date    HX COLONOSCOPY  09/11/2020    Dr Jackie Reza, needs 5 year follow up, hemorrhoids and poly    HX ORTHOPAEDIC      right knee        Allergies   Allergen Reactions    Latex Itching    Bee Sting [Sting, Bee] Hives        Past Surgical History:   Procedure Laterality Date    HX COLONOSCOPY  09/11/2020    Dr Jackie Reza, needs 5 year follow up, hemorrhoids and poly    HX ORTHOPAEDIC      right knee        Review of Systems   Constitutional: Negative. HENT: Negative. Eyes:         Wears glasses    Respiratory: Negative. Cardiovascular: Negative. Gastrointestinal: Negative. Genitourinary: Negative. Musculoskeletal: Negative. Skin: Negative. Neurological: Negative. Negative for dizziness and headaches. Psychiatric/Behavioral:  Negative for depression. The patient is nervous/anxious.       Objective  Visit Vitals  /80 (BP 1 Location: Left upper arm, BP Patient Position: Sitting, BP Cuff Size: Adult)   Pulse 86   Temp 98 °F (36.7 °C) (Oral)   Resp 18   Ht 5' 9\" (1.753 m)   Wt 186 lb (84.4 kg)   SpO2 98%   BMI 27.47 kg/m²      Physical Exam  Constitutional:       General: He is not in acute distress. Appearance: Normal appearance. He is not ill-appearing. Neck:      Vascular: No carotid bruit. Cardiovascular:      Rate and Rhythm: Normal rate and regular rhythm. Pulses: Normal pulses. Heart sounds: Normal heart sounds. Pulmonary:      Effort: Pulmonary effort is normal.      Breath sounds: Examination of the left-upper field reveals wheezing. Examination of the left-middle field reveals wheezing. Examination of the left-lower field reveals wheezing. Wheezing present. No rhonchi or rales. Musculoskeletal:      Right lower leg: No edema. Left lower leg: No edema. Lymphadenopathy:      Cervical: No cervical adenopathy. Skin:     General: Skin is warm and dry. Findings: No erythema or rash. Neurological:      Mental Status: He is alert. Mental status is at baseline. Cranial Nerves: No cranial nerve deficit. Gait: Gait normal.   Psychiatric:         Mood and Affect: Mood normal.         Behavior: Behavior normal.         Thought Content:  Thought content normal.          Lab Results   Component Value Date/Time    Cholesterol, total 181 03/20/2023 04:07 PM    HDL Cholesterol 37 (L) 03/20/2023 04:07 PM    LDL, calculated 97 03/20/2023 04:07 PM    LDL, calculated 75.4 06/21/2022 09:14 AM    Triglyceride 279 (H) 03/20/2023 04:07 PM    CHOL/HDL Ratio 4.3 06/21/2022 09:14 AM     Lab Results   Component Value Date/Time    Sodium 142 03/20/2023 04:07 PM    Potassium 4.5 03/20/2023 04:07 PM    Chloride 99 03/20/2023 04:07 PM    CO2 24 03/20/2023 04:07 PM    Anion gap 6 06/21/2022 09:14 AM    Glucose 99 03/20/2023 04:07 PM    BUN 31 (H) 03/20/2023 04:07 PM    Creatinine 2.74 (H) 03/20/2023 04:07 PM    BUN/Creatinine ratio 11 03/20/2023 04:07 PM    GFR est AA 37 (L) 06/21/2022 09:14 AM    GFR est non-AA 30 (L) 06/21/2022 09:14 AM    Calcium 10.0 03/20/2023 04:07 PM    Bilirubin, total 0.3 03/20/2023 04:07 PM    ALT (SGPT) 16 03/20/2023 04:07 PM    Alk. phosphatase 63 03/20/2023 04:07 PM    Protein, total 7.7 03/20/2023 04:07 PM    Albumin 5.0 (H) 03/20/2023 04:07 PM    Globulin 3.4 06/21/2022 09:14 AM    A-G Ratio 1.9 03/20/2023 04:07 PM      Lab Results   Component Value Date/Time    Hemoglobin A1c 6.2 (H) 03/20/2023 04:07 PM    Hemoglobin A1c (POC) 6.3 11/25/2022 08:40 AM    Hemoglobin A1c, External 8.2 02/06/2022 12:00 AM       Assessment & Plan    ICD-10-CM ICD-9-CM    1. Essential hypertension with goal blood pressure less than 130/80  I10 401.9       2. Memory impairment  R41.3 780.93 REFERRAL TO PSYCHOLOGY      3. Stage 3b chronic kidney disease (HCC)  N18.32 585.3       4. Insomnia, unspecified type  G47.00 780.52       5. External hemorrhoid  K64.4 455.3       6. Controlled type 2 diabetes mellitus with other specified complication, without long-term current use of insulin (HCC)  E11.69 250.80       7. Stage 3a chronic kidney disease (HCC)  N18.31 585.3       8. Mixed hyperlipidemia  E78.2 272.2       9. Chronic kidney disease, unspecified CKD stage  N18.9 585.9       10. Environmental allergies  Z91.09 V15.09       11. Anxiety  F41.9 300.00         Follow-up and Dispositions    Return in about 6 months (around 9/22/2023), or if symptoms worsen or fail to improve, for htn.        Normotensive off Olmesartan  Patient expressed plans to restart Albuterol inhaler for seasonal symptoms   current treatment plan is effective, no change in therapy  lab results and schedule of future lab studies reviewed with patient  reviewed diet, exercise and weight control  reviewed medications and side effects in detail  specific diabetic recommendations: low cholesterol diet, weight control and daily exercise discussed, home glucose monitoring emphasized, all medications, side effects and compliance discussed carefully, annual eye examinations at Ophthalmology discussed, and glycohemoglobin and other lab monitoring discussed    Patient voices understanding and acceptance of this advice and will call back if any further questions or concerns.    Gayla Benjamin NP

## 2023-03-22 NOTE — PROGRESS NOTES
Room: 7  Identified pt with two pt identifiers(name and ). Reviewed record in preparation for visit and have obtained necessary documentation. Chief Complaint   Patient presents with    Hypertension    Cholesterol Problem    Diabetes        Vitals:    23 0804 23 0807   BP: (!) 142/88 133/87   Pulse: 97 86   Resp: 18    Temp: 98 °F (36.7 °C)    TempSrc: Oral    SpO2: 98%    Weight: 186 lb (84.4 kg)    Height: 5' 9\" (1.753 m)    PainSc:   0 - No pain        Health Maintenance Due   Topic    COVID-19 Vaccine (1)    Hepatitis B Vaccine (1 of 3 - Risk 3-dose series)    Eye Exam Retinal or Dilated        1. \"Have you been to the ER, urgent care clinic since your last visit? Hospitalized since your last visit? \" No    2. \"Have you seen or consulted any other health care providers outside of the [address] [place] [address] since your last visit? \" No     3. For patients over 45: Has the patient had a colonoscopy? Yes - no Care Gap present     If the patient is female:    4. For patients over 36: Has the patient had a mammogram? NA - based on age    11. For patients over 21: Has the patient had a pap smear? NA - based on age    Current Outpatient Medications   Medication Instructions    albuterol (PROVENTIL HFA, VENTOLIN HFA, PROAIR HFA) 90 mcg/actuation inhaler INHALE 2 PUFFS BY MOUTH EVERY 4 HOURS AS NEEDED FOR WHEEZING    amLODIPine (NORVASC) 5 mg, Oral, DAILY    cinnamon bark 500 mg, Oral, 2 TIMES DAILY    coenzyme Q-10 (CO Q-10) 200 mg, Oral, DAILY    dapagliflozin (FARXIGA) 10 mg, Oral, DAILY    glucose blood VI test strips (OneTouch Verio test strips) strip Check blood sugar 2 times per day.  DX: E11.9    hydroCHLOROthiazide (HYDRODIURIL) 12.5 mg, Oral, DAILY    hydroCHLOROthiazide (MICROZIDE) 12.5 mg capsule TAKE 1 CAPSULE BY MOUTH EVERY DAY    hydrocortisone (ANUSOL-HC) 25 mg, Rectal, 2 TIMES DAILY AS NEEDED    hydrocortisone-pramoxine (PROCTOFOAM HC) rectal foam 1 Applicator, Rectal, 3 TIMES DAILY AS NEEDED    Insulin Needles, Disposable, (NOVOFINE 32) 32 gauge x 1/4\" ndle USE FOR ONE SHOT DAILY    linagliptin-metFORMIN (Jentadueto) 2.5-1,000 mg per tablet 1 Tablet, Oral, 2 TIMES DAILY WITH MEALS    multivitamin (ONE A DAY) tablet 1 Tablet, Oral, DAILY    olmesartan (BENICAR) 20 mg tablet Take 1 tablet by mouth once daily    omeprazole (PRILOSEC) 40 mg, Oral, EVERY BEDTIME    OneTouch Verio Reflect Meter misc TWICE DAILY    Ozempic 1 mg, SubCUTAneous, EVERY 7 DAYS    rosuvastatin (CRESTOR) 10 mg tablet TAKE 1 TABLET BY MOUTH EVERY DAY    sildenafil citrate (VIAGRA) 25 mg tablet TAKE 1-4 TABLETS BY MOUTH 30 MINUTES PRIOR TO ROMANCE. DO NOT REPEAT IN 24 HOURS    traZODone (DESYREL) 50 mg tablet Take 1 tablet by mouth nightly       Allergies   Allergen Reactions    Latex Itching    Bee Sting [Sting, Bee] Hives       Immunization History   Administered Date(s) Administered    Influenza Vaccine 10/01/2019    Influenza, FLUARIX, FLULAVAL, FLUZONE (age 10 mo+) AND AFLURIA, (age 1 y+), PF, 0.5mL 09/16/2020, 11/23/2021, 09/21/2022    Influenza, FLUCELVAX, (age 10 mo+), MDCK, PF 11/23/2018    Pneumococcal Conjugate PCV20, PF (Prevnar 20) 06/21/2022    Pneumococcal Polysaccharide (PPSV-23) 08/05/2016    Tdap 04/26/2019    Zoster Recombinant 06/07/2018, 06/28/2019, 11/26/2019       Past Medical History:   Diagnosis Date    Arthritis     Borderline diabetic     Diabetes ([facility] [address]     Family history of premature CAD     Hypertension

## 2023-03-24 ENCOUNTER — OFFICE VISIT (OUTPATIENT)
Dept: ENDOCRINOLOGY | Age: 58
End: 2023-03-24

## 2023-03-24 VITALS
HEIGHT: 69 IN | DIASTOLIC BLOOD PRESSURE: 71 MMHG | SYSTOLIC BLOOD PRESSURE: 118 MMHG | HEART RATE: 82 BPM | BODY MASS INDEX: 28.32 KG/M2 | WEIGHT: 191.2 LBS

## 2023-03-24 DIAGNOSIS — I10 ESSENTIAL HYPERTENSION: ICD-10-CM

## 2023-03-24 DIAGNOSIS — N52.8 OTHER MALE ERECTILE DYSFUNCTION: ICD-10-CM

## 2023-03-24 DIAGNOSIS — E11.22 TYPE 2 DIABETES MELLITUS WITH STAGE 3B CHRONIC KIDNEY DISEASE, WITHOUT LONG-TERM CURRENT USE OF INSULIN (HCC): Primary | ICD-10-CM

## 2023-03-24 DIAGNOSIS — N18.32 TYPE 2 DIABETES MELLITUS WITH STAGE 3B CHRONIC KIDNEY DISEASE, WITHOUT LONG-TERM CURRENT USE OF INSULIN (HCC): Primary | ICD-10-CM

## 2023-03-24 DIAGNOSIS — E78.2 MIXED HYPERLIPIDEMIA: ICD-10-CM

## 2023-03-24 NOTE — PROGRESS NOTES
Chief Complaint   Patient presents with    Diabetes     Pcp and pharmacy verified     Records since last visit reviewed. History of Present Illness: Jean Marie Rosenthal is a 65 y.o. male here for follow up of diabetes. He was diagnosed with diabetes 2015. At our last visit in November 2022 his A1c was down to 6. 3%His weight was down to 187 pounds. To help with his CKD we started him on Farxiga 10mg daily and cut his Jentadueto in half to 1/2 pill BID with the goal of weaning off this all together. Pt was encouraged to continue the Ozempic 1.0mg weekly. \"I saw the kidney specialist (Dr. Maria Elena Boyd) 2 weeks ago. My BP was a little too low and he stopped the Amlodipine. His BP today was 118/71. \"I am seeing Dr. Ryan Sher and he will be doing some testing on me next month. I have been having issues most of my life with reversing words and reading things backwards. \"    Pt is still taking the Ozempic 1.0mg weekly (I was out for about a month, but I have been back on it for 2-3 weeks), Farxiga 10mg daily and Jentadueto 2.5/1000mg 1/2 pill BID. He will test his BGs twice per week. His BGs have been running in the 90-110s range. His A1C last week was down to 6.2%. His weight today was down to 191 pounds. He is working on Intermittent Fasting and a plant-based diet. \"I plan to go back to the Gym to work out. \"    Pt denies any recent illnesses, injuries or hospitalizations. His A1C today was 6.3%. His weight today was down to 187 pounds. Pt is still taking Ozempic 1.0mg weekly and Jentadueto 2.5/1000mg BID. Pt is testing his BGs intermitently and he notes his BGs have been in the 100s. Pt is working 5PM-10PM, he works 2 jobs. His first meal of the day is 1130AM, he will have fruit or yogurt with granola \"if I eat at all\". He will have coffee (sugar)  He has lunch around 230-3PM, yesterday he had a PB sandwich, grapes, 2 oranges and water.   He will have a dinner around 7PM, last night he had chicken wings and water. He is not eating after dinner. No history of vascular disease. Pt has been diagnosed with neuropathy and has CKD III and is followed by Dr. Clint Primrose. Last eye exam was October 2022, no retinopathy. Current Outpatient Medications   Medication Sig    dapagliflozin (Farxiga) 10 mg tab tablet Take 1 Tablet by mouth daily. semaglutide (Ozempic) 1 mg/dose (4 mg/3 mL) pnij 1 mg by SubCUTAneous route every seven (7) days. traZODone (DESYREL) 50 mg tablet Take 1 tablet by mouth nightly    rosuvastatin (CRESTOR) 10 mg tablet TAKE 1 TABLET BY MOUTH EVERY DAY    multivitamin (ONE A DAY) tablet Take 1 Tablet by mouth daily. hydroCHLOROthiazide (MICROZIDE) 12.5 mg capsule TAKE 1 CAPSULE BY MOUTH EVERY DAY    albuterol (PROVENTIL HFA, VENTOLIN HFA, PROAIR HFA) 90 mcg/actuation inhaler INHALE 2 PUFFS BY MOUTH EVERY 4 HOURS AS NEEDED FOR WHEEZING    hydroCHLOROthiazide (HYDRODIURIL) 12.5 mg tablet Take 12.5 mg by mouth daily. glucose blood VI test strips (OneTouch Verio test strips) strip Check blood sugar 2 times per day. DX: E11.9    OneTouch Verio Reflect Meter misc TWICE DAILY    sildenafil citrate (VIAGRA) 25 mg tablet TAKE 1-4 TABLETS BY MOUTH 30 MINUTES PRIOR TO ROMANCE. DO NOT REPEAT IN 24 HOURS    Insulin Needles, Disposable, (NOVOFINE 32) 32 gauge x 1/4\" ndle USE FOR ONE SHOT DAILY    coenzyme Q-10 (CO Q-10) 200 mg capsule Take 1 Cap by mouth daily. cinnamon bark 500 mg cap Take 500 mg by mouth two (2) times a day. No current facility-administered medications for this visit.      Allergies   Allergen Reactions    Latex Itching    Bee Sting [Sting, Bee] Hives     Review of Systems:  - Eyes: no blurry vision or double vision  - Cardiovascular: no chest pain  - Respiratory: no SOB, his dry cough has improved   - Musculoskeletal: chronic knee pain, chronic left shoulder stiffness  - Neurological: no numbness/tingling in extremities    Physical Examination:  Blood pressure 118/71, pulse 82, height 5' 9\" (1.753 m), weight 191 lb 3.2 oz (86.7 kg). General: pleasant, no distress, good eye contact   Neck: no carotid bruits  Cardiovascular: regular, normal rate, nl s1 and s2, no m/r/g, 2+ DP pulses   Respiratory: clear bilaterally  Integumentary: no edema, no foot ulcers  Psychiatric: normal mood and affect    Diabetic foot exam:     Left Foot:   Visual Exam: normal    Pulse DP: 2+ (normal)   Filament test: normal sensation    Vibratory sensation: normal      Right Foot:   Visual Exam: normal    Pulse DP: 2+ (normal)   Filament test: normal sensation    Vibratory sensation: normal        Data Reviewed:   Component      Latest Ref Rng & Units 3/20/2023   Glucose      70 - 99 mg/dL 99   BUN      6 - 24 mg/dL 31 (H)   Creatinine      0.76 - 1.27 mg/dL 2.74 (H)   eGFR      >59 mL/min/1.73 26 (L)   BUN/Creatinine ratio      9 - 20 11   Sodium      134 - 144 mmol/L 142   Potassium      3.5 - 5.2 mmol/L 4.5   Chloride      96 - 106 mmol/L 99   CO2      20 - 29 mmol/L 24   Calcium      8.7 - 10.2 mg/dL 10.0   Protein, total      6.0 - 8.5 g/dL 7.7   Albumin      3.8 - 4.9 g/dL 5.0 (H)   GLOBULIN, TOTAL      1.5 - 4.5 g/dL 2.7   A-G Ratio      1.2 - 2.2 1.9   Bilirubin, total      0.0 - 1.2 mg/dL 0.3   Alk. phosphatase      44 - 121 IU/L 63   AST      0 - 40 IU/L 20   ALT      0 - 44 IU/L 16   Cholesterol, total      100 - 199 mg/dL 181   Triglyceride      0 - 149 mg/dL 279 (H)   HDL Cholesterol      >39 mg/dL 37 (L)   VLDL, calculated      5 - 40 mg/dL 47 (H)   LDL, calculated      0 - 99 mg/dL 97   Creatinine, urine random      Not Estab. mg/dL 93.8   Microalbumin, urine      Not Estab. ug/mL <3.0   Microalbumin/Creat. Ratio      0 - 29 mg/g creat <3   Hemoglobin A1c, (calculated)      4.8 - 5.6 % 6.2 (H)   Estimated average glucose      mg/dL 131       Assessment/Plan:   1) DM > His A1C last week was 6.2%. His eGFR last week was 26.  I will have pt stop the Hilario Monge for now, but continue the Ozempic 1.0mg weekly and Farxiga 10mg daily. Pt encouraged to keep up the good work. Pt to check her BGs daily. 2) HTN > His BP today was 118/71. Will not make any changes to his BP regimen at this time. 3) HLD > Pt's lipid panel was at goal in March 2023, pt is on Rosuvastatin 10mg daily, which he is tolerating well. This is a high intensity statin regimen. Pt voices understanding and agreement with the plan. RTC 4 months    Follow-up and Dispositions    Return in about 4 months (around 7/24/2023).          Copy sent to:  Dr. Konstantin Edawrds

## 2023-03-24 NOTE — LETTER
3/24/2023    Patient: Nikko Boyce   YOB: 1965   Date of Visit: 3/24/2023     Maria M Mckeon NP  [id number] [facility],[address] [address]  Via In Basket    Dear Maria M Mckeon NP,      Thank you for referring Mr. Codie Tolentino to [address] [place] [address] for evaluation. My notes for this consultation are attached. If you have questions, please do not hesitate to call me. I look forward to following your patient along with you.       Sincerely,    Irina Baugh MD

## 2023-03-29 RX ORDER — HYDROCHLOROTHIAZIDE 12.5 MG/1
CAPSULE ORAL
Qty: 90 CAPSULE | Refills: 1 | Status: SHIPPED | OUTPATIENT
Start: 2023-03-29

## 2023-05-18 ENCOUNTER — TELEPHONE (OUTPATIENT)
Age: 58
End: 2023-05-18

## 2023-06-02 RX ORDER — TRAZODONE HYDROCHLORIDE 50 MG/1
TABLET ORAL
Qty: 90 TABLET | Refills: 3 | Status: SHIPPED | OUTPATIENT
Start: 2023-06-02

## 2023-06-23 NOTE — PROGRESS NOTES
Subjective  Jasvir Grigsby is a 55 y.o. male. HPI     Last /81  He takes medications daily as prescribed     He lost weight with healthier lifestyle   regained love off Victoza     He has not schedule appointment with nephrologist for evaluation of CKD. Does not recall seeing provider prior. Past Medical History:   Diagnosis Date    Arthritis     Borderline diabetic     Diabetes ([address]     Family history of premature CAD     Hypertension        Allergies   Allergen Reactions    Latex Itching    Bee Sting [Sting, Bee] Hives       Past Surgical History:   Procedure Laterality Date    HX ORTHOPAEDIC      right knee       Current Outpatient Medications   Medication Sig    semaglutide (Ozempic) 1 mg/dose (4 mg/3 mL) pnij 1 mg by SubCUTAneous route every seven (7) days.  olmesartan (BENICAR) 20 mg tablet Take 1 Tablet by mouth daily.  traZODone (DESYREL) 50 mg tablet Take 1 tablet by mouth nightly    hydroCHLOROthiazide (MICROZIDE) 12.5 mg capsule TAKE 1 CAPSULE BY MOUTH EVERY DAY    sildenafil citrate (VIAGRA) 25 mg tablet TAKE 1-4 TABLETS BY MOUTH 30 MINUTES PRIOR TO ROMANCE. DO NOT REPEAT IN 24 HOURS    montelukast (SINGULAIR) 10 mg tablet TAKE 1 TABLET BY MOUTH EVERY DAY    rosuvastatin (CRESTOR) 10 mg tablet TAKE 1 TABLET BY MOUTH EVERY DAY    Blood-Glucose Meter (OneTouch Verio Meter) misc Check sugars twice per day. E11.9 Insurance preferance    citalopram (CELEXA) 20 mg tablet Take 1 Tab by mouth daily.  albuterol (PROVENTIL HFA, VENTOLIN HFA, PROAIR HFA) 90 mcg/actuation inhaler Take 2 Puffs by inhalation every four (4) hours as needed for Wheezing.  Insulin Needles, Disposable, (NOVOFINE 32) 32 gauge x 1/4\" ndle USE FOR ONE SHOT DAILY    coenzyme Q-10 (CO Q-10) 200 mg capsule Take 1 Cap by mouth daily.  cinnamon bark (CINNAMON) 500 mg cap Take 500 mg by mouth two (2) times a day. No current facility-administered medications for this visit.        Review of Systems Constitutional: Positive for weight loss (intentional ). Eyes: Negative. Respiratory: Negative. Cardiovascular: Negative. Gastrointestinal: Negative. Genitourinary: Negative. Objective  Visit Vitals  /88 (BP 1 Location: Left upper arm, BP Patient Position: Sitting, BP Cuff Size: Adult long)   Pulse 90   Temp 98.5 °F (36.9 °C) (Oral)   Resp 16   Ht 5' 9\" (1.753 m)   Wt 217 lb 9.6 oz (98.7 kg)   SpO2 96%   BMI 32.13 kg/m²     Physical Exam  Constitutional:       General: He is not in acute distress. Appearance: Normal appearance. He is not ill-appearing. Cardiovascular:      Rate and Rhythm: Normal rate and regular rhythm. Pulses: Normal pulses. Heart sounds: Normal heart sounds. Musculoskeletal:      Right lower leg: No edema. Left lower leg: No edema. Neurological:      Mental Status: He is alert. Gait: Gait normal.   Psychiatric:         Mood and Affect: Mood normal.         Behavior: Behavior normal.         Thought Content: Thought content normal.          Assessment & Plan    ICD-10-CM ICD-9-CM    1. Essential hypertension with goal blood pressure less than 130/80  I10 401.9    2. Renal insufficiency  N28.9 593.9 REFERRAL TO NEPHROLOGY     Follow-up and Dispositions    · Return in about 6 months (around 6/21/2022) for htn.       normotensive   Discussed indications for nephrology consult   current treatment plan is effective, no change in therapy  lab results and schedule of future lab studies reviewed with patient  cardiovascular risk and specific lipid/LDL goals reviewed  reviewed medications and side effects in detail  Dominik Damon NP

## 2023-07-06 RX ORDER — SILDENAFIL 25 MG/1
TABLET, FILM COATED ORAL
Qty: 25 TABLET | Refills: 0 | Status: SHIPPED | OUTPATIENT
Start: 2023-07-06

## 2023-07-06 NOTE — TELEPHONE ENCOUNTER
Future Appointments:  Future Appointments   Date Time Provider [address]   7/20/2023  4:00 PM ARMANDO Solorzano - NP [facility] BS AMB   8/18/2023  8:50 AM Blaise Villarreal MD RDE [facility] [address] BS AMB        Last Appointment With Me:  6/6/2023     Requested Prescriptions     Pending Prescriptions Disp Refills    PROCTOFOAM HC 1-1 % rectal foam [Pharmacy Med Name: Proctofoam HC 1-1 % Rectal Foam] 10 g 0     Sig: APPLY 1 APPLICATORFUL RECTALLY  THREE TIMES DAILY AS NEEDED FOR HEMORRHOIDS

## 2023-07-08 RX ORDER — PRAMOXINE HYDROCHLORIDE HYDROCORTISONE ACETATE 100; 100 MG/10G; MG/10G
AEROSOL, FOAM TOPICAL
Qty: 10 G | Refills: 0 | Status: SHIPPED | OUTPATIENT
Start: 2023-07-08

## 2023-07-17 SDOH — ECONOMIC STABILITY: HOUSING INSECURITY
IN THE LAST 12 MONTHS, WAS THERE A TIME WHEN YOU DID NOT HAVE A STEADY PLACE TO SLEEP OR SLEPT IN A SHELTER (INCLUDING NOW)?: NO

## 2023-07-17 SDOH — ECONOMIC STABILITY: FOOD INSECURITY: WITHIN THE PAST 12 MONTHS, THE FOOD YOU BOUGHT JUST DIDN'T LAST AND YOU DIDN'T HAVE MONEY TO GET MORE.: NEVER TRUE

## 2023-07-17 SDOH — ECONOMIC STABILITY: TRANSPORTATION INSECURITY
IN THE PAST 12 MONTHS, HAS LACK OF TRANSPORTATION KEPT YOU FROM MEETINGS, WORK, OR FROM GETTING THINGS NEEDED FOR DAILY LIVING?: NO

## 2023-07-17 SDOH — ECONOMIC STABILITY: FOOD INSECURITY: WITHIN THE PAST 12 MONTHS, YOU WORRIED THAT YOUR FOOD WOULD RUN OUT BEFORE YOU GOT MONEY TO BUY MORE.: NEVER TRUE

## 2023-07-17 SDOH — ECONOMIC STABILITY: INCOME INSECURITY: HOW HARD IS IT FOR YOU TO PAY FOR THE VERY BASICS LIKE FOOD, HOUSING, MEDICAL CARE, AND HEATING?: NOT VERY HARD

## 2023-07-20 ENCOUNTER — TELEMEDICINE (OUTPATIENT)
Age: 58
End: 2023-07-20
Payer: COMMERCIAL

## 2023-07-20 DIAGNOSIS — J20.9 ACUTE BRONCHITIS, UNSPECIFIED ORGANISM: ICD-10-CM

## 2023-07-20 DIAGNOSIS — I10 ESSENTIAL (PRIMARY) HYPERTENSION: ICD-10-CM

## 2023-07-20 DIAGNOSIS — F33.2 SEVERE EPISODE OF RECURRENT MAJOR DEPRESSIVE DISORDER, WITHOUT PSYCHOTIC FEATURES (HCC): Primary | ICD-10-CM

## 2023-07-20 PROCEDURE — 99214 OFFICE O/P EST MOD 30 MIN: CPT | Performed by: NURSE PRACTITIONER

## 2023-07-20 RX ORDER — ALBUTEROL SULFATE 90 UG/1
2 AEROSOL, METERED RESPIRATORY (INHALATION) EVERY 4 HOURS PRN
Qty: 18 G | Refills: 3 | Status: SHIPPED | OUTPATIENT
Start: 2023-07-20

## 2023-07-20 RX ORDER — EPINEPHRINE 0.3 MG/.3ML
INJECTION SUBCUTANEOUS
COMMUNITY
Start: 2023-05-06

## 2023-07-20 RX ORDER — SERTRALINE HYDROCHLORIDE 100 MG/1
100 TABLET, FILM COATED ORAL DAILY
Qty: 30 TABLET | Refills: 5 | Status: SHIPPED | OUTPATIENT
Start: 2023-07-20

## 2023-07-20 NOTE — PROGRESS NOTES
Virtual Virtual       Chief Complaint   Patient presents with    Follow-up    Medication Refill     Albuterol inhaler              1. Have you been to the ER, urgent care clinic since your last visit? Hospitalized since your last visit?no    2. Have you seen or consulted any other health care providers outside of the [address] [place] [address] since your last visit? Include any pap smears or colon screening. no      There were no vitals filed for this visit.

## 2023-08-10 ASSESSMENT — ENCOUNTER SYMPTOMS
COUGH: 1
EYES NEGATIVE: 1
SHORTNESS OF BREATH: 0
WHEEZING: 1

## 2023-08-10 NOTE — PROGRESS NOTES
Subjective:      Patient ID: Lex Escalante is a 63 y.o. male. Medication Refill  Associated symptoms include coughing and headaches. Chief Complaint   Patient presents with    Follow-up    Medication Refill     Albuterol inhaler           He reports wheezing and dry cough in mornings for last 2-3 weeks   Using cough drops  He has not need Albuterol inhaler in a long time   He has a slight headache   No sick contact, fever, chills or SOB  COVID test negative    Zoloft \" is definitely working\"  He feels better  He has days when he wants to \"sit and watch TV all day\"     BP is doing well      Past Medical History:   Diagnosis Date    Arthritis     Borderline diabetic     Diabetes ([address] )     Family history of premature CAD     Hypertension         Allergies   Allergen Reactions    Latex Itching    Bee Venom Hives        Current Outpatient Medications   Medication Sig    albuterol sulfate HFA (PROVENTIL;VENTOLIN;PROAIR) 108 (90 Base) MCG/ACT inhaler Inhale 2 puffs into the lungs every 4 hours as needed for Wheezing    sertraline (ZOLOFT) 100 MG tablet Take 1 tablet by mouth daily    PROCTOFOAM HC 1-1 % rectal foam APPLY 1 APPLICATORFUL RECTALLY  THREE TIMES DAILY AS NEEDED FOR HEMORRHOIDS    sildenafil (VIAGRA) 25 MG tablet TAKE 1-4 TABLETS BY MOUTH 30 MINUTES PRIOR TO ROMANCE.  DO NOT REPEAT IN 24 HOURS    traZODone (DESYREL) 50 MG tablet Take 1 tablet by mouth nightly    amLODIPine (NORVASC) 5 MG tablet Take 1 tablet by mouth daily    Cinnamon 500 MG CAPS Take 1 capsule by mouth 2 times daily    coenzyme Q10 200 MG CAPS capsule Take 1 capsule by mouth daily    dapagliflozin (FARXIGA) 10 MG tablet Take 1 tablet by mouth daily    hydroCHLOROthiazide (HYDRODIURIL) 12.5 MG tablet Take 1 tablet by mouth daily    hydrocortisone (ANUSOL-HC) 25 MG suppository Place 1 suppository rectally 2 times daily as needed    olmesartan (BENICAR) 20 MG tablet     omeprazole (PRILOSEC) 40 MG delayed release capsule Take 1 capsule

## 2023-08-18 ENCOUNTER — OFFICE VISIT (OUTPATIENT)
Age: 58
End: 2023-08-18
Payer: COMMERCIAL

## 2023-08-18 VITALS
WEIGHT: 200.8 LBS | HEIGHT: 69 IN | BODY MASS INDEX: 29.74 KG/M2 | HEART RATE: 82 BPM | DIASTOLIC BLOOD PRESSURE: 63 MMHG | SYSTOLIC BLOOD PRESSURE: 103 MMHG

## 2023-08-18 DIAGNOSIS — I10 ESSENTIAL (PRIMARY) HYPERTENSION: ICD-10-CM

## 2023-08-18 DIAGNOSIS — E11.22 TYPE 2 DIABETES MELLITUS WITH STAGE 3B CHRONIC KIDNEY DISEASE, WITHOUT LONG-TERM CURRENT USE OF INSULIN (HCC): Primary | ICD-10-CM

## 2023-08-18 DIAGNOSIS — E78.2 MIXED HYPERLIPIDEMIA: ICD-10-CM

## 2023-08-18 DIAGNOSIS — N18.32 TYPE 2 DIABETES MELLITUS WITH STAGE 3B CHRONIC KIDNEY DISEASE, WITHOUT LONG-TERM CURRENT USE OF INSULIN (HCC): Primary | ICD-10-CM

## 2023-08-18 LAB — HBA1C MFR BLD: 7.2 %

## 2023-08-18 PROCEDURE — 99214 OFFICE O/P EST MOD 30 MIN: CPT | Performed by: INTERNAL MEDICINE

## 2023-08-18 PROCEDURE — 3074F SYST BP LT 130 MM HG: CPT | Performed by: INTERNAL MEDICINE

## 2023-08-18 PROCEDURE — 3044F HG A1C LEVEL LT 7.0%: CPT | Performed by: INTERNAL MEDICINE

## 2023-08-18 PROCEDURE — 83036 HEMOGLOBIN GLYCOSYLATED A1C: CPT | Performed by: INTERNAL MEDICINE

## 2023-08-18 PROCEDURE — 3078F DIAST BP <80 MM HG: CPT | Performed by: INTERNAL MEDICINE

## 2023-08-18 RX ORDER — SEMAGLUTIDE 2.68 MG/ML
INJECTION, SOLUTION SUBCUTANEOUS
Qty: 3 ML | Refills: 5 | Status: SHIPPED | OUTPATIENT
Start: 2023-08-18

## 2023-08-18 NOTE — PROGRESS NOTES
Chief Complaint   Patient presents with    Diabetes     Pcp and pharmacy verified     History of Present Illness: Lourdes Cyr is a 63 y.o. male here for follow up of diabetes. He was diagnosed with diabetes 2015. At our last visit in March 2023 his A1C was was 6.2% and his eGFR was 26. I instructed him to stop the Jentadueto, but continue the Ozempic 1.0mg weekly and Farxiga 10mg daily. \"I took a behavior test, looking for dislexia, but it came back positive for manic-depression. I have now been on zoloft for the last 2 months. I have been feeling better, but my weight did go up some. I was up to 204, but today I was 198 pounds. I recently started working of dietary changes, I have cut out sweets and sugars in August. So far things are doing well. \"    \"My goal weight is 180, but I have not been able to be physical active as I have been in the past.\"    Pt is taking Ozempic 1.0mg every week and Farxiga 10mg daily. He has been testing his BG about once per week in the morning. His FBG have been running in the 90-110s. He denies issues of hypoglycemia. His A1C today was 7.2%. \"I saw the kidney specialist (Dr. Marquez) in March 2023 and he has follow up in November 2023. His BP today was 103/63. Pt is working 7AM-4PM  - Pt is waking around 3-5AM  \"I am still doing the intermittent fasting, I eat from 1130AM-730PM\"  - His first meal is not till 1130AM, yesterday he had a salmon kaylin, fried potatoes and water. - He has and afternoon snack of popcorn and fruit.  - He has dinner around 630-730PM, last night he had baked chicken wrap and water. No history of vascular disease. Pt has been diagnosed with neuropathy and has CKD IV and is followed by Dr. Marquez. Last eye exam was October 2022, no retinopathy.      Current Outpatient Medications   Medication Sig    EPINEPHrine (EPIPEN) 0.3 MG/0.3ML SOAJ injection INJECT CONTENTS OF 1 PEN AS NEEDED FOR ALLERGIC REACTION

## 2023-09-01 DIAGNOSIS — I10 PRIMARY HYPERTENSION: Primary | ICD-10-CM

## 2023-09-01 NOTE — TELEPHONE ENCOUNTER
Med:Refill on the (Olmesartan(Benicar)20mg tablet  Last ov-7/20/23   Please sent to [facility] ([place]location

## 2023-09-05 RX ORDER — OLMESARTAN MEDOXOMIL 20 MG/1
20 TABLET ORAL DAILY
Qty: 30 TABLET | Refills: 1 | Status: SHIPPED | OUTPATIENT
Start: 2023-09-05

## 2023-09-05 NOTE — TELEPHONE ENCOUNTER
Future Appointments:  Future Appointments   Date Time Provider [address]   12/22/2023  8:00 AM MD CARLOS Bonds [facility] [address] BS AMB   12/22/2023  9:10 AM MD CARLOS Bonds [facility] [address] BS AMB        Last Appointment With Me:  7/20/2023     Requested Prescriptions     Pending Prescriptions Disp Refills    olmesartan (BENICAR) 20 MG tablet 30 tablet      Sig: Take 1 tablet by mouth daily

## 2023-11-08 ENCOUNTER — TELEPHONE (OUTPATIENT)
Age: 58
End: 2023-11-08

## 2023-11-08 NOTE — TELEPHONE ENCOUNTER
----- Message from Rolf Hernandez sent at 10/27/2023  8:38 AM EDT -----  Subject: Message to Provider    QUESTIONS  Information for Provider? Patient was unable to receive his Covid shot due   to needing more information from his PCP; Please contact and advise  ---------------------------------------------------------------------------  --------------  Brenda Ospina INFO  [phone]; OK to leave message on voicemail  ---------------------------------------------------------------------------  --------------  SCRIPT ANSWERS  Relationship to Patient?  Self

## 2023-11-14 ENCOUNTER — TELEPHONE (OUTPATIENT)
Age: 58
End: 2023-11-14

## 2023-11-14 NOTE — TELEPHONE ENCOUNTER
Patient left  stating that Ozempic has been on back order x 2 mos. He said that the [place] nurse said that Trulicity is similar to Ashkan Juan and is covered by his insurance. Patient wonders if he should wait until Ozempic is back in stock or change to Trulicity.

## 2023-12-01 DIAGNOSIS — E78.2 MIXED HYPERLIPIDEMIA: ICD-10-CM

## 2023-12-01 DIAGNOSIS — E11.22 TYPE 2 DIABETES MELLITUS WITH STAGE 3B CHRONIC KIDNEY DISEASE, WITHOUT LONG-TERM CURRENT USE OF INSULIN (HCC): ICD-10-CM

## 2023-12-01 DIAGNOSIS — I10 ESSENTIAL (PRIMARY) HYPERTENSION: ICD-10-CM

## 2023-12-01 DIAGNOSIS — N18.32 TYPE 2 DIABETES MELLITUS WITH STAGE 3B CHRONIC KIDNEY DISEASE, WITHOUT LONG-TERM CURRENT USE OF INSULIN (HCC): ICD-10-CM

## 2023-12-06 ENCOUNTER — TELEPHONE (OUTPATIENT)
Age: 58
End: 2023-12-06

## 2023-12-06 RX ORDER — ROSUVASTATIN CALCIUM 10 MG/1
10 TABLET, COATED ORAL DAILY
Qty: 30 TABLET | Refills: 0 | Status: SHIPPED | OUTPATIENT
Start: 2023-12-06

## 2023-12-06 RX ORDER — HYDROCHLOROTHIAZIDE 12.5 MG/1
12.5 TABLET ORAL DAILY
Qty: 30 TABLET | Refills: 0 | Status: SHIPPED | OUTPATIENT
Start: 2023-12-06

## 2023-12-06 NOTE — TELEPHONE ENCOUNTER
Patient requested refills for Crestor and HCTZ. This was refilled and sent to pharmacy for 30 day supply. He needs to be seen for any further refills.     Orders Placed This Encounter    hydroCHLOROthiazide (HYDRODIURIL) 12.5 MG tablet     Sig: Take 1 tablet by mouth daily     Dispense:  30 tablet     Refill:  0    rosuvastatin (CRESTOR) 10 MG tablet     Sig: Take 1 tablet by mouth daily     Dispense:  30 tablet     Refill:  0       Fernando Velasco MD

## 2023-12-11 DIAGNOSIS — I10 PRIMARY HYPERTENSION: ICD-10-CM

## 2023-12-11 RX ORDER — OLMESARTAN MEDOXOMIL 20 MG/1
20 TABLET ORAL DAILY
Qty: 30 TABLET | Refills: 0 | Status: SHIPPED | OUTPATIENT
Start: 2023-12-11

## 2023-12-11 NOTE — TELEPHONE ENCOUNTER
Last appointment: 07/20/2023 Virtual visit NP Vera Arguello   Next appointment: 12/29/2023 MD Tessy Lr   Previous refill encounter(s):   09/05/2023 Benicar #30 with 1 refill.      For Pharmacy Admin Tracking Only    Program: Medication Refill  Intervention Detail: New Rx: 1, reason: Patient Preference  Time Spent (min): 5      Requested Prescriptions     Pending Prescriptions Disp Refills    olmesartan (BENICAR) 20 MG tablet [Pharmacy Med Name: Olmesartan Medoxomil 20 MG Oral Tablet] 30 tablet 0     Sig: Take 1 tablet by mouth once daily

## 2023-12-22 LAB
ALBUMIN SERPL-MCNC: 4.8 G/DL (ref 3.8–4.9)
ALBUMIN/CREAT UR: <4 MG/G CREAT (ref 0–29)
ALBUMIN/GLOB SERPL: 1.7 {RATIO} (ref 1.2–2.2)
ALP SERPL-CCNC: 66 IU/L (ref 44–121)
ALT SERPL-CCNC: 28 IU/L (ref 0–44)
AST SERPL-CCNC: 26 IU/L (ref 0–40)
BILIRUB SERPL-MCNC: 0.3 MG/DL (ref 0–1.2)
BUN SERPL-MCNC: 38 MG/DL (ref 6–24)
BUN/CREAT SERPL: 14 (ref 9–20)
CALCIUM SERPL-MCNC: 10 MG/DL (ref 8.7–10.2)
CHLORIDE SERPL-SCNC: 100 MMOL/L (ref 96–106)
CHOLEST SERPL-MCNC: 221 MG/DL (ref 100–199)
CO2 SERPL-SCNC: 24 MMOL/L (ref 20–29)
CREAT SERPL-MCNC: 2.71 MG/DL (ref 0.76–1.27)
CREAT UR-MCNC: 81.6 MG/DL
EGFRCR SERPLBLD CKD-EPI 2021: 27 ML/MIN/1.73
GLOBULIN SER CALC-MCNC: 2.9 G/DL (ref 1.5–4.5)
GLUCOSE SERPL-MCNC: 105 MG/DL (ref 70–99)
HBA1C MFR BLD: 6.6 % (ref 4.8–5.6)
HDLC SERPL-MCNC: 40 MG/DL
IMP & REVIEW OF LAB RESULTS: NORMAL
LDLC SERPL CALC-MCNC: 137 MG/DL (ref 0–99)
Lab: NORMAL
MICROALBUMIN UR-MCNC: <3 UG/ML
POTASSIUM SERPL-SCNC: 4.7 MMOL/L (ref 3.5–5.2)
PROT SERPL-MCNC: 7.7 G/DL (ref 6–8.5)
REPORT: NORMAL
REPORT: NORMAL
SODIUM SERPL-SCNC: 140 MMOL/L (ref 134–144)
TRIGL SERPL-MCNC: 245 MG/DL (ref 0–149)
VLDLC SERPL CALC-MCNC: 44 MG/DL (ref 5–40)

## 2023-12-29 ENCOUNTER — OFFICE VISIT (OUTPATIENT)
Age: 58
End: 2023-12-29
Payer: COMMERCIAL

## 2023-12-29 VITALS
OXYGEN SATURATION: 97 % | WEIGHT: 199 LBS | RESPIRATION RATE: 16 BRPM | DIASTOLIC BLOOD PRESSURE: 70 MMHG | TEMPERATURE: 98.5 F | HEART RATE: 81 BPM | SYSTOLIC BLOOD PRESSURE: 103 MMHG | HEIGHT: 69 IN | BODY MASS INDEX: 29.47 KG/M2

## 2023-12-29 DIAGNOSIS — I10 PRIMARY HYPERTENSION: ICD-10-CM

## 2023-12-29 DIAGNOSIS — Z11.4 SCREENING FOR HIV (HUMAN IMMUNODEFICIENCY VIRUS): ICD-10-CM

## 2023-12-29 DIAGNOSIS — F33.2 SEVERE EPISODE OF RECURRENT MAJOR DEPRESSIVE DISORDER, WITHOUT PSYCHOTIC FEATURES (HCC): ICD-10-CM

## 2023-12-29 DIAGNOSIS — N18.32 STAGE 3B CHRONIC KIDNEY DISEASE (HCC): ICD-10-CM

## 2023-12-29 DIAGNOSIS — E78.2 MIXED HYPERLIPIDEMIA: ICD-10-CM

## 2023-12-29 DIAGNOSIS — Z76.89 ESTABLISHING CARE WITH NEW DOCTOR, ENCOUNTER FOR: Primary | ICD-10-CM

## 2023-12-29 DIAGNOSIS — E11.21 TYPE 2 DIABETES WITH NEPHROPATHY (HCC): ICD-10-CM

## 2023-12-29 PROBLEM — E11.9 TYPE 2 DIABETES MELLITUS WITHOUT COMPLICATION, WITHOUT LONG-TERM CURRENT USE OF INSULIN (HCC): Status: RESOLVED | Noted: 2017-11-21 | Resolved: 2023-12-29

## 2023-12-29 PROBLEM — E66.9 OBESITY (BMI 30-39.9): Status: RESOLVED | Noted: 2018-06-07 | Resolved: 2023-12-29

## 2023-12-29 PROBLEM — E66.01 SEVERE OBESITY WITH BODY MASS INDEX (BMI) OF 35.0 TO 39.9 WITH SERIOUS COMORBIDITY (HCC): Status: RESOLVED | Noted: 2018-06-07 | Resolved: 2023-12-29

## 2023-12-29 PROCEDURE — 3074F SYST BP LT 130 MM HG: CPT | Performed by: STUDENT IN AN ORGANIZED HEALTH CARE EDUCATION/TRAINING PROGRAM

## 2023-12-29 PROCEDURE — 99214 OFFICE O/P EST MOD 30 MIN: CPT | Performed by: STUDENT IN AN ORGANIZED HEALTH CARE EDUCATION/TRAINING PROGRAM

## 2023-12-29 PROCEDURE — 3044F HG A1C LEVEL LT 7.0%: CPT | Performed by: STUDENT IN AN ORGANIZED HEALTH CARE EDUCATION/TRAINING PROGRAM

## 2023-12-29 PROCEDURE — 3078F DIAST BP <80 MM HG: CPT | Performed by: STUDENT IN AN ORGANIZED HEALTH CARE EDUCATION/TRAINING PROGRAM

## 2023-12-29 RX ORDER — OLMESARTAN MEDOXOMIL 20 MG/1
10 TABLET ORAL DAILY
Qty: 30 TABLET | Refills: 0 | Status: SHIPPED | OUTPATIENT
Start: 2023-12-29

## 2023-12-29 NOTE — PROGRESS NOTES
Rm: 14    Chief Complaint   Patient presents with    Established New Doctor        1. Have you been to the ER, urgent care clinic since your last visit? Hospitalized since your last visit?no    2. Have you seen or consulted any other health care providers outside of the [address] [place] [address] since your last visit? Include any pap smears or colon screening.  no        Social Determinants of Health     Tobacco Use: Low Risk  (12/29/2023)    Patient History     Smoking Tobacco Use: Never     Smokeless Tobacco Use: Never     Passive Exposure: Not on file   Alcohol Use: Not on file   Financial Resource Strain: Low Risk  (6/6/2023)    Overall Financial Resource Strain (CARDIA)     Difficulty of Paying Living Expenses: Not hard at all   Food Insecurity: Not on file (7/17/2023)   Transportation Needs: Not on file   Physical Activity: Insufficiently Active (12/28/2023)    Exercise Vital Sign     Days of Exercise per Week: 3 days     Minutes of Exercise per Session: 30 min   Stress: Not on file   Social Connections: Not on file   Intimate Partner Violence: Not on file   Depression: Not at risk (12/29/2023)    PHQ-2     PHQ-2 Score: 0   Housing Stability: Not on file   Interpersonal Safety: Not on file   Utilities: Not on file

## 2023-12-29 NOTE — PROGRESS NOTES
Vicenta Castle (:  1965) is a 63 y.o. male, New patient, here for evaluation of the following chief complaint(s):  Established New Doctor    Established to practice. New patient to this provider. Subjective   SUBJECTIVE/OBJECTIVE:  Patient presents today to establish care with me    Patient does have prior PCP  Patient's prior PCP is ARMANDO Mcneil - NP    Chronic problems:  Diabetes - Sees Dr. Travis Vidal - endocrinology, last A1c was 6.6.  Urine microalbumin/cr was normal. Both done on 23.  - Medications: Ozempic 2 mg/dose, Farxiga 10 mg daily  - On statin - Rosuvastatin 10 mg daily    Anxiety/mood disorder  - On sertraline 100 mg daily - this is working well for the patient  - Has had more stress/depressive symptoms recent, some trouble focusing  - Medication does help, and he has been on it for most of this year    Hypertension  - Home BP readings: Good, lower side  - Medications: Olmesartan 10 mg daily, amlodipine 5 mg daily, HCTZ 12.5 mg      - Compliance: yes  - Reviewed over Diet and exercise:   - Recommended low salt diet, DASH diet  - Recommended 150 mins mod intensity weekly    Erectile dysfunction  - Has Viagra 25 mg as needed - has not needed recently      Preventative care:  Health Maintenance Due   Topic Date Due    Hepatitis B vaccine (1 of 3 - 3-dose series) Never done    HIV screen  Never done    Diabetic retinal exam  2019          Past Medical History:   Diagnosis Date    Arthritis     Borderline diabetic     Depression 23    On medication    Diabetes ([address])     Family history of premature CAD     Hypertension     Obesity (BMI 30-39.9) 2018    Severe obesity with body mass index (BMI) of 35.0 to 39.9 with serious comorbidity ([address]) 2018     Past Surgical History:   Procedure Laterality Date    COLONOSCOPY  2020    Dr Rocio Lynn, needs 5 year follow up, hemorrhoids and poly    ORTHOPEDIC SURGERY      right knee      Family History   Problem Relation Age of

## 2023-12-29 NOTE — PATIENT INSTRUCTIONS
L-methylfolate - this is a supplement and the active component of folic acid. It can cross the blood brain barrier and help with depression. You can pick it up over the counter. I would take 15 mg a daily.

## 2024-01-09 ENCOUNTER — HOSPITAL ENCOUNTER (OUTPATIENT)
Facility: HOSPITAL | Age: 59
Setting detail: RECURRING SERIES
Discharge: HOME | End: 2024-01-12
Payer: COMMERCIAL

## 2024-01-09 PROCEDURE — 97140 MANUAL THERAPY 1/> REGIONS: CPT | Performed by: PHYSICAL THERAPIST

## 2024-01-09 PROCEDURE — 97110 THERAPEUTIC EXERCISES: CPT | Performed by: PHYSICAL THERAPIST

## 2024-01-09 PROCEDURE — 97162 PT EVAL MOD COMPLEX 30 MIN: CPT | Performed by: PHYSICAL THERAPIST

## 2024-01-09 NOTE — THERAPY EVALUATION
bending)   Rotation   75%  100%      Hip ROM (A/PROM)              R                     L  Flexion                                   91/122 (R-sided low back p!)     97/129  Extension                               WNL (R-sided low back p!)        WNL (R-sided low back p!)  Internal Rotation                    40                    42  External Rotation                   50 (R-sided low back p!)            58    LOWER QUARTER   MUSCLE STRENGTH  KEY       R  L  0 - No Contraction  L1, L2 Psoas  4+ (R-sided low back p!)  5 (R-sided low back p!)  1 - Trace   L3 Quads  4+  5  2 - Poor   L4 Tib Ant  4  4+  3 - Fair    L5 EHL  4  4  4 - Good   S1 Peroneals  4-  4  5 - Normal   S2 Hams  4  4+    Heel Raise Test: R = 20 repetitions (R-sided low back p!); L = 22 repetitions    Flexibility: Hamstring 90-90 Test = (+) bilaterally; Raudel Test = (+) bilaterally (iliopsoas)    Mobility Assessment: Noted hypomobility to mid-thoracic PA joint mobilizations (T4-10)        MMT: HIP Ext: R = 4- (R-sided low back p!),4- (R-sided low back p!)/L = 4,4-            HIP Abd: R = 4- (R-sided low back p!)/L = 4            HIP Add: R = 4 (R-sided low back p!)/L = 4    Neurological: Reflexes / Sensations: NT    Special Tests:  Trendelenberg: (-) bilaterally   FABERS: (+) on R; (-) on L    Slump: MSK response bilaterally                                          SLR: MSK response bilaterally              Prone Instability Test: (-)              Repeated Motions: Flexion = (-); Extension = (-)              ASLR: (-) on R; (+) on L    Functional Tests:  Single Limb Balance: R = 16.1 seconds (R-sided low back/R knee p!); L = 20.5 seconds    Objective/Functional Outcome Measure: FOTO Score: 57/100  FOTO score = an established functional score where 100 = no disability    42 min [x]Eval - untimed                      Therapeutic Procedures:  Tx Min Billable or 1:1 Min (if diff from Tx Min) Procedure, Rationale, Specifics   15  [id number] Therapeutic

## 2024-01-15 ENCOUNTER — HOSPITAL ENCOUNTER (OUTPATIENT)
Facility: HOSPITAL | Age: 59
Setting detail: RECURRING SERIES
End: 2024-01-15
Payer: COMMERCIAL

## 2024-01-17 ENCOUNTER — HOSPITAL ENCOUNTER (OUTPATIENT)
Facility: HOSPITAL | Age: 59
Setting detail: RECURRING SERIES
End: 2024-01-17
Payer: COMMERCIAL

## 2024-01-17 DIAGNOSIS — I10 PRIMARY HYPERTENSION: ICD-10-CM

## 2024-01-17 RX ORDER — OLMESARTAN MEDOXOMIL 20 MG/1
10 TABLET ORAL DAILY
Qty: 30 TABLET | Refills: 0 | Status: CANCELLED | OUTPATIENT
Start: 2024-01-17

## 2024-01-17 NOTE — TELEPHONE ENCOUNTER
Duplicate request: 12/29/2023 Benicar 20 mg #30 was sent to [facility] #[id number].   Writer sent pt a message via [employer].     Last appointment: 12/29/2023 MD Nichols   Next appointment: 03/29/2024 MD Nicohls   Previous refill encounter(s):   12/29/2023 Benicar #30 (1/2 qd)     For Pharmacy Admin Tracking Only    Program: Medication Refill  Intervention Detail: Discontinued Rx: 1, reason: Duplicate Therapy  Time Spent (min): 5      Requested Prescriptions     Pending Prescriptions Disp Refills    olmesartan (BENICAR) 20 MG tablet 30 tablet 0     Sig: Take 0.5 tablets by mouth daily

## 2024-01-19 DIAGNOSIS — F33.2 SEVERE EPISODE OF RECURRENT MAJOR DEPRESSIVE DISORDER, WITHOUT PSYCHOTIC FEATURES (HCC): ICD-10-CM

## 2024-01-19 RX ORDER — SERTRALINE HYDROCHLORIDE 100 MG/1
100 TABLET, FILM COATED ORAL DAILY
Qty: 90 TABLET | Refills: 0 | Status: SHIPPED | OUTPATIENT
Start: 2024-01-19

## 2024-01-19 NOTE — TELEPHONE ENCOUNTER
Pt established care with Dr. ROCIO Nichols on 12/29/2023.     Last appointment: 12/29/2023 MD Nichols   Next appointment: 03/29/2024 MD Nichols   Previous refill encounter(s):   07/20/2023 Zoloft #30 with 5 refills.     For Pharmacy Admin Tracking Only    Program: Medication Refill  Intervention Detail: New Rx: 1, reason: Patient Preference  Time Spent (min): 5      Requested Prescriptions     Pending Prescriptions Disp Refills    sertraline (ZOLOFT) 100 MG tablet 90 tablet 0     Sig: Take 1 tablet by mouth daily

## 2024-01-21 DIAGNOSIS — I10 PRIMARY HYPERTENSION: ICD-10-CM

## 2024-01-22 RX ORDER — OLMESARTAN MEDOXOMIL 20 MG/1
10 TABLET ORAL DAILY
Qty: 45 TABLET | Refills: 3 | Status: SHIPPED | OUTPATIENT
Start: 2024-01-22

## 2024-01-22 NOTE — TELEPHONE ENCOUNTER
[facility] #[id number] is requesting a refill via fax. Pt has also requested a refill via [facility].     Pt established care with Dr. ROCIO Nichols on 12/29/2023.      Last appointment: 12/29/2023 MD Nichols   Next appointment: 03/29/2024 MD Nichols   Previous refill encounter(s):   12/29/2023 Benicar #30 (1/2 tab qd) -   Please review the directions. [facility] has pt taking Olmesartan 20 mg 1 tab daily.     For Pharmacy Admin Tracking Only    Program: Medication Refill  Intervention Detail: New Rx: 1, reason: Patient Preference  Time Spent (min): 5      Requested Prescriptions     Pending Prescriptions Disp Refills    olmesartan (BENICAR) 20 MG tablet 30 tablet 0     Sig: Take 0.5 tablets by mouth daily

## 2024-01-23 ENCOUNTER — HOSPITAL ENCOUNTER (OUTPATIENT)
Facility: HOSPITAL | Age: 59
Setting detail: RECURRING SERIES
End: 2024-01-23
Payer: COMMERCIAL

## 2024-01-25 ENCOUNTER — HOSPITAL ENCOUNTER (OUTPATIENT)
Facility: HOSPITAL | Age: 59
Setting detail: RECURRING SERIES
Discharge: HOME | End: 2024-01-28
Payer: COMMERCIAL

## 2024-01-25 PROCEDURE — 97140 MANUAL THERAPY 1/> REGIONS: CPT | Performed by: PHYSICAL THERAPIST

## 2024-01-25 PROCEDURE — 97110 THERAPEUTIC EXERCISES: CPT | Performed by: PHYSICAL THERAPIST

## 2024-01-25 NOTE — PROGRESS NOTES
report of function. - Progressing     Frequency / Duration: Patient to be seen 2 times per week for 16-20 treatments.    PLAN  Yes  Continue plan of care  Re-Cert Due: NA  [x]  Upgrade activities as tolerated  []  Discharge due to :  []  Other:    ELVIN NEWSOME, PT, DPT       1/25/2024       1:57 PM

## 2024-01-29 ENCOUNTER — HOSPITAL ENCOUNTER (OUTPATIENT)
Facility: HOSPITAL | Age: 59
Setting detail: RECURRING SERIES
Discharge: HOME | End: 2024-02-01
Payer: COMMERCIAL

## 2024-01-29 PROCEDURE — 97140 MANUAL THERAPY 1/> REGIONS: CPT

## 2024-01-29 PROCEDURE — 97110 THERAPEUTIC EXERCISES: CPT

## 2024-01-29 RX ORDER — ROSUVASTATIN CALCIUM 10 MG/1
10 TABLET, COATED ORAL DAILY
Qty: 90 TABLET | Refills: 0 | Status: SHIPPED | OUTPATIENT
Start: 2024-01-29

## 2024-01-29 NOTE — PROGRESS NOTES
goals.  The manual therapy interventions were performed at a separate and distinct time from the therapeutic activities interventions . (see flow sheet as applicable)     Details if applicable: Bilateral hip PROM, all planes with overpressure to tolerance; sidelying bilateral hip extension (PA) joint mobilization (Grade III); prone mid-thoracic PA joint mobilizations (Grade II-III, T4-10, (-) cavitation); prone sacral base joint mobilizations; STM/TPR bilateral hip flexors/QL/posterolateral hip (gluteus beth/medius/minimus/piriformis) muscle bellies   57 35    Total Total     [x]  Patient Education billed concurrently with other procedures   [x] Review HEP    [] Progressed/Changed HEP, detail:    [] Other detail:       Other Objective/Functional Measures    Patient noting decrease in familiar R-sided low back symptoms to 4/10 post-manual intervention    Pain Level at end of session (0-10 scale): 6/10 on VAS in R-sided low back with ambulation post-session today    Assessment   Patient tolerated treatment session well today, able to perform new exercises and progressions. Patient did continue to note increased R>L QL TTP during manual therapy, noting improvement with symptoms following manual intervention. Patient noted rapid posterior chain musculature throughout therex today, especially during core press and march pulldowns today. Continue to progress as tolerated.   Patient will continue to benefit from skilled PT / OT services to modify and progress therapeutic interventions, analyze and address functional mobility deficits, analyze and address ROM deficits, analyze and address strength deficits, analyze and address soft tissue restrictions, analyze and cue for proper movement patterns, analyze and modify for postural abnormalities, analyze and address imbalance/dizziness, and instruct in home and community integration to address functional deficits and attain remaining goals.    Progress toward goals /

## 2024-01-31 ENCOUNTER — APPOINTMENT (OUTPATIENT)
Facility: HOSPITAL | Age: 59
End: 2024-01-31
Payer: COMMERCIAL

## 2024-02-06 ENCOUNTER — HOSPITAL ENCOUNTER (OUTPATIENT)
Facility: HOSPITAL | Age: 59
Setting detail: RECURRING SERIES
Discharge: HOME | End: 2024-02-09
Payer: COMMERCIAL

## 2024-02-06 PROCEDURE — 97140 MANUAL THERAPY 1/> REGIONS: CPT | Performed by: PHYSICAL THERAPIST

## 2024-02-06 PROCEDURE — 97110 THERAPEUTIC EXERCISES: CPT | Performed by: PHYSICAL THERAPIST

## 2024-02-07 NOTE — PROGRESS NOTES
R                     L  Flexion                                   113/130          108/133  Extension                               WNL (R-sided low back p!)        WNL (R-sided low back p!)  Internal Rotation                    55                    WNL  External Rotation                   57                    WNL    CURRENT STATUS    Short Term Goals: To be accomplished in 6-8 treatments.              The patient will demonstrate understanding of and compliance with updated and progressive HEP toward improved participation in physical therapy plan of care. - Met              The patient will demonstrate (-) L ASLR/R KAMLESH tests toward improved mechanics with transitional movements and prolonged standing/walking activities. - Met              The patient will demonstrate ability to maintain single limb postural control >= 30 seconds bilaterally toward improved mechanics with prolonged walking and work tasks. - Continue goal  Long Term Goals: To be accomplished in 16-20 treatments.              The patient will demonstrate multiplanar bilateral LE strength increased by >= 1/2 MMT grade toward improved endurance with functional activities such as transitional movements and prolonged prolonged standing/walking activities. - Met              The patient will demonstrate ability to complete >= 40 single limb heel raises bilaterally toward improved functional endurance with transitional movements and prolonged standing/walking activities. - Progressing              The patient will score >= 68 on FOTO to demonstrate significantly improved subjective report of function. - Continue goal    RECOMMENDATIONS    Frequency / Duration: Patient to be seen 1-2 times per week for 12-16 treatments.    ELVIN NEWSOME, PT, DPT       2/6/2024       8:32 PM    If you have any questions/comments please contact us directly at [phone].   Thank you for allowing us to assist in the care of your patient.   
endurance with transitional movements and prolonged standing/walking activities. - Progressing              The patient will score >= 68 on FOTO to demonstrate significantly improved subjective report of function. - Continue goal     Frequency / Duration: Patient to be seen 1-2 times per week for 12-16 treatments.    PLAN  Yes  Continue plan of care  Re-Cert Due: NA  [x]  Upgrade activities as tolerated  []  Discharge due to :  []  Other:    ELVIN NEWSOME, PT, DPT       2/6/2024       4:55 PM

## 2024-02-11 ENCOUNTER — APPOINTMENT (OUTPATIENT)
Facility: HOSPITAL | Age: 59
End: 2024-02-11
Payer: COMMERCIAL

## 2024-02-11 ENCOUNTER — HOSPITAL ENCOUNTER (EMERGENCY)
Facility: HOSPITAL | Age: 59
Discharge: HOME | End: 2024-02-11
Attending: EMERGENCY MEDICINE
Payer: COMMERCIAL

## 2024-02-11 VITALS
RESPIRATION RATE: 16 BRPM | OXYGEN SATURATION: 98 % | SYSTOLIC BLOOD PRESSURE: 109 MMHG | HEART RATE: 93 BPM | WEIGHT: 197.09 LBS | TEMPERATURE: 98.6 F | BODY MASS INDEX: 29.11 KG/M2 | DIASTOLIC BLOOD PRESSURE: 82 MMHG

## 2024-02-11 DIAGNOSIS — S09.90XA CLOSED HEAD INJURY, INITIAL ENCOUNTER: Primary | ICD-10-CM

## 2024-02-11 PROCEDURE — 6360000002 HC RX W HCPCS: Performed by: EMERGENCY MEDICINE

## 2024-02-11 PROCEDURE — 72125 CT NECK SPINE W/O DYE: CPT

## 2024-02-11 PROCEDURE — 70450 CT HEAD/BRAIN W/O DYE: CPT

## 2024-02-11 PROCEDURE — 96372 THER/PROPH/DIAG INJ SC/IM: CPT

## 2024-02-11 PROCEDURE — 99284 EMERGENCY DEPT VISIT MOD MDM: CPT

## 2024-02-11 RX ORDER — KETOROLAC TROMETHAMINE 30 MG/ML
15 INJECTION, SOLUTION INTRAMUSCULAR; INTRAVENOUS
Status: COMPLETED | OUTPATIENT
Start: 2024-02-11 | End: 2024-02-11

## 2024-02-11 RX ORDER — METHOCARBAMOL 750 MG/1
750 TABLET, FILM COATED ORAL 3 TIMES DAILY PRN
Qty: 30 TABLET | Refills: 0 | Status: SHIPPED | OUTPATIENT
Start: 2024-02-11 | End: 2024-02-21

## 2024-02-11 RX ADMIN — KETOROLAC TROMETHAMINE 15 MG: 30 INJECTION, SOLUTION INTRAMUSCULAR; INTRAVENOUS at 14:51

## 2024-02-11 NOTE — ED PROVIDER NOTES
HEMORRHOIDS     rosuvastatin 10 MG tablet  Commonly known as: CRESTOR  Take 1 tablet by mouth daily     sertraline 100 MG tablet  Commonly known as: Zoloft  Take 1 tablet by mouth daily     sildenafil 25 MG tablet  Commonly known as: VIAGRA  TAKE 1-4 TABLETS BY MOUTH 30 MINUTES PRIOR TO ROMANCE. DO NOT REPEAT IN 24 HOURS     traZODone 50 MG tablet  Commonly known as: DESYREL  Take 1 tablet by mouth nightly               Where to Get Your Medications        These medications were sent to [facility] [address] [place] [address] - [address] - P [phone] - F [phone] [address] [facility] [address]      Phone: [phone]   methocarbamol 750 MG tablet           DISCONTINUED MEDICATIONS:  Discharge Medication List as of 2/11/2024  4:36 PM          I am the Primary Clinician of Record.   Prabhakar Oropeza MD (electronically signed)    (Please note that parts of this dictation were completed with voice recognition software. Quite often unanticipated grammatical, syntax, homophones, and other interpretive errors are inadvertently transcribed by the computer software. Please disregards these errors. Please excuse any errors that have escaped final proofreading.)         Prabhakar Oropeza MD  02/11/24 2047

## 2024-02-11 NOTE — DISCHARGE INSTRUCTIONS
You are seen in the emergency department for your symptoms.  Please follow-up with your primary care doctor.  Return for new or worsening symptoms anytime.

## 2024-02-13 ENCOUNTER — HOSPITAL ENCOUNTER (OUTPATIENT)
Facility: HOSPITAL | Age: 59
Setting detail: RECURRING SERIES
Discharge: HOME | End: 2024-02-16
Payer: COMMERCIAL

## 2024-02-13 PROCEDURE — 97110 THERAPEUTIC EXERCISES: CPT | Performed by: PHYSICAL THERAPIST

## 2024-02-13 PROCEDURE — 97140 MANUAL THERAPY 1/> REGIONS: CPT | Performed by: PHYSICAL THERAPIST

## 2024-02-13 NOTE — PROGRESS NOTES
PHYSICAL THERAPY - MEDICARE DAILY TREATMENT NOTE (updated 3/23)    Date: 2024        Patient Name:  Siddharth Ricci :  1965   Medical   Diagnosis:  Other low back pain [M54.59] Treatment Diagnosis:  M54.59, G89.29  CHRONIC LOWER BACK PAIN    Referral Source:  Irving Ardon MD Insurance:   Payor: [facility] / Plan: [facility] / Product Type: *No Product type* /                   Patient  verified yes     Visit #   Current  / Total 5 20   Time   In / Out 2000 3901   Total Treatment Time 61   Total Timed Codes 61   1:1 Treatment Time 45      [facility] Totals Reminder:  bill using total billable   min of TIMED therapeutic procedures and modalities.   8-22 min = 1 unit; 23-37 min = 2 units; 38-52 min = 3 units; 53-67 min = 4 units; 68-82 min = 5 units        SUBJECTIVE    Pain Level (0-10 scale): 2 \"stiffness/tightness\" in low back    Any medication changes, allergies to medications, adverse drug reactions, diagnosis change, or new procedure performed?: [x] No    [] Yes (see summary sheet for update)  Medications: Verified on Patient Summary List    Subjective functional status/changes:       The patient reports 80% improvement over physical therapy plan of care related to current condition at this point; he notes he continues to work regularly on home exercises with good challenge. He continues to experience mild \"stiffness\" when sitting for longer periods at work, however overall feels minimal pain related to current condition as previously. He feels he has no need to undergo low back MRI at this point, which pleases him.    OBJECTIVE               Therapeutic Procedures:  Tx Min Billable or 1:1 Min (if diff from Tx Min) Procedure, Rationale, Specifics   [phone] [id number] Therapeutic Exercise (timed):  increase ROM, strength, coordination, balance, and proprioception to improve patient's ability to progress to PLOF and address remaining functional goals. (see flow sheet as applicable)     Details if applicable:

## 2024-02-20 ENCOUNTER — HOSPITAL ENCOUNTER (OUTPATIENT)
Facility: HOSPITAL | Age: 59
Setting detail: RECURRING SERIES
End: 2024-02-20
Payer: COMMERCIAL

## 2024-03-11 DIAGNOSIS — J30.1 SEASONAL ALLERGIC RHINITIS DUE TO POLLEN: Primary | ICD-10-CM

## 2024-03-11 RX ORDER — MONTELUKAST SODIUM 10 MG/1
10 TABLET ORAL DAILY
Qty: 90 TABLET | Refills: 1 | Status: SHIPPED | OUTPATIENT
Start: 2024-03-11

## 2024-03-25 RX ORDER — DAPAGLIFLOZIN 10 MG/1
10 TABLET, FILM COATED ORAL DAILY
Qty: 90 TABLET | Refills: 0 | Status: SHIPPED | OUTPATIENT
Start: 2024-03-25

## 2024-03-28 LAB
BUN SERPL-MCNC: 25 MG/DL (ref 6–24)
BUN/CREAT SERPL: 10 (ref 9–20)
CALCIUM SERPL-MCNC: 10.2 MG/DL (ref 8.7–10.2)
CHLORIDE SERPL-SCNC: 104 MMOL/L (ref 96–106)
CO2 SERPL-SCNC: 25 MMOL/L (ref 20–29)
CREAT SERPL-MCNC: 2.46 MG/DL (ref 0.76–1.27)
GLUCOSE SERPL-MCNC: 94 MG/DL (ref 70–99)
MAGNESIUM SERPL-MCNC: 2.1 MG/DL (ref 1.6–2.3)
PHOSPHATE SERPL-MCNC: 4.1 MG/DL (ref 2.8–4.1)
POTASSIUM SERPL-SCNC: 5.3 MMOL/L (ref 3.5–5.2)
SODIUM SERPL-SCNC: 145 MMOL/L (ref 134–144)

## 2024-03-29 ENCOUNTER — OFFICE VISIT (OUTPATIENT)
Age: 59
End: 2024-03-29
Payer: COMMERCIAL

## 2024-03-29 VITALS
WEIGHT: 192 LBS | RESPIRATION RATE: 16 BRPM | OXYGEN SATURATION: 99 % | TEMPERATURE: 98.3 F | BODY MASS INDEX: 28.44 KG/M2 | DIASTOLIC BLOOD PRESSURE: 82 MMHG | SYSTOLIC BLOOD PRESSURE: 127 MMHG | HEART RATE: 76 BPM | HEIGHT: 69 IN

## 2024-03-29 DIAGNOSIS — I10 PRIMARY HYPERTENSION: Primary | ICD-10-CM

## 2024-03-29 DIAGNOSIS — E78.2 MIXED HYPERLIPIDEMIA: ICD-10-CM

## 2024-03-29 DIAGNOSIS — E11.21 TYPE 2 DIABETES WITH NEPHROPATHY (HCC): ICD-10-CM

## 2024-03-29 DIAGNOSIS — N18.4 CKD (CHRONIC KIDNEY DISEASE) STAGE 4, GFR 15-29 ML/MIN (HCC): ICD-10-CM

## 2024-03-29 DIAGNOSIS — E87.5 HYPERKALEMIA: ICD-10-CM

## 2024-03-29 DIAGNOSIS — F33.2 SEVERE EPISODE OF RECURRENT MAJOR DEPRESSIVE DISORDER, WITHOUT PSYCHOTIC FEATURES (HCC): ICD-10-CM

## 2024-03-29 PROBLEM — N18.30 CHRONIC RENAL DISEASE, STAGE III (HCC): Status: RESOLVED | Noted: 2022-09-21 | Resolved: 2024-03-29

## 2024-03-29 LAB — HIV 1+2 AB+HIV1 P24 AG SERPL QL IA: NON REACTIVE

## 2024-03-29 PROCEDURE — 99214 OFFICE O/P EST MOD 30 MIN: CPT | Performed by: STUDENT IN AN ORGANIZED HEALTH CARE EDUCATION/TRAINING PROGRAM

## 2024-03-29 PROCEDURE — 3079F DIAST BP 80-89 MM HG: CPT | Performed by: STUDENT IN AN ORGANIZED HEALTH CARE EDUCATION/TRAINING PROGRAM

## 2024-03-29 PROCEDURE — 3074F SYST BP LT 130 MM HG: CPT | Performed by: STUDENT IN AN ORGANIZED HEALTH CARE EDUCATION/TRAINING PROGRAM

## 2024-03-29 RX ORDER — AMLODIPINE BESYLATE 5 MG/1
5 TABLET ORAL DAILY
Qty: 90 TABLET | Refills: 1 | Status: SHIPPED | OUTPATIENT
Start: 2024-03-29

## 2024-03-29 RX ORDER — OLMESARTAN MEDOXOMIL 5 MG/1
5 TABLET ORAL DAILY
Qty: 90 TABLET | Refills: 1 | Status: SHIPPED | OUTPATIENT
Start: 2024-03-29

## 2024-03-29 SDOH — ECONOMIC STABILITY: INCOME INSECURITY: HOW HARD IS IT FOR YOU TO PAY FOR THE VERY BASICS LIKE FOOD, HOUSING, MEDICAL CARE, AND HEATING?: NOT HARD AT ALL

## 2024-03-29 SDOH — ECONOMIC STABILITY: FOOD INSECURITY: WITHIN THE PAST 12 MONTHS, YOU WORRIED THAT YOUR FOOD WOULD RUN OUT BEFORE YOU GOT MONEY TO BUY MORE.: NEVER TRUE

## 2024-03-29 SDOH — ECONOMIC STABILITY: FOOD INSECURITY: WITHIN THE PAST 12 MONTHS, THE FOOD YOU BOUGHT JUST DIDN'T LAST AND YOU DIDN'T HAVE MONEY TO GET MORE.: NEVER TRUE

## 2024-03-29 ASSESSMENT — PATIENT HEALTH QUESTIONNAIRE - PHQ9
3. TROUBLE FALLING OR STAYING ASLEEP: NOT AT ALL
9. THOUGHTS THAT YOU WOULD BE BETTER OFF DEAD, OR OF HURTING YOURSELF: NOT AT ALL
SUM OF ALL RESPONSES TO PHQ QUESTIONS 1-9: 0
SUM OF ALL RESPONSES TO PHQ QUESTIONS 1-9: 0
4. FEELING TIRED OR HAVING LITTLE ENERGY: NOT AT ALL
8. MOVING OR SPEAKING SO SLOWLY THAT OTHER PEOPLE COULD HAVE NOTICED. OR THE OPPOSITE, BEING SO FIGETY OR RESTLESS THAT YOU HAVE BEEN MOVING AROUND A LOT MORE THAN USUAL: NOT AT ALL
1. LITTLE INTEREST OR PLEASURE IN DOING THINGS: NOT AT ALL
7. TROUBLE CONCENTRATING ON THINGS, SUCH AS READING THE NEWSPAPER OR WATCHING TELEVISION: NOT AT ALL
6. FEELING BAD ABOUT YOURSELF - OR THAT YOU ARE A FAILURE OR HAVE LET YOURSELF OR YOUR FAMILY DOWN: NOT AT ALL
10. IF YOU CHECKED OFF ANY PROBLEMS, HOW DIFFICULT HAVE THESE PROBLEMS MADE IT FOR YOU TO DO YOUR WORK, TAKE CARE OF THINGS AT HOME, OR GET ALONG WITH OTHER PEOPLE: NOT DIFFICULT AT ALL
2. FEELING DOWN, DEPRESSED OR HOPELESS: NOT AT ALL
5. POOR APPETITE OR OVEREATING: NOT AT ALL
SUM OF ALL RESPONSES TO PHQ QUESTIONS 1-9: 0
SUM OF ALL RESPONSES TO PHQ QUESTIONS 1-9: 0
SUM OF ALL RESPONSES TO PHQ9 QUESTIONS 1 & 2: 0

## 2024-03-29 ASSESSMENT — ENCOUNTER SYMPTOMS
ABDOMINAL PAIN: 0
DIARRHEA: 0
VOMITING: 0
BLOOD IN STOOL: 0
CONSTIPATION: 0
COUGH: 0
SHORTNESS OF BREATH: 1
NAUSEA: 0

## 2024-03-29 NOTE — PROGRESS NOTES
Siddharth Ricci (:  1965) is a 58 y.o. male, Established patient, here for evaluation of the following chief complaint(s):  Depression and Hypertension        Subjective   SUBJECTIVE/OBJECTIVE:  Patient presents today for follow-up for the following:    Chronic problems:  Diabetes - Sees Dr. Espino - endocrinology, last A1c was 6.6. Urine microalbumin/cr was normal. Both done on 23.  - Medications: Ozempic 2 mg/dose, Farxiga 10 mg daily  - On statin - Rosuvastatin 10 mg daily  - Last eye visit 2023     Anxiety/mood disorder  - On sertraline 100 mg daily - this is working well for the patient  - Has had more stress/depressive symptoms recent, some trouble focusing  - Medication does help, and he has been on it for most of this year     Hypertension  - Home BP readings: Good, lower side  - Medications: Olmesartan 10 mg daily, amlodipine 5 mg daily      - Compliance: yes  - Reviewed over Diet and exercise:   - Recommended low salt diet, DASH diet  - Recommended 150 mins mod intensity weekly     Erectile dysfunction  - Has Viagra 25 mg as needed - has not needed recently    Preventative care:  Health Maintenance Due   Topic Date Due    Hepatitis B vaccine (1 of 3 - 3-dose series) Never done    Diabetic retinal exam  2019          Past Medical History:   Diagnosis Date    Arthritis     Borderline diabetic     Depression 23    On medication    Diabetes (HCC)     Family history of premature CAD     Hypertension     Obesity (BMI 30-39.9) 2018    Severe obesity with body mass index (BMI) of 35.0 to 39.9 with serious comorbidity (HCC) 2018     Past Surgical History:   Procedure Laterality Date    COLONOSCOPY  2020    Dr White, needs 5 year follow up, hemorrhoids and poly    ORTHOPEDIC SURGERY      right knee      Family History   Problem Relation Age of Onset    Diabetes Father         Diabetes    Heart Disease Father     Hypertension Father     Elevated Lipids Father

## 2024-03-29 NOTE — PROGRESS NOTES
Rm:14    Chief Complaint   Patient presents with    Depression    Hypertension    Fasting  Talked about results.     \"Have you been to the ER, urgent care clinic since your last visit?  Hospitalized since your last visit?\"    NO    “Have you seen or consulted any other health care providers outside of [facility] since your last visit?”    NO            Click Here for Release of Records Request

## 2024-04-16 RX ORDER — SEMAGLUTIDE 2.68 MG/ML
INJECTION, SOLUTION SUBCUTANEOUS
Qty: 9 ML | Refills: 3 | Status: SHIPPED | OUTPATIENT
Start: 2024-04-16

## 2024-04-16 NOTE — TELEPHONE ENCOUNTER
----- Message from Siddharth Ricci sent at 4/15/2024  9:05 PM EDT -----  Regarding: Ozempic   Contact: [phone]  Dr. Espino I’m trying to mail order my Ozempic but the system is not responding. I tried calling it in but that didn’t work either. Could you please submit my mail-in order?

## 2024-04-29 RX ORDER — ROSUVASTATIN CALCIUM 10 MG/1
10 TABLET, COATED ORAL DAILY
Qty: 90 TABLET | Refills: 3 | Status: SHIPPED | OUTPATIENT
Start: 2024-04-29

## 2024-04-29 NOTE — TELEPHONE ENCOUNTER
Last appointment: 03/29/2024 MD Nichols   Next appointment: Nothing scheduled   Previous refill encounter(s):   01/29/2024 Crestor #90     For Pharmacy Admin Tracking Only    Program: Medication Refill  Intervention Detail: New Rx: 1, reason: Patient Preference  Time Spent (min): 5    Requested Prescriptions     Pending Prescriptions Disp Refills    rosuvastatin (CRESTOR) 10 MG tablet 90 tablet 0     Sig: Take 1 tablet by mouth daily

## 2024-05-01 DIAGNOSIS — F33.2 SEVERE EPISODE OF RECURRENT MAJOR DEPRESSIVE DISORDER, WITHOUT PSYCHOTIC FEATURES (HCC): ICD-10-CM

## 2024-05-01 RX ORDER — SERTRALINE HYDROCHLORIDE 100 MG/1
100 TABLET, FILM COATED ORAL DAILY
Qty: 90 TABLET | Refills: 2 | Status: SHIPPED | OUTPATIENT
Start: 2024-05-01

## 2024-05-01 NOTE — TELEPHONE ENCOUNTER
Last appointment: 03/29/2024 MD Nichols   Next appointment: Nothing scheduled   Previous refill encounter(s):   01/19/2024 Zoloft #90     For Pharmacy Admin Tracking Only    Program: Medication Refill  Intervention Detail: New Rx: 1, reason: Patient Preference  Time Spent (min): 5    Requested Prescriptions     Pending Prescriptions Disp Refills    sertraline (ZOLOFT) 100 MG tablet 90 tablet 0     Sig: Take 1 tablet by mouth daily

## 2024-05-24 RX ORDER — TRAZODONE HYDROCHLORIDE 50 MG/1
50 TABLET ORAL NIGHTLY
Qty: 90 TABLET | Refills: 1 | Status: SHIPPED | OUTPATIENT
Start: 2024-05-24

## 2024-05-24 NOTE — TELEPHONE ENCOUNTER
Last appointment: 03/29/2024 MD Nichols   Next appointment: Advised to follow up in 3 months (06/2024) - Nothing scheduled   Previous refill encounter(s):   06/02/2023 Desyrel #90 with 3 refills. Last prescribed by CELESTINA Anaya.     For Pharmacy Admin Tracking Only    Program: Medication Refill  Intervention Detail: New Rx: 1, reason: Patient Preference  Time Spent (min): 5    Requested Prescriptions     Pending Prescriptions Disp Refills    traZODone (DESYREL) 50 MG tablet 90 tablet 0     Sig: Take 1 tablet by mouth nightly

## 2024-06-01 DIAGNOSIS — E11.22 TYPE 2 DIABETES MELLITUS WITH STAGE 3B CHRONIC KIDNEY DISEASE, WITHOUT LONG-TERM CURRENT USE OF INSULIN (HCC): ICD-10-CM

## 2024-06-01 DIAGNOSIS — N18.32 TYPE 2 DIABETES MELLITUS WITH STAGE 3B CHRONIC KIDNEY DISEASE, WITHOUT LONG-TERM CURRENT USE OF INSULIN (HCC): ICD-10-CM

## 2024-06-01 DIAGNOSIS — I10 ESSENTIAL (PRIMARY) HYPERTENSION: ICD-10-CM

## 2024-06-01 DIAGNOSIS — E78.2 MIXED HYPERLIPIDEMIA: ICD-10-CM

## 2024-06-17 RX ORDER — DAPAGLIFLOZIN 10 MG/1
10 TABLET, FILM COATED ORAL DAILY
Qty: 90 TABLET | Refills: 1 | Status: SHIPPED | OUTPATIENT
Start: 2024-06-17

## 2024-06-18 LAB
ALBUMIN SERPL-MCNC: 4.6 G/DL (ref 3.8–4.9)
ALBUMIN/CREAT UR: <4 MG/G CREAT (ref 0–29)
ALP SERPL-CCNC: 67 IU/L (ref 44–121)
ALT SERPL-CCNC: 20 IU/L (ref 0–44)
AST SERPL-CCNC: 19 IU/L (ref 0–40)
BILIRUB SERPL-MCNC: 0.2 MG/DL (ref 0–1.2)
BUN SERPL-MCNC: 33 MG/DL (ref 6–24)
BUN/CREAT SERPL: 13 (ref 9–20)
CALCIUM SERPL-MCNC: 9.5 MG/DL (ref 8.7–10.2)
CHLORIDE SERPL-SCNC: 104 MMOL/L (ref 96–106)
CHOLEST SERPL-MCNC: 183 MG/DL (ref 100–199)
CO2 SERPL-SCNC: 27 MMOL/L (ref 20–29)
CREAT SERPL-MCNC: 2.49 MG/DL (ref 0.76–1.27)
CREAT UR-MCNC: 81.9 MG/DL
EGFRCR SERPLBLD CKD-EPI 2021: 29 ML/MIN/1.73
GLOBULIN SER CALC-MCNC: 2.6 G/DL (ref 1.5–4.5)
GLUCOSE SERPL-MCNC: 95 MG/DL (ref 70–99)
HBA1C MFR BLD: 6.7 % (ref 4.8–5.6)
HDLC SERPL-MCNC: 39 MG/DL
IMP & REVIEW OF LAB RESULTS: NORMAL
LDLC SERPL CALC-MCNC: 106 MG/DL (ref 0–99)
Lab: NORMAL
MICROALBUMIN UR-MCNC: <3 UG/ML
POTASSIUM SERPL-SCNC: 5.1 MMOL/L (ref 3.5–5.2)
PROT SERPL-MCNC: 7.2 G/DL (ref 6–8.5)
REPORT: NORMAL
REPORT: NORMAL
SODIUM SERPL-SCNC: 143 MMOL/L (ref 134–144)
TRIGL SERPL-MCNC: 222 MG/DL (ref 0–149)
VLDLC SERPL CALC-MCNC: 38 MG/DL (ref 5–40)

## 2024-06-19 ENCOUNTER — OFFICE VISIT (OUTPATIENT)
Age: 59
End: 2024-06-19
Payer: COMMERCIAL

## 2024-06-19 VITALS
HEIGHT: 69 IN | WEIGHT: 197.2 LBS | HEART RATE: 90 BPM | SYSTOLIC BLOOD PRESSURE: 108 MMHG | BODY MASS INDEX: 29.21 KG/M2 | DIASTOLIC BLOOD PRESSURE: 74 MMHG

## 2024-06-19 DIAGNOSIS — I10 ESSENTIAL (PRIMARY) HYPERTENSION: ICD-10-CM

## 2024-06-19 DIAGNOSIS — N18.32 TYPE 2 DIABETES MELLITUS WITH STAGE 3B CHRONIC KIDNEY DISEASE, WITHOUT LONG-TERM CURRENT USE OF INSULIN (HCC): Primary | ICD-10-CM

## 2024-06-19 DIAGNOSIS — E11.22 TYPE 2 DIABETES MELLITUS WITH STAGE 3B CHRONIC KIDNEY DISEASE, WITHOUT LONG-TERM CURRENT USE OF INSULIN (HCC): Primary | ICD-10-CM

## 2024-06-19 DIAGNOSIS — E78.2 MIXED HYPERLIPIDEMIA: ICD-10-CM

## 2024-06-19 PROCEDURE — 3044F HG A1C LEVEL LT 7.0%: CPT | Performed by: INTERNAL MEDICINE

## 2024-06-19 PROCEDURE — 99214 OFFICE O/P EST MOD 30 MIN: CPT | Performed by: INTERNAL MEDICINE

## 2024-06-19 PROCEDURE — 3078F DIAST BP <80 MM HG: CPT | Performed by: INTERNAL MEDICINE

## 2024-06-19 PROCEDURE — 3074F SYST BP LT 130 MM HG: CPT | Performed by: INTERNAL MEDICINE

## 2024-06-19 NOTE — PROGRESS NOTES
Chief Complaint   Patient presents with    Diabetes     Pcp and pharmacy verified     History of Present Illness: Siddharth Ricci is a 58 y.o. male here for follow up of diabetes.  He was diagnosed with diabetes 2015.    \"I took a behavior test, looking for dislexia, but it came back positive for manic-depression. I have now been on zoloft for the last 2 months. I have been feeling better, but my weight did go up some. I was up to 204, but today I was 198 pounds.  I recently started working of dietary changes, I have cut out sweets and sugars in August. So far things are doing well.\"    \"I have been suffering with some lower back issues and I am seeing a doctor for that later today. This has kept me from walking as much.\"    \"My goal weight is 180, but I have not been able to be physical active as I have been in the past.\"    Pt is taking Farxiga 10mg daily and Ozempic 2.0mg every Monday.    He has been testing his BG about twice per week in the morning. His FBG have been running in the 90-110s.  He denies issues of hypoglycemia.    His A1C last week was 6.7%.    He is following with Dr. Coby Hanna of Nephrology for CKD IV.   His BP today was 108/74.    Pt wakes around 3-4AM  - \"I am still doing the intermittent fasting, I eat from 1130AM-730PM\"  - His first meal is 1130AM, yesterday he had a salad with chicken coffee (cream and sugar) and water.  - He has been cutting out the afternoon snack.   - He has dinner around 7-730PM, last night he had leftovers from lunch and crystal lite.     No history of vascular disease.      Pt has been diagnosed with neuropathy and has CKD IV and is followed by Dr. Coby Vail.  His eGFR in June 2024 was 29.    Last eye exam was October 2023, no retinopathy.     Current Outpatient Medications   Medication Sig    dapagliflozin (FARXIGA) 10 MG tablet Take 1 tablet by mouth once daily    traZODone (DESYREL) 50 MG tablet Take 1 tablet by mouth nightly    sertraline (ZOLOFT) 100

## 2024-07-05 RX ORDER — SEMAGLUTIDE 2.68 MG/ML
INJECTION, SOLUTION SUBCUTANEOUS
Qty: 9 ML | Refills: 1 | Status: SHIPPED | OUTPATIENT
Start: 2024-07-05

## 2024-07-24 ENCOUNTER — PATIENT MESSAGE (OUTPATIENT)
Age: 59
End: 2024-07-24

## 2024-07-24 DIAGNOSIS — E78.2 MIXED HYPERLIPIDEMIA: Primary | ICD-10-CM

## 2024-07-24 RX ORDER — ROSUVASTATIN CALCIUM 10 MG/1
10 TABLET, COATED ORAL DAILY
Qty: 90 TABLET | Refills: 3 | Status: SHIPPED | OUTPATIENT
Start: 2024-07-24

## 2024-09-04 DIAGNOSIS — J30.1 SEASONAL ALLERGIC RHINITIS DUE TO POLLEN: ICD-10-CM

## 2024-09-04 RX ORDER — MONTELUKAST SODIUM 10 MG/1
10 TABLET ORAL DAILY
Qty: 90 TABLET | Refills: 3 | Status: SHIPPED | OUTPATIENT
Start: 2024-09-04

## 2024-09-04 NOTE — TELEPHONE ENCOUNTER
Last appointment: 03/29/2024 MD Nichols   Next appointment: Nothing scheduled   Previous refill encounter(s):   03/11/2024 Singulair #90 with 1 refill.     For Pharmacy Admin Tracking Only    Program: Medication Refill  Intervention Detail: New Rx: 1, reason: Patient Preference  Time Spent (min): 5  Requested Prescriptions     Pending Prescriptions Disp Refills    montelukast (SINGULAIR) 10 MG tablet 90 tablet 0     Sig: Take 1 tablet by mouth daily

## 2024-09-10 ENCOUNTER — TELEPHONE (OUTPATIENT)
Age: 59
End: 2024-09-10

## 2024-09-10 ENCOUNTER — TELEMEDICINE (OUTPATIENT)
Age: 59
End: 2024-09-10
Payer: COMMERCIAL

## 2024-09-10 DIAGNOSIS — I10 PRIMARY HYPERTENSION: ICD-10-CM

## 2024-09-10 DIAGNOSIS — U07.1 COVID-19 VIRUS INFECTION: Primary | ICD-10-CM

## 2024-09-10 DIAGNOSIS — R05.1 ACUTE COUGH: ICD-10-CM

## 2024-09-10 DIAGNOSIS — N18.4 CKD (CHRONIC KIDNEY DISEASE) STAGE 4, GFR 15-29 ML/MIN (HCC): ICD-10-CM

## 2024-09-10 DIAGNOSIS — E78.2 MIXED HYPERLIPIDEMIA: ICD-10-CM

## 2024-09-10 DIAGNOSIS — E11.21 TYPE 2 DIABETES WITH NEPHROPATHY (HCC): ICD-10-CM

## 2024-09-10 PROCEDURE — 3044F HG A1C LEVEL LT 7.0%: CPT | Performed by: STUDENT IN AN ORGANIZED HEALTH CARE EDUCATION/TRAINING PROGRAM

## 2024-09-10 PROCEDURE — 99214 OFFICE O/P EST MOD 30 MIN: CPT | Performed by: STUDENT IN AN ORGANIZED HEALTH CARE EDUCATION/TRAINING PROGRAM

## 2024-09-10 RX ORDER — BENZONATATE 100 MG/1
100 CAPSULE ORAL 3 TIMES DAILY PRN
Qty: 30 CAPSULE | Refills: 0 | Status: SHIPPED | OUTPATIENT
Start: 2024-09-10 | End: 2024-09-20

## 2024-09-10 SDOH — ECONOMIC STABILITY: FOOD INSECURITY: WITHIN THE PAST 12 MONTHS, YOU WORRIED THAT YOUR FOOD WOULD RUN OUT BEFORE YOU GOT MONEY TO BUY MORE.: NEVER TRUE

## 2024-09-10 SDOH — ECONOMIC STABILITY: FOOD INSECURITY: WITHIN THE PAST 12 MONTHS, THE FOOD YOU BOUGHT JUST DIDN'T LAST AND YOU DIDN'T HAVE MONEY TO GET MORE.: NEVER TRUE

## 2024-09-10 SDOH — ECONOMIC STABILITY: INCOME INSECURITY: HOW HARD IS IT FOR YOU TO PAY FOR THE VERY BASICS LIKE FOOD, HOUSING, MEDICAL CARE, AND HEATING?: NOT HARD AT ALL

## 2024-09-10 ASSESSMENT — PATIENT HEALTH QUESTIONNAIRE - PHQ9
7. TROUBLE CONCENTRATING ON THINGS, SUCH AS READING THE NEWSPAPER OR WATCHING TELEVISION: NOT AT ALL
SUM OF ALL RESPONSES TO PHQ QUESTIONS 1-9: 4
SUM OF ALL RESPONSES TO PHQ QUESTIONS 1-9: 4
10. IF YOU CHECKED OFF ANY PROBLEMS, HOW DIFFICULT HAVE THESE PROBLEMS MADE IT FOR YOU TO DO YOUR WORK, TAKE CARE OF THINGS AT HOME, OR GET ALONG WITH OTHER PEOPLE: NOT DIFFICULT AT ALL
SUM OF ALL RESPONSES TO PHQ9 QUESTIONS 1 & 2: 0
5. POOR APPETITE OR OVEREATING: NOT AT ALL
SUM OF ALL RESPONSES TO PHQ QUESTIONS 1-9: 4
2. FEELING DOWN, DEPRESSED OR HOPELESS: NOT AT ALL
4. FEELING TIRED OR HAVING LITTLE ENERGY: SEVERAL DAYS
9. THOUGHTS THAT YOU WOULD BE BETTER OFF DEAD, OR OF HURTING YOURSELF: NOT AT ALL
3. TROUBLE FALLING OR STAYING ASLEEP: NEARLY EVERY DAY
SUM OF ALL RESPONSES TO PHQ QUESTIONS 1-9: 0
SUM OF ALL RESPONSES TO PHQ QUESTIONS 1-9: 0
1. LITTLE INTEREST OR PLEASURE IN DOING THINGS: NOT AT ALL
SUM OF ALL RESPONSES TO PHQ QUESTIONS 1-9: 0
8. MOVING OR SPEAKING SO SLOWLY THAT OTHER PEOPLE COULD HAVE NOTICED. OR THE OPPOSITE, BEING SO FIGETY OR RESTLESS THAT YOU HAVE BEEN MOVING AROUND A LOT MORE THAN USUAL: NOT AT ALL
SUM OF ALL RESPONSES TO PHQ QUESTIONS 1-9: 4
2. FEELING DOWN, DEPRESSED OR HOPELESS: NOT AT ALL
SUM OF ALL RESPONSES TO PHQ QUESTIONS 1-9: 0
SUM OF ALL RESPONSES TO PHQ9 QUESTIONS 1 & 2: 0
6. FEELING BAD ABOUT YOURSELF - OR THAT YOU ARE A FAILURE OR HAVE LET YOURSELF OR YOUR FAMILY DOWN: NOT AT ALL
1. LITTLE INTEREST OR PLEASURE IN DOING THINGS: NOT AT ALL

## 2024-09-10 ASSESSMENT — ENCOUNTER SYMPTOMS
SHORTNESS OF BREATH: 1
CONSTIPATION: 0
NAUSEA: 0
BLOOD IN STOOL: 0
DIARRHEA: 0
VOMITING: 0
COUGH: 1
ABDOMINAL PAIN: 0
SORE THROAT: 1

## 2024-09-10 NOTE — TELEPHONE ENCOUNTER
Pt stated that he has covid he tested positive twice now and he has fever and body aches and oother symptoms and he would like to come in for an appointment. When does the provider want the pt in and if the visit needs to be virtual.

## 2024-09-27 DIAGNOSIS — I10 PRIMARY HYPERTENSION: ICD-10-CM

## 2024-09-27 RX ORDER — OLMESARTAN MEDOXOMIL 5 MG/1
5 TABLET ORAL DAILY
Qty: 90 TABLET | Refills: 1 | Status: SHIPPED | OUTPATIENT
Start: 2024-09-27

## 2024-10-02 RX ORDER — SEMAGLUTIDE 2.68 MG/ML
INJECTION, SOLUTION SUBCUTANEOUS
Qty: 9 ML | Refills: 0 | Status: SHIPPED | OUTPATIENT
Start: 2024-10-02

## 2024-10-09 DIAGNOSIS — I10 PRIMARY HYPERTENSION: ICD-10-CM

## 2024-10-09 RX ORDER — AMLODIPINE BESYLATE 5 MG/1
5 TABLET ORAL DAILY
Qty: 90 TABLET | Refills: 3 | Status: SHIPPED | OUTPATIENT
Start: 2024-10-09

## 2024-10-09 NOTE — TELEPHONE ENCOUNTER
Last appointment: 09/10/2024 Virtual visit MD Nichols    Next appointment: 11/19/2024 MD Nichols   Previous refill encounter(s):   03/29/2024 Norvasc #90 with 1 refill.     For Pharmacy Admin Tracking Only    Program: Medication Refill  Intervention Detail: New Rx: 1, reason: Patient Preference  Time Spent (min): 5  Requested Prescriptions     Pending Prescriptions Disp Refills    amLODIPine (NORVASC) 5 MG tablet 90 tablet 0     Sig: Take 1 tablet by mouth daily

## 2024-11-14 DIAGNOSIS — F51.01 PRIMARY INSOMNIA: Primary | ICD-10-CM

## 2024-11-14 RX ORDER — TRAZODONE HYDROCHLORIDE 50 MG/1
50 TABLET, FILM COATED ORAL NIGHTLY
Qty: 90 TABLET | Refills: 0 | Status: SHIPPED | OUTPATIENT
Start: 2024-11-14

## 2024-11-14 NOTE — TELEPHONE ENCOUNTER
Last appointment: 09/10/2024 Virtual visit MD Nichols   Next appointment: 11/19/2024 MD Nichols   Previous refill encounter(s):   05/24/2024 Desyrel #90 with 1 refill.     For Pharmacy Admin Tracking Only    Program: Medication Refill  Intervention Detail: New Rx: 1, reason: Patient Preference  Time Spent (min): 5    Requested Prescriptions     Pending Prescriptions Disp Refills    traZODone (DESYREL) 50 MG tablet 90 tablet 0     Sig: Take 1 tablet by mouth nightly

## 2024-11-19 ENCOUNTER — OFFICE VISIT (OUTPATIENT)
Age: 59
End: 2024-11-19
Payer: COMMERCIAL

## 2024-11-19 VITALS
TEMPERATURE: 98.8 F | DIASTOLIC BLOOD PRESSURE: 87 MMHG | HEART RATE: 80 BPM | BODY MASS INDEX: 30.51 KG/M2 | SYSTOLIC BLOOD PRESSURE: 132 MMHG | OXYGEN SATURATION: 96 % | WEIGHT: 206.6 LBS

## 2024-11-19 DIAGNOSIS — M54.50 CHRONIC LOW BACK PAIN, UNSPECIFIED BACK PAIN LATERALITY, UNSPECIFIED WHETHER SCIATICA PRESENT: ICD-10-CM

## 2024-11-19 DIAGNOSIS — F33.2 SEVERE EPISODE OF RECURRENT MAJOR DEPRESSIVE DISORDER, WITHOUT PSYCHOTIC FEATURES (HCC): ICD-10-CM

## 2024-11-19 DIAGNOSIS — N18.4 CKD (CHRONIC KIDNEY DISEASE) STAGE 4, GFR 15-29 ML/MIN (HCC): ICD-10-CM

## 2024-11-19 DIAGNOSIS — G89.29 CHRONIC LOW BACK PAIN, UNSPECIFIED BACK PAIN LATERALITY, UNSPECIFIED WHETHER SCIATICA PRESENT: ICD-10-CM

## 2024-11-19 DIAGNOSIS — E78.2 MIXED HYPERLIPIDEMIA: ICD-10-CM

## 2024-11-19 DIAGNOSIS — I10 PRIMARY HYPERTENSION: Primary | ICD-10-CM

## 2024-11-19 DIAGNOSIS — E11.21 TYPE 2 DIABETES WITH NEPHROPATHY (HCC): ICD-10-CM

## 2024-11-19 PROCEDURE — 99214 OFFICE O/P EST MOD 30 MIN: CPT | Performed by: STUDENT IN AN ORGANIZED HEALTH CARE EDUCATION/TRAINING PROGRAM

## 2024-11-19 PROCEDURE — 3079F DIAST BP 80-89 MM HG: CPT | Performed by: STUDENT IN AN ORGANIZED HEALTH CARE EDUCATION/TRAINING PROGRAM

## 2024-11-19 PROCEDURE — 3075F SYST BP GE 130 - 139MM HG: CPT | Performed by: STUDENT IN AN ORGANIZED HEALTH CARE EDUCATION/TRAINING PROGRAM

## 2024-11-19 PROCEDURE — 3044F HG A1C LEVEL LT 7.0%: CPT | Performed by: STUDENT IN AN ORGANIZED HEALTH CARE EDUCATION/TRAINING PROGRAM

## 2024-11-19 ASSESSMENT — ENCOUNTER SYMPTOMS
NAUSEA: 0
COUGH: 0
DIARRHEA: 0
VOMITING: 0
CONSTIPATION: 0
BLOOD IN STOOL: 0
ABDOMINAL PAIN: 0
SHORTNESS OF BREATH: 1

## 2024-11-19 ASSESSMENT — PATIENT HEALTH QUESTIONNAIRE - PHQ9
SUM OF ALL RESPONSES TO PHQ QUESTIONS 1-9: 0
SUM OF ALL RESPONSES TO PHQ QUESTIONS 1-9: 0
SUM OF ALL RESPONSES TO PHQ9 QUESTIONS 1 & 2: 0
SUM OF ALL RESPONSES TO PHQ QUESTIONS 1-9: 0
2. FEELING DOWN, DEPRESSED OR HOPELESS: NOT AT ALL
SUM OF ALL RESPONSES TO PHQ QUESTIONS 1-9: 0
1. LITTLE INTEREST OR PLEASURE IN DOING THINGS: NOT AT ALL

## 2024-11-19 NOTE — PROGRESS NOTES
Siddharth Ricci (:  1965) is a 59 y.o. male, Established patient, here for evaluation of the following chief complaint(s):  Follow-up        Subjective   SUBJECTIVE/OBJECTIVE:  Patient presents today for follow-up for the following:    Chronic problems:  Diabetes - Sees Dr. Espino - endocrinology, last A1c was 6.6. Urine microalbumin/cr was normal. Both done on 23.  - Medications: Ozempic 2 mg/dose, Farxiga 10 mg daily  - On statin - Rosuvastatin 10 mg daily  - Last eye visit 10/2024 at [facility]     Anxiety/mood disorder  - On sertraline 100 mg daily - this is working well for the patient  - Has had more stress/depressive symptoms recent, some trouble focusing  - Medication does help, and he has been on it for most of this year     Hypertension  - BP today: 132/87  - Medications: Olmesartan 10 mg daily, amlodipine 5 mg daily      - Compliance: yes  - Reviewed over Diet and exercise:   - Recommended low salt diet, DASH diet  - Recommended 150 mins mod intensity weekly     Erectile dysfunction  - Has Viagra 25 mg as needed - has not needed recently    Preventative care:  Health Maintenance Due   Topic Date Due    Hepatitis B vaccine (1 of 3 - 19+ 3-dose series) Never done    Diabetic retinal exam  2019          Past Medical History:   Diagnosis Date    Arthritis     Borderline diabetic     Depression 23    On medication    Diabetes (HCC)     Family history of premature CAD     Hypertension     Obesity (BMI 30-39.9) 2018    Severe obesity with body mass index (BMI) of 35.0 to 39.9 with serious comorbidity 2018     Past Surgical History:   Procedure Laterality Date    COLONOSCOPY  2020    Dr White, needs 5 year follow up, hemorrhoids and poly    ORTHOPEDIC SURGERY      right knee      Family History   Problem Relation Age of Onset    Diabetes Father         Diabetes    Heart Disease Father     Hypertension Father     Elevated Lipids Father     Glaucoma Father

## 2024-11-19 NOTE — PROGRESS NOTES
RM14    Chief Complaint   Patient presents with    Follow-up       Vitals:    11/19/24 1548   BP: 132/87   Site: Left Upper Arm   Position: Sitting   Pulse: 80   Temp: 98.8 °F (37.1 °C)   TempSrc: Oral   SpO2: 96%   Weight: 93.7 kg (206 lb 9.6 oz)       \"Have you been to the ER, urgent care clinic since your last visit?  Hospitalized since your last visit?\"    NO    “Have you seen or consulted any other health care providers outside of [facility] since your last visit?”    NO            Click Here for Release of Records Request   AVS  education, follow up, and recommendations provided and addressed with patient.  services used to advise patient No

## 2024-12-01 DIAGNOSIS — I10 ESSENTIAL (PRIMARY) HYPERTENSION: ICD-10-CM

## 2024-12-01 DIAGNOSIS — N18.32 TYPE 2 DIABETES MELLITUS WITH STAGE 3B CHRONIC KIDNEY DISEASE, WITHOUT LONG-TERM CURRENT USE OF INSULIN (HCC): ICD-10-CM

## 2024-12-01 DIAGNOSIS — E78.2 MIXED HYPERLIPIDEMIA: ICD-10-CM

## 2024-12-01 DIAGNOSIS — E11.22 TYPE 2 DIABETES MELLITUS WITH STAGE 3B CHRONIC KIDNEY DISEASE, WITHOUT LONG-TERM CURRENT USE OF INSULIN (HCC): ICD-10-CM

## 2024-12-04 RX ORDER — DAPAGLIFLOZIN 10 MG/1
10 TABLET, FILM COATED ORAL DAILY
Qty: 90 TABLET | Refills: 1 | Status: SHIPPED | OUTPATIENT
Start: 2024-12-04

## 2024-12-05 LAB — HBA1C MFR BLD HPLC: 7 %

## 2024-12-13 ENCOUNTER — OFFICE VISIT (OUTPATIENT)
Age: 59
End: 2024-12-13
Payer: COMMERCIAL

## 2024-12-13 VITALS
HEIGHT: 69 IN | BODY MASS INDEX: 29.92 KG/M2 | SYSTOLIC BLOOD PRESSURE: 114 MMHG | WEIGHT: 202 LBS | HEART RATE: 81 BPM | DIASTOLIC BLOOD PRESSURE: 78 MMHG

## 2024-12-13 DIAGNOSIS — I10 ESSENTIAL (PRIMARY) HYPERTENSION: ICD-10-CM

## 2024-12-13 DIAGNOSIS — E11.22 TYPE 2 DIABETES MELLITUS WITH STAGE 3B CHRONIC KIDNEY DISEASE, WITHOUT LONG-TERM CURRENT USE OF INSULIN (HCC): Primary | ICD-10-CM

## 2024-12-13 DIAGNOSIS — E78.2 MIXED HYPERLIPIDEMIA: ICD-10-CM

## 2024-12-13 DIAGNOSIS — N18.32 TYPE 2 DIABETES MELLITUS WITH STAGE 3B CHRONIC KIDNEY DISEASE, WITHOUT LONG-TERM CURRENT USE OF INSULIN (HCC): Primary | ICD-10-CM

## 2024-12-13 PROCEDURE — 3044F HG A1C LEVEL LT 7.0%: CPT | Performed by: INTERNAL MEDICINE

## 2024-12-13 PROCEDURE — 99214 OFFICE O/P EST MOD 30 MIN: CPT | Performed by: INTERNAL MEDICINE

## 2024-12-13 PROCEDURE — 3074F SYST BP LT 130 MM HG: CPT | Performed by: INTERNAL MEDICINE

## 2024-12-13 PROCEDURE — 3078F DIAST BP <80 MM HG: CPT | Performed by: INTERNAL MEDICINE

## 2024-12-13 RX ORDER — SEMAGLUTIDE 2.68 MG/ML
INJECTION, SOLUTION SUBCUTANEOUS
Qty: 9 ML | Refills: 1 | Status: SHIPPED | OUTPATIENT
Start: 2024-12-13

## 2024-12-13 NOTE — PROGRESS NOTES
Chief Complaint   Patient presents with    Diabetes     Pcp and pharmacy verified     History of Present Illness: Siddharth Ricci is a 59 y.o. male here for follow up of diabetes.  He was diagnosed with diabetes 2015 when his A1C was >8%.    \"I took a behavior test, looking for dislexia, but it came back positive for manic-depression. I have now been on zoloft for the last 2 months. I have been feeling better, but my weight did go up some. I was up to 204, but today I was 198 pounds.  I recently started working of dietary changes, I have cut out sweets and sugars in August. So far things are doing well.\"    \"I am still struggling with my weight but the meds are helping and keeping me stabilized.\"    \"I had COVID in September and my sugars were higher during that time.\"      \"My goal weight is 180, but I have not been able to be physical active as I have been in the past.\"  His weight today was 202 pounds.    Pt is taking Farxiga 10mg daily and Ozempic 2.0mg every Monday.    He has been testing his BG about twice per week in the morning. His FBG have been running in the 90-110s in the morning and 110-120s in the evening..  He denies issues of hypoglycemia.    His A1C last week was 7.0%.    He is following with Dr. Coby Hanna of Nephrology for CKD IV. \"I saw him on 12/4/24 and he said my kidney function is stable\".      Pt wakes around 4AM  - \"I am still doing the intermittent fasting, I eat from 1130AM-730PM. I am also working on a Mediterranean diet.\"  - His first meal is 1130AM, yesterday he had a 2 chicken wraps and water.   - He has been cutting out the afternoon snack.   - He has dinner around 6PM, last night he had 3 chicken tenders and crystal lite.     No history of vascular disease.      Pt has been diagnosed with neuropathy and has CKD IV and is followed by Dr. Coby Vail.  His eGFR in June 2024 was 29.    Last eye exam was September 2024, no retinopathy. \"They said my eyes looked

## 2025-01-23 DIAGNOSIS — F33.2 SEVERE EPISODE OF RECURRENT MAJOR DEPRESSIVE DISORDER, WITHOUT PSYCHOTIC FEATURES (HCC): ICD-10-CM

## 2025-01-23 RX ORDER — SERTRALINE HYDROCHLORIDE 100 MG/1
100 TABLET, FILM COATED ORAL DAILY
Qty: 90 TABLET | Refills: 1 | Status: SHIPPED | OUTPATIENT
Start: 2025-01-23

## 2025-01-23 NOTE — TELEPHONE ENCOUNTER
Per hx pt is taking the Zoloft 100 mg along with the Zoloft 50 mg = 150 mg.     Last appointment: 11/19/2024 MD Nichols   Next appointment: Nothing scheduled   Previous refill encounter(s):   05/01/2024 Zoloft #90 with 2 refills.     For Pharmacy Admin Tracking Only    Program: Medication Refill  Intervention Detail: New Rx: 1, reason: Patient Preference  Time Spent (min): 5    Requested Prescriptions     Pending Prescriptions Disp Refills    sertraline (ZOLOFT) 100 MG tablet 90 tablet 0     Sig: Take 1 tablet by mouth daily

## 2025-02-11 DIAGNOSIS — F51.01 PRIMARY INSOMNIA: ICD-10-CM

## 2025-02-11 NOTE — TELEPHONE ENCOUNTER
Last appointment: 11/19/2024 MD Nichols   Next appointment: Advised to follow up in 6 months (05/2025) - Nothing scheduled   Previous refill encounter(s):   11/14/2024 Desyrel #90    For Pharmacy Admin Tracking Only    Program: Medication Refill  Intervention Detail: New Rx: 1, reason: Patient Preference  Time Spent (min): 5    Requested Prescriptions     Pending Prescriptions Disp Refills    traZODone (DESYREL) 50 MG tablet 90 tablet 0     Sig: Take 1 tablet by mouth nightly

## 2025-02-12 RX ORDER — TRAZODONE HYDROCHLORIDE 50 MG/1
50 TABLET, FILM COATED ORAL NIGHTLY
Qty: 90 TABLET | Refills: 1 | Status: SHIPPED | OUTPATIENT
Start: 2025-02-12

## 2025-03-24 DIAGNOSIS — I10 PRIMARY HYPERTENSION: ICD-10-CM

## 2025-03-24 RX ORDER — OLMESARTAN MEDOXOMIL 5 MG/1
5 TABLET ORAL DAILY
Qty: 90 TABLET | Refills: 2 | Status: SHIPPED | OUTPATIENT
Start: 2025-03-24

## 2025-03-24 NOTE — TELEPHONE ENCOUNTER
Last appointment: 11/19/2024 MD Nichols   Next appointment: Advised to follow up in 6 moths (05/25) - Nothing scheduled   Previous refill encounter(s):   09/27/2024 Benicar #90 with 1 refill.     For Pharmacy Admin Tracking Only    Program: Medication Refill  Intervention Detail: New Rx: 1, reason: Patient Preference  Time Spent (min): 5    Requested Prescriptions     Pending Prescriptions Disp Refills    olmesartan (BENICAR) 5 MG tablet 90 tablet 1     Sig: Take 1 tablet by mouth daily

## 2025-04-01 DIAGNOSIS — N18.32 TYPE 2 DIABETES MELLITUS WITH STAGE 3B CHRONIC KIDNEY DISEASE, WITHOUT LONG-TERM CURRENT USE OF INSULIN (HCC): ICD-10-CM

## 2025-04-01 DIAGNOSIS — E11.22 TYPE 2 DIABETES MELLITUS WITH STAGE 3B CHRONIC KIDNEY DISEASE, WITHOUT LONG-TERM CURRENT USE OF INSULIN (HCC): ICD-10-CM

## 2025-04-01 DIAGNOSIS — I10 ESSENTIAL (PRIMARY) HYPERTENSION: ICD-10-CM

## 2025-04-01 DIAGNOSIS — E78.2 MIXED HYPERLIPIDEMIA: ICD-10-CM

## 2025-04-10 LAB
ALBUMIN SERPL-MCNC: 4.8 G/DL (ref 3.8–4.9)
ALBUMIN/CREAT UR: 4 MG/G CREAT (ref 0–29)
ALP SERPL-CCNC: 72 IU/L (ref 44–121)
ALT SERPL-CCNC: 36 IU/L (ref 0–44)
AST SERPL-CCNC: 31 IU/L (ref 0–40)
BILIRUB SERPL-MCNC: 0.3 MG/DL (ref 0–1.2)
BUN SERPL-MCNC: 21 MG/DL (ref 6–24)
BUN/CREAT SERPL: 9 (ref 9–20)
CALCIUM SERPL-MCNC: 9.9 MG/DL (ref 8.7–10.2)
CHLORIDE SERPL-SCNC: 103 MMOL/L (ref 96–106)
CHOLEST SERPL-MCNC: 184 MG/DL (ref 100–199)
CO2 SERPL-SCNC: 22 MMOL/L (ref 20–29)
CREAT SERPL-MCNC: 2.26 MG/DL (ref 0.76–1.27)
CREAT UR-MCNC: 96.9 MG/DL
EGFRCR SERPLBLD CKD-EPI 2021: 33 ML/MIN/1.73
GLOBULIN SER CALC-MCNC: 2.8 G/DL (ref 1.5–4.5)
GLUCOSE SERPL-MCNC: 110 MG/DL (ref 70–99)
HBA1C MFR BLD: 6.9 % (ref 4.8–5.6)
HDLC SERPL-MCNC: 38 MG/DL
IMP & REVIEW OF LAB RESULTS: NORMAL
LDLC SERPL CALC-MCNC: 100 MG/DL (ref 0–99)
Lab: NORMAL
MICROALBUMIN UR-MCNC: 3.8 UG/ML
POTASSIUM SERPL-SCNC: 4.7 MMOL/L (ref 3.5–5.2)
PROT SERPL-MCNC: 7.6 G/DL (ref 6–8.5)
REPORT: NORMAL
REPORT: NORMAL
SODIUM SERPL-SCNC: 141 MMOL/L (ref 134–144)
TRIGL SERPL-MCNC: 271 MG/DL (ref 0–149)
VLDLC SERPL CALC-MCNC: 46 MG/DL (ref 5–40)

## 2025-04-25 RX ORDER — SEMAGLUTIDE 2.68 MG/ML
INJECTION, SOLUTION SUBCUTANEOUS
Qty: 9 ML | Refills: 0 | Status: ACTIVE | OUTPATIENT
Start: 2025-04-25

## 2025-05-27 DIAGNOSIS — Z91.030 ALLERGY TO HONEY BEE VENOM: Primary | ICD-10-CM

## 2025-05-27 RX ORDER — EPINEPHRINE 0.3 MG/.3ML
INJECTION SUBCUTANEOUS
Qty: 2 EACH | Refills: 1 | Status: SHIPPED | OUTPATIENT
Start: 2025-05-27

## 2025-05-27 NOTE — TELEPHONE ENCOUNTER
Last appointment: 11/19/2024 MD Nichols   Next appointment: Nothing scheduled   Previous refill encounter(s):   08/02/2022 Epipen #2 each with 3 refills. Last prescribed by CELESTINA Anaya.     For Pharmacy Admin Tracking Only    Program: Medication Refill  Intervention Detail: New Rx: 1, reason: Patient Preference  Time Spent (min): 5    Requested Prescriptions     Pending Prescriptions Disp Refills    EPINEPHrine (EPIPEN) 0.3 MG/0.3ML SOAJ injection 2 each 3     Sig: Inject 0.3 mL by IntraMUSCular route once as needed for Allergic Response for up to 1 dose

## 2025-05-28 RX ORDER — DAPAGLIFLOZIN 10 MG/1
10 TABLET, FILM COATED ORAL DAILY
Qty: 90 TABLET | Refills: 1 | Status: ACTIVE | OUTPATIENT
Start: 2025-05-28

## 2025-06-26 ENCOUNTER — OFFICE VISIT (OUTPATIENT)
Age: 60
End: 2025-06-26
Payer: COMMERCIAL

## 2025-06-26 VITALS
SYSTOLIC BLOOD PRESSURE: 114 MMHG | BODY MASS INDEX: 29.53 KG/M2 | WEIGHT: 199.4 LBS | DIASTOLIC BLOOD PRESSURE: 70 MMHG | HEIGHT: 69 IN | HEART RATE: 84 BPM

## 2025-06-26 DIAGNOSIS — E78.2 MIXED HYPERLIPIDEMIA: ICD-10-CM

## 2025-06-26 DIAGNOSIS — I10 ESSENTIAL (PRIMARY) HYPERTENSION: ICD-10-CM

## 2025-06-26 DIAGNOSIS — N18.32 TYPE 2 DIABETES MELLITUS WITH STAGE 3B CHRONIC KIDNEY DISEASE, WITHOUT LONG-TERM CURRENT USE OF INSULIN (HCC): Primary | ICD-10-CM

## 2025-06-26 DIAGNOSIS — E11.22 TYPE 2 DIABETES MELLITUS WITH STAGE 3B CHRONIC KIDNEY DISEASE, WITHOUT LONG-TERM CURRENT USE OF INSULIN (HCC): Primary | ICD-10-CM

## 2025-06-26 PROCEDURE — 3074F SYST BP LT 130 MM HG: CPT | Performed by: INTERNAL MEDICINE

## 2025-06-26 PROCEDURE — 3078F DIAST BP <80 MM HG: CPT | Performed by: INTERNAL MEDICINE

## 2025-06-26 PROCEDURE — 99214 OFFICE O/P EST MOD 30 MIN: CPT | Performed by: INTERNAL MEDICINE

## 2025-06-26 PROCEDURE — 3044F HG A1C LEVEL LT 7.0%: CPT | Performed by: INTERNAL MEDICINE

## 2025-06-26 NOTE — PROGRESS NOTES
Chief Complaint   Patient presents with    Diabetes     Pcp and pharmacy verified     History of Present Illness: Siddharth Ricci is a 59 y.o. male here for follow up of diabetes.  He was diagnosed with diabetes 2015 when his A1C was >8%.    \"I took a behavior test, looking for dislexia, but it came back positive for manic-depression. I don't feel the Zoloft is helping. I do need to get back with my therapist. I have bee walking 2 miles per day.  His weight today is down from 202 pounds in December 2024 to 199 pounds.    \"I had a viral infection I [facility] and [facility] gave me a prescription cough medicine. That was 2 months ago.     Pt is taking Farxiga 10mg daily and Ozempic 2.0mg every Monday.    He has been testing his BG about twice per week in the morning. His FBG have been running in the 90-110s.  He denies issues of hypoglycemia.    His A1C in April 2025 was 6.9%. .    Pt wakes around 4AM  - \"I am still doing the intermittent fasting, I eat from 1130AM-730PM. I am also working on a Mediterranean diet.\"  - His first meal is 1130AM-Noon, yesterday he had baked chicken and cheese wrap, chips, blackberries and water.   - He has been having an afternoon snack of fruit, or chips.  - He has dinner around 6-7PM, last night he had a BBQ sandwich, chips and crystal lite.  - He denies having an evening snack.     No history of vascular disease.      Pt has been diagnosed with neuropathy and has CKD IV and is followed by Dr. Coby Vail.  His eGFR in April 2025 was 33.     \"I am seeing someone next week for carpel tunnel.\"    Last eye exam was September 2024, no retinopathy. \"They said my eyes looked fine.\"    Current Outpatient Medications   Medication Sig    dapagliflozin (FARXIGA) 10 MG tablet Take 1 tablet by mouth once daily    EPINEPHrine (EPIPEN) 0.3 MG/0.3ML SOAJ injection Inject 0.3 mL by IntraMUSCular route once as needed for Allergic Response for up to 1 dose    semaglutide, 2 MG/DOSE, (OZEMPIC, 2

## 2025-07-11 DIAGNOSIS — E78.2 MIXED HYPERLIPIDEMIA: ICD-10-CM

## 2025-07-11 NOTE — TELEPHONE ENCOUNTER
Please route to  after reviewing for scheduling. Thanks    Last appointment: 11/19/2024 MD Nichols  Last labs: 04/09/2025 by Dr. ALEJANDRO Espino  Next appointment: Nothing scheduled   Previous refill encounter(s):   07/24/2024 Crestor #90 with 3 refills.     For Pharmacy Admin Tracking Only    Program: Medication Refill  Intervention Detail: New Rx: 1, reason: Patient Preference  Time Spent (min): 5    Requested Prescriptions     Pending Prescriptions Disp Refills    rosuvastatin (CRESTOR) 10 MG tablet 90 tablet 0     Sig: Take 1 tablet by mouth daily

## 2025-07-12 RX ORDER — ROSUVASTATIN CALCIUM 10 MG/1
10 TABLET, COATED ORAL DAILY
Qty: 90 TABLET | Refills: 0 | Status: SHIPPED | OUTPATIENT
Start: 2025-07-12

## 2025-07-12 NOTE — TELEPHONE ENCOUNTER
Medication refilled. No further refills until patient is seen. Please schedule patient for follow-up appointment.    Diane Nichols MD

## 2025-07-18 DIAGNOSIS — F33.2 SEVERE EPISODE OF RECURRENT MAJOR DEPRESSIVE DISORDER, WITHOUT PSYCHOTIC FEATURES (HCC): ICD-10-CM

## 2025-07-18 RX ORDER — SERTRALINE HYDROCHLORIDE 100 MG/1
100 TABLET, FILM COATED ORAL DAILY
Qty: 90 TABLET | Refills: 1 | Status: SHIPPED | OUTPATIENT
Start: 2025-07-18

## 2025-07-18 NOTE — TELEPHONE ENCOUNTER
Last appointment: 11/19/2024 MD Nichols   Next appointment: Nothing scheduled   Previous refill encounter(s):   01/23/2025 Zoloft #90 with 1 refill.     For Pharmacy Admin Tracking Only    Program: Medication Refill  Intervention Detail: New Rx: 1, reason: Patient Preference  Time Spent (min): 5  Requested Prescriptions     Pending Prescriptions Disp Refills    sertraline (ZOLOFT) 100 MG tablet 90 tablet 1     Sig: Take 1 tablet by mouth daily

## 2025-07-29 RX ORDER — SEMAGLUTIDE 2.68 MG/ML
INJECTION, SOLUTION SUBCUTANEOUS
Qty: 9 ML | Refills: 1 | Status: ACTIVE | OUTPATIENT
Start: 2025-07-29

## 2025-08-07 DIAGNOSIS — F51.01 PRIMARY INSOMNIA: ICD-10-CM

## 2025-08-07 RX ORDER — TRAZODONE HYDROCHLORIDE 50 MG/1
50 TABLET ORAL NIGHTLY
Qty: 90 TABLET | Refills: 0 | Status: SHIPPED | OUTPATIENT
Start: 2025-08-07

## 2025-08-22 DIAGNOSIS — J30.1 SEASONAL ALLERGIC RHINITIS DUE TO POLLEN: ICD-10-CM

## 2025-08-24 RX ORDER — MONTELUKAST SODIUM 10 MG/1
10 TABLET ORAL DAILY
Qty: 30 TABLET | Refills: 0 | Status: SHIPPED | OUTPATIENT
Start: 2025-08-24